# Patient Record
Sex: MALE | Race: WHITE | Employment: OTHER | URBAN - METROPOLITAN AREA
[De-identification: names, ages, dates, MRNs, and addresses within clinical notes are randomized per-mention and may not be internally consistent; named-entity substitution may affect disease eponyms.]

---

## 2017-03-06 ENCOUNTER — HOSPITAL ENCOUNTER (OUTPATIENT)
Dept: PREADMISSION TESTING | Age: 72
Discharge: HOME | End: 2017-03-06
Payer: MEDICARE

## 2017-03-06 VITALS
BODY MASS INDEX: 26.96 KG/M2 | WEIGHT: 182 LBS | TEMPERATURE: 97.7 F | SYSTOLIC BLOOD PRESSURE: 125 MMHG | HEIGHT: 69 IN | HEART RATE: 54 BPM | DIASTOLIC BLOOD PRESSURE: 76 MMHG | RESPIRATION RATE: 18 BRPM

## 2017-03-06 LAB
ABO + RH BLD: NORMAL
ANION GAP BLD CALC-SCNC: 6 MMOL/L (ref 5–15)
APPEARANCE UR: CLEAR
BACTERIA URNS QL MICRO: NEGATIVE /HPF
BILIRUB UR QL: NEGATIVE
BLOOD GROUP ANTIBODIES SERPL: NORMAL
BUN SERPL-MCNC: 23 MG/DL (ref 6–20)
BUN/CREAT SERPL: 18 (ref 12–20)
CALCIUM SERPL-MCNC: 9.3 MG/DL (ref 8.5–10.1)
CHLORIDE SERPL-SCNC: 101 MMOL/L (ref 97–108)
CO2 SERPL-SCNC: 31 MMOL/L (ref 21–32)
COLOR UR: NORMAL
CREAT SERPL-MCNC: 1.26 MG/DL (ref 0.7–1.3)
EPITH CASTS URNS QL MICRO: NORMAL /LPF
ERYTHROCYTE [DISTWIDTH] IN BLOOD BY AUTOMATED COUNT: 13.3 % (ref 11.5–14.5)
EST. AVERAGE GLUCOSE BLD GHB EST-MCNC: 128 MG/DL
GLUCOSE SERPL-MCNC: 93 MG/DL (ref 65–100)
GLUCOSE UR STRIP.AUTO-MCNC: NEGATIVE MG/DL
HBA1C MFR BLD: 6.1 % (ref 4.2–6.3)
HCT VFR BLD AUTO: 50 % (ref 36.6–50.3)
HGB BLD-MCNC: 16.5 G/DL (ref 12.1–17)
HGB UR QL STRIP: NEGATIVE
HYALINE CASTS URNS QL MICRO: NORMAL /LPF (ref 0–5)
INR PPP: 1 (ref 0.9–1.1)
KETONES UR QL STRIP.AUTO: NEGATIVE MG/DL
LEUKOCYTE ESTERASE UR QL STRIP.AUTO: NEGATIVE
MCH RBC QN AUTO: 31.5 PG (ref 26–34)
MCHC RBC AUTO-ENTMCNC: 33 G/DL (ref 30–36.5)
MCV RBC AUTO: 95.4 FL (ref 80–99)
NITRITE UR QL STRIP.AUTO: NEGATIVE
PH UR STRIP: 5 [PH] (ref 5–8)
PLATELET # BLD AUTO: 247 K/UL (ref 150–400)
POTASSIUM SERPL-SCNC: 4.5 MMOL/L (ref 3.5–5.1)
PROT UR STRIP-MCNC: NEGATIVE MG/DL
PROTHROMBIN TIME: 10.4 SEC (ref 9–11.1)
RBC # BLD AUTO: 5.24 M/UL (ref 4.1–5.7)
RBC #/AREA URNS HPF: NORMAL /HPF (ref 0–5)
SODIUM SERPL-SCNC: 138 MMOL/L (ref 136–145)
SP GR UR REFRACTOMETRY: 1.01 (ref 1–1.03)
SPECIMEN EXP DATE BLD: NORMAL
UA: UC IF INDICATED,UAUC: NORMAL
UROBILINOGEN UR QL STRIP.AUTO: 0.2 EU/DL (ref 0.2–1)
WBC # BLD AUTO: 5.8 K/UL (ref 4.1–11.1)
WBC URNS QL MICRO: NORMAL /HPF (ref 0–4)

## 2017-03-06 PROCEDURE — 86900 BLOOD TYPING SEROLOGIC ABO: CPT | Performed by: ORTHOPAEDIC SURGERY

## 2017-03-06 PROCEDURE — 80048 BASIC METABOLIC PNL TOTAL CA: CPT | Performed by: ORTHOPAEDIC SURGERY

## 2017-03-06 PROCEDURE — 85027 COMPLETE CBC AUTOMATED: CPT | Performed by: ORTHOPAEDIC SURGERY

## 2017-03-06 PROCEDURE — 83036 HEMOGLOBIN GLYCOSYLATED A1C: CPT | Performed by: ORTHOPAEDIC SURGERY

## 2017-03-06 PROCEDURE — 36415 COLL VENOUS BLD VENIPUNCTURE: CPT | Performed by: ORTHOPAEDIC SURGERY

## 2017-03-06 PROCEDURE — 85610 PROTHROMBIN TIME: CPT | Performed by: ORTHOPAEDIC SURGERY

## 2017-03-06 PROCEDURE — 81001 URINALYSIS AUTO W/SCOPE: CPT | Performed by: ORTHOPAEDIC SURGERY

## 2017-03-06 RX ORDER — IBUPROFEN 800 MG/1
800 TABLET ORAL
COMMUNITY
End: 2017-04-08

## 2017-03-06 RX ORDER — ATORVASTATIN CALCIUM 40 MG/1
20 TABLET, FILM COATED ORAL
COMMUNITY

## 2017-03-06 RX ORDER — LEVOTHYROXINE SODIUM 200 UG/1
100 TABLET ORAL
COMMUNITY
End: 2020-11-24

## 2017-03-06 RX ORDER — ENALAPRIL MALEATE 10 MG/1
20 TABLET ORAL
COMMUNITY

## 2017-03-06 RX ORDER — DESONIDE 0.5 MG/G
CREAM TOPICAL AS NEEDED
COMMUNITY
End: 2020-11-24

## 2017-03-06 RX ORDER — LEVOTHYROXINE SODIUM 200 UG/1
200 TABLET ORAL
COMMUNITY

## 2017-03-06 RX ORDER — SILDENAFIL CITRATE 20 MG/1
20 TABLET ORAL AS NEEDED
COMMUNITY
End: 2022-10-20

## 2017-03-06 RX ORDER — KETOCONAZOLE 20 MG/G
CREAM TOPICAL AS NEEDED
COMMUNITY
End: 2020-11-24

## 2017-03-06 RX ORDER — EPINEPHRINE 0.3 MG/.3ML
0.3 INJECTION SUBCUTANEOUS
COMMUNITY

## 2017-03-06 NOTE — PERIOP NOTES
Preoperative instructions reviewed with patient. Patient given six packs of CHG wipes. Instructions( reviewed in class) on use of CHG wipes. Patient given SSI infection FAQS sheet, as well as a  MRSA/MSSA treatment instruction sheet  With an explanation to patient that they will be notified if treatment instructions need to be initiated. Patient was given the opportunity to ask questions on the information provided. ORTHOPEDIC PRE-OP ASSESSMENT AND PLANNING FORM SENT FOR SCANNING.

## 2017-03-07 LAB
BACTERIA SPEC CULT: NORMAL
BACTERIA SPEC CULT: NORMAL
SERVICE CMNT-IMP: NORMAL

## 2017-04-05 ENCOUNTER — ANESTHESIA EVENT (OUTPATIENT)
Dept: SURGERY | Age: 72
DRG: 470 | End: 2017-04-05
Payer: MEDICARE

## 2017-04-05 RX ORDER — DEXAMETHASONE SODIUM PHOSPHATE 100 MG/10ML
10 INJECTION INTRAMUSCULAR; INTRAVENOUS ONCE
Status: CANCELLED | OUTPATIENT
Start: 2017-04-05 | End: 2017-04-05

## 2017-04-05 RX ORDER — CEFAZOLIN SODIUM IN 0.9 % NACL 2 G/50 ML
2 INTRAVENOUS SOLUTION, PIGGYBACK (ML) INTRAVENOUS ONCE
Status: CANCELLED | OUTPATIENT
Start: 2017-04-05 | End: 2017-04-05

## 2017-04-05 RX ORDER — PREGABALIN 75 MG/1
75 CAPSULE ORAL ONCE
Status: CANCELLED | OUTPATIENT
Start: 2017-04-05 | End: 2017-04-05

## 2017-04-05 RX ORDER — ACETAMINOPHEN 500 MG
1000 TABLET ORAL ONCE
Status: CANCELLED | OUTPATIENT
Start: 2017-04-05 | End: 2017-04-05

## 2017-04-05 NOTE — H&P
Mr. Rosanna Chin presents for right total knee replacement.  Has significant pain in the right knee at start up and with weightbearing activities.  Mechanical symptoms in the right knee without falls. [facility] now has wakening night pain. [facility] has had corticosteroid injections which provided very short term relief.  Over-the-counter anti-inflammatories at prescription strength doses have not helped. [facility] is very unhappy with progressive pain and dysfunction, poor quality of life. Past Medical History:   Diagnosis Date    Arthritis     CAD (coronary artery disease)     stent x 1 2001 LDA    Chronic lower back pain     Dr John Pike     Chronic pain     right knee    Hypercholesteremia     Hypertension     Hypothyroidism      Past Surgical History:   Procedure Laterality Date    HX CATARACT REMOVAL Right     2/16/17    HX CORONARY STENT PLACEMENT  2000    Dr Jagdish Flores  approx 2010    @ [place]    HX LUMBAR FUSION  2001     L3-L4 fusion    HX TONSILLECTOMY  age 13     No current facility-administered medications on file prior to encounter. Current Outpatient Prescriptions on File Prior to Encounter   Medication Sig Dispense Refill    aspirin 81 mg chewable tablet Take 81 mg by mouth daily.  furosemide (LASIX) 20 mg tablet Take 20 mg by mouth daily. Allergies   Allergen Reactions    Sulfa (Sulfonamide Antibiotics) Other (comments)     Lower extremity redness     Family History   Problem Relation Age of Onset    Hypertension Mother     Heart Disease Father     No Known Problems Sister     No Known Problems Brother     Anesth Problems Neg Hx      Social History     Social History    Marital status:      Spouse name: N/A    Number of children: N/A    Years of education: N/A     Occupational History    Not on file.      Social History Main Topics    Smoking status: Never Smoker    Smokeless tobacco: Never Used    Alcohol use 0.6 oz/week     1 Shots of liquor per week Comment: not weekly, drinks socially    Drug use: No    Sexual activity: Not on file     Other Topics Concern    Not on file     Social History Narrative     ROS:  General Not Present- Chills and Fatigue. Skin Not Present- Bruising, Pallor and Skin Color Changes. Respiratory Not Present- Cough and Difficulty Breathing. Cardiovascular Not Present- Chest Pain, Fainting / Blacking Out and Rapid Heart Rate. Musculoskeletal Present- Joint Pain. Not Present- Decreased Range of Motion and Joint Swelling. Neurological Not Present- Dysesthesia, Paresthesias and Weakness In Extremities. Hematology Not Present- Abnormal Bleeding, Blood Clots and Petechiae. Physical Exam   Appears well  Chest CTA  Heart RRR  abd soft NT  Ambulates with antalgia on the right side.  Moderate varus thrust in the right knee on ambulation.  Pain-free active and passive right hip range of motion.  Negative Stinchfield.  Right knee is in varus with near complete correction to neutral with valgus stress.  Healed arthroscopy portals.  Trace effusion but no warmth.  Tender over medial joint line.  Full active and passive extension with flexion to approximately 95°.  Patella tracks centrally.  Other than pseudo-laxity, no other instability.  No gross motor or sensory deficits distally in lower extremities.  No distal edema.  Symmetrical palpable pulses distally. Prior to X-rays of right knee demonstrate severe medial compartment arthritis with complete loss of medial joint space and tibial erosion. Imp/Plan:  Severe osteoarthritis right knee with progressive symptoms despite comprehensive conservative treatment measures.  Proceed with right total knee replacement.  Discussed risks and benefits in detail as well as anticipated hospital stay and course of rehabilitation.  All questions answered. Plan use IV tranexamic acid intraoperatively, aspirin for DVT prophylaxis.     Elke Flores MD

## 2017-04-06 ENCOUNTER — HOSPITAL ENCOUNTER (INPATIENT)
Age: 72
LOS: 2 days | Discharge: HOME HEALTH | DRG: 470 | End: 2017-04-08
Attending: ORTHOPAEDIC SURGERY | Admitting: ORTHOPAEDIC SURGERY
Payer: MEDICARE

## 2017-04-06 ENCOUNTER — ANESTHESIA (OUTPATIENT)
Dept: SURGERY | Age: 72
DRG: 470 | End: 2017-04-06
Payer: MEDICARE

## 2017-04-06 PROBLEM — M17.10 PRIMARY LOCALIZED OSTEOARTHROSIS OF THE KNEE: Status: ACTIVE | Noted: 2017-04-06

## 2017-04-06 LAB
ABO + RH BLD: NORMAL
BLOOD GROUP ANTIBODIES SERPL: NORMAL
GLUCOSE BLD STRIP.AUTO-MCNC: 94 MG/DL (ref 65–100)
SERVICE CMNT-IMP: NORMAL
SPECIMEN EXP DATE BLD: NORMAL

## 2017-04-06 PROCEDURE — 77030003601 HC NDL NRV BLK BBMI -A

## 2017-04-06 PROCEDURE — 77030018836 HC SOL IRR NACL ICUM -A: Performed by: ORTHOPAEDIC SURGERY

## 2017-04-06 PROCEDURE — C1713 ANCHOR/SCREW BN/BN,TIS/BN: HCPCS | Performed by: ORTHOPAEDIC SURGERY

## 2017-04-06 PROCEDURE — 77030031139 HC SUT VCRL2 J&J -A: Performed by: ORTHOPAEDIC SURGERY

## 2017-04-06 PROCEDURE — 74011250636 HC RX REV CODE- 250/636

## 2017-04-06 PROCEDURE — 77030000032 HC CUF TRNQT ZIMM -B: Performed by: ORTHOPAEDIC SURGERY

## 2017-04-06 PROCEDURE — 74011000258 HC RX REV CODE- 258: Performed by: ORTHOPAEDIC SURGERY

## 2017-04-06 PROCEDURE — 36415 COLL VENOUS BLD VENIPUNCTURE: CPT | Performed by: ORTHOPAEDIC SURGERY

## 2017-04-06 PROCEDURE — 77030011640 HC PAD GRND REM COVD -A: Performed by: ORTHOPAEDIC SURGERY

## 2017-04-06 PROCEDURE — 74011250636 HC RX REV CODE- 250/636: Performed by: ORTHOPAEDIC SURGERY

## 2017-04-06 PROCEDURE — 77030002933 HC SUT MCRYL J&J -A: Performed by: ORTHOPAEDIC SURGERY

## 2017-04-06 PROCEDURE — 76060000036 HC ANESTHESIA 2.5 TO 3 HR: Performed by: ORTHOPAEDIC SURGERY

## 2017-04-06 PROCEDURE — C9290 INJ, BUPIVACAINE LIPOSOME: HCPCS | Performed by: ORTHOPAEDIC SURGERY

## 2017-04-06 PROCEDURE — 97161 PT EVAL LOW COMPLEX 20 MIN: CPT

## 2017-04-06 PROCEDURE — 77030005515 HC CATH URETH FOL14 BARD -B: Performed by: ORTHOPAEDIC SURGERY

## 2017-04-06 PROCEDURE — 77030033067 HC SUT PDO STRATFX SPIR J&J -B: Performed by: ORTHOPAEDIC SURGERY

## 2017-04-06 PROCEDURE — C1776 JOINT DEVICE (IMPLANTABLE): HCPCS | Performed by: ORTHOPAEDIC SURGERY

## 2017-04-06 PROCEDURE — 74011250636 HC RX REV CODE- 250/636: Performed by: ANESTHESIOLOGY

## 2017-04-06 PROCEDURE — 77030007866 HC KT SPN ANES BBMI -B: Performed by: ANESTHESIOLOGY

## 2017-04-06 PROCEDURE — 74011000250 HC RX REV CODE- 250

## 2017-04-06 PROCEDURE — 76010000172 HC OR TIME 2.5 TO 3 HR INTENSV-TIER 1: Performed by: ORTHOPAEDIC SURGERY

## 2017-04-06 PROCEDURE — 65270000029 HC RM PRIVATE

## 2017-04-06 PROCEDURE — 77030020365 HC SOL INJ SOD CL 0.9% 50ML: Performed by: ORTHOPAEDIC SURGERY

## 2017-04-06 PROCEDURE — 77030014077 HC TOWER MX CEM J&J -C: Performed by: ORTHOPAEDIC SURGERY

## 2017-04-06 PROCEDURE — 77030013079 HC BLNKT BAIR HGGR 3M -A: Performed by: ANESTHESIOLOGY

## 2017-04-06 PROCEDURE — 77030016547 HC BLD SAW SAG1 STRY -B: Performed by: ORTHOPAEDIC SURGERY

## 2017-04-06 PROCEDURE — 77030018846 HC SOL IRR STRL H20 ICUM -A: Performed by: ORTHOPAEDIC SURGERY

## 2017-04-06 PROCEDURE — 77030020788: Performed by: ORTHOPAEDIC SURGERY

## 2017-04-06 PROCEDURE — 77030010507 HC ADH SKN DERMBND J&J -B: Performed by: ORTHOPAEDIC SURGERY

## 2017-04-06 PROCEDURE — 86900 BLOOD TYPING SEROLOGIC ABO: CPT | Performed by: ORTHOPAEDIC SURGERY

## 2017-04-06 PROCEDURE — 74011250637 HC RX REV CODE- 250/637: Performed by: ORTHOPAEDIC SURGERY

## 2017-04-06 PROCEDURE — 76210000006 HC OR PH I REC 0.5 TO 1 HR: Performed by: ORTHOPAEDIC SURGERY

## 2017-04-06 PROCEDURE — 82962 GLUCOSE BLOOD TEST: CPT

## 2017-04-06 PROCEDURE — 97116 GAIT TRAINING THERAPY: CPT

## 2017-04-06 PROCEDURE — 77030012935 HC DRSG AQUACEL BMS -B: Performed by: ORTHOPAEDIC SURGERY

## 2017-04-06 PROCEDURE — 74011250636 HC RX REV CODE- 250/636: Performed by: PHYSICIAN ASSISTANT

## 2017-04-06 PROCEDURE — 74011250637 HC RX REV CODE- 250/637: Performed by: PHYSICIAN ASSISTANT

## 2017-04-06 PROCEDURE — 74011000250 HC RX REV CODE- 250: Performed by: ORTHOPAEDIC SURGERY

## 2017-04-06 DEVICE — INSERT TIB RP FEM KNEE CEM: Type: IMPLANTABLE DEVICE | Site: KNEE | Status: FUNCTIONAL

## 2017-04-06 DEVICE — BASEPLATE TIB SZ 6 KNEE CEM FIX BEAR ATTUNE: Type: IMPLANTABLE DEVICE | Site: KNEE | Status: FUNCTIONAL

## 2017-04-06 DEVICE — CEMENT BNE GENTAMICIN 40 GM SMARTSET GMV: Type: IMPLANTABLE DEVICE | Site: KNEE | Status: FUNCTIONAL

## 2017-04-06 DEVICE — COMPONENT PAT DIA38MM POLYETH DOME CEM MEDIALIZED ATTUNE: Type: IMPLANTABLE DEVICE | Site: KNEE | Status: FUNCTIONAL

## 2017-04-06 DEVICE — INSERT TIB SZ 6 THK10MM KNEE POST STBL FIX BEAR ATTUNE: Type: IMPLANTABLE DEVICE | Site: KNEE | Status: FUNCTIONAL

## 2017-04-06 DEVICE — COMPONENT FEM SZ 6 R KNEE POST STBL CEM ATTUNE: Type: IMPLANTABLE DEVICE | Site: KNEE | Status: FUNCTIONAL

## 2017-04-06 RX ORDER — POLYETHYLENE GLYCOL 3350 17 G/17G
17 POWDER, FOR SOLUTION ORAL DAILY
Status: DISCONTINUED | OUTPATIENT
Start: 2017-04-06 | End: 2017-04-08 | Stop reason: HOSPADM

## 2017-04-06 RX ORDER — SODIUM CHLORIDE 9 MG/ML
25 INJECTION, SOLUTION INTRAVENOUS CONTINUOUS
Status: DISCONTINUED | OUTPATIENT
Start: 2017-04-06 | End: 2017-04-06 | Stop reason: HOSPADM

## 2017-04-06 RX ORDER — ONDANSETRON 2 MG/ML
4 INJECTION INTRAMUSCULAR; INTRAVENOUS AS NEEDED
Status: DISCONTINUED | OUTPATIENT
Start: 2017-04-06 | End: 2017-04-06 | Stop reason: HOSPADM

## 2017-04-06 RX ORDER — DIPHENHYDRAMINE HYDROCHLORIDE 50 MG/ML
12.5 INJECTION, SOLUTION INTRAMUSCULAR; INTRAVENOUS AS NEEDED
Status: DISCONTINUED | OUTPATIENT
Start: 2017-04-06 | End: 2017-04-06 | Stop reason: HOSPADM

## 2017-04-06 RX ORDER — MIDAZOLAM HYDROCHLORIDE 1 MG/ML
0.5 INJECTION, SOLUTION INTRAMUSCULAR; INTRAVENOUS
Status: DISCONTINUED | OUTPATIENT
Start: 2017-04-06 | End: 2017-04-06 | Stop reason: HOSPADM

## 2017-04-06 RX ORDER — OXYCODONE HYDROCHLORIDE 5 MG/1
10 TABLET ORAL
Status: DISCONTINUED | OUTPATIENT
Start: 2017-04-06 | End: 2017-04-08 | Stop reason: HOSPADM

## 2017-04-06 RX ORDER — SODIUM CHLORIDE, SODIUM LACTATE, POTASSIUM CHLORIDE, CALCIUM CHLORIDE 600; 310; 30; 20 MG/100ML; MG/100ML; MG/100ML; MG/100ML
1000 INJECTION, SOLUTION INTRAVENOUS CONTINUOUS
Status: DISCONTINUED | OUTPATIENT
Start: 2017-04-06 | End: 2017-04-06 | Stop reason: HOSPADM

## 2017-04-06 RX ORDER — SODIUM CHLORIDE 0.9 % (FLUSH) 0.9 %
5-10 SYRINGE (ML) INJECTION AS NEEDED
Status: DISCONTINUED | OUTPATIENT
Start: 2017-04-06 | End: 2017-04-08 | Stop reason: HOSPADM

## 2017-04-06 RX ORDER — SODIUM CHLORIDE, SODIUM LACTATE, POTASSIUM CHLORIDE, CALCIUM CHLORIDE 600; 310; 30; 20 MG/100ML; MG/100ML; MG/100ML; MG/100ML
INJECTION, SOLUTION INTRAVENOUS
Status: DISCONTINUED | OUTPATIENT
Start: 2017-04-06 | End: 2017-04-06 | Stop reason: HOSPADM

## 2017-04-06 RX ORDER — CEFAZOLIN SODIUM IN 0.9 % NACL 2 G/50 ML
2 INTRAVENOUS SOLUTION, PIGGYBACK (ML) INTRAVENOUS
Status: COMPLETED | OUTPATIENT
Start: 2017-04-06 | End: 2017-04-06

## 2017-04-06 RX ORDER — MIDAZOLAM HYDROCHLORIDE 1 MG/ML
INJECTION, SOLUTION INTRAMUSCULAR; INTRAVENOUS AS NEEDED
Status: DISCONTINUED | OUTPATIENT
Start: 2017-04-06 | End: 2017-04-06 | Stop reason: HOSPADM

## 2017-04-06 RX ORDER — OXYCODONE HYDROCHLORIDE 5 MG/1
5 TABLET ORAL
Status: DISCONTINUED | OUTPATIENT
Start: 2017-04-06 | End: 2017-04-08 | Stop reason: HOSPADM

## 2017-04-06 RX ORDER — ONDANSETRON 2 MG/ML
INJECTION INTRAMUSCULAR; INTRAVENOUS AS NEEDED
Status: DISCONTINUED | OUTPATIENT
Start: 2017-04-06 | End: 2017-04-06 | Stop reason: HOSPADM

## 2017-04-06 RX ORDER — FENTANYL CITRATE 50 UG/ML
25 INJECTION, SOLUTION INTRAMUSCULAR; INTRAVENOUS
Status: DISCONTINUED | OUTPATIENT
Start: 2017-04-06 | End: 2017-04-06 | Stop reason: HOSPADM

## 2017-04-06 RX ORDER — EPINEPHRINE 1 MG/ML
0.5 INJECTION INTRAMUSCULAR; INTRAVENOUS; SUBCUTANEOUS AS NEEDED
Status: DISCONTINUED | OUTPATIENT
Start: 2017-04-06 | End: 2017-04-08 | Stop reason: HOSPADM

## 2017-04-06 RX ORDER — DEXAMETHASONE SODIUM PHOSPHATE 4 MG/ML
INJECTION, SOLUTION INTRA-ARTICULAR; INTRALESIONAL; INTRAMUSCULAR; INTRAVENOUS; SOFT TISSUE AS NEEDED
Status: DISCONTINUED | OUTPATIENT
Start: 2017-04-06 | End: 2017-04-06 | Stop reason: HOSPADM

## 2017-04-06 RX ORDER — HYDROCORTISONE 1 %
CREAM (GRAM) TOPICAL
Status: DISCONTINUED | OUTPATIENT
Start: 2017-04-06 | End: 2017-04-08 | Stop reason: HOSPADM

## 2017-04-06 RX ORDER — HYDROXYZINE HYDROCHLORIDE 10 MG/1
10 TABLET, FILM COATED ORAL
Status: DISCONTINUED | OUTPATIENT
Start: 2017-04-06 | End: 2017-04-08 | Stop reason: HOSPADM

## 2017-04-06 RX ORDER — ACETAMINOPHEN 500 MG
1000 TABLET ORAL ONCE
Status: COMPLETED | OUTPATIENT
Start: 2017-04-06 | End: 2017-04-06

## 2017-04-06 RX ORDER — SODIUM CHLORIDE 9 MG/ML
125 INJECTION, SOLUTION INTRAVENOUS CONTINUOUS
Status: DISPENSED | OUTPATIENT
Start: 2017-04-06 | End: 2017-04-07

## 2017-04-06 RX ORDER — FENTANYL CITRATE 50 UG/ML
50 INJECTION, SOLUTION INTRAMUSCULAR; INTRAVENOUS AS NEEDED
Status: DISCONTINUED | OUTPATIENT
Start: 2017-04-06 | End: 2017-04-06 | Stop reason: HOSPADM

## 2017-04-06 RX ORDER — FUROSEMIDE 20 MG/1
20 TABLET ORAL DAILY
Status: DISCONTINUED | OUTPATIENT
Start: 2017-04-06 | End: 2017-04-08 | Stop reason: HOSPADM

## 2017-04-06 RX ORDER — LEVOTHYROXINE SODIUM 200 UG/1
200 TABLET ORAL
Status: DISCONTINUED | OUTPATIENT
Start: 2017-04-07 | End: 2017-04-08 | Stop reason: HOSPADM

## 2017-04-06 RX ORDER — GLYCOPYRROLATE 0.2 MG/ML
0.2 INJECTION INTRAMUSCULAR; INTRAVENOUS
Status: DISCONTINUED | OUTPATIENT
Start: 2017-04-06 | End: 2017-04-06 | Stop reason: HOSPADM

## 2017-04-06 RX ORDER — AMOXICILLIN 250 MG
1 CAPSULE ORAL 2 TIMES DAILY
Status: DISCONTINUED | OUTPATIENT
Start: 2017-04-06 | End: 2017-04-08 | Stop reason: HOSPADM

## 2017-04-06 RX ORDER — PROPOFOL 10 MG/ML
INJECTION, EMULSION INTRAVENOUS
Status: DISCONTINUED | OUTPATIENT
Start: 2017-04-06 | End: 2017-04-06 | Stop reason: HOSPADM

## 2017-04-06 RX ORDER — PREGABALIN 75 MG/1
75 CAPSULE ORAL ONCE
Status: COMPLETED | OUTPATIENT
Start: 2017-04-06 | End: 2017-04-06

## 2017-04-06 RX ORDER — ALBUTEROL SULFATE 0.83 MG/ML
2.5 SOLUTION RESPIRATORY (INHALATION) AS NEEDED
Status: DISCONTINUED | OUTPATIENT
Start: 2017-04-06 | End: 2017-04-06 | Stop reason: HOSPADM

## 2017-04-06 RX ORDER — ENALAPRIL MALEATE 5 MG/1
10 TABLET ORAL
Status: DISCONTINUED | OUTPATIENT
Start: 2017-04-06 | End: 2017-04-08 | Stop reason: HOSPADM

## 2017-04-06 RX ORDER — HYDROMORPHONE HYDROCHLORIDE 1 MG/ML
0.2 INJECTION, SOLUTION INTRAMUSCULAR; INTRAVENOUS; SUBCUTANEOUS
Status: DISCONTINUED | OUTPATIENT
Start: 2017-04-06 | End: 2017-04-06 | Stop reason: HOSPADM

## 2017-04-06 RX ORDER — KETOROLAC TROMETHAMINE 30 MG/ML
15 INJECTION, SOLUTION INTRAMUSCULAR; INTRAVENOUS EVERY 6 HOURS
Status: COMPLETED | OUTPATIENT
Start: 2017-04-06 | End: 2017-04-07

## 2017-04-06 RX ORDER — ASPIRIN 325 MG
325 TABLET, DELAYED RELEASE (ENTERIC COATED) ORAL 2 TIMES DAILY
Status: DISCONTINUED | OUTPATIENT
Start: 2017-04-06 | End: 2017-04-08 | Stop reason: HOSPADM

## 2017-04-06 RX ORDER — FACIAL-BODY WIPES
10 EACH TOPICAL DAILY PRN
Status: DISCONTINUED | OUTPATIENT
Start: 2017-04-08 | End: 2017-04-08 | Stop reason: HOSPADM

## 2017-04-06 RX ORDER — MORPHINE SULFATE 10 MG/ML
2 INJECTION, SOLUTION INTRAMUSCULAR; INTRAVENOUS
Status: DISCONTINUED | OUTPATIENT
Start: 2017-04-06 | End: 2017-04-06 | Stop reason: HOSPADM

## 2017-04-06 RX ORDER — ONDANSETRON 2 MG/ML
4 INJECTION INTRAMUSCULAR; INTRAVENOUS
Status: ACTIVE | OUTPATIENT
Start: 2017-04-06 | End: 2017-04-07

## 2017-04-06 RX ORDER — NALOXONE HYDROCHLORIDE 0.4 MG/ML
0.4 INJECTION, SOLUTION INTRAMUSCULAR; INTRAVENOUS; SUBCUTANEOUS AS NEEDED
Status: DISCONTINUED | OUTPATIENT
Start: 2017-04-06 | End: 2017-04-08 | Stop reason: HOSPADM

## 2017-04-06 RX ORDER — TRANEXAMIC ACID 100 MG/ML
INJECTION, SOLUTION INTRAVENOUS AS NEEDED
Status: DISCONTINUED | OUTPATIENT
Start: 2017-04-06 | End: 2017-04-06 | Stop reason: HOSPADM

## 2017-04-06 RX ORDER — LIDOCAINE HYDROCHLORIDE 10 MG/ML
0.1 INJECTION, SOLUTION EPIDURAL; INFILTRATION; INTRACAUDAL; PERINEURAL AS NEEDED
Status: DISCONTINUED | OUTPATIENT
Start: 2017-04-06 | End: 2017-04-06 | Stop reason: HOSPADM

## 2017-04-06 RX ORDER — ACETAMINOPHEN 500 MG
500 TABLET ORAL
Status: DISCONTINUED | OUTPATIENT
Start: 2017-04-06 | End: 2017-04-08 | Stop reason: HOSPADM

## 2017-04-06 RX ORDER — SAW PALMETTO 160 MG
CAPSULE ORAL
COMMUNITY
End: 2017-04-08

## 2017-04-06 RX ORDER — ATORVASTATIN CALCIUM 20 MG/1
20 TABLET, FILM COATED ORAL
Status: DISCONTINUED | OUTPATIENT
Start: 2017-04-06 | End: 2017-04-08 | Stop reason: HOSPADM

## 2017-04-06 RX ORDER — BUPIVACAINE HYDROCHLORIDE 7.5 MG/ML
INJECTION, SOLUTION EPIDURAL; RETROBULBAR AS NEEDED
Status: DISCONTINUED | OUTPATIENT
Start: 2017-04-06 | End: 2017-04-06 | Stop reason: HOSPADM

## 2017-04-06 RX ORDER — DEXAMETHASONE SODIUM PHOSPHATE 100 MG/10ML
10 INJECTION INTRAMUSCULAR; INTRAVENOUS ONCE
Status: DISCONTINUED | OUTPATIENT
Start: 2017-04-06 | End: 2017-04-06 | Stop reason: HOSPADM

## 2017-04-06 RX ORDER — HYDROCODONE BITARTRATE AND ACETAMINOPHEN 5; 325 MG/1; MG/1
1 TABLET ORAL AS NEEDED
Status: DISCONTINUED | OUTPATIENT
Start: 2017-04-06 | End: 2017-04-06 | Stop reason: HOSPADM

## 2017-04-06 RX ORDER — HYDROMORPHONE HYDROCHLORIDE 1 MG/ML
0.5 INJECTION, SOLUTION INTRAMUSCULAR; INTRAVENOUS; SUBCUTANEOUS
Status: ACTIVE | OUTPATIENT
Start: 2017-04-06 | End: 2017-04-07

## 2017-04-06 RX ORDER — ROPIVACAINE HYDROCHLORIDE 5 MG/ML
30 INJECTION, SOLUTION EPIDURAL; INFILTRATION; PERINEURAL AS NEEDED
Status: DISCONTINUED | OUTPATIENT
Start: 2017-04-06 | End: 2017-04-06 | Stop reason: HOSPADM

## 2017-04-06 RX ORDER — MIDAZOLAM HYDROCHLORIDE 1 MG/ML
1 INJECTION, SOLUTION INTRAMUSCULAR; INTRAVENOUS AS NEEDED
Status: DISCONTINUED | OUTPATIENT
Start: 2017-04-06 | End: 2017-04-06 | Stop reason: HOSPADM

## 2017-04-06 RX ORDER — DEXAMETHASONE SODIUM PHOSPHATE 4 MG/ML
10 INJECTION, SOLUTION INTRA-ARTICULAR; INTRALESIONAL; INTRAMUSCULAR; INTRAVENOUS; SOFT TISSUE ONCE
Status: COMPLETED | OUTPATIENT
Start: 2017-04-07 | End: 2017-04-07

## 2017-04-06 RX ORDER — CEFAZOLIN SODIUM IN 0.9 % NACL 2 G/50 ML
2 INTRAVENOUS SOLUTION, PIGGYBACK (ML) INTRAVENOUS EVERY 8 HOURS
Status: COMPLETED | OUTPATIENT
Start: 2017-04-06 | End: 2017-04-07

## 2017-04-06 RX ORDER — SODIUM CHLORIDE 0.9 % (FLUSH) 0.9 %
5-10 SYRINGE (ML) INJECTION EVERY 8 HOURS
Status: DISCONTINUED | OUTPATIENT
Start: 2017-04-07 | End: 2017-04-08 | Stop reason: HOSPADM

## 2017-04-06 RX ORDER — KETOCONAZOLE 20 MG/G
CREAM TOPICAL AS NEEDED
Status: DISCONTINUED | OUTPATIENT
Start: 2017-04-06 | End: 2017-04-08 | Stop reason: HOSPADM

## 2017-04-06 RX ADMIN — CEFAZOLIN 2 G: 1 INJECTION, POWDER, FOR SOLUTION INTRAMUSCULAR; INTRAVENOUS; PARENTERAL at 08:16

## 2017-04-06 RX ADMIN — ENALAPRIL MALEATE 10 MG: 5 TABLET ORAL at 23:09

## 2017-04-06 RX ADMIN — POLYETHYLENE GLYCOL 3350 17 G: 17 POWDER, FOR SOLUTION ORAL at 12:37

## 2017-04-06 RX ADMIN — PREGABALIN 75 MG: 75 CAPSULE ORAL at 06:54

## 2017-04-06 RX ADMIN — ACETAMINOPHEN 1000 MG: 500 TABLET, FILM COATED ORAL at 06:55

## 2017-04-06 RX ADMIN — MIDAZOLAM HYDROCHLORIDE 1 MG: 1 INJECTION, SOLUTION INTRAMUSCULAR; INTRAVENOUS at 08:01

## 2017-04-06 RX ADMIN — PROPOFOL 25 MCG/KG/MIN: 10 INJECTION, EMULSION INTRAVENOUS at 08:15

## 2017-04-06 RX ADMIN — ONDANSETRON 4 MG: 2 INJECTION INTRAMUSCULAR; INTRAVENOUS at 10:13

## 2017-04-06 RX ADMIN — ASPIRIN 325 MG: 325 TABLET, DELAYED RELEASE ORAL at 12:36

## 2017-04-06 RX ADMIN — KETOROLAC TROMETHAMINE 15 MG: 30 INJECTION, SOLUTION INTRAMUSCULAR at 18:00

## 2017-04-06 RX ADMIN — SODIUM CHLORIDE 125 ML/HR: 900 INJECTION, SOLUTION INTRAVENOUS at 20:23

## 2017-04-06 RX ADMIN — SODIUM CHLORIDE, SODIUM LACTATE, POTASSIUM CHLORIDE, CALCIUM CHLORIDE: 600; 310; 30; 20 INJECTION, SOLUTION INTRAVENOUS at 07:55

## 2017-04-06 RX ADMIN — KETOROLAC TROMETHAMINE 15 MG: 30 INJECTION, SOLUTION INTRAMUSCULAR at 12:37

## 2017-04-06 RX ADMIN — CEFAZOLIN 2 G: 1 INJECTION, POWDER, FOR SOLUTION INTRAMUSCULAR; INTRAVENOUS; PARENTERAL at 16:57

## 2017-04-06 RX ADMIN — ACETAMINOPHEN 500 MG: 500 TABLET, FILM COATED ORAL at 17:00

## 2017-04-06 RX ADMIN — DEXAMETHASONE SODIUM PHOSPHATE 10 MG: 4 INJECTION, SOLUTION INTRA-ARTICULAR; INTRALESIONAL; INTRAMUSCULAR; INTRAVENOUS; SOFT TISSUE at 08:32

## 2017-04-06 RX ADMIN — SODIUM CHLORIDE, SODIUM LACTATE, POTASSIUM CHLORIDE, AND CALCIUM CHLORIDE 1000 ML: 600; 310; 30; 20 INJECTION, SOLUTION INTRAVENOUS at 06:54

## 2017-04-06 RX ADMIN — MIDAZOLAM HYDROCHLORIDE 4 MG: 1 INJECTION, SOLUTION INTRAMUSCULAR; INTRAVENOUS at 07:44

## 2017-04-06 RX ADMIN — ACETAMINOPHEN 500 MG: 500 TABLET, FILM COATED ORAL at 23:09

## 2017-04-06 RX ADMIN — FENTANYL CITRATE 50 MCG: 50 INJECTION, SOLUTION INTRAMUSCULAR; INTRAVENOUS at 07:44

## 2017-04-06 RX ADMIN — SODIUM CHLORIDE, SODIUM LACTATE, POTASSIUM CHLORIDE, CALCIUM CHLORIDE: 600; 310; 30; 20 INJECTION, SOLUTION INTRAVENOUS at 08:50

## 2017-04-06 RX ADMIN — ATORVASTATIN CALCIUM 20 MG: 20 TABLET, FILM COATED ORAL at 23:10

## 2017-04-06 RX ADMIN — MIDAZOLAM HYDROCHLORIDE 1 MG: 1 INJECTION, SOLUTION INTRAMUSCULAR; INTRAVENOUS at 09:52

## 2017-04-06 RX ADMIN — SODIUM CHLORIDE 125 ML/HR: 900 INJECTION, SOLUTION INTRAVENOUS at 11:00

## 2017-04-06 RX ADMIN — PROPOFOL 25 MCG/KG/MIN: 10 INJECTION, EMULSION INTRAVENOUS at 08:16

## 2017-04-06 RX ADMIN — BUPIVACAINE HYDROCHLORIDE 12 MG: 7.5 INJECTION, SOLUTION EPIDURAL; RETROBULBAR at 08:11

## 2017-04-06 RX ADMIN — DOCUSATE SODIUM AND SENNOSIDES 1 TABLET: 8.6; 5 TABLET, FILM COATED ORAL at 17:00

## 2017-04-06 RX ADMIN — KETOROLAC TROMETHAMINE 15 MG: 30 INJECTION, SOLUTION INTRAMUSCULAR at 23:10

## 2017-04-06 RX ADMIN — ASPIRIN 325 MG: 325 TABLET, DELAYED RELEASE ORAL at 20:22

## 2017-04-06 RX ADMIN — DOCUSATE SODIUM AND SENNOSIDES 1 TABLET: 8.6; 5 TABLET, FILM COATED ORAL at 12:36

## 2017-04-06 NOTE — IP AVS SNAPSHOT
[address] [place] [address] 
[phone] Patient: Jigar Malcolm MRN: [id number] :1945 You are allergic to the following Allergen Reactions Wasp (Venom-Wasp) Anaphylaxis Swelling Sulfa (Sulfonamide Antibiotics) Other (comments) Lower extremity redness Recent Documentation Height Weight BMI Smoking Status 1.74 m 82 kg 27.09 kg/m2 Never Smoker Emergency Contacts Name Discharge Info Relation Home Work Mobile Charisse Hernandez [3] [phone] About your hospitalization You were admitted on:  2017 You last received care in the:  [facility] You were discharged on:  2017 Unit phone number:  [phone] Why you were hospitalized Your primary diagnosis was:  Not on File Your diagnoses also included:  Primary Localized Osteoarthrosis Of The Knee Providers Seen During Your Hospitalizations Provider Role Specialty Primary office phone Nabeel Delgado MD Attending Provider Orthopedic Surgery [phone] Your Primary Care Physician (PCP) Primary Care Physician Office Phone Office Fax [address] [phone] Follow-up Information Follow up With Details Comments Contact Info Orly Hancock MD   [address] [place] [address] 
[phone]  [facility], please call office if you have not heard from a staff member first full day after discharge. [address] [facility] [address] 
[phone] Current Discharge Medication List  
  
START taking these medications Dose & Instructions Dispensing Information Comments Morning Noon Evening Bedtime  
 acetaminophen 500 mg tablet Commonly known as:  TYLENOL Your last dose was: Your next dose is: Dose:  500 mg Take 1 Tab by mouth every four (4) hours (while awake). Indications: Pain Quantity:  100 Tab Refills:  0  
     
   
   
   
  
 aspirin delayed-release 325 mg tablet Replaces:  aspirin 81 mg chewable tablet Your last dose was: Your next dose is:    
   
   
 Dose:  325 mg Take 1 Tab by mouth two (2) times a day. Quantity:  60 Tab Refills:  0  
     
   
   
   
  
 oxyCODONE IR 5 mg immediate release tablet Commonly known as:  Liza Melissa Your last dose was: Your next dose is:    
   
   
 Dose:  5-10 mg Take 1-2 Tabs by mouth every four (4) hours as needed for Pain. Max Daily Amount: 60 mg.  
 Quantity:  90 Tab Refills:  0  
     
   
   
   
  
 senna-docusate 8.6-50 mg per tablet Commonly known as:  Mecca Romero Your last dose was: Your next dose is:    
   
   
 Dose:  1 Tab Take 1 Tab by mouth two (2) times a day. Indications: Constipation Quantity:  60 Tab Refills:  0 CONTINUE these medications which have NOT CHANGED Dose & Instructions Dispensing Information Comments Morning Noon Evening Bedtime  
 desonide 0.05 % cream  
Commonly known as:  Randy Alston Your last dose was: Your next dose is:    
   
   
 Apply  to affected area as needed for Skin Irritation. Apply to buttocks as needed Refills:  0  
     
   
   
   
  
 enalapril 10 mg tablet Commonly known as:  Floridalma Leaf Your last dose was: Your next dose is:    
   
   
 Dose:  10 mg Take 10 mg by mouth nightly. Refills:  0 EPINEPHrine 0.3 mg/0.3 mL injection Commonly known as:  Tobi Bachelor Your last dose was: Your next dose is:    
   
   
 Dose:  0.3 mg  
0.3 mg by IntraMUSCular route once as needed. Refills:  0  
     
   
   
   
  
 furosemide 20 mg tablet Commonly known as:  LASIX Your last dose was: Your next dose is: Dose:  20 mg Take 20 mg by mouth daily. Refills:  0  
     
   
   
   
  
 ketoconazole 2 % topical cream  
Commonly known as:  NIZORAL Your last dose was: Your next dose is:    
   
   
 Apply  to affected area as needed for Skin Irritation. Apply to affected skin prn Refills:  0 LIPITOR 40 mg tablet Generic drug:  atorvastatin Your last dose was: Your next dose is:    
   
   
 Dose:  20 mg Take 20 mg by mouth nightly. Refills:  0  
     
   
   
   
  
 sildenafil 20 mg tablet Commonly known as:  REVATIO Your last dose was: Your next dose is:    
   
   
 Dose:  20 mg Take 20 mg by mouth three (3) times daily. Refills:  0  
     
   
   
   
  
 * SYNTHROID 200 mcg tablet Generic drug:  levothyroxine Your last dose was: Your next dose is:    
   
   
 Dose:  200 mcg Take 200 mcg by mouth. Daily 6 days a week, Mon, Tues, Wed, Thurs, Fri ,Sun Refills:  0  
     
   
   
   
  
 * SYNTHROID 200 mcg tablet Generic drug:  levothyroxine Your last dose was: Your next dose is:    
   
   
 Dose:  100 mcg Take 100 mcg by mouth. on Saturday  (1/2 of 200mcg tab) Refills:  0  
     
   
   
   
  
 * Notice: This list has 2 medication(s) that are the same as other medications prescribed for you. Read the directions carefully, and ask your doctor or other care provider to review them with you. STOP taking these medications   
 aspirin 81 mg chewable tablet Replaced by:  aspirin delayed-release 325 mg tablet GARCINIA CAMBOGIA 200-500 mcg-mg Tab Generic drug:  chromium-brindal berry  
   
  
 ibuprofen 800 mg tablet Commonly known as:  MOTRIN  
   
  
 saw palmetto 160 mg Cap Where to Get Your Medications Information on where to get these meds will be given to you by the nurse or doctor. ! Ask your nurse or doctor about these medications acetaminophen 500 mg tablet  
 aspirin delayed-release 325 mg tablet  
 oxyCODONE IR 5 mg immediate release tablet  
 senna-docusate 8.6-50 mg per tablet Discharge Instructions Patient meets criteria for BUNDLED PAYMENT  
for Care Improvement Initiative Criteria Contact Information for Orthopedic Nurse Navigator:     
ARYA Arita, RN-BC 
N:[phone] P:[phone] K:[phone] After Hospital Care Plan:  Discharge Instructions Knee Replacement- Dr. Alvaro Valdez Patient Name: Rafita Simmons Date of procedure: 4/6/2017 Procedure: Procedure(s): RIGHT TOTAL KNEE ARTHROPLASTY (SPINAL WITH IV SEDATION) Surgeon: Surgeon(s) and Role: Sue Hoff MD - Primary PCP: Kassy Dumont MD 
Date of discharge: No discharge date for patient encounter. Follow up appointments ? Follow up with Dr. Alvaro Valdez in 4 weeks. Call [phone] to make an appointment. ? If home health has been arranged for you the agency will contact you to arrange dates/times for visits. Please call them if you do not hear from them within 24 hours after you are discharged When to call your Orthopaedic Surgeon: Call [phone]. If you call after 5pm or on a weekend, the on call physician will be contacted ? Unrelieved pain ? Signs of infection-if your incision is red; continues to have drainage; drainage has a foul odor or if you have a persistent fever over 101 degrees ? Signs of a blood clot in your leg-calf pain, tenderness, redness, swelling of lower leg When to call your Primary Care Physician: 
? Concerns about medical conditions such as diabetes, high blood pressure, asthma, congestive heart failure ? Call if blood sugars are elevated, persistent headache or dizziness, coughing or congestion, constipation or diarrhea, burning with urination, abnormal heart rate When to call [id number] go to the nearest emergency room ? Acute onset of chest pain, shortness of breath, difficulty breathing Activity ? Weight bearing as tolerated with walker or crutches. Refer to pages 23-31 of your handbook for instructions and pictures ? Complete your Home Exercise Program daily as instructed by your therapist.  Refer to pages 33-41 of your handbook for instructions and pictures ? Get up every one hour and walk (except at night when sleeping) ? Do not drive or operate heavy machinery Incision Care ? The Aquacel (brown, waterproof) surgical dressing is to remain on your knee for 7 days. On the 7th day have someone gently peel the dressing off by carefully lifting the edge and stretching it slightly to break the adhesive seal 
? If you have steri-strips (small, white pieces of tape) on your incision, they may come off when you remove the Aquacel surgical dressing. This is okay. You may now leave your incision open to air ? If your Aquacel dressing comes loose/off before the 7th day, you may replace it with a dry sterile gauze dressing; change it daily. Once you incision is not draining, you may leave it open to air ? You may take a shower with the Aquacel dressing in place. Once the Aquacel is removed, you may shower and get your incision wet but do not submerge your incision under water in a bath tub, hot tub or swimming pool for 6 weeks after surgery. Preventing blood clots ? Take Enteric coated Aspirin (delayed release) one tablet twice a day for one month following surgery Pain management ? Take pain medication as prescribed; decrease the amount you use as your pain lessens ? Avoid alcoholic beverages while taking pain medication ? Please be aware that many medications contain Tylenol. We do not want you to over medicate so please read the information below as a guide. Do not take more than 4 Grams of Tylenol in a 24 hour period. (There are 1000 milligrams in one Gram) o Tylenol 325 mg per tablet (do not take more than 12 tablets in 24 hours) o Tylenol 500 mg per tablet (do not take more than 8 tablets in 24 hours) o Tylenol 650 mg per tablet (do not take more than 6 tablets in 24 hours) ? You may place an ice bag on your knee for 15-20 minutes after exercising Diet ? Resume usual diet; drink plenty of fluids; eat foods high in fiber ? You may want to take a stool softener (such as Senokot-S or Colace) to prevent constipation while you are taking pain medication. If constipation occurs, take a laxative (such as Dulcolax tablets, Milk of Magnesia, or a suppository) Home Health Care Protocol (to be followed by [address]) Nursing-per physicians order ? Complete head to toe assessment, vital signs ? Medication reconciliation ? Review pain management ? Manage chronic medical conditions Physical Therapy-per physicians order Weight bearing status: 
Precautions at Admission: Fall, WBAT Right Side Weight Bearing: As tolerated Mobility Status: 
Supine to Sit: Modified independent Sit to Stand: Supervision Sit to Supine: Supervision Gait: 
Distance (ft): 100 Feet (ft) Ambulation - Level of Assistance: Supervision Assistive Device: Walker, rolling, Gait belt Gait Abnormalities: Antalgic, Decreased step clearance ADL status overall composite: 
  
  
  
  
 
Physical Therapy ? Assessment and evaluation-bed mobility; functional transfers (bed, chair, bathroom, stairs); ambulation with equipment, car transfers, shower transfers, safety and ability to get out of house in the event of an emergency ? Review weight bearing as tolerated, wean from walker or crutches as tolerated ? Discuss pain management ? Review how to do ADLs. Refer to page 42 of patient handbook Home Exercise Program-refer to pages 33-41 of patient handbook for exercises. Discharge Orders None [facility] Announcement We are excited to announce that we are making your provider's discharge notes available to you in [employer]. You will see these notes when they are completed and signed by the physician that discharged you from your recent hospital stay. If you have any questions or concerns about any information you see in [employer], please call the Health Information Department where you were seen or reach out to your Primary Care Provider for more information about your plan of care. Introducing [facility]! Thelma Pichardo introduces [employer] patient portal. Now you can access parts of your medical record, email your doctor's office, and request medication refills online. 1. In your internet browser, go to [email or web address] 2. Click on the First Time User? Click Here link in the Sign In box. You will see the New Member Sign Up page. 3. Enter your [employer] Access Code exactly as it appears below. You will not need to use this code after youve completed the sign-up process. If you do not sign up before the expiration date, you must request a new code. · [employer] Access Code: [email or web address] Expires: 6/1/2017 12:39 PM 
 
4. Enter the last four digits of your Social Security Number (xxxx) and Date of Birth (mm/dd/yyyy) as indicated and click Submit. You will be taken to the next sign-up page. 5. Create a [employer] ID. This will be your [employer] login ID and cannot be changed, so think of one that is secure and easy to remember. 6. Create a [employer] password. You can change your password at any time. 7. Enter your Password Reset Question and Answer. This can be used at a later time if you forget your password. 8. Enter your e-mail address. You will receive e-mail notification when new information is available in [address] 9. Click Sign Up. You can now view and download portions of your medical record.  
10. Click the Download Summary menu link to download a portable copy of your medical information. If you have questions, please visit the Frequently Asked Questions section of the [employer] website. Remember, MyChart is NOT to be used for urgent needs. For medical emergencies, dial 911. Now available from your iPhone and Android! General Information Please provide this summary of care documentation to your next provider. Patient Signature:  ____________________________________________________________ Date:  ____________________________________________________________  
  
Geraldine Parisian Provider Signature:  ____________________________________________________________ Date:  ____________________________________________________________

## 2017-04-06 NOTE — ANESTHESIA PREPROCEDURE EVALUATION
Anesthetic History   No history of anesthetic complications            Review of Systems / Medical History  Patient summary reviewed, nursing notes reviewed and pertinent labs reviewed    Pulmonary  Within defined limits                 Neuro/Psych   Within defined limits           Cardiovascular    Hypertension          CAD    Exercise tolerance: >4 METS     GI/Hepatic/Renal  Within defined limits              Endo/Other      Hypothyroidism: well controlled  Arthritis     Other Findings              Physical Exam    Airway  Mallampati: II  TM Distance: > 6 cm  Neck ROM: normal range of motion   Mouth opening: Normal     Cardiovascular  Regular rate and rhythm,  S1 and S2 normal,  no murmur, click, rub, or gallop             Dental  No notable dental hx       Pulmonary  Breath sounds clear to auscultation               Abdominal  GI exam deferred       Other Findings            Anesthetic Plan    ASA: 2  Anesthesia type: spinal      Post-op pain plan if not by surgeon: peripheral nerve block single    Induction: Intravenous  Anesthetic plan and risks discussed with: Patient

## 2017-04-06 NOTE — ANESTHESIA POSTPROCEDURE EVALUATION
Post-Anesthesia Evaluation and Assessment    Patient: Matthew Seals MRN: [id number]  SSN: [id number]    YOB: 1945  Age: 73 y.o. Sex: male       Cardiovascular Function/Vital Signs  Visit Vitals    /81 (BP 1 Location: Right arm, BP Patient Position: At rest)    Pulse 62    Temp 36.6 °C (97.9 °F)    Resp 16    Ht 5' 8.5\" (1.74 m)    Wt 81.6 kg (180 lb)    SpO2 97%    BMI 26.97 kg/m2       Patient is status post spinal anesthesia for Procedure(s):  RIGHT TOTAL KNEE ARTHROPLASTY (SPINAL WITH IV SEDATION). Nausea/Vomiting: None    Postoperative hydration reviewed and adequate. Pain:  Pain Scale 1: Numeric (0 - 10) (04/06/17 1241)  Pain Intensity 1: 0 (04/06/17 1241)   Managed    Neurological Status:   Neuro (WDL): Exceptions to WDL (04/06/17 1122)  Neuro  Neurologic State: Alert;Drowsy (04/06/17 1122)  Orientation Level: Oriented X4 (04/06/17 1122)  Cognition: Follows commands (04/06/17 1122)  Speech: Clear (04/06/17 1122)  LUE Motor Response: Purposeful (04/06/17 1122)  LLE Motor Response: Pharmacologically paralyzed;Numbness; No movement to any stimulus (04/06/17 1122)  RUE Motor Response: Purposeful (04/06/17 1122)  RLE Motor Response: No movement to any stimulus; Pharmacologically paralyzed;Numbness (04/06/17 1122)   At baseline    Mental Status and Level of Consciousness: Arousable    Pulmonary Status:   O2 Device: Nasal cannula (04/06/17 1122)   Adequate oxygenation and airway patent    Complications related to anesthesia: None    Post-anesthesia assessment completed.  No concerns    Signed By: Ansley Russell MD     April 6, 2017

## 2017-04-06 NOTE — PROGRESS NOTES
Problem: Mobility Impaired (Adult and Pediatric)  Goal: *Acute Goals and Plan of Care (Insert Text)  Physical Therapy Goals  Initiated 4/6/2017    1. Patient will move from supine to sit and sit to supine in bed with modified independence within 4 days. 2. Patient will perform sit to stand with modified independence within 4 days. 3. Patient will ambulate with modified independence for 250 feet with the least restrictive device within 4 days. 4. Patient will ascend/descend 4 stairs with use of handrail(s) with modified independence within 4 days. 5. Patient will perform home exercise program per protocol with independence within 4 days. 6. Patient will demonstrate AROM 0-90 degrees in operative joint within 4 days. PHYSICAL THERAPY EVALUATION  Patient: Walterine Spatz (89 y.o. male)  Date: 4/6/2017  Primary Diagnosis: PRIMARY LOCALIZED OSTEO ARTHRITIS OF BILATERAL KNEES  Primary localized osteoarthrosis of the knee, right  Procedure(s) (LRB):  RIGHT TOTAL KNEE ARTHROPLASTY (SPINAL WITH IV SEDATION) (Right) Day of Surgery   Precautions:   Fall, WBAT      ASSESSMENT :  Based on the objective data described below, the patient presents with decreased R knee ROM/strength and decreased functional independence compared to his baseline. He was able to ambulate 50 ft with RW, CGA for safety. Encouraged to work on [employer], quad sets and heel slides this evening, but to limit the amount to minimize pain. Ice applied at end of session. Anticipate he will progress well with therapy and should be able to return home with HHPT. Patient will benefit from skilled intervention to address the above impairments.   Patients rehabilitation potential is considered to be Good  Factors which may influence rehabilitation potential include:   [X]         None noted  [ ]         Mental ability/status  [ ]         Medical condition  [ ]         Home/family situation and support systems  [ ]         Safety awareness  [ ]         Pain tolerance/management  [ ]         Other:        PLAN :  Recommendations and Planned Interventions:  [X]           Bed Mobility Training             [ ]    Neuromuscular Re-Education  [X]           Transfer Training                   [ ]    Orthotic/Prosthetic Training  [X]           Gait Training                         [ ]    Modalities  [X]           Therapeutic Exercises           [ ]    Edema Management/Control  [X]           Therapeutic Activities            [X]    Patient and Family Training/Education  [ ]           Other (comment):     Frequency/Duration: Patient will be followed by physical therapy  twice daily to address goals. Discharge Recommendations: Home Health  Further Equipment Recommendations for Discharge: rolling walker       SUBJECTIVE:   Patient stated I am ready to do this.       OBJECTIVE DATA SUMMARY:   HISTORY:    Past Medical History:   Diagnosis Date    Arthritis      CAD (coronary artery disease)       stent x 1 2001 LDA    Chronic lower back pain       Dr Cheryl Sow     Chronic pain       right knee    Hypercholesteremia      Hypertension      Hypothyroidism       Past Surgical History:   Procedure Laterality Date    HX CATARACT REMOVAL Right       2/16/17    HX CORONARY STENT PLACEMENT   2000     Dr [address]   approx 2010     @ [place]    HX LUMBAR FUSION   2001      L3-L4 fusion    HX TONSILLECTOMY   age 13     Prior Level of Function/Home Situation: independent  Personal factors and/or comorbidities impacting plan of care:      Home Situation  Home Environment: Private residence  # Steps to Enter: 0  One/Two Story Residence: One story  Living Alone: No  Support Systems: Spouse/Significant Other/Partner  Patient Expects to be Discharged to[de-identified] Private residence  Current DME Used/Available at Home: None     EXAMINATION/PRESENTATION/DECISION MAKING:   Critical Behavior:  Neurologic State: Alert  Orientation Level: Oriented X4  Cognition: Appropriate decision making, Appropriate for age attention/concentration, Follows commands  Safety/Judgement: Awareness of environment  Hearing: Auditory  Auditory Impairment: None  Hearing Aids/Status: Does not own  Skin:  Appears intact     Range Of Motion:  AROM: Generally decreased, functional           Strength:    Strength: Generally decreased, functional           Tone & Sensation:   Tone: Normal        Sensation: Intact         Coordination:  Coordination: Within functional limits  Vision:      Functional Mobility:  Bed Mobility:     Supine to Sit: Supervision  Sit to Supine: Supervision     Transfers:  Sit to Stand: Stand-by asssistance  Stand to Sit: Stand-by asssistance           Balance:   Sitting: Intact  Standing: Intact; With support  Ambulation/Gait Training:  Distance (ft): 50 Feet (ft)  Assistive Device: Walker, rolling;Gait belt  Ambulation - Level of Assistance: Contact guard assistance     Gait Description (WDL): Exceptions to WDL  Gait Abnormalities: Antalgic;Decreased step clearance  Right Side Weight Bearing: As tolerated        Stance: Right decreased                       Therapeutic Exercises:   Encouraged AP's, quad sets and heel slides     Functional Measure:  Tinetti test:      Sitting Balance: 1  Arises: 1  Attempts to Rise: 2  Immediate Standing Balance: 1  Standing Balance: 1  Nudged: 2  Eyes Closed: 1  Turn 360 Degrees - Continuous/Discontinuous: 1  Turn 360 Degrees - Steady/Unsteady: 1  Sitting Down: 1  Balance Score: 12  Indication of Gait: 1  R Step Length/Height: 1  L Step Length/Height: 1  R Foot Clearance: 1  L Foot Clearance: 1  Step Symmetry: 1  Step Continuity: 1  Path: 1  Trunk: 0  Walking Time: 1  Gait Score: 9  Total Score: 21         Tinetti Test and G-code impairment scale:  Percentage of Impairment CH     0%    CI     1-19% CJ     20-39% CK     40-59% CL     60-79% CM     80-99% CN      100%   Tinetti  Score 0-28 28 23-27 17-22 12-16 6-11 1-5 0          Tinetti Tool Score Risk of Falls  <19 = High Fall Risk  19-24 = Moderate Fall Risk  25-28 = Low Fall Risk  Cristy CHAPPELL. Performance-Oriented Assessment of Mobility Problems in Elderly Patients. [facility] 66; [id number]. (Scoring Description: PT Bulletin Feb. 10, 1993)     Older adults: Stephanie Michel et al, 2009; n = 1000 [facility] elderly evaluated with ABC, MARTIN, ADL, and IADL)  · Mean MARTIN score for males aged 69-68 years = 26.21(3.40)  · Mean MARTIN score for females age 69-68 years = 25.16(4.30)  · Mean MARTIN score for males over 80 years = 23.29(6.02)  · Mean MARTIN score for females over 80 years = 17.20(8.32)         G codes: In compliance with CMSs Claims Based Outcome Reporting, the following G-code set was chosen for this patient based on their primary functional limitation being treated: The outcome measure chosen to determine the severity of the functional limitation was the Tinetti with a score of 21/28 which was correlated with the impairment scale.       · Mobility - Walking and Moving Around:               - CURRENT STATUS:    CJ - 20%-39% impaired, limited or restricted               - GOAL STATUS:           CI - 1%-19% impaired, limited or restricted               - D/C STATUS:                       ---------------To be determined---------------      Physical Therapy Evaluation Charge Determination   History Examination Presentation Decision-Making   MEDIUM  Complexity : 1-2 comorbidities / personal factors will impact the outcome/ POC  MEDIUM Complexity : 3 Standardized tests and measures addressing body structure, function, activity limitation and / or participation in recreation  LOW Complexity : Stable, uncomplicated  Other outcome measures Tinetti  MEDIUM      Based on the above components, the patient evaluation is determined to be of the following complexity level: LOW      Pain:  Pain Scale 1: Numeric (0 - 10)  Pain Intensity 1: 0              Activity Tolerance:   No apparent distress   Please refer to the flowsheet for vital signs taken during this treatment. After treatment:   [ ]         Patient left in no apparent distress sitting up in chair  [X]         Patient left in no apparent distress in bed  [X]         Call bell left within reach  [X]         Nursing notified  [ ]         Caregiver present  [ ]         Bed alarm activated      COMMUNICATION/EDUCATION:   The patients plan of care was discussed with: Registered Nurse.  [X]         Fall prevention education was provided and the patient/caregiver indicated understanding. [X]         Patient/family have participated as able in goal setting and plan of care. [X]         Patient/family agree to work toward stated goals and plan of care. [ ]         Patient understands intent and goals of therapy, but is neutral about his/her participation. [ ]         Patient is unable to participate in goal setting and plan of care.      Thank you for this referral.  Pennie Carlton, PT   Time Calculation: 21 mins

## 2017-04-06 NOTE — ANESTHESIA PROCEDURE NOTES
Spinal Block    Start time: 4/6/2017 8:05 AM  End time: 4/6/2017 8:10 AM  Performed by: Jovanna Chadwick  Authorized by: Jovanna Chadwick     Pre-procedure:   Indications: primary anesthetic  Preanesthetic Checklist: patient identified, risks and benefits discussed, anesthesia consent, site marked, patient being monitored and timeout performed      Spinal Block:   Patient Position:  Seated  Prep Region:  Lumbar  Prep: Betadine      Location:  L2-3  Technique:  Single shot  Local:  Lidocaine 1%  Local Dose (mL):  4    Needle:   Needle Type:  Pencan  Needle Gauge:  24 G  Attempts:  1      Events: CSF confirmed, no blood with aspiration and no paresthesia        Assessment:  Insertion:  Uncomplicated  Patient tolerance:  Patient tolerated the procedure well with no immediate complications

## 2017-04-06 NOTE — BRIEF OP NOTE
BRIEF OPERATIVE NOTE    Date of Procedure: 4/6/2017   Preoperative Diagnosis: PRIMARY LOCALIZED OSTEO ARTHRITIS OF BILATERAL KNEES  Postoperative Diagnosis: PRIMARY LOCALIZED OSTEO ARTHRITIS OF BILATERAL KNEES    Procedure(s):  RIGHT TOTAL KNEE ARTHROPLASTY (SPINAL WITH IV SEDATION)  Surgeon(s) and Role:     * Ruddy Siegel MD - Primary       Physician Assistant: Nisha Duke PA-C    Surgical Staff:  Circ-1: Lewis Wallace RN  Circ-Relief: Yuri Lane  Physician Assistant: Nisha Duke PA-C  Scrub Tech-1: Allan Ashton  Surg Asst-1: Den Carver  Event Time In   Incision Start 2668   Incision Close      Anesthesia: Spinal   Estimated Blood Loss: 100 mL  Specimens: * No specimens in log *   Findings: end-stage djd right knee   Complications: none  Implants:   Implant Name Type Inv.  Item Serial No.  Lot No. LRB No. Used Action   CEMENT BNE GENTAMC MV [id number] -- SMARTSET ENDURANCE - SNA  CEMENT BNE GENTAMC MV 40GM -- SMARTSET ENDURANCE NA [facility] [employer] [id number] Right 1 Implanted   CEMENT BNE GENTAMC MV 40GM -- SMARTSET ENDURANCE - SNA  CEMENT BNE GENTAMC MV 40GM -- SMARTSET ENDURANCE NA [facility] [employer] [id number] Right 1 Implanted   PAT GRETA DOME MEDIAL 38MM -- ATTUNE - SNA  PAT GRETA DOME MEDIAL 38MM -- ATTUNE NA [facility] [employer] [id number] Right 1 Implanted   BASE TIB FB SZ 6 GRETA -- ATTUNE - SNA  BASE TIB FB SZ 6 GRETA -- ATTUNE NA [facility] [employer] [id number] Right 1 Implanted   FEM PS SZ 6 RT GRETA -- ATTUNE - SNA  FEM PS SZ 6 RT GRETA -- ATTUNE NA [facility] [employer] [id number] Right 1 Implanted   INSERT TIB FB PS SZ 6 10MM -- ATTUNE - SNA   INSERT TIB FB PS SZ 6 10MM -- ATTUNE NA [facility] [employer] [id number] Right 1 Implanted

## 2017-04-06 NOTE — IP AVS SNAPSHOT
Current Discharge Medication List  
  
START taking these medications Dose & Instructions Dispensing Information Comments Morning Noon Evening Bedtime  
 acetaminophen 500 mg tablet Commonly known as:  TYLENOL Your last dose was: Your next dose is:    
   
   
 Dose:  500 mg Take 1 Tab by mouth every four (4) hours (while awake). Indications: Pain Quantity:  100 Tab Refills:  0  
     
   
   
   
  
 aspirin delayed-release 325 mg tablet Replaces:  aspirin 81 mg chewable tablet Your last dose was: Your next dose is:    
   
   
 Dose:  325 mg Take 1 Tab by mouth two (2) times a day. Quantity:  60 Tab Refills:  0  
     
   
   
   
  
 oxyCODONE IR 5 mg immediate release tablet Commonly known as:  Carlos Eldridgeto Your last dose was: Your next dose is:    
   
   
 Dose:  5-10 mg Take 1-2 Tabs by mouth every four (4) hours as needed for Pain. Max Daily Amount: 60 mg.  
 Quantity:  90 Tab Refills:  0  
     
   
   
   
  
 senna-docusate 8.6-50 mg per tablet Commonly known as:  Jaren Madi Your last dose was: Your next dose is:    
   
   
 Dose:  1 Tab Take 1 Tab by mouth two (2) times a day. Indications: Constipation Quantity:  60 Tab Refills:  0 CONTINUE these medications which have NOT CHANGED Dose & Instructions Dispensing Information Comments Morning Noon Evening Bedtime  
 desonide 0.05 % cream  
Commonly known as:  Bárbara Holland Your last dose was: Your next dose is:    
   
   
 Apply  to affected area as needed for Skin Irritation. Apply to buttocks as needed Refills:  0  
     
   
   
   
  
 enalapril 10 mg tablet Commonly known as:  Bar Bhatia Your last dose was: Your next dose is:    
   
   
 Dose:  10 mg Take 10 mg by mouth nightly. Refills:  0 EPINEPHrine 0.3 mg/0.3 mL injection Commonly known as:  Divina Godfrey Your last dose was: Your next dose is:    
   
   
 Dose:  0.3 mg  
0.3 mg by IntraMUSCular route once as needed. Refills:  0  
     
   
   
   
  
 furosemide 20 mg tablet Commonly known as:  LASIX Your last dose was: Your next dose is:    
   
   
 Dose:  20 mg Take 20 mg by mouth daily. Refills:  0  
     
   
   
   
  
 ketoconazole 2 % topical cream  
Commonly known as:  NIZORAL Your last dose was: Your next dose is:    
   
   
 Apply  to affected area as needed for Skin Irritation. Apply to affected skin prn Refills:  0 LIPITOR 40 mg tablet Generic drug:  atorvastatin Your last dose was: Your next dose is:    
   
   
 Dose:  20 mg Take 20 mg by mouth nightly. Refills:  0  
     
   
   
   
  
 sildenafil 20 mg tablet Commonly known as:  REVATIO Your last dose was: Your next dose is:    
   
   
 Dose:  20 mg Take 20 mg by mouth three (3) times daily. Refills:  0  
     
   
   
   
  
 * SYNTHROID 200 mcg tablet Generic drug:  levothyroxine Your last dose was: Your next dose is:    
   
   
 Dose:  200 mcg Take 200 mcg by mouth. Daily 6 days a week, Mon, Tues, Wed, Thurs, Fri ,Sun Refills:  0  
     
   
   
   
  
 * SYNTHROID 200 mcg tablet Generic drug:  levothyroxine Your last dose was: Your next dose is:    
   
   
 Dose:  100 mcg Take 100 mcg by mouth. on Saturday  (1/2 of 200mcg tab) Refills:  0  
     
   
   
   
  
 * Notice: This list has 2 medication(s) that are the same as other medications prescribed for you. Read the directions carefully, and ask your doctor or other care provider to review them with you. STOP taking these medications   
 aspirin 81 mg chewable tablet Replaced by:  aspirin delayed-release 325 mg tablet GARCINIA CAMBOGIA 200-500 mcg-mg Tab Generic drug:  chromium-brindal berry  
   
  
 ibuprofen 800 mg tablet Commonly known as:  MOTRIN  
   
  
 saw palmetto 160 mg Cap Where to Get Your Medications Information on where to get these meds will be given to you by the nurse or doctor. ! Ask your nurse or doctor about these medications  
  acetaminophen 500 mg tablet  
 aspirin delayed-release 325 mg tablet  
 oxyCODONE IR 5 mg immediate release tablet  
 senna-docusate 8.6-50 mg per tablet

## 2017-04-06 NOTE — ANESTHESIA PROCEDURE NOTES
Peripheral Block    Start time: 4/6/2017 7:34 AM  End time: 4/6/2017 7:41 AM  Performed by: Casandra Pugh  Authorized by: Casandra Pugh       Pre-procedure: Indications: at surgeon's request, post-op pain management and procedure for pain    Preanesthetic Checklist: patient identified, risks and benefits discussed, site marked, timeout performed, anesthesia consent given and patient being monitored      Block Type:   Block Type:   Adductor canal  Laterality:  Right  Monitoring:  Standard ASA monitoring, continuous pulse ox, frequent vital sign checks, heart rate, responsive to questions and oxygen  Injection Technique:  Single shot  Procedures: ultrasound guided    Patient Position: supine  Prep: chlorhexidine    Location:  Mid thigh  Needle Type:  Stimuplex  Needle Gauge:  22 G  Needle Localization:  Ultrasound guidance  Medication Injected:  0.5%  Volume (mL):  30    Assessment:  Number of attempts:  1  Injection Assessment:  Incremental injection every 5 mL, no paresthesia, local visualized surrounding nerve on ultrasound, negative aspiration for blood and no intravascular symptoms  Patient tolerance:  Patient tolerated the procedure well with no immediate complications  NO image stored

## 2017-04-06 NOTE — PROGRESS NOTES
Primary Nurse Karolina Gallo and Gracia Sorto, RN performed a dual skin assessment on this patient No impairment noted  Sulaiman score is 20

## 2017-04-06 NOTE — ROUTINE PROCESS
TO2 @ 3 liters  TechRANSFER - OUT REPORT:    Verbal report given to La Nena rea RN(name) on Roya Meza  being transferred to [facility](unit) for routine post - op       Report consisted of patients Situation, Background, Assessment and   Recommendations(SBAR). Information from the following report(s) SBAR, OR Summary, Procedure Summary, Intake/Output, MAR, Recent Results, Med Rec Status and Cardiac Rhythm NSR was reviewed with the receiving nurse. Opportunity for questions and clarification was provided. Patient [id number] with:   O2 @ 3 liters  Tech   SWA called and family notified of pt transfer to floor. Personal belongings at PACU bedside.

## 2017-04-07 LAB
ANION GAP BLD CALC-SCNC: 6 MMOL/L (ref 5–15)
BUN SERPL-MCNC: 14 MG/DL (ref 6–20)
BUN/CREAT SERPL: 13 (ref 12–20)
CALCIUM SERPL-MCNC: 8.4 MG/DL (ref 8.5–10.1)
CHLORIDE SERPL-SCNC: 111 MMOL/L (ref 97–108)
CO2 SERPL-SCNC: 25 MMOL/L (ref 21–32)
CREAT SERPL-MCNC: 1.09 MG/DL (ref 0.7–1.3)
GLUCOSE SERPL-MCNC: 107 MG/DL (ref 65–100)
HGB BLD-MCNC: 11.8 G/DL (ref 12.1–17)
POTASSIUM SERPL-SCNC: 4.8 MMOL/L (ref 3.5–5.1)
SODIUM SERPL-SCNC: 142 MMOL/L (ref 136–145)

## 2017-04-07 PROCEDURE — 97110 THERAPEUTIC EXERCISES: CPT

## 2017-04-07 PROCEDURE — 65270000029 HC RM PRIVATE

## 2017-04-07 PROCEDURE — 74011250637 HC RX REV CODE- 250/637: Performed by: PHYSICIAN ASSISTANT

## 2017-04-07 PROCEDURE — 36415 COLL VENOUS BLD VENIPUNCTURE: CPT | Performed by: ORTHOPAEDIC SURGERY

## 2017-04-07 PROCEDURE — 85018 HEMOGLOBIN: CPT | Performed by: ORTHOPAEDIC SURGERY

## 2017-04-07 PROCEDURE — 74011250636 HC RX REV CODE- 250/636: Performed by: PHYSICIAN ASSISTANT

## 2017-04-07 PROCEDURE — 97116 GAIT TRAINING THERAPY: CPT

## 2017-04-07 PROCEDURE — 0SRC0J9 REPLACEMENT OF RIGHT KNEE JOINT WITH SYNTHETIC SUBSTITUTE, CEMENTED, OPEN APPROACH: ICD-10-PCS | Performed by: ORTHOPAEDIC SURGERY

## 2017-04-07 PROCEDURE — 80048 BASIC METABOLIC PNL TOTAL CA: CPT | Performed by: ORTHOPAEDIC SURGERY

## 2017-04-07 RX ORDER — ASPIRIN 325 MG
325 TABLET, DELAYED RELEASE (ENTERIC COATED) ORAL 2 TIMES DAILY
Qty: 60 TAB | Refills: 0 | Status: SHIPPED | OUTPATIENT
Start: 2017-04-07 | End: 2017-04-07

## 2017-04-07 RX ORDER — OXYCODONE HYDROCHLORIDE 5 MG/1
5-10 TABLET ORAL
Qty: 90 TAB | Refills: 0 | Status: SHIPPED | OUTPATIENT
Start: 2017-04-07 | End: 2017-10-19

## 2017-04-07 RX ORDER — ASPIRIN 325 MG
325 TABLET, DELAYED RELEASE (ENTERIC COATED) ORAL 2 TIMES DAILY
Qty: 60 TAB | Refills: 0 | Status: SHIPPED | OUTPATIENT
Start: 2017-04-07 | End: 2017-10-19

## 2017-04-07 RX ORDER — ACETAMINOPHEN 500 MG
500 TABLET ORAL
Qty: 100 TAB | Refills: 0 | Status: SHIPPED
Start: 2017-04-07 | End: 2020-11-24

## 2017-04-07 RX ORDER — AMOXICILLIN 250 MG
1 CAPSULE ORAL 2 TIMES DAILY
Qty: 60 TAB | Refills: 0 | Status: SHIPPED
Start: 2017-04-07 | End: 2017-10-19

## 2017-04-07 RX ADMIN — KETOROLAC TROMETHAMINE 15 MG: 30 INJECTION, SOLUTION INTRAMUSCULAR at 06:59

## 2017-04-07 RX ADMIN — ASPIRIN 325 MG: 325 TABLET, DELAYED RELEASE ORAL at 08:20

## 2017-04-07 RX ADMIN — POLYETHYLENE GLYCOL 3350 17 G: 17 POWDER, FOR SOLUTION ORAL at 08:19

## 2017-04-07 RX ADMIN — ACETAMINOPHEN 500 MG: 500 TABLET, FILM COATED ORAL at 06:58

## 2017-04-07 RX ADMIN — OXYCODONE HYDROCHLORIDE 10 MG: 5 TABLET ORAL at 16:06

## 2017-04-07 RX ADMIN — CEFAZOLIN 2 G: 1 INJECTION, POWDER, FOR SOLUTION INTRAMUSCULAR; INTRAVENOUS; PARENTERAL at 00:00

## 2017-04-07 RX ADMIN — OXYCODONE HYDROCHLORIDE 5 MG: 5 TABLET ORAL at 08:19

## 2017-04-07 RX ADMIN — OXYCODONE HYDROCHLORIDE 10 MG: 5 TABLET ORAL at 22:14

## 2017-04-07 RX ADMIN — ASPIRIN 325 MG: 325 TABLET, DELAYED RELEASE ORAL at 21:02

## 2017-04-07 RX ADMIN — DOCUSATE SODIUM AND SENNOSIDES 1 TABLET: 8.6; 5 TABLET, FILM COATED ORAL at 17:11

## 2017-04-07 RX ADMIN — DEXAMETHASONE SODIUM PHOSPHATE 10 MG: 4 INJECTION, SOLUTION INTRAMUSCULAR; INTRAVENOUS at 07:00

## 2017-04-07 RX ADMIN — Medication 5 ML: at 06:00

## 2017-04-07 RX ADMIN — ACETAMINOPHEN 500 MG: 500 TABLET, FILM COATED ORAL at 18:35

## 2017-04-07 RX ADMIN — ACETAMINOPHEN 500 MG: 500 TABLET, FILM COATED ORAL at 14:30

## 2017-04-07 RX ADMIN — OXYCODONE HYDROCHLORIDE 10 MG: 5 TABLET ORAL at 12:54

## 2017-04-07 RX ADMIN — ENALAPRIL MALEATE 10 MG: 5 TABLET ORAL at 21:01

## 2017-04-07 RX ADMIN — OXYCODONE HYDROCHLORIDE 10 MG: 5 TABLET ORAL at 19:18

## 2017-04-07 RX ADMIN — DOCUSATE SODIUM AND SENNOSIDES 1 TABLET: 8.6; 5 TABLET, FILM COATED ORAL at 08:20

## 2017-04-07 RX ADMIN — LEVOTHYROXINE SODIUM 200 MCG: 200 TABLET ORAL at 07:36

## 2017-04-07 RX ADMIN — ATORVASTATIN CALCIUM 20 MG: 20 TABLET, FILM COATED ORAL at 21:01

## 2017-04-07 RX ADMIN — ACETAMINOPHEN 500 MG: 500 TABLET, FILM COATED ORAL at 21:01

## 2017-04-07 NOTE — PROGRESS NOTES
Problem: Mobility Impaired (Adult and Pediatric)  Goal: *Acute Goals and Plan of Care (Insert Text)  Physical Therapy Goals  Initiated 4/6/2017    1. Patient will move from supine to sit and sit to supine in bed with modified independence within 4 days. 2. Patient will perform sit to stand with modified independence within 4 days. 3. Patient will ambulate with modified independence for 250 feet with the least restrictive device within 4 days. 4. Patient will ascend/descend 4 stairs with use of handrail(s) with modified independence within 4 days. 5. Patient will perform home exercise program per protocol with independence within 4 days. 6. Patient will demonstrate AROM 0-90 degrees in operative joint within 4 days. PHYSICAL THERAPY TREATMENT  Patient: Jackeline Holguin (79 y.o. male)  Date: 4/7/2017  Diagnosis: PRIMARY LOCALIZED OSTEO ARTHRITIS OF BILATERAL KNEES  Primary localized osteoarthrosis of the knee, right <principal problem not specified>  Procedure(s) (LRB):  RIGHT TOTAL KNEE ARTHROPLASTY (SPINAL WITH IV SEDATION) (Right) 1 Day Post-Op  Precautions: Fall, WBAT      ASSESSMENT:  Pt received resting in bed and is progressing well with therapy. He was able to perform protocol knee exercises followed by ambulating 100 ft with RW, supervision for safety. Pt demonstrates step through gait pattern with minimal c/o pain. Ice applied at end of session sitting in the chair and instructed to sit no longer than 1 hour. PT to f/u this PM for BID session and to progress ambulation. He plans to discharge home tomorrow with HHPT. RW ordered this AM   Progression toward goals:  [X]      Improving appropriately and progressing toward goals  [ ]      Improving slowly and progressing toward goals  [ ]      Not making progress toward goals and plan of care will be adjusted       PLAN:  Patient continues to benefit from skilled intervention to address the above impairments.   Continue treatment per established plan of care.  Discharge Recommendations:  Home Health  Further Equipment Recommendations for Discharge:  rolling walker       SUBJECTIVE:   Patient stated It feels good to be up walking.       OBJECTIVE DATA SUMMARY:   Critical Behavior:  Neurologic State: Alert  Orientation Level: Oriented X4  Cognition: Appropriate decision making, Appropriate for age attention/concentration, Follows commands  Safety/Judgement: Awareness of environment  Range of Motion:   Flexion 74 degrees, Extension -6 degrees         Functional Mobility Training:  Bed Mobility:     Supine to Sit: Modified independent     Transfers:  Sit to Stand: Supervision  Stand to Sit: Supervision              Balance:  Sitting: Intact  Standing: Intact; With support  Ambulation/Gait Training:  Distance (ft): 100 Feet (ft)  Assistive Device: Walker, rolling;Gait belt  Ambulation - Level of Assistance: Supervision        Gait Abnormalities: Antalgic;Decreased step clearance  Right Side Weight Bearing: As tolerated        Stance: Right decreased           Therapeutic Exercises:     EXERCISE   Sets   Reps   Active Active Assist   Passive Self ROM   Comments   Ankle Pumps 1 10 [X]                                        [ ]                                        [ ]                                        [ ]                                            Quad Sets 1 10 [X]                                        [ ]                                        [ ]                                        [ ]                                            Hamstring Sets 1 10 [X]                                        [ ]                                        [ ]                                        [ ]                                            Short Arc Quads 1 10 [X]                                        [ ]                                        [ ]                                        [ ]                                            Knee Extension Stretch 1 1 min   [X] [ ]                                          [ ]                                          [ ]                                            Norrine Saas 1 10 [X]                                        [ ]                                        [ ]                                        [ ]                                            Xander Webstering     [ ]                                        [ ]                                        [ ]                                        [ ]                                            Brian Rodriguez     [ ]                                        [ ]                                        [ ]                                        [ ]                                            Helder Saunders     [ ]                                        [ ]                                        [ ]                                        [ ]                                               Pain:  Pain Scale 1: Numeric (0 - 10)  Pain Intensity 1: 5              Activity Tolerance:   No apparent distress   Please refer to the flowsheet for vital signs taken during this treatment.   After treatment:   [X] Patient left in no apparent distress sitting up in chair  [ ] Patient left in no apparent distress in bed  [X] Call bell left within reach  [X] Nursing notified  [X] Caregiver present  [ ] Bed alarm activated      COMMUNICATION/COLLABORATION:   The patients plan of care was discussed with: Registered Nurse     Mahi Carlton, PT   Time Calculation: 30 mins

## 2017-04-07 NOTE — PROGRESS NOTES
Problem: Discharge Planning  Goal: *Discharge to safe environment  Outcome: Progressing Towards Goal  Disposition plan is to discharge home in care of wife, home health, and self

## 2017-04-07 NOTE — OP NOTES
[address] [facility] [address]   OP NOTE       Name:  Consuelo Montes   MR#:  [id number]   :  1945   Account #:  [de-identified]    Surgery Date:  2017   Date of Adm:  2017       PREOPERATIVE DIAGNOSIS:   Osteoarthritis of the right knee. POSTOPERATIVE DIAGNOSIS:   Osteoarthritis of the right knee. PROCEDURES PERFORMED:   Right total knee arthroplasty. SURGEON:  Rod Wilson MD     FIRST ASSISTANT: Jesse Chao. Erin Gutierrez PA-C    COMPONENTS IMPLANTED: DePuy Attune size 6 posterior stabilized   femur, size 6 tibial tray with 10 mm posterior stabilized polyethylene   insert and 38 mm patella. ANESTHESIA: Spinal with sedation as well as adductor canal block. ESTIMATED BLOOD LOSS:   100 mL. SPECIMENS REMOVED: None. COMPLICATIONS: None. INDICATIONS: The patient is a 68-year-old gentleman with   progressive right knee pain due to severe osteoarthritis. Symptoms   have progressed despite comprehensive conservative treatment and   he presents for right total knee replacement. Risks, benefits,   alternatives of the procedure were reviewed with him in detail and he   desires to proceed. PROCEDURE IN DETAIL: The patient was taken to the operating   room after the anesthesia team had placed an adductor canal block in   the preoperative holding area. They also placed a spinal in the OR. Connors catheter was inserted and a padded pneumatic tourniquet was   placed around the right upper thigh. Right lower extremity was prepped   and draped in the usual sterile fashion. Tourniquet was inflated to 275   and I performed a medial parapatellar arthrotomy through a midline   anterior skin incision. A progressive medial release was performed to   facilitate exposure and soft tissue balance throughout the procedure.    There was complete absence of an ACL and PCL, which appeared to   be completely absent, with significant anterior translation of the femur   on the tibia, no rollback at all. There was fairly significant passive   correction of varus deformity and in flexion, there was also noted to be   laxity on the lateral side to varus stress. I took 10 mm off the distal   femur using a 5-degree distal femoral cutting block over a long   intramedullary markus with the presence of laxity on the lateral side in   flexion I elected to move to the tibial cut and use a gap balance   technique for preparation. Neutral proximal tibial resection was   performed using an extramedullary cutting guide. The menisci were   excised. Medial osteophyte was removed from the tibia and peripheral   osteophytes were removed from the femur. Posterior osteophytes were   removed from the femur as well and with the knee in extension   progressive medial release was performed until the extension gap was   symmetrical. With an 8 mm spacer block with the knee in flexion, there   was enough laxity that I could not externally rotate the femur enough to   create a symmetrical flexion gap. The femur was sized to a 6 and   externally rotated to close the lateral gap as much as I felt comfortable. Preparation was completed for the size 6 using appropriate jigs. By   upsizing the spacer block to a 10 in flexion I was happy with the medial   tension and this helped close the flexion gap laterally. I took 2 more   millimeters off the distal femur before placing trials, including a 10 mm   posterior stabilized insert. After a little bit more subperiosteal release   up the back of the femur the knee had full extension to gravity with   satisfactory coronal plane soft tissue tension and stability in extension   and mid flexion. Lateral flexion gap still opened up 2 to 3 mm to varus   stress, but I elected to accept this. It appeared that the popliteus   tendon was basically absent, appearing to have undergone a mucoid   type degeneration similar to the PCL.  The patella was prepared for a   38 mm component and tracked centrally using no-thumbs technique. Trials were removed and bony surfaces were copiously irrigated by   pulse lavage and dried before the real components were cemented   into place using antibiotic-impregnated cement. Excess cement was   removed, the knee was reduced in full extension with the real insert in   place during cement setup. Periarticular soft tissues were injected with   solution containing Exparel, 0.5% Marcaine with epinephrine, as well   as morphine and Toradol. Tourniquet was released and hemostasis   obtained with the Bovie. The wound was copiously irrigated. Two   grams of topical tranexamic acid were applied to the wound. The   arthrotomy was closed with a combination of heavy Vicryl sutures and   a running #2 StrataFix suture. Skin and subcutaneous layers were   closed in layered fashion with Vicryl and a running Monocryl   subcuticular stitch. The wound was dressed with Dermabond and an   Aquacel occlusive dressing, as well as a sterile compressive dressing. The patient was transported to the postanesthesia care unit in stable   condition. All counts were correct at the end of the procedure.         Delfina Chua MD      [facility]   D:  04/06/2017   20:52   T:  04/07/2017   00:17   Job #:  [id number]

## 2017-04-07 NOTE — PROGRESS NOTES
Problem: Mobility Impaired (Adult and Pediatric)  Goal: *Acute Goals and Plan of Care (Insert Text)  Physical Therapy Goals  Initiated 4/6/2017    1. Patient will move from supine to sit and sit to supine in bed with modified independence within 4 days. 2. Patient will perform sit to stand with modified independence within 4 days. 3. Patient will ambulate with modified independence for 250 feet with the least restrictive device within 4 days. 4. Patient will ascend/descend 4 stairs with use of handrail(s) with modified independence within 4 days. 5. Patient will perform home exercise program per protocol with independence within 4 days. 6. Patient will demonstrate AROM 0-90 degrees in operative joint within 4 days. PHYSICAL THERAPY TREATMENT  Patient: Luisito West (81 y.o. male)  Date: 4/7/2017  Diagnosis: PRIMARY LOCALIZED OSTEO ARTHRITIS OF BILATERAL KNEES  Primary localized osteoarthrosis of the knee, right <principal problem not specified>  Procedure(s) (LRB):  RIGHT TOTAL KNEE ARTHROPLASTY (SPINAL WITH IV SEDATION) (Right) 1 Day Post-Op  Precautions: Fall, WBAT      ASSESSMENT:  Pt progressing well with therapy as he was able to ambulate 150 ft with RW and perform 4 stairs with use of 1 rail without LOB or instability. He reports pain is under control and ice has helped with edema. Returned to chair at end of session and encouraged to continue ambulating throughout the evening with wife and nursing staff. Pt plans to discharge home tomorrow and will have HHPT. Progression toward goals:  [X]      Improving appropriately and progressing toward goals  [ ]      Improving slowly and progressing toward goals  [ ]      Not making progress toward goals and plan of care will be adjusted       PLAN:  Patient continues to benefit from skilled intervention to address the above impairments. Continue treatment per established plan of care.   Discharge Recommendations:  Home Health  Further Equipment Recommendations for Discharge:  RW delivered       SUBJECTIVE:   Is it going to hurt more tomorrow? OBJECTIVE DATA SUMMARY:   Functional Mobility Training:  Bed Mobility:     Supine to Sit: Modified independent              Transfers:  Sit to Stand: Modified independent  Stand to Sit: Modified independent                             Ambulation/Gait Training:  Distance (ft): 150 Feet (ft)  Assistive Device: Walker, rolling;Gait belt  Ambulation - Level of Assistance: Supervision        Gait Abnormalities: Antalgic;Decreased step clearance  Right Side Weight Bearing: As tolerated        Stance: Right decreased           Therapeutic Exercises:   Exercises performed per protocol. See morning treatment note for description. Pain:  Pain Scale 1: Numeric (0 - 10)  Pain Intensity 1: 8  Pain Location 1: Knee        Pain Intervention(s) 1: Medication (see MAR)  Activity Tolerance:   No apparent distress   Please refer to the flowsheet for vital signs taken during this treatment.   After treatment:   [X] Patient left in no apparent distress sitting up in chair  [ ] Patient left in no apparent distress in bed  [X] Call bell left within reach  [X] Nursing notified  [ ] Caregiver present  [ ] Bed alarm activated      COMMUNICATION/COLLABORATION:   The patients plan of care was discussed with: Registered Nurse     Vera Carlton, PT   Time Calculation: 17 mins

## 2017-04-07 NOTE — PROGRESS NOTES
Bedside and Verbal shift change report given to Heena (oncoming nurse) by Nancy Smith RN (offgoing nurse). Report given with Radha VÁZQUEZ and MAR.

## 2017-04-07 NOTE — PROGRESS NOTES
Pain controlled. No cp/sob.     Visit Vitals    /79    Pulse 65    Temp 97.6 °F (36.4 °C)    Resp 14    Ht 5' 8.5\" (1.74 m)    Wt 82 kg (180 lb 12.4 oz)    SpO2 96%    BMI 27.09 kg/m2     R knee dressing dry   Calves soft NT  Motor 5/5  Pulses symmetrical  Recent Results (from the past 12 hour(s))   METABOLIC PANEL, BASIC    Collection Time: 04/07/17  5:11 AM   Result Value Ref Range    Sodium 142 136 - 145 mmol/L    Potassium 4.8 3.5 - 5.1 mmol/L    Chloride 111 (H) 97 - 108 mmol/L    CO2 25 21 - 32 mmol/L    Anion gap 6 5 - 15 mmol/L    Glucose 107 (H) 65 - 100 mg/dL    BUN 14 6 - 20 MG/DL    Creatinine 1.09 0.70 - 1.30 MG/DL    BUN/Creatinine ratio 13 12 - 20      GFR est AA >60 >60 ml/min/1.73m2    GFR est non-AA >60 >60 ml/min/1.73m2    Calcium 8.4 (L) 8.5 - 10.1 MG/DL   HEMOGLOBIN    Collection Time: 04/07/17  5:11 AM   Result Value Ref Range    HGB 11.8 (L) 12.1 - 17.0 g/dL     POD 1 R TKA; acute postop blood loss anemia (expected)  -PT  -pain control  -ASA  -anticipate disch home tomorrow    Jim Tinsley MD

## 2017-04-07 NOTE — PROGRESS NOTES
CM reviewed chart. CM noted pt had right total knee arthroplasty with history of arthritis, CAD, chronic pain, hypercholesteremia, and HTN. CM met with pt to introduce self and role and offer support. Bedside assessment completed. CM noted pt is a total joint bundle patient. CURRENT LIVING SITUATION-  Pt states he lives with his wife in a private residence. Pt was driving prior to this hospital stay and has assistance with transportation as needed. Pt used a cane as needed prior to this surgery and is independent with ADL's and IADL's. Pt is currently retired. Pt's PCP is Dr Justin Greer phone # [phone]. Pt last saw his PCP 6 months ago. Pt gets his prescriptions filled at [facility]. CM verified with pt his insurance is Medicare. Pt has an AMD.    DISCHARGE PLANNING-  Pt denies need for assistance with medications, transportation, and follow up appointments. Disposition plan is to discharge home in care of wife, home health, and self. CM offered choice of [facility] provider and pt selected At [address] [place] [address]. Referral sent via [facility] to [facility] [address] [place] [address]. [facility] [address] [place] [address] accepted pt for [facility]. Information added to AVS.  Anat Lovell RN CRM      Care Management Interventions  PCP Verified by CM:  Yes (Dr Justin Greer [phone])  Palliative Care Consult (Criteria: CHF and RRAT>21): No  Mode of Transport at Discharge: Self (Private car)  Transition of Care Consult (CM Consult): [address]: No  Reason Outside [place]: Unable to staff case ([facility] [place]  [place] Signup: No  Physical Therapy Consult: Yes  Occupational Therapy Consult: No  Speech Therapy Consult: No  Current Support Network: Lives with Spouse, Own Home  Confirm Follow Up Transport: Family  Plan discussed with Pt/Family/Caregiver: Yes  Freedom of Choice Offered: Yes  Discharge Location  Discharge Placement: Home with home health

## 2017-04-08 VITALS
TEMPERATURE: 97.8 F | WEIGHT: 180.78 LBS | RESPIRATION RATE: 16 BRPM | DIASTOLIC BLOOD PRESSURE: 62 MMHG | HEART RATE: 79 BPM | BODY MASS INDEX: 26.78 KG/M2 | HEIGHT: 69 IN | SYSTOLIC BLOOD PRESSURE: 113 MMHG | OXYGEN SATURATION: 95 %

## 2017-04-08 LAB — HGB BLD-MCNC: 12 G/DL (ref 12.1–17)

## 2017-04-08 PROCEDURE — 97116 GAIT TRAINING THERAPY: CPT

## 2017-04-08 PROCEDURE — 36415 COLL VENOUS BLD VENIPUNCTURE: CPT | Performed by: ORTHOPAEDIC SURGERY

## 2017-04-08 PROCEDURE — 74011250637 HC RX REV CODE- 250/637: Performed by: PHYSICIAN ASSISTANT

## 2017-04-08 PROCEDURE — 85018 HEMOGLOBIN: CPT | Performed by: ORTHOPAEDIC SURGERY

## 2017-04-08 RX ADMIN — ACETAMINOPHEN 500 MG: 500 TABLET, FILM COATED ORAL at 09:20

## 2017-04-08 RX ADMIN — ASPIRIN 325 MG: 325 TABLET, DELAYED RELEASE ORAL at 09:20

## 2017-04-08 RX ADMIN — OXYCODONE HYDROCHLORIDE 10 MG: 5 TABLET ORAL at 03:47

## 2017-04-08 RX ADMIN — ACETAMINOPHEN 500 MG: 500 TABLET, FILM COATED ORAL at 12:56

## 2017-04-08 RX ADMIN — OXYCODONE HYDROCHLORIDE 10 MG: 5 TABLET ORAL at 07:02

## 2017-04-08 RX ADMIN — OXYCODONE HYDROCHLORIDE 5 MG: 5 TABLET ORAL at 12:56

## 2017-04-08 RX ADMIN — POLYETHYLENE GLYCOL 3350 17 G: 17 POWDER, FOR SOLUTION ORAL at 09:20

## 2017-04-08 RX ADMIN — ACETAMINOPHEN 500 MG: 500 TABLET, FILM COATED ORAL at 07:02

## 2017-04-08 RX ADMIN — DOCUSATE SODIUM AND SENNOSIDES 1 TABLET: 8.6; 5 TABLET, FILM COATED ORAL at 09:20

## 2017-04-08 RX ADMIN — Medication 10 ML: at 07:03

## 2017-04-08 RX ADMIN — OXYCODONE HYDROCHLORIDE 10 MG: 5 TABLET ORAL at 10:58

## 2017-04-08 NOTE — PROGRESS NOTES
Bedside shift change report given to Miller (oncoming nurse) by Hilary Lopez (offgoing nurse). Report included the following information SBAR, Kardex, Intake/Output and MAR.

## 2017-04-08 NOTE — DISCHARGE SUMMARY
Total Joint Discharge Summary      Patient ID:  Mt Alicia  [id number]  56 y.o.  1945    Admit date: 4/6/2017    Discharge date and time: No discharge date for patient encounter. Admitting Physician: Kendy Gamez MD     Discharge Physician: Vel Innocent    Admission Diagnoses: PRIMARY LOCALIZED OSTEO ARTHRITIS OF BILATERAL KNEES  Primary localized osteoarthrosis of the knee, right    Discharge Diagnoses: Active Problems:    Primary localized osteoarthrosis of the knee (4/6/2017)        Surgeon: Glenn Tabor                            Perioperative Antibiotics: Ancef      Postoperative Pain Management:  Roxycodone    DVT Prophylaxis:  ASA                                  ADDY Hose                                  Plexi-Pulse    Postoperative transfusions:     none Banked PRBCs     Post Op complications: none    Hemoglobin at discharge: 12.0    Wound appears to be healing without any evidence of infection. Physical Therapy started on the day following surgery and progressed to independent ambulation with the aid of a walker. At the time of discharge, able to go up and down stairs and had understanding of precautions needed following surgery.       Condition: Good    Discharged to: Home    Discharge instructions:  - Anticoagulate with: ASA  -Resume pre hospital diet             -Resume home medications per medical continuation form     -Ambulate with walker, appropriate total joint protocol  -Follow up in office as scheduled       Signed:  Buck Martinez MD  4/8/2017  8:52 AM

## 2017-04-08 NOTE — PROGRESS NOTES
[address]  SBAR Bundled Payment Handoff     FROM:                                TO: [facility] [address]                                                      ([address] [place] [address] or Facility name)  [address]  [address] [id number]  Dept: [address]: [phone]                                      Room#:  577/01                                                      Discharging Nurse:  Jw Kruger, RN  Unit Ph#:[phone]         SITUATION      ASAScore: ASA 2 - Patient with mild systemic disease with no functional limitations    Admitted:  4/6/2017  Hospital Day: 3      Attending Provider:  Celia Terrazas MD     Consultations:  None    PCP:  Sophia Zambrano MD   [phone]     Admitting Dx:  PRIMARY LOCALIZED OSTEO ARTHRITIS OF BILATERAL KNEES  Primary localized osteoarthrosis of the knee, right       Active Problems:    Primary localized osteoarthrosis of the knee (4/6/2017)      2 Days Post-Op of   Procedure(s):  RIGHT TOTAL KNEE ARTHROPLASTY (SPINAL WITH IV SEDATION)   BY: Celia Terrazas MD             ON: 4/6/2017                  Code Status: Full Code             Advance Directive? Yes Not W Pt (Send w/patient)     Isolation:  There are currently no Active Isolations       MDRO: No current active infections    BACKGROUND     Allergies:   Allergies   Allergen Reactions    Wasp [Venom-Wasp] Anaphylaxis and Swelling    Sulfa (Sulfonamide Antibiotics) Other (comments)     Lower extremity redness       Past Medical History:   Diagnosis Date    Arthritis     CAD (coronary artery disease)     stent x 1 2001 LDA    Chronic lower back pain     Dr Kendall Alexander     Chronic pain     right knee    Hypercholesteremia     Hypertension     Hypothyroidism        Past Surgical History:   Procedure Laterality Date    HX CATARACT REMOVAL Right     2/16/17    HX CORONARY STENT PLACEMENT  2000    Dr Moncho Mccollum  approx 2010    @ [place]    HX LUMBAR FUSION  2001     L3-L4 fusion    HX TONSILLECTOMY  age 13       Prior to Admission Medications   Prescriptions Last Dose Informant Patient Reported? Taking? EPINEPHrine (EPIPEN) 0.3 mg/0.3 mL injection 2015  Yes No   Si.3 mg by IntraMUSCular route once as needed. aspirin 81 mg chewable tablet 3/30/2017 at Unknown time  Yes Yes   Sig: Take 81 mg by mouth daily. atorvastatin (LIPITOR) 40 mg tablet 2017 at Unknown time  Yes Yes   Sig: Take 20 mg by mouth nightly. chromium-brindal berry (GARCINIA CAMBOGIA) 200-500 mcg-mg tab 3/30/2017 at Unknown time  Yes Yes   Sig: Take  by mouth. desonide (TRIDESILON) 0.05 % cream 3/30/2017 at Unknown time  Yes Yes   Sig: Apply  to affected area as needed for Skin Irritation. Apply to buttocks as needed   enalapril (VASOTEC) 10 mg tablet 2017 at Unknown time  Yes Yes   Sig: Take 10 mg by mouth nightly. furosemide (LASIX) 20 mg tablet 2017  Yes No   Sig: Take 20 mg by mouth daily. ibuprofen (MOTRIN) 800 mg tablet 3/30/2017 at Unknown time  Yes Yes   Sig: Take 800 mg by mouth every eight (8) hours as needed for Pain.   ketoconazole (NIZORAL) 2 % topical cream 3/30/2017 at Unknown time  Yes Yes   Sig: Apply  to affected area as needed for Skin Irritation. Apply to affected skin prn   levothyroxine (SYNTHROID) 200 mcg tablet 2017 at 0430  Yes Yes   Sig: Take 200 mcg by mouth. Daily 6 days a week, Mon, Tues, Wed, Thurs, Fri ,Sun   levothyroxine (SYNTHROID) 200 mcg tablet   Yes No   Sig: Take 100 mcg by mouth. on Saturday  (1/2 of 200mcg tab)   saw palmetto 160 mg cap 3/30/2017 at Unknown time  Yes Yes   Sig: Take  by mouth.   sildenafil (REVATIO) 20 mg tablet Unknown at Unknown time  Yes No   Sig: Take 20 mg by mouth three (3) times daily. Facility-Administered Medications: None       Vaccinations: There is no immunization history on file for this patient. ASSESSMENT   Age: 73 y.o.              Gender: male        Height: Height: 5' 8.5\" (174 cm)                    Weight:Weight: 82 kg (180 lb 12.4 oz)     Patient Vitals for the past 8 hrs:   Temp Pulse Resp BP SpO2   04/08/17 0857 (P) 97.8 °F (36.6 °C) (P) 79 (P) 16 (P) 113/62 (P) 95 %   04/08/17 0340 97.8 °F (36.6 °C) 72 14 128/74 98 %            Active Orders   Diet    DIET REGULAR       Orientation: Orientation Level: Oriented X4    Active Lines/Drains:  (Peg Tube / Connors / CL or S/L?):no    Urinary Status: Voiding      Last BM: Last Bowel Movement Date: 04/05/17     Skin Integrity: Incision (comment) (R Knee)   Wound Knee Right-DRESSING STATUS: Clean, dry, and intact    Wound Knee Right-DRESSING TYPE: Elastic bandage    Mobility: Slightly limited   Weight Bearing Status: WBAT (Weight Bearing as Tolerated)      Gait Training  Assistive Device: Walker, rolling, Gait belt  Ambulation - Level of Assistance: Supervision  Distance (ft): 150 Feet (ft)     On Anticoagulation? YES  Aspirin                                      Last dose:  4/8/2017at 0930    Pain Medications given:  oxicodone                                 Last dose: 4/8/2017 at  1100    Lab Results   Component Value Date/Time    Glucose 107 04/07/2017 05:11 AM    Hemoglobin A1c 6.1 03/06/2017 12:38 PM    INR 1.0 03/06/2017 12:38 PM    HGB 12.0 04/08/2017 03:54 AM    HGB 11.8 04/07/2017 05:11 AM    HGB 16.5 03/06/2017 12:38 PM       Readmission Risks:  Score:         RECOMMENDATION     See After Visit Summary (AVS) for:  · Discharge instructions  · After [address]   · Medication Reconciliation          [facility] Orthopaedic Nurse Navigator  ARYA Brady, RN-BC       Office  [phone]  Cell      [phone]  Fax      [phone]  [email or web address]             . Jj

## 2017-04-08 NOTE — PROGRESS NOTES
Bedside and Verbal shift change report given to Elliot Mazariegos RN (oncoming nurse) by Lesa Conley RN (offgoing nurse). Report included the following information SBAR.

## 2017-04-08 NOTE — PROGRESS NOTES
I have reviewed discharge instructions with the patient. The patient verbalized understanding.  Patient leaving tto go home with wife by car; patient signed electronically; patient leaving with copy of discharge instructions, bundle form, 2 prescriptions (declined bedside pharmacy), extra ice packs, personal belongings; new walker; volunteers called to assist patient to discharge area

## 2017-04-08 NOTE — DISCHARGE INSTRUCTIONS
Patient meets criteria for   BUNDLED PAYMENT   for Care Improvement Initiative Criteria    Contact Information for Orthopedic Nurse Navigator:      ARYA Brantley, RN-BC  [phone]  K:[phone]    After Hospital Care Plan:  Discharge Instructions Knee Replacement- Dr. Kadeem Thao    Patient Name: Rachell Mora  Date of procedure: 4/6/2017   Procedure: Procedure(s):  RIGHT TOTAL KNEE ARTHROPLASTY (SPINAL WITH IV SEDATION)  Surgeon: Nikita Perry) and Role:     * Deangelo Black MD - Primary  PCP: Aysha Nettles MD  Date of discharge: No discharge date for patient encounter. Follow up appointments   Follow up with Dr. Kadeem Thao in 4 weeks. Call [phone] to make an appointment.  If home health has been arranged for you the agency will contact you to arrange dates/times for visits. Please call them if you do not hear from them within 24 hours after you are discharged    When to call your Orthopaedic Surgeon: Call [phone]. If you call after 5pm or on a weekend, the on call physician will be contacted   Unrelieved pain   Signs of infection-if your incision is red; continues to have drainage; drainage has a foul odor or if you have a persistent fever over 101 degrees   Signs of a blood clot in your leg-calf pain, tenderness, redness, swelling of lower leg    When to call your Primary Care Physician:   Concerns about medical conditions such as diabetes, high blood pressure, asthma, congestive heart failure   Call if blood sugars are elevated, persistent headache or dizziness, coughing or congestion, constipation or diarrhea, burning with urination, abnormal heart rate    When to call [phone] go to the nearest emergency room   Acute onset of chest pain, shortness of breath, difficulty breathing      Activity   Weight bearing as tolerated with walker or crutches.  Refer to pages 23-31 of your handbook for instructions and pictures   Complete your Home Exercise Program daily as instructed by your therapist.  Refer to pages 33-41 of your handbook for instructions and pictures   Get up every one hour and walk (except at night when sleeping)   Do not drive or operate heavy machinery      Incision Care   The Aquacel (brown, waterproof) surgical dressing is to remain on your knee for 7 days. On the 7th day have someone gently peel the dressing off by carefully lifting the edge and stretching it slightly to break the adhesive seal   If you have steri-strips (small, white pieces of tape) on your incision, they may come off when you remove the Aquacel surgical dressing. This is okay. You may now leave your incision open to air   If your Aquacel dressing comes loose/off before the 7th day, you may replace it with a dry sterile gauze dressing; change it daily. Once you incision is not draining, you may leave it open to air   You may take a shower with the Aquacel dressing in place. Once the Aquacel is removed, you may shower and get your incision wet but do not submerge your incision under water in a bath tub, hot tub or swimming pool for 6 weeks after surgery. Preventing blood clots    Take Enteric coated Aspirin (delayed release) one tablet twice a day for one month following surgery    Pain management   Take pain medication as prescribed; decrease the amount you use as your pain lessens   Avoid alcoholic beverages while taking pain medication   Please be aware that many medications contain Tylenol. We do not want you to over medicate so please read the information below as a guide. Do not take more than 4 Grams of Tylenol in a 24 hour period.   (There are 1000 milligrams in one Gram)   o Tylenol 325 mg per tablet (do not take more than 12 tablets in 24 hours)  o Tylenol 500 mg per tablet (do not take more than 8 tablets in 24 hours)  o Tylenol 650 mg per tablet (do not take more than 6 tablets in 24 hours)   You may place an ice bag on your knee for 15-20 minutes after exercising    Diet   Resume usual diet; drink plenty of fluids; eat foods high in fiber   You may want to take a stool softener (such as Senokot-S or Colace) to prevent constipation while you are taking pain medication. If constipation occurs, take a laxative (such as Dulcolax tablets, Milk of Magnesia, or a suppository)      [facility] Protocol (to be followed by [address])  Nursing-per physicians order   Complete head to toe assessment, vital signs   Medication reconciliation   Review pain management   Manage chronic medical conditions    Physical Therapy-per physicians order  Weight bearing status:  Precautions at Admission: Fall, WBAT     Right Side Weight Bearing: As tolerated    Mobility Status:  Supine to Sit: Modified independent  Sit to Stand: Supervision  Sit to Supine: Supervision     Gait:  Distance (ft): 100 Feet (ft)  Ambulation - Level of Assistance: Supervision  Assistive Device: Walker, rolling, Gait belt  Gait Abnormalities: Antalgic, Decreased step clearance    ADL status overall composite:                Physical Therapy   Assessment and evaluation-bed mobility; functional transfers (bed, chair, bathroom, stairs); ambulation with equipment, car transfers, shower transfers, safety and ability to get out of house in the event of an emergency   Review weight bearing as tolerated, wean from walker or crutches as tolerated   Discuss pain management   Review how to do ADLs. Refer to page 42 of patient handbook    Home Exercise Program-refer to pages 33-41 of patient handbook for exercises.

## 2017-04-08 NOTE — PROGRESS NOTES
Problem: Mobility Impaired (Adult and Pediatric)  Goal: *Acute Goals and Plan of Care (Insert Text)  Physical Therapy Goals  Initiated 4/6/2017    1. Patient will move from supine to sit and sit to supine in bed with modified independence within 4 days. 2. Patient will perform sit to stand with modified independence within 4 days. 3. Patient will ambulate with modified independence for 250 feet with the least restrictive device within 4 days. 4. Patient will ascend/descend 4 stairs with use of handrail(s) with modified independence within 4 days. 5. Patient will perform home exercise program per protocol with independence within 4 days. 6. Patient will demonstrate AROM 0-90 degrees in operative joint within 4 days. PHYSICAL THERAPY TREATMENT/DISCHARGE  Patient: Jackeline Holguin (6 y.o. male)  Date: 4/8/2017  Diagnosis: PRIMARY LOCALIZED OSTEO ARTHRITIS OF BILATERAL KNEES  Primary localized osteoarthrosis of the knee, right <principal problem not specified>  Procedure(s) (LRB):  RIGHT TOTAL KNEE ARTHROPLASTY (SPINAL WITH IV SEDATION) (Right) 2 Days Post-Op  Precautions: Fall, WBAT      ASSESSMENT:  Pt has no further skilled acute PT needs and would highly benefit from transition to HHPT in order to maximize R LE ROM/strength/balance, progress gait with a less restrictive assistive device, and to ensure safety with functional mobility and household tasks. Overall, pt is at an independent to modified independent for all functional tasks/ OOB mobility. Pt progressed his distance ambulated to 250 ft with walker support. With increased distance ambulated, pt able to progress to a step-through pattern. No LOB or path deviations noted. Pt able to achieve 70-75 degrees of knee flexion (anticiapte improvement following reduction of pain, swelling, and continuation of ROM exercises).  Reviewed role of HHPT, LE exercises and frequency of performance, signs/symptoms of DVTs, icing schedule, and LE positioning in bed and in chair; Pt verbalized and demonstrated understanding. Progression toward goals:  [X]      Improving appropriately and progressing toward goals  [ ]      Improving slowly and progressing toward goals  [ ]      Not making progress toward goals and plan of care will be adjusted       PLAN:  Patient has no further skilled acute care PT needs at this time. Goals primarily met. Discharge Recommendations:  Home Health  Further Equipment Recommendations for Discharge:  RW present in pt's hospital room        SUBJECTIVE:   Patient stated I am ex [employer].       OBJECTIVE DATA SUMMARY:   Critical Behavior:  Neurologic State: Alert, Appropriate for age  Orientation Level: Oriented X4  Cognition: Appropriate decision making, Appropriate for age attention/concentration, Appropriate safety awareness, Follows commands  Safety/Judgement: Awareness of environment  Range of Motion:  RLE AROM  R Knee Flexion: 70  R Knee Extension: 0  RLE PROM  R Knee Flexion: 75  R Knee Extension: 0     Functional Mobility Training:  Bed Mobility:  Supine to Sit: Independent  Sit to Supine: Independent  Transfers:  Sit to Stand: Modified independent  Stand to Sit: Modified independent  Balance:  Sitting: Intact  Standing: Intact; With support  Ambulation/Gait Training:  Distance (ft): 250 Feet (ft)  Assistive Device: Gait belt;Walker, rolling  Ambulation - Level of Assistance: Modified independent  Right Side Weight Bearing: As tolerated  Therapeutic Exercises:   Exercises performed per protocol. Pain:  Pain Scale 1: Numeric (0 - 10)  Pain Intensity 1: 5  Pain Location 1: Knee  Pain Orientation 1: Right  Pain Description 1: Aching  Pain Intervention(s) 1: Medication (see MAR); Repositioned      Activity Tolerance:   Please refer to the flowsheet for vital signs taken during this treatment.   After treatment:   [ ] Patient left in no apparent distress sitting up in chair  [X] Patient left in no apparent distress in bed  [X] Call bell left within reach  [X] Nursing notified  [ ] Caregiver present  [ ] Bed alarm activated      COMMUNICATION/COLLABORATION:   The patients plan of care was discussed with: Registered Nurse     Mahi López PT, DPT   Time Calculation: 22 mins

## 2017-04-10 ENCOUNTER — NURSE NAVIGATOR (OUTPATIENT)
Dept: OTHER | Age: 72
End: 2017-04-10

## 2017-04-10 NOTE — PROGRESS NOTES
This note will not be viewable in [address] Post Discharge Follow-up contact after Joint Replacement    Patient discharged on 4/8/17  By  Trace John   following  right knee Arthroplasty. Spoke with patient today, who reports they are \" doing really well. \"  Denies Fever, Shortness of Breath or Chest Pain. Home Health has visited. Patient also reports:. Incision  clean, dry, intact  Calf is non-tender,   operative extremity has moderate swelling. Pain is well managed. Discussed use of ice & elevation. is progressing with therapy and isexercising on own. Taking Aspirin for anticoagulation, oxycodone for pain. Patient   is not experiencing symptoms of constipation & urinating without difficulty. Discussed side effects of anticoagulants & pain medications (bleeding/bruising, constipation, lightheaded/dizziness)  Follow up appointment is not scheduled; but patient states plan to schedule. Discussed calling surgeon Dr Chauncey Claude  for drainage, bleeding, swelling in operative extremity, fever or pain. Discussed calling PCP Dr Corie Cornejo with other medical issues.

## 2017-10-19 ENCOUNTER — ANESTHESIA EVENT (OUTPATIENT)
Dept: SURGERY | Age: 72
End: 2017-10-19
Payer: MEDICARE

## 2017-10-19 ENCOUNTER — HOSPITAL ENCOUNTER (OUTPATIENT)
Dept: SURGERY | Age: 72
Setting detail: OUTPATIENT SURGERY
Discharge: HOME | End: 2017-10-19
Payer: MEDICARE

## 2017-10-19 VITALS
HEART RATE: 69 BPM | OXYGEN SATURATION: 97 % | DIASTOLIC BLOOD PRESSURE: 78 MMHG | SYSTOLIC BLOOD PRESSURE: 135 MMHG | RESPIRATION RATE: 16 BRPM | TEMPERATURE: 97.8 F

## 2017-10-19 LAB
ANION GAP SERPL CALC-SCNC: 6 MMOL/L (ref 5–15)
BUN SERPL-MCNC: 16 MG/DL (ref 6–20)
BUN/CREAT SERPL: 14 (ref 12–20)
CALCIUM SERPL-MCNC: 9.1 MG/DL (ref 8.5–10.1)
CHLORIDE SERPL-SCNC: 106 MMOL/L (ref 97–108)
CO2 SERPL-SCNC: 29 MMOL/L (ref 21–32)
CREAT SERPL-MCNC: 1.13 MG/DL (ref 0.7–1.3)
GLUCOSE SERPL-MCNC: 85 MG/DL (ref 65–100)
POTASSIUM SERPL-SCNC: 4 MMOL/L (ref 3.5–5.1)
SODIUM SERPL-SCNC: 141 MMOL/L (ref 136–145)

## 2017-10-19 PROCEDURE — 36415 COLL VENOUS BLD VENIPUNCTURE: CPT | Performed by: ANESTHESIOLOGY

## 2017-10-19 PROCEDURE — 80048 BASIC METABOLIC PNL TOTAL CA: CPT | Performed by: ANESTHESIOLOGY

## 2017-10-19 RX ORDER — ASPIRIN 81 MG/1
81 TABLET ORAL DAILY
COMMUNITY
End: 2022-10-29

## 2017-10-19 RX ORDER — INDOMETHACIN 50 MG/1
50 CAPSULE ORAL
COMMUNITY
Start: 2017-07-24 | End: 2020-12-05

## 2017-10-19 RX ORDER — IBUPROFEN 800 MG/1
800 TABLET ORAL
COMMUNITY
End: 2020-12-05

## 2017-10-19 NOTE — PERIOP NOTES
[facility]  Ambulatory Surgery Unit  Pre-operative Instructions for Endo Procedures    Procedure Date  10/20            Tentative Arrival Time 0615      1. On the day of your procedure, please report to the Ambulatory Surgery Unit Registration Desk and sign in at your designated time. The Ambulatory Surgery Unit is located in [facility] on the [facility] side of the [facility] across from the [address] [place] [address]. Please have all of your health insurance cards and a photo ID. 2. You must have someone with you to drive you home, as you should not drive a car for 24 hours following anesthesia. Please make arrangements for a responsible adult friend or family member to stay with you for at least the first 24 hours after your procedure. 3. Do not have anything to eat or drink (including water, gum, mints, coffee, juice) after midnight 10/19. This may not apply to medications prescribed by your physician. (Please note below the special instructions with medications to take the morning of your procedure.)    4. If applicable, follow the clear liquid diet and bowel prep instructions provided by your physician's office. If you do not have this information, or have any questions, please contact your physician's office. 5. We recommend you do not drink any alcoholic beverages for 24 hours before and after your procedure. 6. Contact your surgeons office for instructions on the following medications: non-steroidal anti-inflammatory drugs (i.e. Advil, Aleve), vitamins, and supplements. (Some surgeons will want you to stop these medications prior to surgery and others may allow you to take them)   **If you are currently taking Plavix, Coumadin, Aspirin and/or other blood-thinning agents, contact your surgeon for instructions. ** Your surgeon will partner with the physician prescribing these medications to determine if it is safe to stop or if you need to continue taking.  Please do not stop taking these medications without instructions from your surgeon. 7. In an effort to help prevent surgical site infection, we ask that you shower with an anti-bacterial soap (i.e. Dial or Safeguard) on the morning of your procedure. Do not apply any lotions, powders, or deodorants after showering. 8. Wear comfortable clothes. Wear glasses instead of contacts. Do not bring any jewelry or money (other than copays or fees as instructed). Do not wear make-up, particularly mascara, the morning of your procedure. Wear your hair loose or down, no ponytails, buns, kishore pins or clips. All body piercings must be removed. 9. You should understand that if you do not follow these instructions your procedure may be cancelled. If your physical condition changes (i.e. fever, cold or flu) please contact your surgeon as soon as possible. 10. It is important that you be on time. If a situation occurs where you may be late, or if you have any questions or problems, please call [phone]. 11. Your procedure time may be subject to change. You will receive a phone call the day prior to confirm your arrival time. Special Instructions: Take all medications and inhalers, as prescribed, on the morning of surgery with a sip of water EXCEPT: none      I understand a pre-operative phone call will be made to verify my procedure time. In the event that I am not available, I give permission for a message to be left on my answering service and/or with another person?       yes       ___________________      ___________________      ___________________  (Signature of Patient)          (Witness)                   (Date and Time)

## 2017-10-20 ENCOUNTER — ANESTHESIA (OUTPATIENT)
Dept: SURGERY | Age: 72
End: 2017-10-20
Payer: MEDICARE

## 2017-10-20 ENCOUNTER — HOSPITAL ENCOUNTER (OUTPATIENT)
Age: 72
Setting detail: OUTPATIENT SURGERY
Discharge: HOME | End: 2017-10-20
Attending: SPECIALIST | Admitting: SPECIALIST
Payer: MEDICARE

## 2017-10-20 VITALS
HEART RATE: 72 BPM | TEMPERATURE: 97.9 F | BODY MASS INDEX: 27.74 KG/M2 | OXYGEN SATURATION: 96 % | RESPIRATION RATE: 19 BRPM | SYSTOLIC BLOOD PRESSURE: 132 MMHG | WEIGHT: 183 LBS | DIASTOLIC BLOOD PRESSURE: 87 MMHG | HEIGHT: 68 IN

## 2017-10-20 PROBLEM — Z12.11 SCREEN FOR COLON CANCER: Status: ACTIVE | Noted: 2017-10-20

## 2017-10-20 PROCEDURE — 76210000040 HC AMBSU PH I REC FIRST 0.5 HR: Performed by: SPECIALIST

## 2017-10-20 PROCEDURE — 76030000002 HC AMB SURG OR TIME FIRST 0.: Performed by: SPECIALIST

## 2017-10-20 PROCEDURE — 74011250636 HC RX REV CODE- 250/636: Performed by: ANESTHESIOLOGY

## 2017-10-20 PROCEDURE — 76060000073 HC AMB SURG ANES FIRST 0.5 HR: Performed by: SPECIALIST

## 2017-10-20 PROCEDURE — 77030020255 HC SOL INJ LR 1000ML BG: Performed by: SPECIALIST

## 2017-10-20 PROCEDURE — 74011250636 HC RX REV CODE- 250/636

## 2017-10-20 PROCEDURE — 76210000046 HC AMBSU PH II REC FIRST 0.5 HR: Performed by: SPECIALIST

## 2017-10-20 RX ORDER — SODIUM CHLORIDE 0.9 % (FLUSH) 0.9 %
5-10 SYRINGE (ML) INJECTION EVERY 8 HOURS
Status: DISCONTINUED | OUTPATIENT
Start: 2017-10-20 | End: 2017-10-20 | Stop reason: HOSPADM

## 2017-10-20 RX ORDER — SODIUM CHLORIDE 9 MG/ML
75 INJECTION, SOLUTION INTRAVENOUS CONTINUOUS
Status: DISCONTINUED | OUTPATIENT
Start: 2017-10-20 | End: 2017-10-20 | Stop reason: HOSPADM

## 2017-10-20 RX ORDER — ONDANSETRON 2 MG/ML
4 INJECTION INTRAMUSCULAR; INTRAVENOUS AS NEEDED
Status: DISCONTINUED | OUTPATIENT
Start: 2017-10-20 | End: 2017-10-20 | Stop reason: HOSPADM

## 2017-10-20 RX ORDER — SODIUM CHLORIDE 0.9 % (FLUSH) 0.9 %
5-10 SYRINGE (ML) INJECTION AS NEEDED
Status: DISCONTINUED | OUTPATIENT
Start: 2017-10-20 | End: 2017-10-20 | Stop reason: HOSPADM

## 2017-10-20 RX ORDER — SODIUM CHLORIDE, SODIUM LACTATE, POTASSIUM CHLORIDE, CALCIUM CHLORIDE 600; 310; 30; 20 MG/100ML; MG/100ML; MG/100ML; MG/100ML
25 INJECTION, SOLUTION INTRAVENOUS CONTINUOUS
Status: DISCONTINUED | OUTPATIENT
Start: 2017-10-20 | End: 2017-10-20 | Stop reason: HOSPADM

## 2017-10-20 RX ORDER — ATROPINE SULFATE 0.1 MG/ML
0.5 INJECTION INTRAVENOUS
Status: DISCONTINUED | OUTPATIENT
Start: 2017-10-20 | End: 2017-10-20 | Stop reason: HOSPADM

## 2017-10-20 RX ORDER — FLUMAZENIL 0.1 MG/ML
0.2 INJECTION INTRAVENOUS
Status: DISCONTINUED | OUTPATIENT
Start: 2017-10-20 | End: 2017-10-20 | Stop reason: HOSPADM

## 2017-10-20 RX ORDER — EPINEPHRINE 0.1 MG/ML
1 INJECTION INTRACARDIAC; INTRAVENOUS
Status: DISCONTINUED | OUTPATIENT
Start: 2017-10-20 | End: 2017-10-20 | Stop reason: HOSPADM

## 2017-10-20 RX ORDER — DEXTROMETHORPHAN/PSEUDOEPHED 2.5-7.5/.8
1.2 DROPS ORAL
Status: DISCONTINUED | OUTPATIENT
Start: 2017-10-20 | End: 2017-10-20 | Stop reason: HOSPADM

## 2017-10-20 RX ORDER — LIDOCAINE HYDROCHLORIDE 10 MG/ML
0.1 INJECTION, SOLUTION EPIDURAL; INFILTRATION; INTRACAUDAL; PERINEURAL AS NEEDED
Status: DISCONTINUED | OUTPATIENT
Start: 2017-10-20 | End: 2017-10-20 | Stop reason: HOSPADM

## 2017-10-20 RX ORDER — DIPHENHYDRAMINE HYDROCHLORIDE 50 MG/ML
12.5 INJECTION, SOLUTION INTRAMUSCULAR; INTRAVENOUS AS NEEDED
Status: DISCONTINUED | OUTPATIENT
Start: 2017-10-20 | End: 2017-10-20 | Stop reason: HOSPADM

## 2017-10-20 RX ORDER — PROPOFOL 10 MG/ML
INJECTION, EMULSION INTRAVENOUS AS NEEDED
Status: DISCONTINUED | OUTPATIENT
Start: 2017-10-20 | End: 2017-10-20 | Stop reason: HOSPADM

## 2017-10-20 RX ORDER — FENTANYL CITRATE 50 UG/ML
25 INJECTION, SOLUTION INTRAMUSCULAR; INTRAVENOUS
Status: DISCONTINUED | OUTPATIENT
Start: 2017-10-20 | End: 2017-10-20 | Stop reason: HOSPADM

## 2017-10-20 RX ADMIN — PROPOFOL 50 MG: 10 INJECTION, EMULSION INTRAVENOUS at 07:49

## 2017-10-20 RX ADMIN — PROPOFOL 50 MG: 10 INJECTION, EMULSION INTRAVENOUS at 07:47

## 2017-10-20 RX ADMIN — SODIUM CHLORIDE, SODIUM LACTATE, POTASSIUM CHLORIDE, AND CALCIUM CHLORIDE 25 ML/HR: 600; 310; 30; 20 INJECTION, SOLUTION INTRAVENOUS at 07:01

## 2017-10-20 RX ADMIN — PROPOFOL 100 MG: 10 INJECTION, EMULSION INTRAVENOUS at 07:42

## 2017-10-20 NOTE — PROCEDURES
Colonoscopy    Indications: screen colon cancer    Pre-operative Diagnosis: screen colon cancer    Medications:  See anesthesia form    Post-operative Diagnosis:  INTERNAL HEMORRHOIDS, DIVERTICULOSIS      Procedure Details   Prior to the procedure its objectives, risks, consequences and alternatives were discussed with the patient who then elected to proceed. All questions were answered. Digital Rectal Exam:  was normal     The Olympus videocolonoscope was inserted in the rectum and advanced to the cecum. The cecum was identified by typical landmarks. The colonoscope was slowly and carefully withdrawn as the mucosa was inspected. Left sided diverticula were seen. No other abnormalities were noted. Retroflexion in the rectum was remarkable for grade I internal hemorrhoids. .    Photos to document the ileocecal valve, appendiceal orifice and retroflexion exam were obtained. The preparation was adequate. Estimated Blood Loss:  none    Specimens:  none    Findings:  Negative exam except diverticula and internal hemorrhoids    Complications:  none    Repeat colonoscopy is recommended in:  Ten years--PCP to decide based on patient's health at the time.                  Brianda Damon MD  8:04 AM  10/20/2017

## 2017-10-20 NOTE — ANESTHESIA PREPROCEDURE EVALUATION
Anesthetic History   No history of anesthetic complications            Review of Systems / Medical History  Patient summary reviewed, nursing notes reviewed and pertinent labs reviewed    Pulmonary  Within defined limits                 Neuro/Psych   Within defined limits           Cardiovascular    Hypertension          CAD (s/p stent)    Exercise tolerance: >4 METS     GI/Hepatic/Renal  Within defined limits              Endo/Other      Hypothyroidism: well controlled  Arthritis     Other Findings   Comments: Chronic back pain           Physical Exam    Airway  Mallampati: II  TM Distance: 4 - 6 cm  Neck ROM: normal range of motion   Mouth opening: Normal     Cardiovascular    Rhythm: regular  Rate: normal      Pertinent negatives: No murmur   Dental    Dentition: Implants and Caps/crowns     Pulmonary  Breath sounds clear to auscultation               Abdominal  GI exam deferred       Other Findings            Anesthetic Plan    ASA: 2  Anesthesia type: general and total IV anesthesia          Induction: Intravenous  Anesthetic plan and risks discussed with: Patient

## 2017-10-20 NOTE — ANESTHESIA POSTPROCEDURE EVALUATION
Post-Anesthesia Evaluation and Assessment    Patient: Drew Hobson MRN: [id number]  SSN: [id number]    YOB: 1945  Age: 81 y.o. Sex: male       Cardiovascular Function/Vital Signs  Visit Vitals    /87 (BP 1 Location: Left arm, BP Patient Position: At rest)    Pulse 72    Temp 36.6 °C (97.9 °F)    Resp 19    Ht 5' 8\" (1.727 m)    Wt 83 kg (183 lb)    SpO2 96%    BMI 27.83 kg/m2       Patient is status post general, total IV anesthesia anesthesia for Procedure(s):  COLONOSCOPY. Nausea/Vomiting: None    Postoperative hydration reviewed and adequate. Pain:  Pain Scale 1: Numeric (0 - 10) (10/20/17 0811)  Pain Intensity 1: 0 (10/20/17 0811)   Managed    Neurological Status:   Neuro (WDL): Within Defined Limits (10/20/17 0193)  Neuro  Neurologic State: Drowsy; Eyes open spontaneously (10/20/17 0806)   At baseline    Mental Status and Level of Consciousness: Arousable    Pulmonary Status:   O2 Device: Room air (10/20/17 0811)   Adequate oxygenation and airway patent    Complications related to anesthesia: None    Post-anesthesia assessment completed.  No concerns    Signed By: Екатерина Mak MD     October 20, 2017

## 2017-10-20 NOTE — PERIOP NOTES
Reviewed discharge instructions w/pt. And wife. They verbalized understanding. Vss. Denies pain. Abdomen soft, nontender. Pt discharged via wheelchair, accompanied by RN. Pt discharged awake and alert, respirations equal and unlabored, skin warm, dry, and intact. Pt and family members' questions and concerns addressed prior to discharge.

## 2017-10-20 NOTE — PERIOP NOTES
Kenia Trejo  1945  [id number]    Situation:  Verbal report given from: Braxton Rangel CRNA, Jagjit Smith RN  Procedure: Procedure(s):  COLONOSCOPY    Background:    Preoperative diagnosis: SCREENING    Postoperative diagnosis: INTERNAL HEMORRHOIDS, DIVERTICULOSIS    :  Dr. Sindy Rucker    Assistant(s): Circ-1: Marcus Hampton RN  Circ-2: Joaquim Post RN  Scrub Tech-1: Curt Gabriel    Specimens: * No specimens in log *    Assessment:  Intra-procedure medications   Propofol 200 mg      Anesthesia gave intra-procedure sedation and medications, see anesthesia flow sheet     Intravenous fluids: LR@ KVO     Vital signs stable     Abdominal assessment: round and soft       Recommendation:    Permission to share finding with family or friend yes    All side rails up, bed in low position, wheels locked. Nurse at bedside.

## 2017-10-20 NOTE — IP AVS SNAPSHOT
[address] [facility] 
[phone] Patient: Bertha Tatum MRN: [id number] :1945 You are allergic to the following Allergen Reactions Wasp (Venom-Wasp) Anaphylaxis Swelling Sulfa (Sulfonamide Antibiotics) Other (comments) Lower extremity redness Recent Documentation Height Weight BMI Smoking Status 1.727 m 83 kg 27.83 kg/m2 Never Smoker Emergency Contacts Name Discharge Info Relation Home Work Mobile Felicitas Lind CAREGIVER [3] Spouse [3] [phone] About your hospitalization You were admitted on:  2017 You last received care in the:  [facility] PACU You were discharged on:  2017 Unit phone number:  [phone] Why you were hospitalized Your primary diagnosis was:  Not on File Your diagnoses also included:  Screen For Colon Cancer Providers Seen During Your Hospitalizations Provider Role Specialty Primary office phone Shannan Velazco MD Attending Provider Gastroenterology [phone] Your Primary Care Physician (PCP) Primary Care Physician Office Phone Office Fax [address] [phone] Follow-up Information Follow up With Details Comments Contact Info Ginna Quiroz MD   [address] [place] [address] [address] [facility] 
[phone] Current Discharge Medication List  
  
CONTINUE these medications which have NOT CHANGED Dose & Instructions Dispensing Information Comments Morning Noon Evening Bedtime  
 acetaminophen 500 mg tablet Commonly known as:  TYLENOL Your last dose was: Your next dose is:    
   
   
 Dose:  500 mg Take 1 Tab by mouth every four (4) hours (while awake). Indications: Pain Quantity:  100 Tab Refills:  0 aspirin delayed-release 81 mg tablet Your last dose was: Your next dose is:    
   
   
 Dose:  81 mg Take 81 mg by mouth daily. Refills:  0  
     
   
   
   
  
 desonide 0.05 % cream  
Commonly known as:  Rohit Brittany Your last dose was: Your next dose is:    
   
   
 Apply  to affected area as needed for Skin Irritation. Apply to buttocks as needed Refills:  0  
     
   
   
   
  
 enalapril 10 mg tablet Commonly known as:  Sanjuana Credit Your last dose was: Your next dose is:    
   
   
 Dose:  10 mg Take 10 mg by mouth nightly. Refills:  0 EPINEPHrine 0.3 mg/0.3 mL injection Commonly known as:  Dannis Plush Your last dose was: Your next dose is:    
   
   
 Dose:  0.3 mg  
0.3 mg by IntraMUSCular route once as needed. Refills:  0  
     
   
   
   
  
 furosemide 20 mg tablet Commonly known as:  LASIX Your last dose was: Your next dose is:    
   
   
 Dose:  20 mg Take 20 mg by mouth daily. Refills:  0  
     
   
   
   
  
 ibuprofen 800 mg tablet Commonly known as:  MOTRIN Your last dose was: Your next dose is:    
   
   
 Dose:  800 mg Take 800 mg by mouth every eight (8) hours as needed for Pain. Refills:  0  
     
   
   
   
  
 indomethacin 50 mg capsule Commonly known as:  INDOCIN Your last dose was: Your next dose is:    
   
   
 Dose:  50 mg Take 50 mg by mouth three (3) times daily as needed. Refills:  0  
     
   
   
   
  
 ketoconazole 2 % topical cream  
Commonly known as:  NIZORAL Your last dose was: Your next dose is:    
   
   
 Apply  to affected area as needed for Skin Irritation. Apply to affected skin prn Refills:  0 LIPITOR 40 mg tablet Generic drug:  atorvastatin Your last dose was: Your next dose is:    
   
   
 Dose:  20 mg Take 20 mg by mouth nightly. Refills:  0  
     
   
   
   
  
 sildenafil 20 mg tablet Commonly known as:  REVATIO Your last dose was: Your next dose is:    
   
   
 Dose:  20 mg Take 20 mg by mouth as needed. Refills:  0  
     
   
   
   
  
 * SYNTHROID 200 mcg tablet Generic drug:  levothyroxine Your last dose was: Your next dose is:    
   
   
 Dose:  200 mcg Take 200 mcg by mouth. Daily 6 days a week, Mon, Tues, Wed, Thurs, Fri ,Sun Refills:  0  
     
   
   
   
  
 * SYNTHROID 200 mcg tablet Generic drug:  levothyroxine Your last dose was: Your next dose is:    
   
   
 Dose:  100 mcg Take 100 mcg by mouth. on Saturday  (1/2 of 200mcg tab) Refills:  0  
     
   
   
   
  
 * Notice: This list has 2 medication(s) that are the same as other medications prescribed for you. Read the directions carefully, and ask your doctor or other care provider to review them with you. Discharge Instructions Gracie Zuleika 
[id number] 
1945 Procedure  Discharge Instructions:   
 
Discomfort: 
Redness at IV site- apply warm compress to area; if redness or soreness persist- contact your physician There may be a slight amount of blood passed from the rectum Gaseous discomfort- walking, belching will help relieve any discomfort You may not operate a vehicle for 24 hours You may not engage in an occupation involving machinery or appliances for rest of today You may not drink alcoholic beverages for at least 24 hours Avoid making any critical decisions for at least 24 hour DIET: 
 You may resume your normal diet today. You should not overeat or \"feast\" today as your abdomen may become distended or uncomfortable. MEDICATIONS: 
 I reconciled this list from the list you gave us when you came today for the procedure. Please clarify with me, your primary care physician and the nurse who is discharging you if we have any discrepancies. Aspirin and or non-steroidal medication (Ibuprofen, Motrin, naproxen, etc.) is ok in limited quantities. ACTIVITY: 
You may resume your normal daily activities it is recommended that you spend the remainder of the day resting -  avoid any strenuous activity. CALL M.D. ANY SIGN OF: Increasing pain, nausea, vomiting Abdominal distension (swelling) New increased bleeding (oral or rectal) Fever (chills) Pain in chest area Bloody discharge from nose or mouth Shortness of breath Follow-up Instructions: No biopsies Telephone #  [phone] Follow up visit as needed. You do not need another colonoscopy unless your pcp decides you should have one at age [de-identified] or if there are symptoms. Jyoti Mazariegos MD 
8:06 AM 
10/20/2017 DO NOT TAKE SLEEPING MEDICATIONS OR ANTIANXIETY MEDICATIONS WHILE TAKING NARCOTIC PAIN MEDICATIONS,  ESPECIALLY THE NIGHT OF ANESTHESIA. CPAP PATIENTS BE SURE TO WEAR MACHINE WHENEVER NAPPING OR SLEEPING. DISCHARGE SUMMARY from Nurse The following personal items collected during your admission are returned to you:  
Dental Appliance: Dental Appliances: None Vision: Visual Aid: Glasses, At home Hearing Aid:   
Jewelry:   
Clothing:   
Other Valuables:   
Valuables sent to safe:   
 
 
PATIENT INSTRUCTIONS: 
 
 
B - Balance E - Eyes F-  Face looks uneven A-  Arms unable to move or move even S-  Speech slurred or non-existent T-  Time-call 911 as soon as signs and symptoms begin-DO NOT go Back to bed or wait to see if you get better-TIME IS BRAIN. If you have not received your influenza and/or pneumococcal vaccine, please follow up with your primary care physician. The discharge information has been reviewed with the patient and spouse. The patient and spouse verbalized understanding. Discharge Orders None Introducing [facility]! [place] [employer] introduces [employer] patient portal. Now you can access parts of your medical record, email your doctor's office, and request medication refills online. 1. In your internet browser, go to [email or web address] 2. Click on the First Time User? Click Here link in the Sign In box. You will see the New Member Sign Up page. 3. Enter your [employer] Access Code exactly as it appears below. You will not need to use this code after youve completed the sign-up process.  If you do not sign up before the expiration date, you must request a new code. 
 
· [employer] Access Code: [email or web address] Expires: 1/17/2018  7:31 AM 
 
4. Enter the last four digits of your Social Security Number (xxxx) and Date of Birth (mm/dd/yyyy) as indicated and click Submit. You will be taken to the next sign-up page. 5. Create a [employer] ID. This will be your [employer] login ID and cannot be changed, so think of one that is secure and easy to remember. 6. Create a [employer] password. You can change your password at any time. 7. Enter your Password Reset Question and Answer. This can be used at a later time if you forget your password. 8. Enter your e-mail address. You will receive e-mail notification when new information is available in [address] 9. Click Sign Up. You can now view and download portions of your medical record. 10. Click the Download Summary menu link to download a portable copy of your medical information. If you have questions, please visit the Frequently Asked Questions section of the [employer] website. Remember, [employer] is NOT to be used for urgent needs. For medical emergencies, dial 911. Now available from your iPhone and Android! General Information Please provide this summary of care documentation to your next provider. Patient Signature:  ____________________________________________________________ Date:  ____________________________________________________________  
  
[facility] Provider Signature:  ____________________________________________________________ Date:  ____________________________________________________________

## 2017-10-20 NOTE — PERIOP NOTES
Endoscope was pre-cleaned at bedside immediately by [employer].    Patient: Isabel Ruiz MRN: [id number]  SSN: [id number]   YOB: 1945  Age: 81 y.o. Sex: male     Patient is status post Procedure(s):  COLONOSCOPY. Surgeon(s) and Role:     * Melissa Jackson MD - Primary    Local/Dose/Irrigation:  Héctor Altamirano TO FIELD &USED IN WATER IRRIGATION AS NEEDED BY DR. Jl Kelsey. Peripheral IV 10/20/17 Right Wrist (Active)   Site Assessment Clean, dry, & intact 10/20/2017  6:52 AM   Phlebitis Assessment 0 10/20/2017  6:52 AM   Infiltration Assessment 0 10/20/2017  6:52 AM   Dressing Status Clean, dry, & intact 10/20/2017  6:52 AM   Dressing Type Tape;Transparent 10/20/2017  6:52 AM   Hub Color/Line Status Blue; Infusing 10/20/2017  6:52 AM

## 2017-10-20 NOTE — DISCHARGE INSTRUCTIONS
Regina Tampa  [id number]  1945              Procedure  Discharge Instructions:      Discomfort:  Redness at IV site- apply warm compress to area; if redness or soreness persist- contact your physician  There may be a slight amount of blood passed from the rectum  Gaseous discomfort- walking, belching will help relieve any discomfort  You may not operate a vehicle for 24 hours  You may not engage in an occupation involving machinery or appliances for rest of today  You may not drink alcoholic beverages for at least 24 hours  Avoid making any critical decisions for at least 24 hour  DIET:   You may resume your normal diet today. You should not overeat or \"feast\" today as your abdomen may become distended or uncomfortable. MEDICATIONS:   I reconciled this list from the list you gave us when you came today for the procedure. Please clarify with me, your primary care physician and the nurse who is discharging you if we have any discrepancies. Aspirin and or non-steroidal medication (Ibuprofen, Motrin, naproxen, etc.) is ok in limited quantities. ACTIVITY:  You may resume your normal daily activities it is recommended that you spend the remainder of the day resting -  avoid any strenuous activity. CALL M.D. ANY SIGN OF:  Increasing pain, nausea, vomiting  Abdominal distension (swelling)  New increased bleeding (oral or rectal)  Fever (chills)  Pain in chest area  Bloody discharge from nose or mouth  Shortness of breath          Follow-up Instructions:   No biopsies Telephone #  [phone]  Follow up visit as needed. You do not need another colonoscopy unless your pcp decides you should have one at age [de-identified] or if there are symptoms. Amor Huber MD  8:06 AM  10/20/2017               DO NOT TAKE SLEEPING MEDICATIONS OR ANTIANXIETY MEDICATIONS WHILE TAKING NARCOTIC PAIN MEDICATIONS,  ESPECIALLY THE NIGHT OF ANESTHESIA.     CPAP PATIENTS BE SURE TO WEAR MACHINE WHENEVER NAPPING OR SLEEPING. DISCHARGE SUMMARY from Nurse    The following personal items collected during your admission are returned to you:   Dental Appliance: Dental Appliances: None  Vision: Visual Aid: Glasses, At home  Hearing Aid:    Jewelry:    Clothing:    Other Valuables:    Valuables sent to safe:        PATIENT INSTRUCTIONS:    After General Anesthesia or Intravenous Sedation, for 24 hours or while taking prescription Narcotics:        Someone should be with you for the next 24 hours. For your own safety, a responsible adult must drive you home. · Limit your activities  · Recommended activity: Rest today, up with assistance today. Do not climb stairs or shower unattended for the next 24 hours. · Please start with a soft bland diet and advance as tolerated (no nausea) to regular diet. · If you have a sore throat you should try the following: fluids, warm salt water gargles, or throat lozenges. If it does not improve after several days please follow up with your primary physician. · Do not drive and operate hazardous machinery  · Do not make important personal or business decisions  · Do  not drink alcoholic beverages  · If you have not urinated within 8 hours after discharge, please contact your surgeon on call. Report the following to your surgeon:  · Excessive pain, swelling, redness or odor of or around the surgical area  · Temperature over 100.5  · Nausea and vomiting lasting longer than 4 hours or if unable to take medications  · Any signs of decreased circulation or nerve impairment to extremity: change in color, persistent  numbness, tingling, coldness or increase pain      · You will receive a Post Operative Call from one of the Recovery Room Nurses on the day after your surgery to check on you. It is very important for us to know how you are recovering after your surgery. If you have an issue or need to speak with someone, please call your surgeon, do not wait for the post operative call.     · You may receive an e-mail or letter in the mail from [facility] regarding your experience with us in the Ambulatory Surgery Unit. Your feedback is valuable to us and we appreciate your participation in the survey. · If the above instructions are not adequate, please contact Sundar Aguilar RN, [facility] anesthesia Nurse Manager or our Anesthesiologist, at [phone]. If you are having problems after your surgery, call the physician at his office number. · We wish you a speedy recovery ? What to do at Home:      *  Please give a list of your current medications to your Primary Care Provider. *  Please update this list whenever your medications are discontinued, doses are      changed, or new medications (including over-the-counter products) are added. *  Please carry medication information at all times in case of emergency situations. These are general instructions for a healthy lifestyle:    No smoking/ No tobacco products/ Avoid exposure to second hand smoke    Surgeon General's Warning:  Quitting smoking now greatly reduces serious risk to your health. Obesity, smoking, and sedentary lifestyle greatly increases your risk for illness    A healthy diet, regular physical exercise & weight monitoring are important for maintaining a healthy lifestyle    You may be retaining fluid if you have a history of heart failure or if you experience any of the following symptoms:  Weight gain of 3 pounds or more overnight or 5 pounds in a week, increased swelling in our hands or feet or shortness of breath while lying flat in bed. Please call your doctor as soon as you notice any of these symptoms; do not wait until your next office visit.     Recognize signs and symptoms of STROKE:    B - Balance  E - Eyes    F-  Face looks uneven    A-  Arms unable to move or move even    S-  Speech slurred or non-existent    T-  Time-call 911 as soon as signs and symptoms begin-DO NOT go       Back to bed or wait to see if you get better-TIME IS BRAIN. If you have not received your influenza and/or pneumococcal vaccine, please follow up with your primary care physician. The discharge information has been reviewed with the patient and spouse. The patient and spouse verbalized understanding.

## 2017-10-20 NOTE — H&P
Pre-endoscopy H and P    The patient was seen and examined in the [facility] pre op. The airway was assessed and docuemented. The problem list, past medical history, and medications were reviewed. Patient Active Problem List   Diagnosis Code    Primary localized osteoarthrosis of the knee M17.10    Screen for colon cancer Z12.11     Social History     Social History    Marital status:      Spouse name: N/A    Number of children: N/A    Years of education: N/A     Occupational History    Not on file. Social History Main Topics    Smoking status: Never Smoker    Smokeless tobacco: Never Used    Alcohol use 1.2 oz/week     2 Shots of liquor per week    Drug use: No    Sexual activity: Not on file     Other Topics Concern    Not on file     Social History Narrative     Past Medical History:   Diagnosis Date    Arthritis     lower back    CAD (coronary artery disease)     stent x 1  LDA    Chronic lower back pain     Dr Trey Ling     Chronic pain     right knee - resolved with knee replacement 2017    Hypercholesteremia     Hypertension     Hypothyroidism      The patient has a family history of na    Prior to Admission Medications   Prescriptions Last Dose Informant Patient Reported? Taking? EPINEPHrine (EPIPEN) 0.3 mg/0.3 mL injection Unknown at Unknown time  Yes No   Si.3 mg by IntraMUSCular route once as needed. acetaminophen (TYLENOL) 500 mg tablet Unknown at Unknown time  No No   Sig: Take 1 Tab by mouth every four (4) hours (while awake). Indications: Pain   aspirin delayed-release 81 mg tablet 10/19/2017 at Unknown time  Yes Yes   Sig: Take 81 mg by mouth daily. atorvastatin (LIPITOR) 40 mg tablet 10/19/2017 at Unknown time  Yes Yes   Sig: Take 20 mg by mouth nightly. desonide (TRIDESILON) 0.05 % cream Not Taking at Unknown time  Yes No   Sig: Apply  to affected area as needed for Skin Irritation.  Apply to buttocks as needed   enalapril (VASOTEC) 10 mg tablet 10/19/2017 at Unknown time  Yes Yes   Sig: Take 10 mg by mouth nightly. furosemide (LASIX) 20 mg tablet 10/19/2017 at Unknown time  Yes Yes   Sig: Take 20 mg by mouth daily. ibuprofen (MOTRIN) 800 mg tablet 10/13/2017 at Unknown time  Yes Yes   Sig: Take 800 mg by mouth every eight (8) hours as needed for Pain. indomethacin (INDOCIN) 50 mg capsule 10/19/2017 at Unknown time  Yes Yes   Sig: Take 50 mg by mouth three (3) times daily as needed. ketoconazole (NIZORAL) 2 % topical cream Not Taking at Unknown time  Yes No   Sig: Apply  to affected area as needed for Skin Irritation. Apply to affected skin prn   levothyroxine (SYNTHROID) 200 mcg tablet 10/19/2017 at Unknown time  Yes Yes   Sig: Take 200 mcg by mouth. Daily 6 days a week, Mon, Tues, Wed, Thurs, Fri ,Sun   levothyroxine (SYNTHROID) 200 mcg tablet 10/19/2017 at Unknown time  Yes Yes   Sig: Take 100 mcg by mouth. on Saturday  (1/2 of 200mcg tab)   sildenafil (REVATIO) 20 mg tablet Unknown at Unknown time  Yes No   Sig: Take 20 mg by mouth as needed. Facility-Administered Medications: None           The review of systems is:  negative for shortness of breath or chest pain      The heart, lungs, and mental status were satisfactory for the administration of anesthesia sedation and for the procedure. I discussed with the patient the objectives, risks, consequences and alternatives to the procedure.       Alfred Boogie MD  10/20/2017  7:24 AM

## 2018-01-31 NOTE — PERIOP NOTES
[facility]  Ambulatory Surgery Unit  Pre-operative Instructions    Procedure Date  2/6            Tentative Arrival Time 6646      1. On the day of your procedure, please report to the Ambulatory Surgery Unit Registration Desk and sign in at your designated time. The Ambulatory Surgery Unit is located in [facility] on the [facility] side of the hospitals across from the [facility]. Please have all of your health insurance cards and a photo ID. 2. You must have someone with you to drive you home as directed by your surgeon. 3. You may have a light breakfast and take normal morning medications. 4. We recommend you do not drink any alcoholic beverages for 24 hours before and after your procedure. 5. Contact your surgeons office for instructions on the following medications: non-steroidal anti-inflammatory drugs (i.e. Advil, Aleve), vitamins, and supplements. (Some surgeons will want you to stop these medications prior to surgery and others may allow you to take them)   **If you are currently taking Plavix, Coumadin, Aspirin and/or other blood-thinning agents, contact your surgeon for instructions. ** Your surgeon will partner with the physician prescribing these medications to determine if it is safe to stop or if you need to continue taking. Please do not stop taking these medications without instructions from your surgeon. 6. In an effort to help prevent surgical site infection, we ask that you shower with an anti-bacterial soap (i.e. Dial or Safeguard) on the morning of your procedure. Do not apply any lotions, powders, or deodorants after showering. 7. Wear comfortable clothes. Wear glasses instead of contacts. Do not bring any jewelry or money (other than copays or fees as instructed). Do not wear make-up, particularly mascara, the morning of your procedure. Wear your hair loose or down, no ponytails, buns, kishore pins or clips.  All body piercings must be removed. 8. You should understand that if you do not follow these instructions your procedure may be cancelled. If your physical condition changes (i.e. fever, cold or flu) please contact your surgeon as soon as possible. 9. It is important that you be on time. If a situation occurs where you may be late, or if you have any questions or problems, please call [phone].    10. Your procedure time may be subject to change. You will receive a phone call the day prior to confirm your arrival time. I understand a pre-operative phone call will be made to verify my procedure time. In the event that I am not available, I give permission for a message to be left on my answering service and/or with another person?       yes    Reviewed by phone with pt, verbalized understanding     ___________________      ___________________      ___________________  (Signature of Patient)          (Witness)                   (Date and Time)

## 2018-02-06 ENCOUNTER — APPOINTMENT (OUTPATIENT)
Dept: GENERAL RADIOLOGY | Age: 73
End: 2018-02-06
Attending: PHYSICAL MEDICINE & REHABILITATION
Payer: MEDICARE

## 2018-02-06 ENCOUNTER — HOSPITAL ENCOUNTER (OUTPATIENT)
Age: 73
Setting detail: OUTPATIENT SURGERY
Discharge: HOME | End: 2018-02-06
Attending: PHYSICAL MEDICINE & REHABILITATION | Admitting: PHYSICAL MEDICINE & REHABILITATION
Payer: MEDICARE

## 2018-02-06 VITALS
HEIGHT: 69 IN | RESPIRATION RATE: 16 BRPM | TEMPERATURE: 98 F | BODY MASS INDEX: 28.23 KG/M2 | DIASTOLIC BLOOD PRESSURE: 82 MMHG | SYSTOLIC BLOOD PRESSURE: 146 MMHG | HEART RATE: 75 BPM | OXYGEN SATURATION: 96 % | WEIGHT: 190.6 LBS

## 2018-02-06 PROCEDURE — 77030003665 HC NDL SPN BBMI -A: Performed by: PHYSICAL MEDICINE & REHABILITATION

## 2018-02-06 PROCEDURE — 74011000250 HC RX REV CODE- 250: Performed by: PHYSICAL MEDICINE & REHABILITATION

## 2018-02-06 PROCEDURE — 76030000000 HC AMB SURG OR TIME 0.5 TO 1: Performed by: PHYSICAL MEDICINE & REHABILITATION

## 2018-02-06 PROCEDURE — 76000 FLUOROSCOPY <1 HR PHYS/QHP: CPT

## 2018-02-06 PROCEDURE — 72020 X-RAY EXAM OF SPINE 1 VIEW: CPT

## 2018-02-06 PROCEDURE — 76210000046 HC AMBSU PH II REC FIRST 0.5 HR: Performed by: PHYSICAL MEDICINE & REHABILITATION

## 2018-02-06 RX ORDER — LIDOCAINE HYDROCHLORIDE 20 MG/ML
5 INJECTION, SOLUTION INFILTRATION; PERINEURAL ONCE
Status: COMPLETED | OUTPATIENT
Start: 2018-02-06 | End: 2018-02-06

## 2018-02-06 RX ORDER — METHYLPREDNISOLONE ACETATE 40 MG/ML
40 INJECTION, SUSPENSION INTRA-ARTICULAR; INTRALESIONAL; INTRAMUSCULAR; SOFT TISSUE ONCE
Status: DISCONTINUED | OUTPATIENT
Start: 2018-02-06 | End: 2018-02-06 | Stop reason: HOSPADM

## 2018-02-06 RX ORDER — BUPIVACAINE HYDROCHLORIDE 5 MG/ML
5 INJECTION, SOLUTION EPIDURAL; INTRACAUDAL ONCE
Status: COMPLETED | OUTPATIENT
Start: 2018-02-06 | End: 2018-02-06

## 2018-02-06 NOTE — DISCHARGE INSTRUCTIONS
Radiofrequency Ablation  Discharge Instructions    You had a radiofrequency ablation today. You will probably have some numbness in your lower back area for the next 6-8 hours. You should begin feeling better after a few days, but it may take up to 2 weeks to notice the difference. The benefit you get from your injection will last a variable amount of time, depending on the severity of your spine problem. · Pain:  Most people do not have any increase in pain after this injection. However, you might experience some soreness a few days at the site of the injection. If this happens, putting an ice pack over the sore area will help. · Bandage: You have a small bandage covering the site of the injection. You may remove it when you get home. · Restrictions:  Some one should drive you home after the injection. After that, you have no restrictions. You may resume your normal level of activity. You may take a shower or bath, and you may eat normally. You should continue your current exercise and/or therapy routine. Medications:  Continue your current medications as prescribed. If you pain decreases, you may reduce the amount of your pain medications. If you stopped taking anticoagulants or blood-thinners before the injection, start them tomorrow. Call Dr. Mckenzie Paget at [phone] if you experience:    · Fever (101 degrees Fahrenheit or greater)  · Nausea or vomiting  · Headache unrelieved by your normal pain medication  · Redness or swelling at the injection site that lasts more than 1 day  · New numbness, tingling, weakness, or pain that you didn't have before the injection      If still having pain in 1-2 weeks, call office at [phone] for a follow up appointment.         DISCHARGE SUMMARY from Nurse    The following personal items collected during your admission are returned to you:   Dental Appliance: Dental Appliances: None  Vision:    Hearing Aid:    Jewelry: Jewelry: Watch (pt wearing)  Clothing: Clothing:  (pt wearing)  Other Valuables:    Valuables sent to safe: If you were given prescriptions, please review the written information on prescribed medications. · You will receive a Post Operative Call from one of the Recovery Room Nurses on the day after your surgery to check on you. It is very important for us to know how you are recovering after your surgery. · You may receive an e-mail or letter in the mail from [employer] regarding your experience with us in the Ambulatory Surgery Unit. Your feedback is valuable to us and we appreciate your participation in the survey. If you have not had your influenza or pneumococcal vaccines, please follow up with your primary care physician. The discharge information has been reviewed with the patient. The patient verbalized understanding.

## 2018-02-06 NOTE — H&P
Procedural Case Note    2/6/2018    (1:23 PM)    Dheeraj Mercado    1945   (81 y.o.)    [id number]    CC:  pain    ROS:   Complete ROS obtained, no CP, no SOB, no N or V    PMH:     Past Medical History:   Diagnosis Date    Arthritis     lower back    CAD (coronary artery disease)     stent x 1 2001 LDA    Chronic lower back pain     Dr Zackary Keene     Chronic pain     right knee - resolved with knee replacement 4/2017    Hypercholesteremia     Hypertension     Hypothyroidism        ALLERGIES:     Allergies   Allergen Reactions    Wasp [Venom-Wasp] Anaphylaxis and Swelling    Sulfa (Sulfonamide Antibiotics) Other (comments)     Lower extremity redness       MEDS:     No current facility-administered medications for this encounter. Visit Vitals    BP (!) 156/92 (BP 1 Location: Right arm, BP Patient Position: At rest)    Pulse 74    Temp 98.2 °F (36.8 °C)    Resp 20    Ht 5' 9\" (1.753 m)    Wt 86.5 kg (190 lb 9.6 oz)    SpO2 96%    BMI 28.15 kg/m2     PE:  Gen: NAD  Head: normocephalic  Heart: RRR  Lungs: CTA sarath  Abd: NT, ND, soft  Neuro: awake and alert  Skin: intact    IMPRESSION:   LS DJD    Note:  The clinical status was discussed in detail with the patient. The procedure was again discussed and described in detail. All understand and accept the planned procedure and risks; reject other forms of treatment. All questions are answered.     Kenia Sinclair MD

## 2018-02-06 NOTE — IP AVS SNAPSHOT
[address] [facility] 
[phone] Patient: Denise Jones MRN: [id number] :1945 About your hospitalization You were admitted on:  2018 You last received care in the:  [facility] You were discharged on:  2018 Why you were hospitalized Your primary diagnosis was:  Not on File Follow-up Information Follow up With Details Comments Contact Info Ricardo Levine MD   [address] [facility] 
[phone] Your Scheduled Appointments 2018 RADIO FREQUENCY LUMBAR/CERVICAL with Toni Macias MD  
[facility] AMB SURGERY UNIT (RI OR PRE ASSESSMENT) [address] [facility]  
[phone] Discharge Orders None A check douglas indicates which time of day the medication should be taken. My Medications ASK your doctor about these medications Instructions Each Dose to Equal  
 Morning Noon Evening Bedtime  
 acetaminophen 500 mg tablet Commonly known as:  TYLENOL Your last dose was: Your next dose is: Take 1 Tab by mouth every four (4) hours (while awake). Indications: Pain 500 mg  
    
   
   
   
  
 aspirin delayed-release 81 mg tablet Your last dose was: Your next dose is: Take 81 mg by mouth daily. 81 mg  
    
   
   
   
  
 desonide 0.05 % cream  
Commonly known as:  Darrian Rider Your last dose was: Your next dose is:    
   
   
 Apply  to affected area as needed for Skin Irritation. Apply to buttocks as needed  
     
   
   
   
  
 enalapril 10 mg tablet Commonly known as:  Adrián Gloria Your last dose was: Your next dose is: Take 10 mg by mouth nightly. 10 mg  
    
   
   
   
  
 EPINEPHrine 0.3 mg/0.3 mL injection Commonly known as:  Yolis Covington  
   
 Your last dose was: Your next dose is: 0.3 mg by IntraMUSCular route once as needed. 0.3 mg  
    
   
   
   
  
 furosemide 20 mg tablet Commonly known as:  LASIX Your last dose was: Your next dose is: Take 20 mg by mouth daily. 20 mg  
    
   
   
   
  
 ibuprofen 800 mg tablet Commonly known as:  MOTRIN Your last dose was: Your next dose is: Take 800 mg by mouth every eight (8) hours as needed for Pain. 800 mg  
    
   
   
   
  
 indomethacin 50 mg capsule Commonly known as:  INDOCIN Your last dose was: Your next dose is: Take 50 mg by mouth three (3) times daily as needed. 50 mg  
    
   
   
   
  
 ketoconazole 2 % topical cream  
Commonly known as:  NIZORAL Your last dose was: Your next dose is:    
   
   
 Apply  to affected area as needed for Skin Irritation. Apply to affected skin prn  
     
   
   
   
  
 LIPITOR 40 mg tablet Generic drug:  atorvastatin Your last dose was: Your next dose is: Take 20 mg by mouth nightly. 20 mg  
    
   
   
   
  
 sildenafil (antihypertensive) 20 mg tablet Commonly known as:  REVATIO Your last dose was: Your next dose is: Take 20 mg by mouth as needed. 20 mg  
    
   
   
   
  
 * SYNTHROID 200 mcg tablet Generic drug:  levothyroxine Your last dose was: Your next dose is: Take 200 mcg by mouth. Daily 6 days a week, Mon, Tues, Wed, Thurs, Fri ,Sun 200 mcg  
    
   
   
   
  
 * SYNTHROID 200 mcg tablet Generic drug:  levothyroxine Your last dose was: Your next dose is: Take 100 mcg by mouth. on Saturday  (1/2 of 200mcg tab) 100 mcg * Notice: This list has 2 medication(s) that are the same as other medications prescribed for you.  Read the directions carefully, and ask your doctor or other care provider to review them with you. Discharge Instructions Radiofrequency Ablation Discharge Instructions You had a radiofrequency ablation today. You will probably have some numbness in your lower back area for the next 6-8 hours. You should begin feeling better after a few days, but it may take up to 2 weeks to notice the difference. The benefit you get from your injection will last a variable amount of time, depending on the severity of your spine problem. · Pain:  Most people do not have any increase in pain after this injection. However, you might experience some soreness a few days at the site of the injection. If this happens, putting an ice pack over the sore area will help. · Bandage: You have a small bandage covering the site of the injection. You may remove it when you get home. · Restrictions:  Some one should drive you home after the injection. After that, you have no restrictions. You may resume your normal level of activity. You may take a shower or bath, and you may eat normally. You should continue your current exercise and/or therapy routine. Medications:  Continue your current medications as prescribed. If you pain decreases, you may reduce the amount of your pain medications. If you stopped taking anticoagulants or blood-thinners before the injection, start them tomorrow. Call Dr. Zackary Keene at [phone] if you experience: · Fever (101 degrees Fahrenheit or greater) · Nausea or vomiting · Headache unrelieved by your normal pain medication · Redness or swelling at the injection site that lasts more than 1 day · New numbness, tingling, weakness, or pain that you didn't have before the injection If still having pain in 1-2 weeks, call office at [phone] for a follow up appointment. DISCHARGE SUMMARY from Nurse The following personal items collected during your admission are returned to you: Dental Appliance: Dental Appliances: None Vision:   
Hearing Aid:   
Jewelry: Jewelry: Watch (pt wearing) Clothing: Clothing:  (pt wearing) Other Valuables:   
Valuables sent to safe: If you were given prescriptions, please review the written information on prescribed medications. · You will receive a Post Operative Call from one of the Recovery Room Nurses on the day after your surgery to check on you. It is very important for us to know how you are recovering after your surgery. · You may receive an e-mail or letter in the mail from [employer] regarding your experience with us in the Ambulatory Surgery Unit. Your feedback is valuable to us and we appreciate your participation in the survey. If you have not had your influenza or pneumococcal vaccines, please follow up with your primary care physician. The discharge information has been reviewed with the patient. The patient verbalized understanding. Introducing [facility] SERVICES! [facility] introduces [employer] patient portal. Now you can access parts of your medical record, email your doctor's office, and request medication refills online. 1. In your internet browser, go to [email or web address] 2. Click on the First Time User? Click Here link in the Sign In box. You will see the New Member Sign Up page. 3. Enter your [employer] Access Code exactly as it appears below. You will not need to use this code after youve completed the sign-up process. If you do not sign up before the expiration date, you must request a new code. · [employer] Access Code: [email or web address] Expires: 5/1/2018 11:20 AM 
 
4. Enter the last four digits of your Social Security Number (xxxx) and Date of Birth (mm/dd/yyyy) as indicated and click Submit. You will be taken to the next sign-up page. 5. Create a [employer] ID. This will be your [employer] login ID and cannot be changed, so think of one that is secure and easy to remember. 6. Create a [employer] password. You can change your password at any time. 7. Enter your Password Reset Question and Answer. This can be used at a later time if you forget your password. 8. Enter your e-mail address. You will receive e-mail notification when new information is available in [address] 9. Click Sign Up. You can now view and download portions of your medical record. 10. Click the Download Summary menu link to download a portable copy of your medical information. If you have questions, please visit the Frequently Asked Questions section of the [employer] website. Remember, [employer] is NOT to be used for urgent needs. For medical emergencies, dial 911. Now available from your iPhone and Android! Providers Seen During Your Hospitalization Provider Specialty Primary office phone Ariel Dunne MD Physical Medicine and Rehab [phone] Your Primary Care Physician (PCP) Primary Care Physician Office Phone Office Fax [address] [phone] You are allergic to the following Allergen Reactions Wasp (Venom-Wasp) Anaphylaxis Swelling Sulfa (Sulfonamide Antibiotics) Other (comments) Lower extremity redness Recent Documentation Height Weight BMI Smoking Status 1.753 m 86.5 kg 28.15 kg/m2 Never Smoker Emergency Contacts Name Discharge Info Relation Home Work Mobile Chin Montoya CAREGIVER [3] Spouse [3] [phone] [facility] DISCHARGE CAREGIVER [3] Daughter [21] [phone] Patient Belongings The following personal items are in your possession at time of discharge: 
  Dental Appliances: None         Home Medications: None   Jewelry: Watch (pt wearing)  Clothing:  (pt wearing) Please provide this summary of care documentation to your next provider. Signatures-by signing, you are acknowledging that this After Visit Summary has been reviewed with you and you have received a copy. Patient Signature:  ____________________________________________________________ Date:  ____________________________________________________________  
  
[place] Current Provider Signature:  ____________________________________________________________ Date:  ____________________________________________________________

## 2018-02-06 NOTE — PERIOP NOTES
[facility]  1945  [id number]    Situation:  Verbal report given from: Alonso Fabio  Procedure: Procedure(s):  RIGHT L3 L4 L5 RADIO FREQUENCY ABLATION    Background:    Preoperative diagnosis: DEGENERATIVE DISC DISEASE    Postoperative diagnosis: [address] DISEASE    :  Dr. Russell Epps    Assistant(s): Circ-1: Ruchi Kahn RN  Scrub Tech-1: Sergey Rivera    Specimens: * No specimens in log *    Assessment:  Intra-procedure medications             Vital signs stable       Recommendation:    Permission to share finding with family or friend yes    Pt has strong push and pull in lower extremities. Pt discharged via wheelchair.

## 2018-02-06 NOTE — OP NOTES
PROCEDURES PERFORMED:   1. Radiofrequency neuroablation of the medial branches R L3, L4 and L5    PREPROCEDURE DIAGNOSIS: lumbar spondylosis with chronic back pain. POST PROCEDURE DIAGNOSIS: lumbar spondylosis with chronic back pain. SURGEON: Deysi Bullard M.D. HISTORY OF PRESENT ILLNESS AND INDICATIONS FOR THE PROCEDURE: This patient was previously given diagnostic successful blocks of the facet joints innervated by the aforementioned medial branches and is here today for ablation and neurolysis of those branches. She has previously failed conservative management which has proceeded to injection therapy. FINDINGS AND PROCEDURES:  Adequate informed consent was obtained and the patient was taken to the procedure room and placed in the prone position on the procedure table. A time out was held for patient verification. The patient had monitoring throughout the procedure at cycled one minute blood pressure, pulse, and pulse oximetry. The patient will be further monitored for 30 minutes postprocedure in the recovery area. The skin was prepped and draped in the normal sterile fashion. Using fluoroscopic guidance in anteroposterior, oblique, and lateral views, the appropriate superior articular processes and pedicles were identified. After identification 1 percent lidocaine skin anesthesia was administered at each site. Using a radiofrequency ablation needle, a 22 gauge 10 millimeter active tip was placed at the medial branch nerves, right L3, L4 and L5. The initial impedance in Ohms was within acceptable limits. Each level was subsequently tested on sensory at 50 megahertz with no referred pattern of pain noted at any level. Each level was also tested at 2 Hertz with no distal motor movement or referred pain pattern. The lesion site was injected with 0.5 milliliters of 0.5 percent bupivacaine anesthesia of the nerve branch. Next a lesion was applied for 90 seconds at 80 degrees.      The patient tolerated the procedure well with no apparent complications. The patient was taken to the recovery area for further monitoring. Post procedure neurologic examination was consistent with preprocedure examination. The patient was discharged home ambulatory with family and was given post procedure instructions. FOLLOW-UP: The patient will have a follow-up appointment scheduled by the clinic in the coming weeks.

## 2018-03-12 NOTE — PERIOP NOTES
[facility]  Ambulatory Surgery Unit  Pre-operative Instructions    Procedure Date  3/13/18            Tentative Arrival Time 6919      1. On the day of your procedure, please report to the Ambulatory Surgery Unit Registration Desk and sign in at your designated time. The Ambulatory Surgery Unit is located in [facility] on the [facility] side of the [facility] [place] across from the [address] [place] [address]. Please have all of your health insurance cards and a photo ID. 2. You must have someone with you to drive you home as directed by your surgeon. 3. You may have a light breakfast and take normal morning medications. 4. We recommend you do not drink any alcoholic beverages for 24 hours before and after your procedure. 5. Contact your surgeons office for instructions on the following medications: non-steroidal anti-inflammatory drugs (i.e. Advil, Aleve), vitamins, and supplements. (Some surgeons will want you to stop these medications prior to surgery and others may allow you to take them)   **If you are currently taking Plavix, Coumadin, Aspirin and/or other blood-thinning agents, contact your surgeon for instructions. ** Your surgeon will partner with the physician prescribing these medications to determine if it is safe to stop or if you need to continue taking. Please do not stop taking these medications without instructions from your surgeon. 6. In an effort to help prevent surgical site infection, we ask that you shower with an anti-bacterial soap (i.e. Dial or Safeguard) on the morning of your procedure. Do not apply any lotions, powders, or deodorants after showering. 7. Wear comfortable clothes. Wear glasses instead of contacts. Do not bring any jewelry or money (other than copays or fees as instructed). Do not wear make-up, particularly mascara, the morning of your procedure. Wear your hair loose or down, no ponytails, buns, kishore pins or clips.  All body piercings must be removed. 8. You should understand that if you do not follow these instructions your procedure may be cancelled. If your physical condition changes (i.e. fever, cold or flu) please contact your surgeon as soon as possible. 9. It is important that you be on time. If a situation occurs where you may be late, or if you have any questions or problems, please call [phone].    10. Your procedure time may be subject to change. You will receive a phone call the day prior to confirm your arrival time. I understand a pre-operative phone call will be made to verify my procedure time. In the event that I am not available, I give permission for a message to be left on my answering service and/or with another person?       Yes     (instructions given verbally during phone assessment- pt voiced understanding)     ___________________      ___________________      ___________________  (Signature of Patient)          (Witness)                   (Date and Time)

## 2018-03-13 ENCOUNTER — APPOINTMENT (OUTPATIENT)
Dept: GENERAL RADIOLOGY | Age: 73
End: 2018-03-13
Attending: PHYSICAL MEDICINE & REHABILITATION
Payer: MEDICARE

## 2018-03-13 ENCOUNTER — HOSPITAL ENCOUNTER (OUTPATIENT)
Age: 73
Setting detail: OUTPATIENT SURGERY
Discharge: HOME | End: 2018-03-13
Attending: PHYSICAL MEDICINE & REHABILITATION | Admitting: PHYSICAL MEDICINE & REHABILITATION
Payer: MEDICARE

## 2018-03-13 VITALS
HEIGHT: 69 IN | HEART RATE: 65 BPM | BODY MASS INDEX: 28.58 KG/M2 | SYSTOLIC BLOOD PRESSURE: 139 MMHG | DIASTOLIC BLOOD PRESSURE: 77 MMHG | RESPIRATION RATE: 16 BRPM | OXYGEN SATURATION: 96 % | TEMPERATURE: 97.8 F | WEIGHT: 193 LBS

## 2018-03-13 PROCEDURE — 72020 X-RAY EXAM OF SPINE 1 VIEW: CPT

## 2018-03-13 PROCEDURE — 74011000250 HC RX REV CODE- 250: Performed by: PHYSICAL MEDICINE & REHABILITATION

## 2018-03-13 PROCEDURE — 76000 FLUOROSCOPY <1 HR PHYS/QHP: CPT

## 2018-03-13 PROCEDURE — 76210000046 HC AMBSU PH II REC FIRST 0.5 HR: Performed by: PHYSICAL MEDICINE & REHABILITATION

## 2018-03-13 PROCEDURE — 76030000000 HC AMB SURG OR TIME 0.5 TO 1: Performed by: PHYSICAL MEDICINE & REHABILITATION

## 2018-03-13 RX ORDER — LIDOCAINE HYDROCHLORIDE 20 MG/ML
5 INJECTION, SOLUTION EPIDURAL; INFILTRATION; INTRACAUDAL; PERINEURAL ONCE
Status: COMPLETED | OUTPATIENT
Start: 2018-03-13 | End: 2018-03-13

## 2018-03-13 RX ORDER — METHYLPREDNISOLONE ACETATE 40 MG/ML
80 INJECTION, SUSPENSION INTRA-ARTICULAR; INTRALESIONAL; INTRAMUSCULAR; SOFT TISSUE ONCE
Status: DISCONTINUED | OUTPATIENT
Start: 2018-03-13 | End: 2018-03-13 | Stop reason: HOSPADM

## 2018-03-13 RX ORDER — BUPIVACAINE HYDROCHLORIDE 5 MG/ML
10 INJECTION, SOLUTION EPIDURAL; INTRACAUDAL ONCE
Status: COMPLETED | OUTPATIENT
Start: 2018-03-13 | End: 2018-03-13

## 2018-03-13 NOTE — OP NOTES
PROCEDURES PERFORMED:   1. Radiofrequency neuroablation of the medial branches Left  L3, L4 and L5    PREPROCEDURE DIAGNOSIS: lumbar spondylosis with chronic back pain. POST PROCEDURE DIAGNOSIS: lumbar spondylosis with chronic back pain. SURGEON: Gloria Lamar M.D. HISTORY OF PRESENT ILLNESS AND INDICATIONS FOR THE PROCEDURE: This patient was previously given diagnostic successful blocks of the facet joints innervated by the aforementioned medial branches and is here today for ablation and neurolysis of those branches. She has previously failed conservative management which has proceeded to injection therapy. FINDINGS AND PROCEDURES:  Adequate informed consent was obtained and the patient was taken to the procedure room and placed in the prone position on the procedure table. A time out was held for patient verification. The patient had monitoring throughout the procedure at cycled one minute blood pressure, pulse, and pulse oximetry. The patient will be further monitored for 30 minutes postprocedure in the recovery area. The skin was prepped and draped in the normal sterile fashion. Using fluoroscopic guidance in anteroposterior, oblique, and lateral views, the appropriate superior articular processes and pedicles were identified. After identification 1 percent lidocaine skin anesthesia was administered at each site. Using a radiofrequency ablation needle, a 22 gauge 10 millimeter active tip was placed at the medial branch nerves, left L3, L4 and L5. The initial impedance in Ohms was within acceptable limits. Each level was subsequently tested on sensory at 50 megahertz with no referred pattern of pain noted at any level. Each level was also tested at 2 Hertz with no distal motor movement or referred pain pattern. The lesion site was injected with 0.5 milliliters of 0.5 percent bupivacaine anesthesia of the nerve branch. Next a lesion was applied for 90 seconds at 80 degrees.      The patient tolerated the procedure well with no apparent complications. The patient was taken to the recovery area for further monitoring. Post procedure neurologic examination was consistent with preprocedure examination. The patient was discharged home ambulatory with family and was given post procedure instructions. FOLLOW-UP: The patient will have a follow-up appointment scheduled by the clinic in the coming weeks.

## 2018-03-13 NOTE — IP AVS SNAPSHOT
[address] [facility]. [phone] Patient: Karen Diaz MRN: [id number] :1945 About your hospitalization You were admitted on:  2018 You last received care in the:  [facility] SURGERY UNIT You were discharged on:  2018 Why you were hospitalized Your primary diagnosis was:  Not on File Follow-up Information Follow up With Details Comments Contact Info Shreya Bergeron MD   [address] [facility]. 
[phone] Discharge Orders None A check douglas indicates which time of day the medication should be taken. My Medications ASK your doctor about these medications Instructions Each Dose to Equal  
 Morning Noon Evening Bedtime  
 acetaminophen 500 mg tablet Commonly known as:  TYLENOL Your last dose was: Your next dose is: Take 1 Tab by mouth every four (4) hours (while awake). Indications: Pain 500 mg  
    
   
   
   
  
 aspirin delayed-release 81 mg tablet Your last dose was: Your next dose is: Take 81 mg by mouth daily. 81 mg  
    
   
   
   
  
 desonide 0.05 % cream  
Commonly known as:  Becky Franz Your last dose was: Your next dose is:    
   
   
 Apply  to affected area as needed for Skin Irritation. Apply to buttocks as needed  
     
   
   
   
  
 enalapril 10 mg tablet Commonly known as:  Loral Tyler Your last dose was: Your next dose is: Take 10 mg by mouth nightly. 10 mg  
    
   
   
   
  
 EPINEPHrine 0.3 mg/0.3 mL injection Commonly known as:  Severa Juan Miguel Your last dose was: Your next dose is: 0.3 mg by IntraMUSCular route once as needed. 0.3 mg  
    
   
   
   
  
 furosemide 20 mg tablet Commonly known as:  LASIX Your last dose was: Your next dose is: Take 20 mg by mouth daily. 20 mg  
    
   
   
   
  
 ibuprofen 800 mg tablet Commonly known as:  MOTRIN Your last dose was: Your next dose is: Take 800 mg by mouth every eight (8) hours as needed for Pain. 800 mg  
    
   
   
   
  
 indomethacin 50 mg capsule Commonly known as:  INDOCIN Your last dose was: Your next dose is: Take 50 mg by mouth three (3) times daily as needed. 50 mg  
    
   
   
   
  
 ketoconazole 2 % topical cream  
Commonly known as:  NIZORAL Your last dose was: Your next dose is:    
   
   
 Apply  to affected area as needed for Skin Irritation. Apply to affected skin prn  
     
   
   
   
  
 LIPITOR 40 mg tablet Generic drug:  atorvastatin Your last dose was: Your next dose is: Take 20 mg by mouth nightly. 20 mg  
    
   
   
   
  
 sildenafil (antihypertensive) 20 mg tablet Commonly known as:  REVATIO Your last dose was: Your next dose is: Take 20 mg by mouth as needed. 20 mg  
    
   
   
   
  
 * SYNTHROID 200 mcg tablet Generic drug:  levothyroxine Your last dose was: Your next dose is: Take 200 mcg by mouth. Daily 6 days a week, Mon, Tues, Wed, Thurs, Fri ,Sun 200 mcg  
    
   
   
   
  
 * SYNTHROID 200 mcg tablet Generic drug:  levothyroxine Your last dose was: Your next dose is: Take 100 mcg by mouth. on Saturday  (1/2 of 200mcg tab) 100 mcg * Notice: This list has 2 medication(s) that are the same as other medications prescribed for you. Read the directions carefully, and ask your doctor or other care provider to review them with you. Discharge Instructions Radiofrequency Ablation Discharge Instructions You had a radiofrequency ablation today. You will probably have some numbness in your lower back area for the next 6-8 hours. You should begin feeling better after a few days, but it may take up to 2 weeks to notice the difference. The benefit you get from your injection will last a variable amount of time, depending on the severity of your spine problem. · Pain:  Most people do not have any increase in pain after this injection. However, you might experience some soreness a few days at the site of the injection. If this happens, putting an ice pack over the sore area will help. · Bandage: You have a small bandage covering the site of the injection. You may remove it when you get home. · Restrictions:  Some one should drive you home after the injection. After that, you have no restrictions. You may resume your normal level of activity. You may take a shower or bath, and you may eat normally. You should continue your current exercise and/or therapy routine. Medications:  Continue your current medications as prescribed. If you pain decreases, you may reduce the amount of your pain medications. If you stopped taking anticoagulants or blood-thinners before the injection, start them tomorrow. Call Dr. Abelardo Ramos at [phone] if you experience: · Fever (101 degrees Fahrenheit or greater) · Nausea or vomiting · Headache unrelieved by your normal pain medication · Redness or swelling at the injection site that lasts more than 1 day · New numbness, tingling, weakness, or pain that you didn't have before the injection If still having pain in 1-2 weeks, call office at [phone] for a follow up appointment. DISCHARGE SUMMARY from Nurse The following personal items collected during your admission are returned to you:  
Dental Appliance: Dental Appliances: None Vision:   
Hearing Aid:   
Jewelry: Jewelry: Watch, With patient Clothing: Clothing: Belt, Jacket/Coat (placed under stretcher) Other Valuables: Other Valuables: None Valuables sent to safe: If you were given prescriptions, please review the written information on prescribed medications. · You will receive a Post Operative Call from one of the Recovery Room Nurses on the day after your surgery to check on you. It is very important for us to know how you are recovering after your surgery. · You may receive an e-mail or letter in the mail from [place] regarding your experience with us in the Ambulatory Surgery Unit. Your feedback is valuable to us and we appreciate your participation in the survey. If you have not had your influenza or pneumococcal vaccines, please follow up with your primary care physician. The discharge information has been reviewed with the patient. The patient verbalized understanding. Introducing [facility]! Tom Multani introduces [facility] patient portal. Now you can access parts of your medical record, email your doctor's office, and request medication refills online. 1. In your internet browser, go to [email or web address] 2. Click on the First Time User? Click Here link in the Sign In box. You will see the New Member Sign Up page. 3. Enter your [facility] Access Code exactly as it appears below. You will not need to use this code after youve completed the sign-up process. If you do not sign up before the expiration date, you must request a new code. · [facility] Access Code: [email or web address] Expires: 5/1/2018 12:20 PM 
 
4. Enter the last four digits of your Social Security Number (xxxx) and Date of Birth (mm/dd/yyyy) as indicated and click Submit. You will be taken to the next sign-up page. 5. Create a [facility] ID. This will be your [facility] login ID and cannot be changed, so think of one that is secure and easy to remember. 6. Create a [facility] password. You can change your password at any time. 7. Enter your Password Reset Question and Answer. This can be used at a later time if you forget your password. 8. Enter your e-mail address. You will receive e-mail notification when new information is available in [address] 9. Click Sign Up. You can now view and download portions of your medical record. 10. Click the Download Summary menu link to download a portable copy of your medical information. If you have questions, please visit the Frequently Asked Questions section of the [facility] website. Remember, [facility] is NOT to be used for urgent needs. For medical emergencies, dial 911. Now available from your iPhone and Android! Providers Seen During Your Hospitalization Provider Specialty Primary office phone Seth Ramirez MD Physical Medicine and Rehab [phone] Your Primary Care Physician (PCP) Primary Care Physician Office Phone Office Fax [address] [phone] You are allergic to the following Allergen Reactions Wasp (Venom-Wasp) Anaphylaxis Swelling Sulfa (Sulfonamide Antibiotics) Other (comments) Lower extremity redness Recent Documentation Height Weight BMI Smoking Status 1.753 m 87.5 kg 28.5 kg/m2 Never Smoker Emergency Contacts Name Discharge Info Relation Home Work Mobile Gabriela Senate CAREGIVER [3] Spouse [3] [phone] [facility] DISCHARGE CAREGIVER [3] Daughter [21] [phone] Patient Belongings The following personal items are in your possession at time of discharge: 
  Dental Appliances: None         Home Medications: None   Jewelry: Watch, With patient  Clothing: Belt, Jacket/Coat (placed under stretcher)    Other Valuables: None Please provide this summary of care documentation to your next provider.  
  
  
 
  
Signatures-by signing, you are acknowledging that this After Visit Summary has been reviewed with you and you have received a copy. Patient Signature:  ____________________________________________________________ Date:  ____________________________________________________________  
  
Branden Mealing Provider Signature:  ____________________________________________________________ Date:  ____________________________________________________________

## 2018-03-13 NOTE — DISCHARGE INSTRUCTIONS
Radiofrequency Ablation  Discharge Instructions    You had a radiofrequency ablation today. You will probably have some numbness in your lower back area for the next 6-8 hours. You should begin feeling better after a few days, but it may take up to 2 weeks to notice the difference. The benefit you get from your injection will last a variable amount of time, depending on the severity of your spine problem. · Pain:  Most people do not have any increase in pain after this injection. However, you might experience some soreness a few days at the site of the injection. If this happens, putting an ice pack over the sore area will help. · Bandage: You have a small bandage covering the site of the injection. You may remove it when you get home. · Restrictions:  Some one should drive you home after the injection. After that, you have no restrictions. You may resume your normal level of activity. You may take a shower or bath, and you may eat normally. You should continue your current exercise and/or therapy routine. Medications:  Continue your current medications as prescribed. If you pain decreases, you may reduce the amount of your pain medications. If you stopped taking anticoagulants or blood-thinners before the injection, start them tomorrow. Call Dr. Mckenzie Paget at [phone] if you experience:    · Fever (101 degrees Fahrenheit or greater)  · Nausea or vomiting  · Headache unrelieved by your normal pain medication  · Redness or swelling at the injection site that lasts more than 1 day  · New numbness, tingling, weakness, or pain that you didn't have before the injection      If still having pain in 1-2 weeks, call office at [phone] for a follow up appointment.         DISCHARGE SUMMARY from Nurse    The following personal items collected during your admission are returned to you:   Dental Appliance: Dental Appliances: None  Vision:    Hearing Aid:    Jewelry: Jewelry: Watch, With patient  Clothing: Clothing: Belt, Jacket/Coat (placed under stretcher)  Other Valuables: Other Valuables: None  Valuables sent to safe: If you were given prescriptions, please review the written information on prescribed medications. · You will receive a Post Operative Call from one of the Recovery Room Nurses on the day after your surgery to check on you. It is very important for us to know how you are recovering after your surgery. · You may receive an e-mail or letter in the mail from [employer] regarding your experience with us in the Ambulatory Surgery Unit. Your feedback is valuable to us and we appreciate your participation in the survey. If you have not had your influenza or pneumococcal vaccines, please follow up with your primary care physician. The discharge information has been reviewed with the patient. The patient verbalized understanding.

## 2018-03-13 NOTE — PERIOP NOTES
Octavia Godfrey  1945  [id number]    Situation:  Verbal report given from: Suzie Mesa RN  Procedure: Procedure(s):  LEFT L3, L4, L5 RADIO FREQUENCY ABLATION     Background:    Preoperative diagnosis: DJD LUMBAR SACRUM     Postoperative diagnosis: DJD LUMBAR SACRUM     :  Dr. Lisa Martinez:  Intra-procedure medications, procedure, and allergies reviewed        Recommendation:    Discharge patient home after discharge instructions reviewed with patient. Rest until local has worn off.

## 2018-03-13 NOTE — PERIOP NOTES
Reviewed discharge instructions w/pt. He verbalized understanding. Vss. Denies pain. Dorsi and plantar flexion strong bilaterally. Incision to lower back intact, open to air. Pt discharged via wheelchair, accompanied by RN. Pt discharged awake and alert, respirations equal and unlabored, skin warm, dry, and intact. Pt questions and concerns addressed prior to discharge.

## 2018-03-13 NOTE — H&P
Procedural Case Note    3/13/2018    (1:08 PM)    Denise Jones    1945   (81 y.o.)    [id number]    CC:  pain    ROS:   Complete ROS obtained, no CP, no SOB, no N or V    PMH:     Past Medical History:   Diagnosis Date    Arthritis     lower back    CAD (coronary artery disease)     stent x 1 2001 LDA    Chronic lower back pain     Dr Shraddha Lechuga     Chronic pain     right knee - resolved with knee replacement 4/2017    Hypercholesteremia     Hypertension     Hypothyroidism        ALLERGIES:     Allergies   Allergen Reactions    Wasp [Venom-Wasp] Anaphylaxis and Swelling    Sulfa (Sulfonamide Antibiotics) Other (comments)     Lower extremity redness       MEDS:     No current facility-administered medications for this encounter. Visit Vitals    /82 (BP 1 Location: Right arm, BP Patient Position: At rest)    Pulse 72    Temp 97.4 °F (36.3 °C)    Resp 17    Ht 5' 9\" (1.753 m)    Wt 87.5 kg (193 lb)    SpO2 96%    BMI 28.5 kg/m2     PE:  Gen: NAD  Head: normocephalic  Heart: RRR  Lungs: CTA sarath  Abd: NT, ND, soft  Neuro: awake and alert  Skin: intact    IMPRESSION:   LS DJD    Note:  The clinical status was discussed in detail with the patient. The procedure was again discussed and described in detail. All understand and accept the planned procedure and risks; reject other forms of treatment. All questions are answered.     Toni Macias MD

## 2019-09-24 PROBLEM — Z12.11 SCREEN FOR COLON CANCER: Status: RESOLVED | Noted: 2017-10-20 | Resolved: 2019-09-24

## 2020-03-06 ENCOUNTER — OFFICE VISIT (OUTPATIENT)
Dept: DERMATOLOGY | Facility: AMBULATORY SURGERY CENTER | Age: 75
End: 2020-03-06

## 2020-03-06 ENCOUNTER — HOSPITAL ENCOUNTER (OUTPATIENT)
Dept: LAB | Age: 75
Discharge: HOME | End: 2020-03-06

## 2020-03-06 VITALS
HEIGHT: 69 IN | HEART RATE: 62 BPM | TEMPERATURE: 98.2 F | SYSTOLIC BLOOD PRESSURE: 124 MMHG | BODY MASS INDEX: 26.66 KG/M2 | OXYGEN SATURATION: 95 % | RESPIRATION RATE: 16 BRPM | DIASTOLIC BLOOD PRESSURE: 68 MMHG | WEIGHT: 180 LBS

## 2020-03-06 DIAGNOSIS — D03.59 MELANOMA IN SITU OF TRUNK (HCC): Primary | ICD-10-CM

## 2020-03-06 NOTE — PATIENT INSTRUCTIONS

## 2020-03-06 NOTE — LETTER
3/6/2020 4:45 PM 
 
Patient:  Kenia Trejo YOB: 1945 Date of Visit: 3/6/2020 Dear Ricardo Kimball MD 
[address] [facility] [address] [place] [address] VIA Facsimile: [phone] Thank you for referring Kenia Trejo to me for evaluation/treatment. Below are the relevant portions of my assessment and plan of care. Mr. 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 I excised the lesion and repaired the defect primarily. He tolerated the procedure well. Please see the attached procedure note(s) for additional details. Mr. Matthew Palomo will return to me for suture removal and/or wound checks at an appropriate interval and I will follow-up with him regarding any issues arising from or relating to this surgery. I will otherwise defer any additional dermatologic care back to you. If you have questions, please do not hesitate to call me. I look forward to following Mr. Matthew Palomo along with you. Sincerely, Bettie Goncalves MD 
[phone] (cell)

## 2020-03-06 NOTE — PROGRESS NOTES
Almas Chew is a 69 y.o. 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 The lesion was recently biopsied by Jakob Chilel MD.  The patient is not any problems with the biopsy site since that time. The lesion had previously been asymptomatic and has never otherwise been treated. 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 These are his first skin cancers. He is otherwise in his normal state of good health and has no additional skin complaints today. Physical Exam  HENT:      Head: Normocephalic. Pulmonary:      Effort: Pulmonary effort is normal.   Skin:     Comments: On the left abdomen there is a pink scar with no apparent pigment on Woods lamp exam.   Neurological:      Mental Status: He is alert and oriented to person, place, and time. 1. Melanoma in situ of trunk ([address]  Excision was advised for removal and therapy of this 1.0 cm bx-proven MIS on the left abdomen. The excision procedure was reviewed and verbal and written consents were obtained. The site was identified and confirmed with the patient. The risks of pain, bleeding, infection, recurrence of the lesion, and scar were discussed. I performed the procedure. The site was cleansed and anesthetized with 1% lidocaine with epinephrine 1:100,000. The lesion was excised with a 5 mm margin in an elliptical manner to a depth of subcutaneous tissue. A intermediate primary closure was performed after the excision using 4-0 Maxon buried vertical mattress sutures and Tegaderm. The closure length was 6.8 cm. The wound was bandaged and care reviewed. The specimen was sent to pathology.   I will contact the patient with the results and any further treatment that may be necessary.              - EXC SKIN MALIG 1.1-2CM TRUNK,ARM,LEG  - LAYR CLOS WND TRUNK,ARM,LEG 2.6-7.5  - SURGICAL PATHOLOGY; Future      Follow-up and Dispositions    · Return in about 3 weeks (around 3/27/2020) for Mohs.          Jose Jenkins MD

## 2020-03-11 NOTE — PROGRESS NOTES
Please let the patient know that the results from his excision have returned and show that the lesion was completely removed. No further treatment is needed at this time. Thank you.

## 2020-03-11 NOTE — PROGRESS NOTES
Left vm on patient's personal number with path results and office number should he have any questions.

## 2020-03-31 ENCOUNTER — OFFICE VISIT (OUTPATIENT)
Dept: DERMATOLOGY | Facility: AMBULATORY SURGERY CENTER | Age: 75
End: 2020-03-31

## 2020-03-31 VITALS
HEIGHT: 69 IN | DIASTOLIC BLOOD PRESSURE: 70 MMHG | RESPIRATION RATE: 12 BRPM | SYSTOLIC BLOOD PRESSURE: 120 MMHG | HEART RATE: 60 BPM | TEMPERATURE: 97.3 F | OXYGEN SATURATION: 96 % | BODY MASS INDEX: 26.66 KG/M2 | WEIGHT: 180 LBS

## 2020-03-31 DIAGNOSIS — D04.71 SQUAMOUS CELL CARCINOMA IN SITU (SCCIS) OF SKIN OF RIGHT LOWER LEG: Primary | ICD-10-CM

## 2020-03-31 NOTE — LETTER
3/31/2020 4:04 PM 
 
Patient:  Allison Tavarez YOB: 1945 Date of Visit: 3/31/2020 Dear Nirmala Feliciano MD 
[address] [facility] [address] [facility] [address] VIA Facsimile: [phone] Thank you for referring Allison Tavarez to me for evaluation/treatment. Below are the relevant portions of my assessment and plan of care. Mr. Ramakrishna Gomez presented today for Mohs surgery to treat a biopsy-proven squamous cell carcinoma in situ at least of the right chin. 1 stage(s) of Mohs surgery were required to achieve tumor free margins. The defect was allowed to heal by second intent. He tolerated the procedure well. Please see the attached procedure note(s) for additional details. Mr. Ramakrishna Gomez will return to me for suture removal and/or wound checks at an appropriate interval and I will follow-up with him regarding any issues arising from or relating to this surgery. I will otherwise defer any additional dermatologic care back to you. If you have questions, please do not hesitate to call me. I look forward to following Mr. Ramakrishna Gomez along with you. Sincerely, Jeanne Haji MD 
[phone] (cell)

## 2020-03-31 NOTE — PATIENT INSTRUCTIONS
Chief Complaint   Patient presents with    Surgery     Mohs - Right shin, SCCis     WOUND CARE INSTRUCTIONS     1. Keep the dressing clean and dry and do not remove for 48 hours. 2. Then change the dressing once a day as follows:  a. Wash hands before and after each dressing change. b. Remove dressing and wash site gently with mild soap and water, rinse, and pat dry.  c. Apply liberal amounts of an ointment (Petroleum jelly or Aquaphor). d. Apply a non-stick (Telfa) dressing or Band-Aid to cover the wound. 3. Watch for:  BLEEDING: A small amount of drainage may occur. If bleeding occurs, elevate and rest the surgery site. Apply gauze and steady pressure for 20 minutes. If bleeding continues repeat pressure once more. If bleeding still does not stop, call this office. If after hours, call Dr. Hayley Johnson at [phone]. INFECTION: Signs of infection include increased redness, pain, warmth, drainage of pus, and fever. If this occurs, please call this office. 4. Special Instructions (follow any that are checked):  · [x] Rest the surgery site and keep it elevated as much as possible for two to three days. Limit activity. [x] Other instructions: Vinegar soaks as directed. Avoid strenuous exercises that result in swelling or bleeding. Follow up as directed. Take Tylenol for pain as needed. If you have any questions or concerns, please call our office Monday through Friday at [phone]. If after hours, please call Dr. Hayley Johnson at [phone].

## 2020-03-31 NOTE — PROGRESS NOTES
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 year old male referred by  Amirah Oneill MD for Mohs surgery to treat the following lesion:  Lesion Info  Location: Right shin  Size: 2.3 x 1.4 cm  Type: Squamous in situ  Duration: less than 6 months  Path Lab: [facility]  Path #: [id number]  Prior Treatment: none     Symptoms of the lesion include recurrence after cryotherapy. ROS:  Shahram Louis is feeling well and in their usual state of health today. He is not in pain. He does not have any other skin concerns. Exam:  Shahram Louis is an awake, alert, oriented, well-appearing male in no distress. The right lower extremity was examined. Findings are: There is a pink scar with surrounding scaly plaque on the right anterior leg. There are scattered scaly plaques on the bilateral anterior legs. 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 The diagnosis was reviewed. The Mohs surgery procedure was reviewed. Indications, risks, and options were discussed with Mr. Sarwat Fitzgerald preoperatively. Risks including, but not limited to: pain, bleeding, infection, tumor recurrence, scarring and damage to motor and/or sensory nerves, were discussed. Mr. Sarwat Fitzgerald chose Mohs surgery. Mr. Sarwat Fitzgerald was an acceptable surgery candidate. I performed Mohs surgery using standard technique after verbal and written consent were obtained. The lesion was identified and confirmed with the patient and photograph, if available. The surgical site was marked with gentian violet, prepped, draped and anesthetized in standard fashion. The tumor was debulked by curettage and orientation hashes were placed. The tumor and any associated scar was excised using beveled incision. Hemostasis was achieved, the site was bandaged, and the tissue was transported to the Mohs lab.   While maintaining anatomic orientation the tissue was divided, if needed, and marked with colored inks that were noted on the corresponding Mohs map. The tissue was prepared by Mohs en-face technique for fresh frozen section analysis. The resulting slides were examined for residual tumor, scar and other concerns, all of which were marked on the corresponding Mohs map, if present. The Mohs map was used to guide subsequent stages of surgery, if necessary, and the above process was repeated until a tumor-free plane was achieved. Once the tumor was cleared the map was marked as such and signed. Dr. Lisset Alarcon acted as surgeon and pathologist for the entire case, performing all stages of the surgical excision as well as examination and interpretation of the histologic slides. See table below for details regarding the surgical case. 1 stage(s) were required to reach a tumor-free plane, resulting in a 2.9 x 2.0 cm defect extending to the subcutaneous tissue. Of note, there is a significant amount of actinic keratosis remaining at the surgical margins. I discussed with the patient the possibility of treating the actinic keratosis with field therapy, however I will defer decisions on this back to Dr. Gerardo العلي. There were not complications. Praful Ontiveros will follow up as needed in the postoperative period. Regular skin examinations will be with  Cari Jimenez MD.    Due to the superficial nature of the defect, the decision was made to allow the wound to heal by second intent. Reconstructive alternatives were discussed including skin graft, and the patient understands that he may elect to undergo repair at a later date if desired. Implications of second intention healing were discussed including bleeding, scar, wound contraction, potential for distortion of free margins. Hemostasis was achieved using electrosurgery and pressure. Vaseline and a pressure dressing were applied and the patient was given written and verbal wound care instructions including my cell phone number. He will return in 2 weeks for wound check. Right shin  Mohs Lesion Operative Report  Date: 03/31/20  Room: Exam 2  Indications: Poor definition, Site, Size  Pre-op Meds: n/a  Pre-op BP: 144/72  Pre-op pulse: 60  1st Assistant: Fredis Oswald RN and Emma Li LPN  Stage #: 1  Stage 1 Sections: 1  Stage 1 # Pos: 0  Perineural Involvement: No  Lymphadenopathy: No  Defect Size: 2.9 x 2.0 cm  Depth: subcutaneous tissue  Wound Mgt: 2nd intention healing  Donor Site: n/a  Closure Length: n/a  Estimated Blood Loss: 2 mL  Hemostasis: Electrosurgery, Pressure  Anesthesia: 1% Lidocaine w/1:100,000 epi  1% Lidocaine: 3 cc  Complications: n/a  Dressing: vaseline, telfa, gauze, medipore tape, coban  Post-op BP: 120/70  Post-op Pulse: 60  Pos-op Meds: n/a  W/C Instructions: Verbal, Written  Follow-up: 3 months for wound check     [facility]   OFFICE PROCEDURE PROGRESS NOTE   Chart reviewed for the following:   Nan Osei MD have reviewed the History, Physical and updated the Allergic reactions for Marianne Schilder performed immediately prior to start of procedure:   Nan Osei MD, have performed the following reviews on Nelta Sensor   prior to the start of the procedure:     * Patient was identified by name and date of birth   * Agreement on procedure being performed was verified   * Risks and Benefits explained to the patient   * Procedure site verified and marked as necessary   * Patient was positioned for comfort   * Consent was signed and verified     Time: 1 PM  Date of procedure: 3/31/2020  Procedure performed by:  Danni Starr MD  Provider assisted by: Fredis Oswald RN and Emma Li LPN  Patient assisted by: self   How tolerated by patient: tolerated the procedure well with no complications   Comments: none

## 2020-09-24 ENCOUNTER — HOSPITAL ENCOUNTER (OUTPATIENT)
Dept: MRI IMAGING | Age: 75
Discharge: HOME | End: 2020-09-24
Attending: NEUROLOGICAL SURGERY
Payer: MEDICARE

## 2020-09-24 VITALS — WEIGHT: 180 LBS | BODY MASS INDEX: 26.58 KG/M2

## 2020-09-24 DIAGNOSIS — M43.10 SPONDYLOLISTHESIS: ICD-10-CM

## 2020-09-24 PROCEDURE — 74011250636 HC RX REV CODE- 250/636: Performed by: NEUROLOGICAL SURGERY

## 2020-09-24 PROCEDURE — A9575 INJ GADOTERATE MEGLUMI 0.1ML: HCPCS | Performed by: NEUROLOGICAL SURGERY

## 2020-09-24 PROCEDURE — 72158 MRI LUMBAR SPINE W/O & W/DYE: CPT

## 2020-09-24 RX ORDER — GADOTERATE MEGLUMINE 376.9 MG/ML
15 INJECTION INTRAVENOUS
Status: COMPLETED | OUTPATIENT
Start: 2020-09-24 | End: 2020-09-24

## 2020-09-24 RX ADMIN — GADOTERATE MEGLUMINE 15 ML: 376.9 INJECTION INTRAVENOUS at 14:02

## 2020-11-17 ENCOUNTER — TRANSCRIBE ORDER (OUTPATIENT)
Dept: REGISTRATION | Age: 75
End: 2020-11-17

## 2020-11-17 DIAGNOSIS — Z01.812 PRE-PROCEDURE LAB EXAM: Primary | ICD-10-CM

## 2020-11-24 ENCOUNTER — HOSPITAL ENCOUNTER (OUTPATIENT)
Dept: PREADMISSION TESTING | Age: 75
Discharge: HOME | End: 2020-11-24
Payer: MEDICARE

## 2020-11-24 VITALS
HEART RATE: 56 BPM | TEMPERATURE: 97.6 F | WEIGHT: 187.39 LBS | SYSTOLIC BLOOD PRESSURE: 141 MMHG | HEIGHT: 68 IN | BODY MASS INDEX: 28.4 KG/M2 | DIASTOLIC BLOOD PRESSURE: 74 MMHG

## 2020-11-24 LAB
ANION GAP SERPL CALC-SCNC: 3 MMOL/L (ref 5–15)
ATRIAL RATE: 52 BPM
BASOPHILS # BLD: 0 K/UL (ref 0–0.1)
BASOPHILS NFR BLD: 1 % (ref 0–1)
BUN SERPL-MCNC: 16 MG/DL (ref 6–20)
BUN/CREAT SERPL: 13 (ref 12–20)
CALCIUM SERPL-MCNC: 9.4 MG/DL (ref 8.5–10.1)
CALCULATED P AXIS, ECG09: 53 DEGREES
CALCULATED R AXIS, ECG10: 8 DEGREES
CALCULATED T AXIS, ECG11: -3 DEGREES
CHLORIDE SERPL-SCNC: 107 MMOL/L (ref 97–108)
CO2 SERPL-SCNC: 31 MMOL/L (ref 21–32)
CREAT SERPL-MCNC: 1.2 MG/DL (ref 0.7–1.3)
DIAGNOSIS, 93000: NORMAL
DIFFERENTIAL METHOD BLD: NORMAL
EOSINOPHIL # BLD: 0.2 K/UL (ref 0–0.4)
EOSINOPHIL NFR BLD: 4 % (ref 0–7)
ERYTHROCYTE [DISTWIDTH] IN BLOOD BY AUTOMATED COUNT: 13.2 % (ref 11.5–14.5)
GLUCOSE SERPL-MCNC: 86 MG/DL (ref 65–100)
HCT VFR BLD AUTO: 48.9 % (ref 36.6–50.3)
HGB BLD-MCNC: 15.9 G/DL (ref 12.1–17)
IMM GRANULOCYTES # BLD AUTO: 0 K/UL (ref 0–0.04)
IMM GRANULOCYTES NFR BLD AUTO: 0 % (ref 0–0.5)
LYMPHOCYTES # BLD: 1.3 K/UL (ref 0.8–3.5)
LYMPHOCYTES NFR BLD: 25 % (ref 12–49)
MCH RBC QN AUTO: 31.3 PG (ref 26–34)
MCHC RBC AUTO-ENTMCNC: 32.5 G/DL (ref 30–36.5)
MCV RBC AUTO: 96.3 FL (ref 80–99)
MONOCYTES # BLD: 0.5 K/UL (ref 0–1)
MONOCYTES NFR BLD: 9 % (ref 5–13)
NEUTS SEG # BLD: 3.2 K/UL (ref 1.8–8)
NEUTS SEG NFR BLD: 61 % (ref 32–75)
NRBC # BLD: 0 K/UL (ref 0–0.01)
NRBC BLD-RTO: 0 PER 100 WBC
P-R INTERVAL, ECG05: 142 MS
PLATELET # BLD AUTO: 283 K/UL (ref 150–400)
PMV BLD AUTO: 9.6 FL (ref 8.9–12.9)
POTASSIUM SERPL-SCNC: 4.6 MMOL/L (ref 3.5–5.1)
Q-T INTERVAL, ECG07: 446 MS
QRS DURATION, ECG06: 82 MS
QTC CALCULATION (BEZET), ECG08: 414 MS
RBC # BLD AUTO: 5.08 M/UL (ref 4.1–5.7)
SODIUM SERPL-SCNC: 141 MMOL/L (ref 136–145)
VENTRICULAR RATE, ECG03: 52 BPM
WBC # BLD AUTO: 5.2 K/UL (ref 4.1–11.1)

## 2020-11-24 PROCEDURE — 36415 COLL VENOUS BLD VENIPUNCTURE: CPT

## 2020-11-24 PROCEDURE — 85025 COMPLETE CBC W/AUTO DIFF WBC: CPT

## 2020-11-24 PROCEDURE — 80048 BASIC METABOLIC PNL TOTAL CA: CPT

## 2020-11-24 PROCEDURE — 93005 ELECTROCARDIOGRAM TRACING: CPT

## 2020-11-24 RX ORDER — TRAMADOL HYDROCHLORIDE 50 MG/1
50 TABLET ORAL
COMMUNITY
End: 2020-12-05

## 2020-11-24 RX ORDER — AMLODIPINE BESYLATE 5 MG/1
2.5 TABLET ORAL
COMMUNITY

## 2020-11-24 RX ORDER — GABAPENTIN 300 MG/1
300 CAPSULE ORAL
COMMUNITY

## 2020-11-24 RX ORDER — ACETAMINOPHEN, DIPHENHYDRAMINE HCL, PHENYLEPHRINE HCL 325; 25; 5 MG/1; MG/1; MG/1
1 TABLET ORAL DAILY
COMMUNITY

## 2020-11-24 NOTE — PERIOP NOTES
PATIENT GIVEN SURGICAL SITE INFECTION FAQ HANDOUT AND HAND WASHING TIP SHEET. PREOP INSTRUCTIONS REVIEWED AND PATIENT VERBALIZES UNDERSTANDING OF INSTRUCTIONS. PATIENT HAS BEEN GIVEN THE OPPORTUNITY TO ASK QUESTIONS.   COVID AND HIBICLENS INSTRUCTIONS WERE GIVEN TO THE PT.  ALSO INFO ABOUT MRSA GIVEN TO THE PT IN CASE HE IS POSITIVE

## 2020-11-24 NOTE — PERIOP NOTES
PAT Nurse Practitioner   Pre-Operative Chart Review/Assessment:-ORTHOPEDIC/NEUROSURGICAL SPINE                Patient Name:  Samuel Patton                                                           Age:   70 y.o.    :  1945     Today's Date:  2020     Date of PAT:   2020      Date of Surgery:    2020      Procedure(s):  L2-S1 Laminectomy and Fusion     Surgeon:   Dr. Lucía Thornton:       1)  PCP: Dr. Bandar Garcia        2)  Cardiac Clearance: Not requested. 3)  Diabetic Treatment Consult: A1c not ordered. No hx of DM. 4)  Sleep Apnea evaluation:  Not indicated.  VERONIKA Score 3.       5)  Treatment for MRSA/Staph Aureus: Neg       6)  Additional Concerns: HTN, CAD s/p stent in               Vital Signs:         Vitals:    20 1220   BP: (!) 141/74   Pulse: (!) 56   Temp: 97.6 °F (36.4 °C)   Weight: 85 kg (187 lb 6.3 oz)   Height: 5' 8\" (1.727 m)            ____________________________________________  PAST MEDICAL HISTORY  Past Medical History:   Diagnosis Date    Arthritis     lower back    CAD (coronary artery disease)     stent x 1  LDA    Chronic lower back pain     Dr Thea Morrison     Chronic pain     right knee - resolved with knee replacement 2017    Hypercholesteremia     Hypertension     Hypothyroidism     Skin cancer       RLEG   L FLANK AREA    Sun-damaged skin       ____________________________________________  PAST SURGICAL HISTORY  Past Surgical History:   Procedure Laterality Date    CARDIAC SURG PROCEDURE UNLIST      1 STENT    COLONOSCOPY N/A 10/20/2017    COLON[facility]oa [facility] AM[facility]    HX CATARACT REMOVAL Right 2017    HX CORONARY STENT PLACEMENT      Dr Nehemias Montero      [address]  approx 2010    @ [place] UMBILCAL AND R GROIN    HX LUMBAR FUSION  2006     L3-L4 fusion    HX TONSILLECTOMY  age 13    NC COLON CA SCRN NOT HI RSK IND  10/20/2017         TOTAL KNEE ARTHROPLASTY Right 04/2017      ____________________________________________  HOME MEDICATIONS    Current Outpatient Medications   Medication Sig    amLODIPine (NORVASC) 5 mg tablet Take 5 mg by mouth nightly.  cyanocobalamin, vitamin B-12, (Vitamin B-12) 5,000 mcg subl 1 Tab by SubLINGual route daily.  gabapentin (NEURONTIN) 300 mg capsule Take 300 mg by mouth as needed for Pain.  traMADoL (ULTRAM) 50 mg tablet Take 50 mg by mouth every six (6) hours as needed for Pain.  ibuprofen (MOTRIN) 800 mg tablet Take 800 mg by mouth every eight (8) hours as needed for Pain.  aspirin delayed-release 81 mg tablet Take 81 mg by mouth daily.  indomethacin (INDOCIN) 50 mg capsule Take 50 mg by mouth three (3) times daily as needed.  atorvastatin (LIPITOR) 40 mg tablet Take 20 mg by mouth nightly.  enalapril (VASOTEC) 10 mg tablet Take 10 mg by mouth nightly.  levothyroxine (SYNTHROID) 200 mcg tablet Take 200 mcg by mouth. Daily 6 days a week, Mon, Tues, Wed, Thurs, Fri ,Sun    EPINEPHrine (EPIPEN) 0.3 mg/0.3 mL injection 0.3 mg by IntraMUSCular route once as needed.  sildenafil (REVATIO) 20 mg tablet Take 20 mg by mouth as needed.  furosemide (LASIX) 20 mg tablet Take 20 mg by mouth as needed. TAKES 4 TIMES A WEEK NORMALLY     No current facility-administered medications for this encounter.       ____________________________________________  ALLERGIES  Allergies   Allergen Reactions    Wasp [Venom-Wasp] Anaphylaxis and Swelling    Sulfa (Sulfonamide Antibiotics) Other (comments)     Lower extremity redness      ____________________________________________  SOCIAL HISTORY  Social History     Tobacco Use    Smoking status: Never Smoker    Smokeless tobacco: Never Used   Substance Use Topics    Alcohol use:  Yes     Alcohol/week: 3.0 standard drinks     Types: 3 Shots of liquor per week ____________________________________________        Labs:     Hospital Outpatient Visit on 11/24/2020   Component Date Value Ref Range Status    WBC 11/24/2020 5.2  4.1 - 11.1 K/uL Final    RBC 11/24/2020 5.08  4.10 - 5.70 M/uL Final    HGB 11/24/2020 15.9  12.1 - 17.0 g/dL Final    HCT 11/24/2020 48.9  36.6 - 50.3 % Final    MCV 11/24/2020 96.3  80.0 - 99.0 FL Final    MCH 11/24/2020 31.3  26.0 - 34.0 PG Final    MCHC 11/24/2020 32.5  30.0 - 36.5 g/dL Final    RDW 11/24/2020 13.2  11.5 - 14.5 % Final    PLATELET 42/90/8600 106  150 - 400 K/uL Final    MPV 11/24/2020 9.6  8.9 - 12.9 FL Final    NRBC 11/24/2020 0.0  0  WBC Final    ABSOLUTE NRBC 11/24/2020 0.00  0.00 - 0.01 K/uL Final    NEUTROPHILS 11/24/2020 61  32 - 75 % Final    LYMPHOCYTES 11/24/2020 25  12 - 49 % Final    MONOCYTES 11/24/2020 9  5 - 13 % Final    EOSINOPHILS 11/24/2020 4  0 - 7 % Final    BASOPHILS 11/24/2020 1  0 - 1 % Final    IMMATURE GRANULOCYTES 11/24/2020 0  0.0 - 0.5 % Final    ABS. NEUTROPHILS 11/24/2020 3.2  1.8 - 8.0 K/UL Final    ABS. LYMPHOCYTES 11/24/2020 1.3  0.8 - 3.5 K/UL Final    ABS. MONOCYTES 11/24/2020 0.5  0.0 - 1.0 K/UL Final    ABS. EOSINOPHILS 11/24/2020 0.2  0.0 - 0.4 K/UL Final    ABS. BASOPHILS 11/24/2020 0.0  0.0 - 0.1 K/UL Final    ABS. IMM.  GRANS. 11/24/2020 0.0  0.00 - 0.04 K/UL Final    DF 11/24/2020 AUTOMATED    Final    Sodium 11/24/2020 141  136 - 145 mmol/L Final    Potassium 11/24/2020 4.6  3.5 - 5.1 mmol/L Final    Chloride 11/24/2020 107  97 - 108 mmol/L Final    CO2 11/24/2020 31  21 - 32 mmol/L Final    Anion gap 11/24/2020 3* 5 - 15 mmol/L Final    Glucose 11/24/2020 86  65 - 100 mg/dL Final    BUN 11/24/2020 16  6 - 20 MG/DL Final    Creatinine 11/24/2020 1.20  0.70 - 1.30 MG/DL Final    BUN/Creatinine ratio 11/24/2020 13  12 - 20   Final    GFR est AA 11/24/2020 >60  >60 ml/min/1.73m2 Final    GFR est non-AA 11/24/2020 59* >60 ml/min/1.73m2 Final Estimated GFR is calculated using the IDMS-traceable Modification of Diet in Renal Disease (MDRD) Study equation, reported for both  Americans (GFRAA) and non- Americans (GFRNA), and normalized to 1.73m2 body surface area. The physician must decide which value applies to the patient.  Calcium 11/24/2020 9.4  8.5 - 10.1 MG/DL Final    Ventricular Rate 11/24/2020 52  BPM Final    Atrial Rate 11/24/2020 52  BPM Final    P-R Interval 11/24/2020 142  ms Final    QRS Duration 11/24/2020 82  ms Final    Q-T Interval 11/24/2020 446  ms Final    QTC Calculation (Bezet) 11/24/2020 414  ms Final    Calculated P Axis 11/24/2020 53  degrees Final    Calculated R Axis 11/24/2020 8  degrees Final    Calculated T Axis 11/24/2020 -3  degrees Final    Diagnosis 11/24/2020    Final                    Value:Sinus bradycardia with premature atrial complexes  Otherwise normal ECG  When compared with ECG of 20-APR-2016 13:25,  premature atrial complexes are now present  Confirmed by Osvaldo Lawrence M.D., Johnny Hess ([id number]) on 11/24/2020 5:57:42 PM      Special Requests: 11/24/2020 NO SPECIAL REQUESTS    Final    Culture result: 11/24/2020 MRSA NOT PRESENT    Final       Skin:     Denies open wounds, cuts, sores, rashes or other areas of concern in PAT assessment.         Tiffani Wade, NP

## 2020-11-25 LAB
BACTERIA SPEC CULT: NORMAL
BACTERIA SPEC CULT: NORMAL
SERVICE CMNT-IMP: NORMAL

## 2020-11-28 ENCOUNTER — HOSPITAL ENCOUNTER (OUTPATIENT)
Dept: PREADMISSION TESTING | Age: 75
Discharge: HOME | End: 2020-11-28
Payer: MEDICARE

## 2020-11-28 DIAGNOSIS — Z01.812 PRE-PROCEDURE LAB EXAM: ICD-10-CM

## 2020-11-28 PROCEDURE — 87635 SARS-COV-2 COVID-19 AMP PRB: CPT

## 2020-11-29 LAB — SARS-COV-2, COV2NT: NOT DETECTED

## 2020-12-02 ENCOUNTER — HOSPITAL ENCOUNTER (INPATIENT)
Age: 75
LOS: 3 days | Discharge: HOME | DRG: 460 | End: 2020-12-05
Attending: NEUROLOGICAL SURGERY | Admitting: NEUROLOGICAL SURGERY
Payer: MEDICARE

## 2020-12-02 ENCOUNTER — APPOINTMENT (OUTPATIENT)
Dept: GENERAL RADIOLOGY | Age: 75
DRG: 460 | End: 2020-12-02
Attending: NEUROLOGICAL SURGERY
Payer: MEDICARE

## 2020-12-02 ENCOUNTER — ANESTHESIA (OUTPATIENT)
Dept: SURGERY | Age: 75
DRG: 460 | End: 2020-12-02
Payer: MEDICARE

## 2020-12-02 ENCOUNTER — ANESTHESIA EVENT (OUTPATIENT)
Dept: SURGERY | Age: 75
DRG: 460 | End: 2020-12-02
Payer: MEDICARE

## 2020-12-02 DIAGNOSIS — Z98.1 S/P SPINAL FUSION: Primary | ICD-10-CM

## 2020-12-02 PROBLEM — M48.062 LUMBAR STENOSIS WITH NEUROGENIC CLAUDICATION: Status: ACTIVE | Noted: 2020-12-02

## 2020-12-02 LAB
GLUCOSE BLD STRIP.AUTO-MCNC: 83 MG/DL (ref 65–100)
HISTORY CHECKED?,CKHIST: NORMAL
SERVICE CMNT-IMP: NORMAL

## 2020-12-02 PROCEDURE — 0SG1071 FUSION OF 2 OR MORE LUMBAR VERTEBRAL JOINTS WITH AUTOLOGOUS TISSUE SUBSTITUTE, POSTERIOR APPROACH, POSTERIOR COLUMN, OPEN APPROACH: ICD-10-PCS | Performed by: NEUROLOGICAL SURGERY

## 2020-12-02 PROCEDURE — 85018 HEMOGLOBIN: CPT

## 2020-12-02 PROCEDURE — 74011250636 HC RX REV CODE- 250/636: Performed by: NURSE ANESTHETIST, CERTIFIED REGISTERED

## 2020-12-02 PROCEDURE — 77030040361 HC SLV COMPR DVT MDII -B: Performed by: NEUROLOGICAL SURGERY

## 2020-12-02 PROCEDURE — 0QP004Z REMOVAL OF INTERNAL FIXATION DEVICE FROM LUMBAR VERTEBRA, OPEN APPROACH: ICD-10-PCS | Performed by: NEUROLOGICAL SURGERY

## 2020-12-02 PROCEDURE — 74011000250 HC RX REV CODE- 250: Performed by: NURSE ANESTHETIST, CERTIFIED REGISTERED

## 2020-12-02 PROCEDURE — 77030008462 HC STPLR SKN PROX J&J -A: Performed by: NEUROLOGICAL SURGERY

## 2020-12-02 PROCEDURE — 77030013079 HC BLNKT BAIR HGGR 3M -A: Performed by: ANESTHESIOLOGY

## 2020-12-02 PROCEDURE — 77030003029 HC SUT VCRL J&J -B: Performed by: NEUROLOGICAL SURGERY

## 2020-12-02 PROCEDURE — 74011000250 HC RX REV CODE- 250: Performed by: NEUROLOGICAL SURGERY

## 2020-12-02 PROCEDURE — 77030019908 HC STETH ESOPH SIMS -A: Performed by: ANESTHESIOLOGY

## 2020-12-02 PROCEDURE — 77030010813: Performed by: NEUROLOGICAL SURGERY

## 2020-12-02 PROCEDURE — 76010000177 HC OR TIME 5 TO 5.5 HR INTENSV-TIER 1: Performed by: NEUROLOGICAL SURGERY

## 2020-12-02 PROCEDURE — 77030014647 HC SEAL FBRN TISSL BAXT -D: Performed by: NEUROLOGICAL SURGERY

## 2020-12-02 PROCEDURE — 01NR0ZZ RELEASE SACRAL NERVE, OPEN APPROACH: ICD-10-PCS | Performed by: NEUROLOGICAL SURGERY

## 2020-12-02 PROCEDURE — 82962 GLUCOSE BLOOD TEST: CPT

## 2020-12-02 PROCEDURE — 72100 X-RAY EXAM L-S SPINE 2/3 VWS: CPT

## 2020-12-02 PROCEDURE — 36415 COLL VENOUS BLD VENIPUNCTURE: CPT

## 2020-12-02 PROCEDURE — 77030040922 HC BLNKT HYPOTHRM STRY -A

## 2020-12-02 PROCEDURE — 8E0WXBZ COMPUTER ASSISTED PROCEDURE OF TRUNK REGION: ICD-10-PCS | Performed by: NEUROLOGICAL SURGERY

## 2020-12-02 PROCEDURE — 77030008684 HC TU ET CUF COVD -B: Performed by: ANESTHESIOLOGY

## 2020-12-02 PROCEDURE — 77030026438 HC STYL ET INTUB CARD -A: Performed by: ANESTHESIOLOGY

## 2020-12-02 PROCEDURE — 01NB0ZZ RELEASE LUMBAR NERVE, OPEN APPROACH: ICD-10-PCS | Performed by: NEUROLOGICAL SURGERY

## 2020-12-02 PROCEDURE — 76060000042 HC ANESTHESIA 5.5 TO 6 HR: Performed by: NEUROLOGICAL SURGERY

## 2020-12-02 PROCEDURE — 77030014650 HC SEAL MTRX FLOSEL BAXT -C: Performed by: NEUROLOGICAL SURGERY

## 2020-12-02 PROCEDURE — 0SG3071 FUSION OF LUMBOSACRAL JOINT WITH AUTOLOGOUS TISSUE SUBSTITUTE, POSTERIOR APPROACH, POSTERIOR COLUMN, OPEN APPROACH: ICD-10-PCS | Performed by: NEUROLOGICAL SURGERY

## 2020-12-02 PROCEDURE — 77030038552 HC DRN WND MDII -A: Performed by: NEUROLOGICAL SURGERY

## 2020-12-02 PROCEDURE — C1713 ANCHOR/SCREW BN/BN,TIS/BN: HCPCS | Performed by: NEUROLOGICAL SURGERY

## 2020-12-02 PROCEDURE — P9045 ALBUMIN (HUMAN), 5%, 250 ML: HCPCS | Performed by: NURSE ANESTHETIST, CERTIFIED REGISTERED

## 2020-12-02 PROCEDURE — 74011250636 HC RX REV CODE- 250/636: Performed by: NEUROLOGICAL SURGERY

## 2020-12-02 PROCEDURE — 86923 COMPATIBILITY TEST ELECTRIC: CPT

## 2020-12-02 PROCEDURE — 76210000016 HC OR PH I REC 1 TO 1.5 HR: Performed by: NEUROLOGICAL SURGERY

## 2020-12-02 PROCEDURE — 74011250636 HC RX REV CODE- 250/636: Performed by: ANESTHESIOLOGY

## 2020-12-02 PROCEDURE — 74011000250 HC RX REV CODE- 250: Performed by: ANESTHESIOLOGY

## 2020-12-02 PROCEDURE — 77030012890

## 2020-12-02 PROCEDURE — 77030032958 HC GRFT BN SUB ELITE TRNTY MUSC -K1: Performed by: NEUROLOGICAL SURGERY

## 2020-12-02 PROCEDURE — 86900 BLOOD TYPING SEROLOGIC ABO: CPT

## 2020-12-02 PROCEDURE — 74011250637 HC RX REV CODE- 250/637: Performed by: ANESTHESIOLOGY

## 2020-12-02 PROCEDURE — 77030029099 HC BN WAX SSPC -A: Performed by: NEUROLOGICAL SURGERY

## 2020-12-02 PROCEDURE — 2709999900 HC NON-CHARGEABLE SUPPLY: Performed by: NEUROLOGICAL SURGERY

## 2020-12-02 PROCEDURE — 77030005513 HC CATH URETH FOL11 MDII -B: Performed by: NEUROLOGICAL SURGERY

## 2020-12-02 PROCEDURE — 74011250637 HC RX REV CODE- 250/637: Performed by: NEUROLOGICAL SURGERY

## 2020-12-02 PROCEDURE — 77030002933 HC SUT MCRYL J&J -A: Performed by: NEUROLOGICAL SURGERY

## 2020-12-02 PROCEDURE — 77030004391 HC BUR FLUT MEDT -C: Performed by: NEUROLOGICAL SURGERY

## 2020-12-02 PROCEDURE — 65270000029 HC RM PRIVATE

## 2020-12-02 PROCEDURE — 77030038600 HC TU BPLR IRR DISP STRY -B: Performed by: NEUROLOGICAL SURGERY

## 2020-12-02 PROCEDURE — 77030022302 HC GRFT BN SPN SC OSTEO -H1: Performed by: NEUROLOGICAL SURGERY

## 2020-12-02 DEVICE — SCREW 55840006545 5.5/6 MAS 6.5X45 CC .
Type: IMPLANTABLE DEVICE | Site: BACK | Status: FUNCTIONAL
Brand: CD HORIZON® SPINAL SYSTEM

## 2020-12-02 DEVICE — SCREW 55840006550 5.5/6 MAS 6.5X50 CC
Type: IMPLANTABLE DEVICE | Site: BACK | Status: FUNCTIONAL
Brand: CD HORIZON® SPINAL SYSTEM

## 2020-12-02 DEVICE — SCREW 55840007545 5.5/6 MAS 7.5X45 CC
Type: IMPLANTABLE DEVICE | Site: BACK | Status: FUNCTIONAL
Brand: CD HORIZON® SPINAL SYSTEM

## 2020-12-02 DEVICE — ROD 1553201100 5.5 TI CP4 NS CURV 100MM
Type: IMPLANTABLE DEVICE | Site: BACK | Status: FUNCTIONAL
Brand: CD HORIZON® SPINAL SYSTEM

## 2020-12-02 DEVICE — CROSSLINK 8115545 45TO58MM X10 MULTI
Type: IMPLANTABLE DEVICE | Site: SPINE LUMBAR | Status: FUNCTIONAL
Brand: CD HORIZON® SPINAL SYSTEM

## 2020-12-02 DEVICE — GRAFT BNE SUB L CANC FRZN MORSELIZED W/ VIABLE CELL TRINITY: Type: IMPLANTABLE DEVICE | Site: SPINE LUMBAR | Status: FUNCTIONAL

## 2020-12-02 DEVICE — SET SCREW 5540030 5.5 TI NS BRK OFF
Type: IMPLANTABLE DEVICE | Site: BACK | Status: FUNCTIONAL
Brand: CD HORIZON® SPINAL SYSTEM

## 2020-12-02 DEVICE — DBM 7509211 MAGNIFUSE 1 X 10CM
Type: IMPLANTABLE DEVICE | Site: SPINE LUMBAR | Status: FUNCTIONAL
Brand: MAGNIFUSE® BONE GRAFT

## 2020-12-02 RX ORDER — DOCUSATE SODIUM 100 MG/1
100 CAPSULE, LIQUID FILLED ORAL 2 TIMES DAILY
Status: DISCONTINUED | OUTPATIENT
Start: 2020-12-02 | End: 2020-12-05 | Stop reason: HOSPADM

## 2020-12-02 RX ORDER — DIPHENHYDRAMINE HYDROCHLORIDE 50 MG/ML
12.5 INJECTION, SOLUTION INTRAMUSCULAR; INTRAVENOUS AS NEEDED
Status: DISCONTINUED | OUTPATIENT
Start: 2020-12-02 | End: 2020-12-02 | Stop reason: HOSPADM

## 2020-12-02 RX ORDER — SODIUM CHLORIDE 0.9 % (FLUSH) 0.9 %
5-40 SYRINGE (ML) INJECTION EVERY 8 HOURS
Status: DISCONTINUED | OUTPATIENT
Start: 2020-12-02 | End: 2020-12-02 | Stop reason: HOSPADM

## 2020-12-02 RX ORDER — PHENYLEPHRINE HCL IN 0.9% NACL 0.4MG/10ML
SYRINGE (ML) INTRAVENOUS AS NEEDED
Status: DISCONTINUED | OUTPATIENT
Start: 2020-12-02 | End: 2020-12-02

## 2020-12-02 RX ORDER — CYCLOBENZAPRINE HCL 10 MG
10 TABLET ORAL
Status: DISCONTINUED | OUTPATIENT
Start: 2020-12-02 | End: 2020-12-05 | Stop reason: HOSPADM

## 2020-12-02 RX ORDER — MORPHINE SULFATE 10 MG/ML
2 INJECTION, SOLUTION INTRAMUSCULAR; INTRAVENOUS
Status: DISCONTINUED | OUTPATIENT
Start: 2020-12-02 | End: 2020-12-02 | Stop reason: HOSPADM

## 2020-12-02 RX ORDER — ROCURONIUM BROMIDE 10 MG/ML
INJECTION, SOLUTION INTRAVENOUS AS NEEDED
Status: DISCONTINUED | OUTPATIENT
Start: 2020-12-02 | End: 2020-12-02 | Stop reason: HOSPADM

## 2020-12-02 RX ORDER — TRAMADOL HYDROCHLORIDE 50 MG/1
50 TABLET ORAL
Status: DISCONTINUED | OUTPATIENT
Start: 2020-12-02 | End: 2020-12-05 | Stop reason: HOSPADM

## 2020-12-02 RX ORDER — ALBUMIN HUMAN 50 G/1000ML
SOLUTION INTRAVENOUS AS NEEDED
Status: DISCONTINUED | OUTPATIENT
Start: 2020-12-02 | End: 2020-12-02 | Stop reason: HOSPADM

## 2020-12-02 RX ORDER — LANOLIN ALCOHOL/MO/W.PET/CERES
1000 CREAM (GRAM) TOPICAL DAILY
Status: DISCONTINUED | OUTPATIENT
Start: 2020-12-03 | End: 2020-12-05 | Stop reason: HOSPADM

## 2020-12-02 RX ORDER — SODIUM CHLORIDE, SODIUM LACTATE, POTASSIUM CHLORIDE, CALCIUM CHLORIDE 600; 310; 30; 20 MG/100ML; MG/100ML; MG/100ML; MG/100ML
25 INJECTION, SOLUTION INTRAVENOUS CONTINUOUS
Status: DISCONTINUED | OUTPATIENT
Start: 2020-12-02 | End: 2020-12-02 | Stop reason: HOSPADM

## 2020-12-02 RX ORDER — MIDAZOLAM HYDROCHLORIDE 1 MG/ML
1 INJECTION, SOLUTION INTRAMUSCULAR; INTRAVENOUS AS NEEDED
Status: DISCONTINUED | OUTPATIENT
Start: 2020-12-02 | End: 2020-12-02 | Stop reason: HOSPADM

## 2020-12-02 RX ORDER — LIDOCAINE HYDROCHLORIDE 10 MG/ML
0.1 INJECTION, SOLUTION EPIDURAL; INFILTRATION; INTRACAUDAL; PERINEURAL AS NEEDED
Status: DISCONTINUED | OUTPATIENT
Start: 2020-12-02 | End: 2020-12-02 | Stop reason: HOSPADM

## 2020-12-02 RX ORDER — TAMSULOSIN HYDROCHLORIDE 0.4 MG/1
0.4 CAPSULE ORAL DAILY
Status: DISCONTINUED | OUTPATIENT
Start: 2020-12-03 | End: 2020-12-05 | Stop reason: HOSPADM

## 2020-12-02 RX ORDER — DIPHENHYDRAMINE HCL 25 MG
25 CAPSULE ORAL
Status: DISCONTINUED | OUTPATIENT
Start: 2020-12-02 | End: 2020-12-05 | Stop reason: HOSPADM

## 2020-12-02 RX ORDER — LEVOTHYROXINE SODIUM 100 UG/1
200 TABLET ORAL
Status: DISCONTINUED | OUTPATIENT
Start: 2020-12-03 | End: 2020-12-05 | Stop reason: HOSPADM

## 2020-12-02 RX ORDER — OXYCODONE HYDROCHLORIDE 5 MG/1
5 TABLET ORAL
Status: DISCONTINUED | OUTPATIENT
Start: 2020-12-02 | End: 2020-12-05 | Stop reason: HOSPADM

## 2020-12-02 RX ORDER — HYDROMORPHONE HYDROCHLORIDE 1 MG/ML
1 INJECTION, SOLUTION INTRAMUSCULAR; INTRAVENOUS; SUBCUTANEOUS
Status: ACTIVE | OUTPATIENT
Start: 2020-12-02 | End: 2020-12-03

## 2020-12-02 RX ORDER — ACETAMINOPHEN 325 MG/1
650 TABLET ORAL ONCE
Status: COMPLETED | OUTPATIENT
Start: 2020-12-02 | End: 2020-12-02

## 2020-12-02 RX ORDER — HYDROMORPHONE HYDROCHLORIDE 2 MG/ML
INJECTION, SOLUTION INTRAMUSCULAR; INTRAVENOUS; SUBCUTANEOUS AS NEEDED
Status: DISCONTINUED | OUTPATIENT
Start: 2020-12-02 | End: 2020-12-02 | Stop reason: HOSPADM

## 2020-12-02 RX ORDER — MIDAZOLAM HYDROCHLORIDE 1 MG/ML
0.5 INJECTION, SOLUTION INTRAMUSCULAR; INTRAVENOUS
Status: DISCONTINUED | OUTPATIENT
Start: 2020-12-02 | End: 2020-12-02 | Stop reason: HOSPADM

## 2020-12-02 RX ORDER — AMLODIPINE BESYLATE 5 MG/1
5 TABLET ORAL
Status: DISCONTINUED | OUTPATIENT
Start: 2020-12-02 | End: 2020-12-05 | Stop reason: HOSPADM

## 2020-12-02 RX ORDER — FENTANYL CITRATE 50 UG/ML
50 INJECTION, SOLUTION INTRAMUSCULAR; INTRAVENOUS AS NEEDED
Status: DISCONTINUED | OUTPATIENT
Start: 2020-12-02 | End: 2020-12-02 | Stop reason: HOSPADM

## 2020-12-02 RX ORDER — SUFENTANIL CITRATE 50 UG/ML
INJECTION EPIDURAL; INTRAVENOUS
Status: DISCONTINUED | OUTPATIENT
Start: 2020-12-02 | End: 2020-12-02 | Stop reason: HOSPADM

## 2020-12-02 RX ORDER — SODIUM CHLORIDE 9 MG/ML
100 INJECTION, SOLUTION INTRAVENOUS CONTINUOUS
Status: DISPENSED | OUTPATIENT
Start: 2020-12-02 | End: 2020-12-03

## 2020-12-02 RX ORDER — SODIUM CHLORIDE, SODIUM LACTATE, POTASSIUM CHLORIDE, CALCIUM CHLORIDE 600; 310; 30; 20 MG/100ML; MG/100ML; MG/100ML; MG/100ML
125 INJECTION, SOLUTION INTRAVENOUS CONTINUOUS
Status: DISCONTINUED | OUTPATIENT
Start: 2020-12-02 | End: 2020-12-02 | Stop reason: HOSPADM

## 2020-12-02 RX ORDER — GLYCOPYRROLATE 0.2 MG/ML
INJECTION INTRAMUSCULAR; INTRAVENOUS AS NEEDED
Status: DISCONTINUED | OUTPATIENT
Start: 2020-12-02 | End: 2020-12-02 | Stop reason: HOSPADM

## 2020-12-02 RX ORDER — ACETAMINOPHEN 325 MG/1
650 TABLET ORAL EVERY 6 HOURS
Status: DISCONTINUED | OUTPATIENT
Start: 2020-12-03 | End: 2020-12-05 | Stop reason: HOSPADM

## 2020-12-02 RX ORDER — SODIUM CHLORIDE 0.9 % (FLUSH) 0.9 %
5-40 SYRINGE (ML) INJECTION AS NEEDED
Status: DISCONTINUED | OUTPATIENT
Start: 2020-12-02 | End: 2020-12-02 | Stop reason: HOSPADM

## 2020-12-02 RX ORDER — NALOXONE HYDROCHLORIDE 0.4 MG/ML
0.4 INJECTION, SOLUTION INTRAMUSCULAR; INTRAVENOUS; SUBCUTANEOUS AS NEEDED
Status: DISCONTINUED | OUTPATIENT
Start: 2020-12-02 | End: 2020-12-05 | Stop reason: HOSPADM

## 2020-12-02 RX ORDER — FENTANYL CITRATE 50 UG/ML
25 INJECTION, SOLUTION INTRAMUSCULAR; INTRAVENOUS
Status: DISCONTINUED | OUTPATIENT
Start: 2020-12-02 | End: 2020-12-02 | Stop reason: HOSPADM

## 2020-12-02 RX ORDER — HYDROMORPHONE HYDROCHLORIDE 1 MG/ML
0.2 INJECTION, SOLUTION INTRAMUSCULAR; INTRAVENOUS; SUBCUTANEOUS
Status: DISCONTINUED | OUTPATIENT
Start: 2020-12-02 | End: 2020-12-02 | Stop reason: HOSPADM

## 2020-12-02 RX ORDER — ONDANSETRON 2 MG/ML
4 INJECTION INTRAMUSCULAR; INTRAVENOUS AS NEEDED
Status: DISCONTINUED | OUTPATIENT
Start: 2020-12-02 | End: 2020-12-02 | Stop reason: HOSPADM

## 2020-12-02 RX ORDER — SODIUM CHLORIDE, SODIUM LACTATE, POTASSIUM CHLORIDE, CALCIUM CHLORIDE 600; 310; 30; 20 MG/100ML; MG/100ML; MG/100ML; MG/100ML
INJECTION, SOLUTION INTRAVENOUS
Status: DISCONTINUED | OUTPATIENT
Start: 2020-12-02 | End: 2020-12-02 | Stop reason: HOSPADM

## 2020-12-02 RX ORDER — LIDOCAINE HYDROCHLORIDE 20 MG/ML
INJECTION, SOLUTION EPIDURAL; INFILTRATION; INTRACAUDAL; PERINEURAL AS NEEDED
Status: DISCONTINUED | OUTPATIENT
Start: 2020-12-02 | End: 2020-12-02 | Stop reason: HOSPADM

## 2020-12-02 RX ORDER — ONDANSETRON 2 MG/ML
4 INJECTION INTRAMUSCULAR; INTRAVENOUS
Status: DISPENSED | OUTPATIENT
Start: 2020-12-02 | End: 2020-12-03

## 2020-12-02 RX ORDER — OXYCODONE HYDROCHLORIDE 5 MG/1
5 TABLET ORAL AS NEEDED
Status: DISCONTINUED | OUTPATIENT
Start: 2020-12-02 | End: 2020-12-02 | Stop reason: HOSPADM

## 2020-12-02 RX ORDER — SODIUM CHLORIDE 0.9 % (FLUSH) 0.9 %
5-40 SYRINGE (ML) INJECTION EVERY 8 HOURS
Status: DISCONTINUED | OUTPATIENT
Start: 2020-12-02 | End: 2020-12-05 | Stop reason: HOSPADM

## 2020-12-02 RX ORDER — DEXAMETHASONE SODIUM PHOSPHATE 4 MG/ML
INJECTION, SOLUTION INTRA-ARTICULAR; INTRALESIONAL; INTRAMUSCULAR; INTRAVENOUS; SOFT TISSUE AS NEEDED
Status: DISCONTINUED | OUTPATIENT
Start: 2020-12-02 | End: 2020-12-02 | Stop reason: HOSPADM

## 2020-12-02 RX ORDER — MORPHINE SULFATE IN 0.9 % NACL 150MG/30ML
PATIENT CONTROLLED ANALGESIA SYRINGE INTRAVENOUS
Status: DISCONTINUED | OUTPATIENT
Start: 2020-12-02 | End: 2020-12-03

## 2020-12-02 RX ORDER — SODIUM CHLORIDE 0.9 % (FLUSH) 0.9 %
5-40 SYRINGE (ML) INJECTION AS NEEDED
Status: DISCONTINUED | OUTPATIENT
Start: 2020-12-02 | End: 2020-12-05 | Stop reason: HOSPADM

## 2020-12-02 RX ORDER — SUFENTANIL CITRATE 50 UG/ML
INJECTION EPIDURAL; INTRAVENOUS AS NEEDED
Status: DISCONTINUED | OUTPATIENT
Start: 2020-12-02 | End: 2020-12-02 | Stop reason: HOSPADM

## 2020-12-02 RX ORDER — SODIUM CHLORIDE 9 MG/ML
250 INJECTION, SOLUTION INTRAVENOUS AS NEEDED
Status: DISCONTINUED | OUTPATIENT
Start: 2020-12-02 | End: 2020-12-02

## 2020-12-02 RX ORDER — ATORVASTATIN CALCIUM 20 MG/1
20 TABLET, FILM COATED ORAL
Status: DISCONTINUED | OUTPATIENT
Start: 2020-12-02 | End: 2020-12-05 | Stop reason: HOSPADM

## 2020-12-02 RX ORDER — SODIUM CHLORIDE 9 MG/ML
25 INJECTION, SOLUTION INTRAVENOUS CONTINUOUS
Status: DISCONTINUED | OUTPATIENT
Start: 2020-12-02 | End: 2020-12-02 | Stop reason: HOSPADM

## 2020-12-02 RX ORDER — PROPOFOL 10 MG/ML
INJECTION, EMULSION INTRAVENOUS AS NEEDED
Status: DISCONTINUED | OUTPATIENT
Start: 2020-12-02 | End: 2020-12-02 | Stop reason: HOSPADM

## 2020-12-02 RX ORDER — SUCCINYLCHOLINE CHLORIDE 20 MG/ML
INJECTION INTRAMUSCULAR; INTRAVENOUS AS NEEDED
Status: DISCONTINUED | OUTPATIENT
Start: 2020-12-02 | End: 2020-12-02 | Stop reason: HOSPADM

## 2020-12-02 RX ORDER — ONDANSETRON 2 MG/ML
INJECTION INTRAMUSCULAR; INTRAVENOUS AS NEEDED
Status: DISCONTINUED | OUTPATIENT
Start: 2020-12-02 | End: 2020-12-02 | Stop reason: HOSPADM

## 2020-12-02 RX ORDER — OXYCODONE HYDROCHLORIDE 5 MG/1
10 TABLET ORAL
Status: DISCONTINUED | OUTPATIENT
Start: 2020-12-02 | End: 2020-12-05 | Stop reason: HOSPADM

## 2020-12-02 RX ORDER — DIAZEPAM 5 MG/1
5 TABLET ORAL
Status: DISCONTINUED | OUTPATIENT
Start: 2020-12-02 | End: 2020-12-05 | Stop reason: HOSPADM

## 2020-12-02 RX ADMIN — DOCUSATE SODIUM 100 MG: 100 CAPSULE, LIQUID FILLED ORAL at 22:16

## 2020-12-02 RX ADMIN — ACETAMINOPHEN 650 MG: 325 TABLET ORAL at 11:16

## 2020-12-02 RX ADMIN — ROCURONIUM BROMIDE 40 MG: 10 SOLUTION INTRAVENOUS at 13:56

## 2020-12-02 RX ADMIN — SUGAMMADEX 400 MG: 100 INJECTION, SOLUTION INTRAVENOUS at 18:33

## 2020-12-02 RX ADMIN — LIDOCAINE HYDROCHLORIDE 40 MG: 20 INJECTION, SOLUTION EPIDURAL; INFILTRATION; INTRACAUDAL; PERINEURAL at 13:37

## 2020-12-02 RX ADMIN — GLYCOPYRROLATE 0.2 MG: 0.2 INJECTION, SOLUTION INTRAMUSCULAR; INTRAVENOUS at 15:16

## 2020-12-02 RX ADMIN — DEXAMETHASONE SODIUM PHOSPHATE 8 MG: 4 INJECTION, SOLUTION INTRAMUSCULAR; INTRAVENOUS at 14:06

## 2020-12-02 RX ADMIN — ROCURONIUM BROMIDE 10 MG: 10 SOLUTION INTRAVENOUS at 16:32

## 2020-12-02 RX ADMIN — PROPOFOL 30 MG: 10 INJECTION, EMULSION INTRAVENOUS at 13:37

## 2020-12-02 RX ADMIN — HYDROMORPHONE HYDROCHLORIDE 0.5 MG: 2 INJECTION, SOLUTION INTRAMUSCULAR; INTRAVENOUS; SUBCUTANEOUS at 19:03

## 2020-12-02 RX ADMIN — ROCURONIUM BROMIDE 10 MG: 10 SOLUTION INTRAVENOUS at 17:34

## 2020-12-02 RX ADMIN — ALBUMIN (HUMAN) 250 ML: 12.5 INJECTION, SOLUTION INTRAVENOUS at 14:53

## 2020-12-02 RX ADMIN — SUFENTANIL CITRATE 0.2 MCG/KG/HR: 50 INJECTION EPIDURAL; INTRAVENOUS at 14:03

## 2020-12-02 RX ADMIN — SODIUM CHLORIDE, POTASSIUM CHLORIDE, SODIUM LACTATE AND CALCIUM CHLORIDE: 600; 310; 30; 20 INJECTION, SOLUTION INTRAVENOUS at 15:18

## 2020-12-02 RX ADMIN — Medication: at 19:19

## 2020-12-02 RX ADMIN — ONDANSETRON HYDROCHLORIDE 4 MG: 2 INJECTION, SOLUTION INTRAMUSCULAR; INTRAVENOUS at 18:38

## 2020-12-02 RX ADMIN — SODIUM CHLORIDE, POTASSIUM CHLORIDE, SODIUM LACTATE AND CALCIUM CHLORIDE: 600; 310; 30; 20 INJECTION, SOLUTION INTRAVENOUS at 13:09

## 2020-12-02 RX ADMIN — SODIUM CHLORIDE 125 ML/HR: 900 INJECTION, SOLUTION INTRAVENOUS at 20:32

## 2020-12-02 RX ADMIN — ROCURONIUM BROMIDE 10 MG: 10 SOLUTION INTRAVENOUS at 16:52

## 2020-12-02 RX ADMIN — SUCCINYLCHOLINE CHLORIDE 120 MG: 20 INJECTION, SOLUTION INTRAMUSCULAR; INTRAVENOUS at 13:50

## 2020-12-02 RX ADMIN — ROCURONIUM BROMIDE 10 MG: 10 SOLUTION INTRAVENOUS at 13:50

## 2020-12-02 RX ADMIN — WATER 2 G: 1 INJECTION INTRAMUSCULAR; INTRAVENOUS; SUBCUTANEOUS at 14:06

## 2020-12-02 RX ADMIN — ALBUMIN (HUMAN) 250 ML: 12.5 INJECTION, SOLUTION INTRAVENOUS at 15:04

## 2020-12-02 RX ADMIN — PROPOFOL 170 MG: 10 INJECTION, EMULSION INTRAVENOUS at 13:50

## 2020-12-02 RX ADMIN — SUFENTANIL CITRATE 10 MCG: 50 INJECTION EPIDURAL; INTRAVENOUS at 13:50

## 2020-12-02 RX ADMIN — LIDOCAINE HYDROCHLORIDE 60 MG: 20 INJECTION, SOLUTION EPIDURAL; INFILTRATION; INTRACAUDAL; PERINEURAL at 13:50

## 2020-12-02 RX ADMIN — SODIUM CHLORIDE, SODIUM LACTATE, POTASSIUM CHLORIDE, AND CALCIUM CHLORIDE 25 ML/HR: 600; 310; 30; 20 INJECTION, SOLUTION INTRAVENOUS at 11:11

## 2020-12-02 RX ADMIN — SODIUM CHLORIDE, POTASSIUM CHLORIDE, SODIUM LACTATE AND CALCIUM CHLORIDE: 600; 310; 30; 20 INJECTION, SOLUTION INTRAVENOUS at 16:43

## 2020-12-02 RX ADMIN — LIDOCAINE HYDROCHLORIDE 0.1 ML: 10 INJECTION, SOLUTION EPIDURAL; INFILTRATION; INTRACAUDAL; PERINEURAL at 11:11

## 2020-12-02 RX ADMIN — HYDROMORPHONE HYDROCHLORIDE 0.5 MG: 2 INJECTION, SOLUTION INTRAMUSCULAR; INTRAVENOUS; SUBCUTANEOUS at 18:52

## 2020-12-02 RX ADMIN — ACETAMINOPHEN 650 MG: 325 TABLET ORAL at 23:58

## 2020-12-02 RX ADMIN — ROCURONIUM BROMIDE 25 MG: 10 SOLUTION INTRAVENOUS at 14:36

## 2020-12-02 RX ADMIN — PHENYLEPHRINE HYDROCHLORIDE 80 MCG/MIN: 10 INJECTION INTRAVENOUS at 15:16

## 2020-12-02 RX ADMIN — ROCURONIUM BROMIDE 15 MG: 10 SOLUTION INTRAVENOUS at 15:33

## 2020-12-02 RX ADMIN — ATORVASTATIN CALCIUM 20 MG: 20 TABLET, FILM COATED ORAL at 22:15

## 2020-12-02 RX ADMIN — WATER 2 G: 1 INJECTION INTRAMUSCULAR; INTRAVENOUS; SUBCUTANEOUS at 18:14

## 2020-12-02 RX ADMIN — SUFENTANIL CITRATE 10 MCG: 50 INJECTION EPIDURAL; INTRAVENOUS at 13:54

## 2020-12-02 NOTE — BRIEF OP NOTE
Brief Postoperative Note    Patient: Ludmila Arguello  YOB: 1945  MRN: [id number]    Date of Procedure: 12/2/2020     Pre-Op Diagnosis: LUMBAR STENOSIS, INSTABILITY    Post-Op Diagnosis: Same as preoperative diagnosis. Procedure(s):  REDO L2-5 LAMINECTOMY WITH INSTRUMENTATED FUSION L2-S1 WITH  REMOVAL OF  L3-4 HARDWARE, L5-S1 POSTEROR LUMBAR INTERBODY FUSION, PEDICAL SCREWS AND ALLOGRAFT (O-ARM)    Surgeon(s):  Nikolas Palma MD    Surgical Assistant: Surg Asst-1: Nilson Mendoza    Anesthesia: General     Estimated Blood Loss (mL): 968    Complications: None    Specimens: * No specimens in log *     Implants:   Implant Name Type Inv. Item Serial No.  Lot No. LRB No. Used Action   GRAFT BNE SUB [id number] SPNL DEFORMITY MAGNIFUSE SC - [id number]  GRAFT BNE SUB [id number] SPNL DEFORMITY MAGNIFUSE SC [id number] [employer] SPINALGRAFT TECH_WD NA N/A 1 Implanted   GRAFT BNE SUB L CANC FRZN MORSELIZED W/ VIABLE CELL MARCY - [id number]  GRAFT BNE SUB L CANC FRZN MORSELIZED W/ VIABLE CELL MARCY [id number] MUSCULOSKELETAL TRANSPLANT [facility]_ NA N/A 1 Implanted   GRAFT BNE SUB L CANC FRZN MORSELIZED W/ VIABLE CELL MARCY - [id number]  GRAFT BNE SUB L CANC FRZN MORSELIZED W/ VIABLE CELL MARCY [id number] [employer]_ NA N/A 1 Implanted   SET SCR SPNL L6MM DIA5. 5MM TI BRK OFF PORTER W/ DETACH 1301 [employer] - SN/A  SET SCR SPNL L6MM DIA5. 5MM TI BRK OFF PORTER W/ DETACH [employer]01 [employer] N/A [employer] SOFAMOR DANEK_WD N/A N/A 10 Implanted   SCREW SPNL L45MM DIA6. 5MM POST THORACOLUMBOSACRAL CO CHROM - SNA  SCREW SPNL L45MM DIA6. 5MM POST THORACOLUMBOSACRAL CO CHROM NA [employer] SOFAMOR DANEK_WD NA N/A 6 Implanted   SCREW SPNL L45MM DIA7. 5MM POST THORACOLUMBOSACRAL CO CHROM - SNA  SCREW SPNL L45MM DIA7. 5MM POST THORACOLUMBOSACRAL CO CHROM NA [employer] SOFAMOR DANEK_WD NA N/A 2 Implanted   SCREW SPNL L50MM DIA6. 5MM POST THORACOLUMBOSACRAL CO CHROM - SNA SCREW SPNL L50MM DIA6. 5MM POST THORACOLUMBOSACRAL CO CHROM NA [employer] SOFAMOR DANEK_WD NA N/A 2 Implanted   JOHANNA SPNL L100MM DIA5. 5MM TI ANT POST THORACOLUMBOSACRAL CRV - SNA  JOHANNA SPNL L100MM DIA5. 5MM TI ANT POST THORACOLUMBOSACRAL CRV NA [employer] SOFAMOR DANEK_WD NA N/A 2 Implanted   PLATE SPNL D24-78UD POST THORLUM TI LO PROF FOR 5.5MM JOHANNA - [id number]  PLATE SPNL P88-10VX POST THORLUM TI LO PROF FOR 5.5MM JOHANNA [id number] [employer] SOFAMOR DANEK_WD NA N/A 1 Implanted       Drains: * No LDAs found *    Findings: stenosis    Electronically Signed by Emanuel Chicas MD on 12/2/2020 at 6:22 PM

## 2020-12-02 NOTE — ANESTHESIA PREPROCEDURE EVALUATION
Anesthetic History   No history of anesthetic complications            Review of Systems / Medical History  Patient summary reviewed, nursing notes reviewed and pertinent labs reviewed    Pulmonary  Within defined limits                 Neuro/Psych   Within defined limits           Cardiovascular    Hypertension          CAD (s/p stent) and cardiac stents    Exercise tolerance: >4 METS     GI/Hepatic/Renal  Within defined limits              Endo/Other      Hypothyroidism: well controlled  Arthritis     Other Findings   Comments: Chronic back pain           Physical Exam    Airway  Mallampati: II  TM Distance: 4 - 6 cm  Neck ROM: normal range of motion   Mouth opening: Normal     Cardiovascular    Rhythm: regular  Rate: normal      Pertinent negatives: No murmur   Dental    Dentition: Implants and Caps/crowns     Pulmonary  Breath sounds clear to auscultation               Abdominal  GI exam deferred       Other Findings            Anesthetic Plan    ASA: 2  Anesthesia type: general          Induction: Intravenous  Anesthetic plan and risks discussed with: Patient

## 2020-12-02 NOTE — ROUTINE PROCESS
Patient: Jarrell Schaeffer MRN: [id number]  SSN: [id number]   YOB: 1945  Age: 70 y.o. Sex: male     Patient is status post Procedure(s):  REDO L2-5 LAMINECTOMY WITH INSTRUMENTATED FUSION L2-S1 WITH  REMOVAL OF  L3-4 HARDWARE, L5-S1 POSTEROR LUMBAR INTERBODY FUSION, PEDICAL SCREWS AND ALLOGRAFT (O-ARM). Surgeon(s) and Role:     * Saint Morale, MD - Primary    Local/Dose/Irrigation:  NIL                  Peripheral IV 12/02/20 Left Hand (Active)   Site Assessment Clean, dry, & intact 12/02/20 1101   Dressing Status Clean, dry, & intact 12/02/20 1101   Dressing Type Transparent 12/02/20 1101   Hub Color/Line Status Infusing 12/02/20 1101       Peripheral IV 12/02/20 Right Hand (Active)          Hemovac Right; Lower Back (Active)      Airway - Endotracheal Tube 12/02/20 Oral (Active)                   Dressing/Packing:  Wound Back-Dressing/Treatment: Other (Comment)(Staples, island dressing, ABD, tape) (12/02/20 5223)    Splint/Cast:  ]    Other:

## 2020-12-03 LAB
ABO + RH BLD: NORMAL
ANION GAP SERPL CALC-SCNC: 8 MMOL/L (ref 5–15)
BLD PROD TYP BPU: NORMAL
BLOOD GROUP ANTIBODIES SERPL: NORMAL
BPU ID: NORMAL
BUN SERPL-MCNC: 18 MG/DL (ref 6–20)
BUN/CREAT SERPL: 12 (ref 12–20)
CALCIUM SERPL-MCNC: 7.9 MG/DL (ref 8.5–10.1)
CHLORIDE SERPL-SCNC: 110 MMOL/L (ref 97–108)
CO2 SERPL-SCNC: 26 MMOL/L (ref 21–32)
CREAT SERPL-MCNC: 1.49 MG/DL (ref 0.7–1.3)
CROSSMATCH RESULT,%XM: NORMAL
GLUCOSE SERPL-MCNC: 150 MG/DL (ref 65–100)
HGB BLD-MCNC: 11.6 G/DL (ref 12.1–17)
POTASSIUM SERPL-SCNC: 5.3 MMOL/L (ref 3.5–5.1)
SODIUM SERPL-SCNC: 144 MMOL/L (ref 136–145)
SPECIMEN EXP DATE BLD: NORMAL
STATUS OF UNIT,%ST: NORMAL
UNIT DIVISION, %UDIV: 0

## 2020-12-03 PROCEDURE — 97535 SELF CARE MNGMENT TRAINING: CPT

## 2020-12-03 PROCEDURE — 36415 COLL VENOUS BLD VENIPUNCTURE: CPT

## 2020-12-03 PROCEDURE — 97530 THERAPEUTIC ACTIVITIES: CPT

## 2020-12-03 PROCEDURE — 80048 BASIC METABOLIC PNL TOTAL CA: CPT

## 2020-12-03 PROCEDURE — 97116 GAIT TRAINING THERAPY: CPT

## 2020-12-03 PROCEDURE — 97165 OT EVAL LOW COMPLEX 30 MIN: CPT

## 2020-12-03 PROCEDURE — 74011250636 HC RX REV CODE- 250/636: Performed by: NEUROLOGICAL SURGERY

## 2020-12-03 PROCEDURE — 65270000029 HC RM PRIVATE

## 2020-12-03 PROCEDURE — 74011250637 HC RX REV CODE- 250/637: Performed by: NEUROLOGICAL SURGERY

## 2020-12-03 PROCEDURE — 85018 HEMOGLOBIN: CPT

## 2020-12-03 PROCEDURE — 74011000250 HC RX REV CODE- 250: Performed by: NEUROLOGICAL SURGERY

## 2020-12-03 PROCEDURE — 97161 PT EVAL LOW COMPLEX 20 MIN: CPT

## 2020-12-03 RX ORDER — POLYETHYLENE GLYCOL 3350 17 G/17G
17 POWDER, FOR SOLUTION ORAL DAILY
Status: DISCONTINUED | OUTPATIENT
Start: 2020-12-03 | End: 2020-12-05 | Stop reason: HOSPADM

## 2020-12-03 RX ORDER — PANTOPRAZOLE SODIUM 20 MG/1
20 TABLET, DELAYED RELEASE ORAL
Status: DISCONTINUED | OUTPATIENT
Start: 2020-12-04 | End: 2020-12-05 | Stop reason: HOSPADM

## 2020-12-03 RX ADMIN — CYANOCOBALAMIN TAB 500 MCG 1000 MCG: 500 TAB at 11:48

## 2020-12-03 RX ADMIN — ONDANSETRON 4 MG: 2 INJECTION INTRAMUSCULAR; INTRAVENOUS at 06:45

## 2020-12-03 RX ADMIN — Medication 10 ML: at 06:46

## 2020-12-03 RX ADMIN — Medication 5 ML: at 13:23

## 2020-12-03 RX ADMIN — ACETAMINOPHEN 650 MG: 325 TABLET ORAL at 11:48

## 2020-12-03 RX ADMIN — ACETAMINOPHEN 650 MG: 325 TABLET ORAL at 06:45

## 2020-12-03 RX ADMIN — OXYCODONE HYDROCHLORIDE 10 MG: 5 TABLET ORAL at 09:41

## 2020-12-03 RX ADMIN — TAMSULOSIN HYDROCHLORIDE 0.4 MG: 0.4 CAPSULE ORAL at 09:08

## 2020-12-03 RX ADMIN — AMLODIPINE BESYLATE 5 MG: 5 TABLET ORAL at 22:13

## 2020-12-03 RX ADMIN — ATORVASTATIN CALCIUM 20 MG: 20 TABLET, FILM COATED ORAL at 22:13

## 2020-12-03 RX ADMIN — DOCUSATE SODIUM 100 MG: 100 CAPSULE, LIQUID FILLED ORAL at 09:08

## 2020-12-03 RX ADMIN — LEVOTHYROXINE SODIUM 200 MCG: 0.1 TABLET ORAL at 07:32

## 2020-12-03 RX ADMIN — POLYETHYLENE GLYCOL 3350 17 G: 17 POWDER, FOR SOLUTION ORAL at 09:08

## 2020-12-03 RX ADMIN — Medication 10 ML: at 22:13

## 2020-12-03 RX ADMIN — CEFAZOLIN SODIUM 2 G: 1 INJECTION, POWDER, FOR SOLUTION INTRAMUSCULAR; INTRAVENOUS at 09:08

## 2020-12-03 RX ADMIN — ACETAMINOPHEN 650 MG: 325 TABLET ORAL at 18:21

## 2020-12-03 RX ADMIN — CEFAZOLIN SODIUM 2 G: 1 INJECTION, POWDER, FOR SOLUTION INTRAMUSCULAR; INTRAVENOUS at 01:56

## 2020-12-03 RX ADMIN — OXYCODONE HYDROCHLORIDE 5 MG: 5 TABLET ORAL at 15:20

## 2020-12-03 RX ADMIN — ACETAMINOPHEN 650 MG: 325 TABLET ORAL at 23:41

## 2020-12-03 RX ADMIN — SODIUM CHLORIDE 100 ML/HR: 900 INJECTION, SOLUTION INTRAVENOUS at 11:55

## 2020-12-03 RX ADMIN — DOCUSATE SODIUM 100 MG: 100 CAPSULE, LIQUID FILLED ORAL at 18:21

## 2020-12-03 NOTE — PROGRESS NOTES
Spine Surgery Progress Note  Odette Cruz PA-C    Admit Date: 2020   LOS: 1 day      Daily Progress Note: 12/3/2020    POD:1 Day Post-Op    S/P: Procedure(s):  REDO L2-5 LAMINECTOMY WITH INSTRUMENTATED FUSION L2-S1 WITH  REMOVAL OF  L3-4 HARDWARE, L5-S1 POSTEROR LUMBAR INTERBODY FUSION, PEDICAL SCREWS AND ALLOGRAFT (O-ARM)    Subjective:     Mr. Constantine Becerril is a 70year old gentleman with a PMHx of HTN, CAD, and hypothyroidism. Patient had a L3-4 laminectomy and fusion by Dr. Missael Perez roughly 12-15 year ago. He did well for a time. He presented to Dr. Molina Lara office recently with worsening claudication despite multiple AIRAM injections. He noted difficulty walking with numbness in both legs. Right leg pain was worse than the left. Xrays revealed significant spondylolisthesis of L4-5 with gas effect and significant collapse at L5-S1. Degenerative changes with high grade stenosis with severe foraminal and lateral recess compression was noted at L2-3. L4-5 showed stenosis and spondylolisthesis. L3-4 was solidly arthrodesed. Patient elected to precede with redo L2-5 laminectomy and L2-S1 fusion with hardware removal at L3-4. At present, patient is doing well. Pain is controlled with current medications. Patient denies chest pain, nausea, vomiting, difficulty swallowing, headache, and dyspnea. Pt resting comfortably in bed. Objective:     Vital signs  VSS Afebrile. Temp (24hrs), Av.7 °F (37.1 °C), Min:97.6 °F (36.4 °C), Max:99.6 °F (37.6 °C)   No intake/output data recorded.    1901 -  0700  In: 2525 [I.V.:2400]  Out:  [Urine:875; Drains:260]    Visit Vitals  BP 98/66 (BP 1 Location: Right arm, BP Patient Position: At rest)   Pulse 78   Temp 97.6 °F (36.4 °C)   Resp 16   Ht 5' 8\" (1.727 m)   Wt 85 kg (187 lb 6.3 oz)   SpO2 96%   BMI 28.49 kg/m²    O2 Flow Rate (L/min): 2 l/min O2 Device: Room air, Nasal cannula       Voiding status: pending  Output (mL)  Last Bowel Movement Date: 20 (12/02/20 1047)     Pain control  Pain Assessment  Pain Scale 1: Numeric (0 - 10)  Pain Intensity 1: 0  Pain Location 1: Back  Pain Orientation 1: Lower, Right  Pain Description 1: Intermittent, Sore    Physical Exam:  Gen: No acute distress. Neuro: A&Ox3. Follows commands. Speech clear. Affect normal.  SOTO spontaneously. Gait deferred. Calves soft and supple; No pain with passive stretch  Skin: Incision C/D/I  Drain intact    24 hour results:    Recent Results (from the past 24 hour(s))   GLUCOSE, POC    Collection Time: 12/02/20 11:13 AM   Result Value Ref Range    Glucose (POC) 83 65 - 100 mg/dL    Performed by Dony Romo    RBC, ALLOCATE    Collection Time: 12/02/20  5:20 PM   Result Value Ref Range    HISTORY CHECKED?  Historical check performed    TYPE & SCREEN    Collection Time: 12/02/20  5:20 PM   Result Value Ref Range    Crossmatch Expiration 12/05/2020,2359     ABO/Rh(D) O POSITIVE     Antibody screen NEG     Unit number [id number]     Blood component type RC LR     Unit division 00     Status of unit REL FROM [facility]     Crossmatch result Compatible    HEMOGLOBIN    Collection Time: 12/03/20  1:51 AM   Result Value Ref Range    HGB 11.6 (L) 12.1 - 70.3 g/dL   METABOLIC PANEL, BASIC    Collection Time: 12/03/20  1:51 AM   Result Value Ref Range    Sodium 144 136 - 145 mmol/L    Potassium 5.3 (H) 3.5 - 5.1 mmol/L    Chloride 110 (H) 97 - 108 mmol/L    CO2 26 21 - 32 mmol/L    Anion gap 8 5 - 15 mmol/L    Glucose 150 (H) 65 - 100 mg/dL    BUN 18 6 - 20 MG/DL    Creatinine 1.49 (H) 0.70 - 1.30 MG/DL    BUN/Creatinine ratio 12 12 - 20      GFR est AA 56 (L) >60 ml/min/1.73m2    GFR est non-AA 46 (L) >60 ml/min/1.73m2    Calcium 7.9 (L) 8.5 - 10.1 MG/DL          Assessment:     Active Problems:    Lumbar stenosis with neurogenic claudication (12/2/2020)      Plan:     1) s/p redo L2-5 laminectomy with L2-S1 fusion  -PT/OT    -Pain control - D/C PCA; scheduled tylenol, prn oxycodone   -Drain - discontinue when <30mL in 8 hours   -Diet - tolerating   -Voiding and BM pending; daily flomax, colace, miralax  2) HTN   -Daily amlodipine  3) Hypothyroidsim   -Daily Synthroid  4) GERD   -Daily Protonix for GERD & GI prophylaxis    Activity: up with assist  DVT ppx: SCDs  Dispo: pending    Plan d/w Dr. China Lazaro, PA

## 2020-12-03 NOTE — PROGRESS NOTES
Problem: Self Care Deficits Care Plan (Adult)  Goal: *Acute Goals and Plan of Care (Insert Text)  Description: FUNCTIONAL STATUS PRIOR TO ADMISSION: Patient was independent and active without use of DME. 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 Prior to operation, patient most limited by pain - only able to tolerate brief periods of activity. States he would lean on the grocery cart when shopping and would need to sit after approx 10 minutes  2/2 pain. HOME SUPPORT: The patient lived with wife but did not require assist.    Occupational Therapy Goals  Initiated 12/3/2020  1. Patient will perform standing grooming with supervision/set-up using RW prn within 7 days. 2.  Patient will perform toilet transfer with supervision/set-up using RW prn within 7 days. 3.  Patient will complete all aspects of toileting at supervision/set-up within 7 days. 4.  Patient will don/doff back brace at modified [place] within 7 days. 5.  Patient will verbalize/demonstrate 3/3 back precautions during ADL tasks without cues within 7 days. Outcome: Progressing Towards Goal     OCCUPATIONAL THERAPY EVALUATION  Patient: David Cabello (23 y.o. male)  Date: 12/3/2020  Primary Diagnosis: Lumbar stenosis with neurogenic claudication [M48.062]  Procedure(s) (LRB):  REDO L2-5 LAMINECTOMY WITH INSTRUMENTATED FUSION L2-S1 WITH  REMOVAL OF  L3-4 HARDWARE, L5-S1 POSTEROR LUMBAR INTERBODY FUSION, PEDICAL SCREWS AND ALLOGRAFT (O-ARM) (N/A) 1 Day Post-Op   Precautions: Spinal       ASSESSMENT  Based on the objective data described below, the patient presents with post-operative pain, impaired functional mobility and balance, decreased reach to lower body, decreased activity tolerance, and new onset spinal precautions. Patient received on 2L O2 via NC, lying in bed with wife present - agreeable to session.  In supine, patient able to demonstrate supine hip ER stretch in prep for greater independence in lower body dressing, maintaining position for approx 20 seconds each LE. With cues for log roll technique and min-mod assist, patient able to transfer to seated EOB where he donned brace with minimal assistance. Patient able to stand with min assist, using RW for increased stability - after standing approx 1 minute, patient with 1 posterior LOB requiring physical assistance to correct. Noted significant drop in patient BP, prompting return to seated for rest break. Patient standing to take several steps towards [facility] before reporting need to urinate - attempted to complete task in standing, however note patient slightly clammy and PIV becoming dislodged when orienting urinal (pressure applied and RN notified, arriving in room to address) - assisted patient in doffing brace at EOB before returning to supine position. Patient symptoms and BP stabilizing in supine - assisted with positioning for comfort, explaining best pillow placement for pain relief. Throughout session provided post-op spinal handout as well as extensive education regarding spinal precautions, transfer techniques, and compensatory ADL strategies - patient would benefit from further review and practice as tolerated next session. Patient left in supine with PT arriving to take over patient care. BP Supine: 102/78  Seated EOB: 108/63  Standing EOB: 80/56, symptomatic    Current Level of Function Impacting Discharge (ADLs/self-care): up to total assist for lower body tasks; orthostatic hypotension; set-up to minimal assistance for upper body ADLs    Functional Outcome Measure: The patient scored 50/100 on the Barthel Index. Other factors to consider for discharge: wife available to assist at discharge     Patient will benefit from skilled therapy intervention to address the above noted impairments.        PLAN :  Recommendations and Planned Interventions: self care training, functional mobility training, therapeutic exercise, balance training, therapeutic activities, endurance activities, patient education, home safety training and family training/education    Frequency/Duration: Patient will be followed by occupational therapy 5 times a week to address goals. Recommendation for discharge: (in order for the patient to meet his/her long term goals)  To be determined: HH vs none    This discharge recommendation:  Has been made in collaboration with the attending provider and/or case management    IF patient discharges home will need the following DME: TBD       SUBJECTIVE:   Patient stated I want to do whatever you tell me is right.     OBJECTIVE DATA SUMMARY:   HISTORY:   Past Medical History:   Diagnosis Date    Arthritis     lower back    CAD (coronary artery disease)     stent x 1 2001 LDA    Chronic lower back pain     Dr Josselyn Reilly Chronic pain     right knee - resolved with knee replacement 4/2017    Hypercholesteremia     Hypertension     Hypothyroidism     Skin cancer 2020      RLEG   L FLANK AREA    Sun-damaged skin      Past Surgical History:   Procedure Laterality Date    CARDIAC SURG PROCEDURE UNLIST      1 STENT    COLONOSCOPY N/A 10/20/2017    [facility]    HX CATARACT REMOVAL Right 02/2017    HX CORONARY STENT PLACEMENT  2000    Dr Bernadine Caceres [address] [facility] [address]  2001    HX HERNIA REPAIR  approx 2010    @ [place] [facility]    HX LUMBAR FUSION  2006     L3-L4 fusion    HX TONSILLECTOMY  age 13    PA COLON CA SCRN NOT  [address]  10/20/2017         TOTAL KNEE ARTHROPLASTY Right 04/2017       Expanded or extensive additional review of patient history:   Home Situation  Home Environment: Private residence  One/Two Story Residence: Two story, live on 1st floor  Living Alone: No  Support Systems: Spouse/Significant Other/Partner  Patient Expects to be Discharged to[de-identified] Private residence  Current DME Used/Available at Home: Cane, straight, Crutches, Raised toilet seat, Safety frame toliet, Walker, rolling  Tub or Shower Type: Shower(w/ built-in bencher)    Hand dominance: Right    EXAMINATION OF PERFORMANCE DEFICITS:  Cognitive/Behavioral Status:  Neurologic State: Alert; Appropriate for age  Orientation Level: Oriented X4  Cognition: Follows commands; Appropriate decision making; Appropriate for age attention/concentration; Appropriate safety awareness  Perception: Appears intact  Perseveration: No perseveration noted  Safety/Judgement: Awareness of environment; Fall prevention;Home safety; Insight into deficits    Hearing: Auditory  Auditory Impairment: None    Vision/Perceptual:    Acuity: Within Defined Limits       Range of Motion:  AROM: Generally decreased, functional    Strength:  Strength: Generally decreased, functional    Coordination:  Coordination: Within functional limits  Fine Motor Skills-Upper: Left Intact; Right Intact    Gross Motor Skills-Upper: Left Intact; Right Intact    Tone & Sensation:  No reported deficits - patient reporting BLE sensation intact, equivalent B sides. No BUE numbness/tingling. Balance:  Sitting: Intact  Standing: Impaired  Standing - Static: Good;Fair  Standing - Dynamic : Fair    Functional Mobility and Transfers for ADLs:  Bed Mobility:  Supine to Sit: Minimum assistance; Moderate assistance; Additional time  Sit to Supine: Minimum assistance(at LEs, cues for technique)    Transfers:  Sit to Stand: Minimum assistance  Stand to Sit: Minimum assistance   Toilet Transfer: Moderate assistance (infer to [facility] 2/2 balance, activity tolerance, and mobility)    ADL Assessment:  Feeding: Setup    Oral Facial Hygiene/Grooming: Setup    Bathing: Moderate assistance    Upper Body Dressing: Minimum assistance    Lower Body Dressing: Maximum assistance    Toileting:  Moderate assistance    ADL Intervention and task modifications:  Patient instructed and demonstrated 3/3 back precautions with occasional verbal and visual cues. Feeding  Feeding Assistance: Set-up  Drink to Mouth: Supervision    Upper Body Dressing Assistance  Dressing Assistance: Minimum assistance  Orthotics(Brace): Minimum assistance  Cues: Verbal cues provided;Visual cues provided    Lower Body Dressing Assistance  Dressing Assistance: Total assistance(dependent)  Socks: Total assistance (dependent)  Position Performed: Supine  Cues: Verbal cues provided;Visual cues provided;Physical assistance    Toileting  Toileting Assistance: Moderate assistance  Bladder Hygiene: Contact guard assistance(in standing using urinal)  Clothing Management: Moderate assistance  Cues: Verbal cues provided;Visual cues provided(physical assist with gown management)  Adaptive Equipment: Walker    Cognitive Retraining  Safety/Judgement: Awareness of environment; Fall prevention;Home safety; Insight into deficits    Patient instructed and indicated understanding the benefits of maintaining activity tolerance, functional mobility, and independence with self care tasks during acute stay  to ensure safe return home and to baseline. Encouraged patient to increase frequency and duration OOB, not sitting longer than 30 mins without marching/walking with staff, be out of bed for all meals, perform daily ADLs (as approved by RN/MD regarding bathing etc), and performing functional mobility to/from bathroom. Patient instruction and indicated understanding on body mechanics, ergonomics and gravitational force on the spine during different body positions to plan activities in prep for return home to complete basic ADLs, instrumental ADLs and back to work safely. Patient instructed and demonstrated while supine hip ER stretch and hold 10 seconds to increase ROM in prep for lower body ADLs daily with supervision. Dressing brace: Patient instructed and demonstrated to don/doff velcro on brace using dominant side, keeping non-dominant side intact.  Patient instructed and demonstrated in meantime of being able to stand with back against wall to don/doff brace, to don/doff seated using lap and bed/chair surface to support brace while manipulating. Dressing lower body: Patient instructed to don brace first and on the benefits to remain seated to don all clothing to increase independence with precautions and pain management. Patient instruction and indicated understanding to not strain i.e. holding breath to bear down during a bowel movement, lifting/activity, and sexual activity. Functional Measure:  Barthel Index:    Bathin  Bladder: 10  Bowels: 10  Groomin  Dressin  Feeding: 10  Mobility: 0  Stairs: 0  Toilet Use: 5  Transfer (Bed to Chair and Back): 5  Total: 50/100        The Barthel ADL Index: Guidelines  1. The index should be used as a record of what a patient does, not as a record of what a patient could do. 2. The main aim is to establish degree of independence from any help, physical or verbal, however minor and for whatever reason. 3. The need for supervision renders the patient not independent. 4. A patient's performance should be established using the best available evidence. Asking the patient, friends/relatives and nurses are the usual sources, but direct observation and common sense are also important. However direct testing is not needed. 5. Usually the patient's performance over the preceding 24-48 hours is important, but occasionally longer periods will be relevant. 6. Middle categories imply that the patient supplies over 50 per cent of the effort. 7. Use of aids to be independent is allowed. Lizz Leroy., Barthel, DPaolaW. ([id number]). Functional evaluation: the Barthel Index. [address] [facility] (14)2. CARLOS MANUEL [place], Felicia Lamas., Calvin Grigsby., Jay, [address] (). Measuring the change indisability after inpatient rehabilitation; comparison of the responsiveness of the Barthel Index and Functional [place] Measure.  Journal of Neurology, Neurosurgery, and Psychiatry, 664), [phone]. ARUN Wang, MICHI Berry, & Julien Stinson M.A. (2004.) Assessment of post-stroke quality of life in cost-effectiveness studies: The usefulness of the Barthel Index and the EuroQoL-5D. Quality of Life Research, 15, 662-94     Occupational Therapy Evaluation Charge Determination   History Examination Decision-Making   LOW Complexity : Brief history review  LOW Complexity : 1-3 performance deficits relating to physical, cognitive , or psychosocial skils that result in activity limitations and / or participation restrictions  LOW Complexity : No comorbidities that affect functional and no verbal or physical assistance needed to complete eval tasks       Based on the above components, the patient evaluation is determined to be of the following complexity level: LOW   Pain Rating:  Patient with moderate post-op pain reported. Activity Tolerance:   Fair, requires frequent rest breaks and signs and symptoms of orthostatic hypotension    After treatment patient left in no apparent distress:    Supine in bed, Call bell within reach, Caregiver / family present, Side rails x 3 and PT present    COMMUNICATION/EDUCATION:   The patients plan of care was discussed with: Physical therapist and Registered nurse. Home safety education was provided and the patient/caregiver indicated understanding., Patient/family have participated as able in goal setting and plan of care. and Patient/family agree to work toward stated goals and plan of care.     Thank you for this referral.  Jose De Jesus Oswald OT  Time Calculation: 59 mins

## 2020-12-03 NOTE — OP NOTES
[address]   OPERATIVE REPORT    Name:  Armen Quiñonez  MR#:  [id number]  :  1945  ACCOUNT #:  [de-identified]  DATE OF SERVICE:  2020    PREOPERATIVE DIAGNOSES:  Severe lumbar stenosis and instability, L2-3 and L4-5; spondylosis, L5-S1; previous fusion, L3-4. POSTOPERATIVE DIAGNOSES:  Severe lumbar stenosis and instability, L2-3 and L4-5; spondylosis, L5-S1; previous fusion, L3-4. PROCEDURE PERFORMED:  Redo L2 through L5 laminectomy, posterolateral fusion L2 through S1, instrumented fusion L2 through S1 using Medtronic Solera pedicle screws and Genie and MagniFuse allograft, removal of hardware at L3-4. SURGEON:  Laurie Arora MD    ASSISTANT:  CHRISTY Cervantes    ANESTHESIA:  General endotracheal anesthesia. COMPLICATIONS:  None. SPECIMENS REMOVED:  None. IMPLANTS:  As noted above. ESTIMATED BLOOD LOSS:  400 mL. OPERATIVE INDICATIONS:  This is a 77-year-old gentleman, who has had 10-year history of L3-4 laminectomy and fusion with progressive severe neurogenic claudication, worse on the right with severe right leg pain and weakness. Workup revealed high-grade stenosis and collapse at L2-3 with retrolisthesis and a grade 1 anterolisthesis at L4-5, spondylosis at L5-S1. After failing nonoperative therapy and discussing the treatment options, informed consent was obtained. OPERATION IN DETAIL:  The patient was taken to the operating room and placed under general endotracheal anesthesia. All necessary lines and monitors were placed. He was given appropriate dose of IV antibiotics. SCDs and Connors were placed. He was placed prone on the Harjit Jock table. All pressure points were padded. The lumbosacral region was prepped and draped in standard sterile fashion. A midline incision was made from proximally L1 spinous process down to the sacrum with a skin knife, carried down with Bovie electrocautery in avascular midline plane.   Subperiosteal dissection was performed on the lamina of L2, L5, S1.  L3-4 had previous facetectomies and posterolateral fusion. This was dissected out. Instrumentation was dissected out. The dissection was carried over the facet joints to expose the transverse processes of L2, L3, L4, L5 and the sacral ala bilaterally. Self-retaining retraction was placed. The previous Medtronic sextant instrumentation was then explanted without difficulty. Scar tissue was removed. A decompression was then performed starting at L4-5 where there was a high-grade slip and severe stenosis. Laminectomy and medial facetectomy were performed creating a wide decompression at this level. Both the L5 nerve roots were widely decompressed as well as the L4 foramen. L5-S1 was also decompressed as well. I then performed a decompression at L2-3 removing all of L2 and back towards the scar tissue of L3 removing some scar tissue and the remaining lamina of L3. Medial facetectomy was performed at this level as well, widely decompressing the L3 nerve roots. At L4-5, I removed some previous L4 scar tissue as well where there had been some previous laminotomies. Foramen were palpated to be free. We used the O-arm for navigation. This was run with fiducial arrays placed in the sacrum. Sterile field was reestablished. Under O-arm navigation, I then cannulated the pedicles of L2, L5 and S1. The posterolateral gutters were then decorticated and packed with 2 large Genie, 10-cm MagniFuse as well as local autograft. I then under navigation placed 6.5 x 50 mm screws bilaterally at L2, 6.5 x 45 mm Medtronic screws bilaterally at L3, L4 and L5 and 7.5 x 45 mm screws bilaterally in the sacrum. There was good purchase of all the screws. AP and lateral fluoroscopy confirmed position. The wound was copiously irrigated out. I then took 100-mm rods and shaped them appropriately.   Before locking them in place, I did distract at L4-5 and L2-3 to further open up these foramen especially on the right-hand side where there was compression. They were locked down. A cross-link was placed. The wound was copiously irrigated. Hemostasis was achieved. The Hemovac drain was placed, brought out through a separate stab incision. The retractors were removed, and the wound was then closed using 0 Vicryl to close the deep fascial and scar layer, 2-0 Vicryl in the deep dermal layer, and staples on the skin. Wounds were cleaned, dried and dressed with sterile dressing. The patient was flipped over, extubated and taken to Recovery in stable condition.       Eyvonne Kawasaki, MD [id number]  D:  12/03/2020 7:43  T:  12/03/2020 9:40  JOB #:  [id number]  CC:  Kayleigh Magaña MD

## 2020-12-03 NOTE — PROGRESS NOTES
Problem: Falls - Risk of  Goal: *Absence of Falls  Description: Document Fransico Clayton Fall Risk and appropriate interventions in the flowsheet.   Outcome: Progressing Towards Goal  Note: Fall Risk Interventions:     Medication Interventions: Evaluate medications/consider consulting pharmacy       Problem: Patient Education: Go to Patient Education Activity  Goal: Patient/Family Education  Outcome: Progressing Towards Goal

## 2020-12-03 NOTE — ANESTHESIA POSTPROCEDURE EVALUATION
Procedure(s):  REDO L2-5 LAMINECTOMY WITH INSTRUMENTATED FUSION L2-S1 WITH  REMOVAL OF  L3-4 HARDWARE, L5-S1 POSTEROR LUMBAR INTERBODY FUSION, PEDICAL SCREWS AND ALLOGRAFT (O-ARM). general    Anesthesia Post Evaluation        Patient location during evaluation: PACU  Patient participation: complete - patient participated  Level of consciousness: awake  Pain management: adequate  Airway patency: patent  Anesthetic complications: no  Cardiovascular status: hemodynamically stable  Respiratory status: acceptable  Hydration status: acceptable  Comments: I have seen and evaluated the patient. The patient is ready for PACU discharge. Nandini Lenz, DO                         INITIAL Post-op Vital signs:   Vitals Value Taken Time   BP 92/64 12/2/2020  7:30 PM   Temp 37.6 °C (99.6 °F) 12/2/2020  7:08 PM   Pulse 85 12/2/2020  7:34 PM   Resp 9 12/2/2020  7:33 PM   SpO2 92 % 12/2/2020  7:34 PM   Vitals shown include unvalidated device data.

## 2020-12-03 NOTE — PROGRESS NOTES
Problem: Mobility Impaired (Adult and Pediatric)  Goal: *Acute Goals and Plan of Care (Insert Text)  Description: FUNCTIONAL STATUS PRIOR TO ADMISSION: Patient was independent and active without use of DME.    HOME SUPPORT PRIOR TO ADMISSION: The patient lived with wife but did not require assist.    Physical Therapy Goals  Initiated 12/3/2020    1. Patient will move from supine to sit and sit to supine , scoot up and down, and roll side to side in bed with modified independence within 4 days. 2. Patient will perform sit to stand with modified independence within 4 days. 3. Patient will ambulate with modified independence for 300 feet with the least restrictive device within 4 days. 4. Patient will ascend/descend 4 stairs with 1 handrail(s) with supervision/set-up within 4 days. 5. Patient will verbalize and demonstrate understanding of spinal precautions (No bending, lifting greater than 5 lbs, or twisting; log-roll technique; frequent repositioning as instructed) within 4 days. Outcome: Progressing Towards Goal       PHYSICAL THERAPY EVALUATION  Patient: Guille Flores (85 y.o. male)  Date: 12/3/2020  Primary Diagnosis: Lumbar stenosis with neurogenic claudication [M48.062]  Procedure(s) (LRB):  REDO L2-5 LAMINECTOMY WITH INSTRUMENTATED FUSION L2-S1 WITH  REMOVAL OF  L3-4 HARDWARE, L5-S1 POSTEROR LUMBAR INTERBODY FUSION, PEDICAL SCREWS AND ALLOGRAFT (O-ARM) (N/A) 1 Day Post-Op   Precautions: spine         ASSESSMENT  Based on the objective data described below, the patient presents with impaired trunk ROM, spinal precautions, mild to none LE pain, balance, and weakness and gait dysfunction/intolerance causing limited independence with mobility s/p recent Redo L2-S1 lami fusion POD #1. Pt received in bed, min A for rolling and had just laid down from sitting position where he became orthostatic.  Pt participates in lengthy discussion of back precautions, sitting limit of 30-45 minutes, positioning in chair, and progressing to return to activity. Pt is not cleared for discharge from Physical Therapy standpoint at this time, recommend HHPT. Will benefit from therapy in acute setting, anticipate will improve tolerance quickly with improved pain control. Current Level of Function Impacting Discharge (mobility/balance): unable to participate in gait due to unstable vitals. Functional Outcome Measure: The patient scored Total: 50/100 on the Barthel Index which is indicative of moderate impaired ability to care for basic self needs/dependency on others. Other factors to consider for discharge: patient will have support at home, is fiercely independent     Patient will benefit from skilled therapy intervention to address the above noted impairments. PLAN :  Recommendations and Planned Interventions: bed mobility training, transfer training, gait training, therapeutic exercises, neuromuscular re-education, patient and family training/education and therapeutic activities      Frequency/Duration: Patient will be followed by physical therapy:  twice daily to address goals. Recommendation for discharge: (in order for the patient to meet his/her long term goals)  Physical therapy at least 2 days/week in the home     This discharge recommendation:  Has been made in collaboration with the attending provider and/or case management    IF patient discharges home will need the following DME: to be determined (TBD)         SUBJECTIVE:   Patient stated I feel so awful.     OBJECTIVE DATA SUMMARY:   HISTORY:    Past Medical History:   Diagnosis Date    Arthritis     lower back    CAD (coronary artery disease)     stent x 1 2001 LDA    Chronic lower back pain     Dr Adarsh Bravo     Chronic pain     right knee - resolved with knee replacement 4/2017    Hypercholesteremia     Hypertension     Hypothyroidism     Skin cancer 2020      RLEG   L FLANK AREA    Sun-damaged skin      Past Surgical History: Procedure Laterality Date    CARDIAC SURG PROCEDURE UNLIST      1 STENT    COLONOSCOPY N/A 10/20/2017    COLONOSCOPY performed [facility]irali Mend[facility] at [facility]    HX CATARACT REMOVAL Right 02/2017    HX CORONARY STENT PLACEMENT  2000    Dr Ariel Cruz  2001    HX HERNIA REPAIR  approx 2010    @ [facility]    HX LUMBAR FUSION  2006     L3-L4 fusion    HX TONSILLECTOMY  age 13    OR COLON CA SCRN NOT HI RSK IND  10/20/2017         TOTAL KNEE ARTHROPLASTY Right 04/2017       Personal factors and/or comorbidities impacting plan of care:     Home Situation  Home Environment: Private residence  One/Two Story Residence: Two story, live on 1st floor  Living Alone: No  Support Systems: Spouse/Significant Other/Partner  Patient Expects to be Discharged to[de-identified] Private residence  Current DME Used/Available at Home: Cane, straight, Crutches, Raised toilet seat, Safety frame toliet, Walker, rolling  Tub or Shower Type: Shower(w/ built-in bencher)    EXAMINATION/PRESENTATION/DECISION MAKING:   Critical Behavior:  Neurologic State: Alert, Appropriate for age  Orientation Level: Oriented X4  Cognition: Follows commands, Appropriate decision making, Appropriate for age attention/concentration, Appropriate safety awareness  Safety/Judgement: Awareness of environment, Fall prevention, Home safety, Insight into deficits  Hearing: Auditory  Auditory Impairment: None  Skin:  Intact x incision  Edema:   Range Of Motion:  AROM: Generally decreased, functional                       Strength:    Strength: Generally decreased, functional                    Tone & Sensation:                                  Coordination:  Coordination: Within functional limits  Vision:   Acuity: Within Defined Limits  Functional Mobility:  Bed Mobility:     Supine to Sit: Minimum assistance; Moderate assistance; Additional time  Sit to Supine: Minimum assistance(at LEs, cues for technique)     Transfers:  Sit to Stand: Minimum assistance  Stand to Sit: Minimum assistance                       Balance:   Sitting: Intact  Standing: Impaired  Standing - Static: Good;Fair  Standing - Dynamic : Fair  Ambulation/Gait Training:           Functional Measure:  Barthel Index:    Bathin  Bladder: 10  Bowels: 10  Groomin  Dressin  Feeding: 10  Mobility: 0  Stairs: 0  Toilet Use: 5  Transfer (Bed to Chair and Back): 5  Total: 50/100       The Barthel ADL Index: Guidelines  1. The index should be used as a record of what a patient does, not as a record of what a patient could do. 2. The main aim is to establish degree of independence from any help, physical or verbal, however minor and for whatever reason. 3. The need for supervision renders the patient not independent. 4. A patient's performance should be established using the best available evidence. Asking the patient, friends/relatives and nurses are the usual sources, but direct observation and common sense are also important. However direct testing is not needed. 5. Usually the patient's performance over the preceding 24-48 hours is important, but occasionally longer periods will be relevant. 6. Middle categories imply that the patient supplies over 50 per cent of the effort. 7. Use of aids to be independent is allowed. Bethany Mra., Barthel, D.W. (9714). Functional evaluation: the Barthel Index. [facility] (14)2. CARLOS MANUEL Ware, Marifer Hernandez., Hitesh Martinez., [place], [address] (). Measuring the change indisability after inpatient rehabilitation; comparison of the responsiveness of the Barthel Index and Functional [place] Measure. Journal of Neurology, Neurosurgery, and Psychiatry, 66(4), 988-907. Timoteo Barrios, N.J.A, Grey Narvaez,  SHERI.J.M, & PURVI ReinaA. (2004.) Assessment of post-stroke quality of life in cost-effectiveness studies: The usefulness of the Barthel Index and the EuroQoL-5D.  Quality of Life Research, 15, 930-85        Physical Therapy Evaluation Charge Determination   History Examination Presentation Decision-Making   HIGH Complexity :3+ comorbidities / personal factors will impact the outcome/ POC  HIGH Complexity : 4+ Standardized tests and measures addressing body structure, function, activity limitation and / or participation in recreation  LOW Complexity : Stable, uncomplicated  Other outcome measures barthel  MEDIUM      Based on the above components, the patient evaluation is determined to be of the following complexity level: LOW     Pain Rating:  controlled    Activity Tolerance:   Fair    After treatment patient left in no apparent distress:   Supine in bed, Call bell within reach and Caregiver / family present    COMMUNICATION/EDUCATION:   The patients plan of care was discussed with: Registered nurse and Case management. Fall prevention education was provided and the patient/caregiver indicated understanding. and Patient/family have participated as able in goal setting and plan of care.     Thank you for this referral.  Doris Haley, PT   Time Calculation: 20 mins

## 2020-12-03 NOTE — ROUTINE PROCESS
Primary Nurse Keith Palma and Kandy Ibrahim, RN performed a dual skin assessment on this patient No impairment noted  Sulaiman score is 23

## 2020-12-03 NOTE — ROUTINE PROCESS
Primary Nurse Radha Dumont and Btete Vegas RN performed a dual skin assessment on this patient No impairment noted  Sulaiman score is 21

## 2020-12-03 NOTE — PROGRESS NOTES
1625- CM reviewed chart & attempted to meet with pt to discuss transitional planning and complete assessment, however  was meeting with pt. Pt asked that CM visit at another time. CM to follow for transitions of care.   Dennie Border RN BSN [facility]  CRM

## 2020-12-03 NOTE — PROGRESS NOTES
Problem: Mobility Impaired (Adult and Pediatric)  Goal: *Acute Goals and Plan of Care (Insert Text)  Description: FUNCTIONAL STATUS PRIOR TO ADMISSION: Patient was independent and active without use of DME.    HOME SUPPORT PRIOR TO ADMISSION: The patient lived with wife but did not require assist.    Physical Therapy Goals  Initiated 12/3/2020    1. Patient will move from supine to sit and sit to supine , scoot up and down, and roll side to side in bed with modified independence within 4 days. 2. Patient will perform sit to stand with modified independence within 4 days. 3. Patient will ambulate with modified independence for 300 feet with the least restrictive device within 4 days. 4. Patient will ascend/descend 4 stairs with 1 handrail(s) with supervision/set-up within 4 days. 5. Patient will verbalize and demonstrate understanding of spinal precautions (No bending, lifting greater than 5 lbs, or twisting; log-roll technique; frequent repositioning as instructed) within 4 days. 12/3/2020 1618 by Trini Vazquez, PT  Outcome: Progressing Towards Goal  12/3/2020 1142 by Trini Vazquez, PT  Outcome: Progressing Towards Goal       PHYSICAL THERAPY TREATMENT  Patient: Bere Cerda (12 y.o. male)  Date: 12/3/2020  Diagnosis: Lumbar stenosis with neurogenic claudication [M48.062]   <principal problem not specified>  Procedure(s) (LRB):  REDO L2-5 LAMINECTOMY WITH INSTRUMENTATED FUSION L2-S1 WITH  REMOVAL OF  L3-4 HARDWARE, L5-S1 POSTEROR LUMBAR INTERBODY FUSION, PEDICAL SCREWS AND ALLOGRAFT (O-ARM) (N/A) 1 Day Post-Op  Precautions:    Chart, physical therapy assessment, plan of care and goals were reviewed. ASSESSMENT  Patient continues with skilled PT services and is progressing towards goals. Pt demos greatly increased time and frequent ankle pumps to maintain BP >100/60s, occasionally orthostatic but resolves with AP and time.  Pt participates in log roll, supine to sit, lengthy rest breaks, sit to stand, and gait training for 40ft. Pt demos increased anxiety regarding performance, recovery, proper movement. Lengthy discussion had regarding proper sitting hygiene, returning home, and progression. Will contiue to follow, left pt in chair with proper BP and call bell. Current Level of Function Impacting Discharge (mobility/balance): min A overall    Other factors to consider for discharge:          PLAN :  Patient continues to benefit from skilled intervention to address the above impairments. Continue treatment per established plan of care. to address goals. Recommendation for discharge: (in order for the patient to meet his/her long term goals)  Physical therapy at least 2 days/week in the home     This discharge recommendation:  Has been made in collaboration with the attending provider and/or case management    IF patient discharges home will need the following DME: brace/splint and rolling walker       SUBJECTIVE:   Patient stated I dont hurt much now, but whats going to happen overnight.     OBJECTIVE DATA SUMMARY:   Critical Behavior:  Neurologic State: Alert, Appropriate for age  Orientation Level: Oriented X4  Cognition: Follows commands, Appropriate decision making, Appropriate for age attention/concentration, Appropriate safety awareness  Safety/Judgement: Awareness of environment, Fall prevention, Home safety, Insight into deficits  Functional Mobility Training:  Bed Mobility:     Supine to Sit: Minimum assistance; Moderate assistance  Sit to Supine: Minimum assistance; Moderate assistance           Transfers:  Sit to Stand: Minimum assistance  Stand to Sit: Minimum assistance                             Balance:  Sitting: Intact  Standing: Impaired; With support  Standing - Static: Fair;Constant support  Standing - Dynamic : Fair;Constant support  Ambulation/Gait Training:  Distance (ft): 40 Feet (ft)  Assistive Device: Brace/Splint;Gait belt;Walker, rolling  Ambulation - Level of Assistance: Minimal assistance;Contact guard assistance        Gait Abnormalities: Antalgic; Shuffling gait        Base of Support: Widened     Speed/Sepideh: Shuffled; Slow  Step Length: Right shortened;Left shortened               Pain Rating:  controlled    Activity Tolerance:   Fair and requires rest breaks    After treatment patient left in no apparent distress:   Sitting in chair and Call bell within reach    COMMUNICATION/COLLABORATION:   The patients plan of care was discussed with: Registered nurse and Case management.      Garret Haley, PT   Time Calculation: 65 mins

## 2020-12-03 NOTE — PROGRESS NOTES
Occupational Therapy    Orders received, chart reviewed and patient evaluated by occupational therapy. Pending progression with skilled acute occupational therapy, recommend: To be determined: HH vs none     Recommend with nursing patient to complete as able in order to maintain strength, endurance and independence: OOB to chair 3x/day as tolerated with RW and mod A and functional mobility to the [facility] as tolerated with RW and mod A. Thank you for your assistance. Full evaluation to follow.      Cass Springer OTR/L

## 2020-12-03 NOTE — ROUTINE PROCESS
TRANSFER - IN REPORT:    Verbal report received from ***(name) on Vianey Scriver  being received from ***(unit) for {TRANSFER CARE:[id number]}      Report consisted of patients Situation, Background, Assessment and   Recommendations(SBAR). Information from the following report(s) {SBAR REPORTS ABEM:26868} was reviewed with the receiving nurse. Opportunity for questions and clarification was provided. Assessment completed upon patients arrival to unit and care assumed.

## 2020-12-04 LAB
HGB BLD-MCNC: 14.4 G/DL (ref 12.1–17)
HGB BLD-MCNC: 9 G/DL (ref 12.1–17)

## 2020-12-04 PROCEDURE — 85018 HEMOGLOBIN: CPT

## 2020-12-04 PROCEDURE — 74011250637 HC RX REV CODE- 250/637: Performed by: PHYSICIAN ASSISTANT

## 2020-12-04 PROCEDURE — 65270000029 HC RM PRIVATE

## 2020-12-04 PROCEDURE — 74011250637 HC RX REV CODE- 250/637: Performed by: NEUROLOGICAL SURGERY

## 2020-12-04 PROCEDURE — 97116 GAIT TRAINING THERAPY: CPT

## 2020-12-04 PROCEDURE — 36415 COLL VENOUS BLD VENIPUNCTURE: CPT

## 2020-12-04 PROCEDURE — 97535 SELF CARE MNGMENT TRAINING: CPT

## 2020-12-04 RX ORDER — OXYCODONE HYDROCHLORIDE 5 MG/1
5 TABLET ORAL
Qty: 60 TAB | Refills: 0 | Status: SHIPPED | OUTPATIENT
Start: 2020-12-04 | End: 2020-12-18

## 2020-12-04 RX ADMIN — CYANOCOBALAMIN TAB 500 MCG 1000 MCG: 500 TAB at 08:38

## 2020-12-04 RX ADMIN — LEVOTHYROXINE SODIUM 200 MCG: 0.1 TABLET ORAL at 06:35

## 2020-12-04 RX ADMIN — OXYCODONE HYDROCHLORIDE 10 MG: 5 TABLET ORAL at 08:38

## 2020-12-04 RX ADMIN — PANTOPRAZOLE SODIUM 20 MG: 20 TABLET, DELAYED RELEASE ORAL at 06:34

## 2020-12-04 RX ADMIN — OXYCODONE HYDROCHLORIDE 10 MG: 5 TABLET ORAL at 16:08

## 2020-12-04 RX ADMIN — DOCUSATE SODIUM 100 MG: 100 CAPSULE, LIQUID FILLED ORAL at 17:50

## 2020-12-04 RX ADMIN — POLYETHYLENE GLYCOL 3350 17 G: 17 POWDER, FOR SOLUTION ORAL at 08:38

## 2020-12-04 RX ADMIN — Medication 10 ML: at 16:08

## 2020-12-04 RX ADMIN — AMLODIPINE BESYLATE 5 MG: 5 TABLET ORAL at 21:45

## 2020-12-04 RX ADMIN — ACETAMINOPHEN 650 MG: 325 TABLET ORAL at 17:50

## 2020-12-04 RX ADMIN — Medication 10 ML: at 06:35

## 2020-12-04 RX ADMIN — ACETAMINOPHEN 650 MG: 325 TABLET ORAL at 23:59

## 2020-12-04 RX ADMIN — Medication 10 ML: at 21:45

## 2020-12-04 RX ADMIN — DOCUSATE SODIUM 100 MG: 100 CAPSULE, LIQUID FILLED ORAL at 08:38

## 2020-12-04 RX ADMIN — TAMSULOSIN HYDROCHLORIDE 0.4 MG: 0.4 CAPSULE ORAL at 08:38

## 2020-12-04 RX ADMIN — OXYCODONE HYDROCHLORIDE 10 MG: 5 TABLET ORAL at 01:31

## 2020-12-04 RX ADMIN — ACETAMINOPHEN 650 MG: 325 TABLET ORAL at 06:34

## 2020-12-04 RX ADMIN — ATORVASTATIN CALCIUM 20 MG: 20 TABLET, FILM COATED ORAL at 21:45

## 2020-12-04 NOTE — PROGRESS NOTES
YASMANI-Home with wife  RUR-7%    Reason for Admission:   Lumbar stenosis, instability                   RUR Score:  7% Low                   Plan for utilizing home health:    Patient has declined       PCP: First and Last name:  Dr. Kathy Nation   Name of Practice: NA   Are you a current patient: Yes/No: Yes   Approximate date of last visit: NA   Can you participate in a virtual visit with your PCP: CEFERINO                    Current Advanced Directive/Advance Care Plan: Has ACP docs on file. Transition of Care Plan:    CM met with patient to inform of CM role and to assess needs. Patient lives at home with wife and is independent. He has DME at home to include walker, cane, raised toilet seat. Patient's wife to transport home. There are no identified CM needs at this time.      Daniela Mcrae MS

## 2020-12-04 NOTE — PROGRESS NOTES
Spine Surgery Progress Note  Juan Carlos Mcdermott PA-C    Admit Date: 2020   LOS: 2 days      Daily Progress Note: 2020    POD:2 Day Post-Op    S/P: Procedure(s):  REDO L2-5 LAMINECTOMY WITH INSTRUMENTATED FUSION L2-S1 WITH  REMOVAL OF  L3-4 HARDWARE, L5-S1 POSTEROR LUMBAR INTERBODY FUSION, PEDICAL SCREWS AND ALLOGRAFT (O-ARM)    Subjective:     Mr. Laura Villalta is a 70year old gentleman with a PMHx of HTN, CAD, and hypothyroidism. Patient had a L3-4 laminectomy and fusion by Dr. Max Alvarado roughly 12-15 year ago. He did well for a time. He presented to Dr. Kristian Geiger office recently with worsening claudication despite multiple AIRAM injections. He noted difficulty walking with numbness in both legs. Right leg pain was worse than the left. Xrays revealed significant spondylolisthesis of L4-5 with gas effect and significant collapse at L5-S1. Degenerative changes with high grade stenosis with severe foraminal and lateral recess compression was noted at L2-3. L4-5 showed stenosis and spondylolisthesis. L3-4 was solidly arthrodesed. Patient elected to precede with redo L2-5 laminectomy and L2-S1 fusion with hardware removal at L3-4. At present, patient is doing well. Pain is controlled with current medications. Patient denies chest pain, nausea, vomiting, difficulty swallowing, headache, and dyspnea. Pt resting comfortably in bed. Objective:     Vital signs  VSS Afebrile. Temp (24hrs), Av.1 °F (36.7 °C), Min:97.6 °F (36.4 °C), Max:98.8 °F (37.1 °C)   No intake/output data recorded.    1901 -  0700  In: -   Out: 250 [Urine:375; Drains:540]    Visit Vitals  /71   Pulse 82   Temp 98.4 °F (36.9 °C)   Resp 16   Ht 5' 8\" (1.727 m)   Wt 85 kg (187 lb 6.3 oz)   SpO2 94%   BMI 28.49 kg/m²    O2 Flow Rate (L/min): 2 l/min O2 Device: Room air       Voiding status: +  Output (mL)  Urine Voided: 250 ml (20 1141)  Last Bowel Movement Date: 20 (20)     Pain control  Pain Assessment  Pain Scale 1: Numeric (0 - 10)  Pain Intensity 1: 4  Pain Onset 1: postop  Pain Location 1: Back  Pain Orientation 1: Lower  Pain Description 1: Aching  Pain Intervention(s) 1: Medication (see MAR)    Physical Exam:  Gen: No acute distress. Neuro: A&Ox3. Follows commands. Speech clear. Affect normal.  SOTO spontaneously. 5/5 strength BUE and RLE. LLE strength 5/5 at hip and knee, 4/5 with plantar and dorsiflexion. Gait deferred. Calves soft and supple; No pain with passive stretch  Skin: Incision C/D/I  Drain intact    24 hour results:    Recent Results (from the past 24 hour(s))   HEMOGLOBIN    Collection Time: 12/04/20  1:33 AM   Result Value Ref Range    HGB 9.0 (L) 12.1 - 17.0 g/dL        Assessment:     Active Problems:    Lumbar stenosis with neurogenic claudication (12/2/2020)      Plan:     1) s/p redo L2-5 laminectomy with L2-S1 fusion  -PT/OT    -Pain control - scheduled tylenol, prn oxycodone   -Drain - discontinue when <30mL in 8 hours   -Diet - tolerating   -Voiding without issue    -Hgb drop from 11.6 (12/3) to 9 (12/3); pt is asymptomatic; monitor & redraw tomorrow AM  2) HTN   -Daily amlodipine  3) Hypothyroidsim   -Daily Synthroid  4) GERD   -Daily Protonix for GERD & GI prophylaxis    Activity: up with assist  DVT ppx: SCDs  Dispo: home over the weekend    Plan d/w Dr. Nixon Lara, PT and nurse    Pamelia Farm, PA           Doing well, coming along.  Motor 5/5   Drain still putting out, will keep until tomorrow   If stable will d/c tomorrow  Brittney Hernandez MD

## 2020-12-04 NOTE — PROGRESS NOTES
Problem: Mobility Impaired (Adult and Pediatric)  Goal: *Acute Goals and Plan of Care (Insert Text)  Description: FUNCTIONAL STATUS PRIOR TO ADMISSION: Patient was independent and active without use of DME.    HOME SUPPORT PRIOR TO ADMISSION: The patient lived with wife but did not require assist.    Physical Therapy Goals  Initiated 12/3/2020    1. Patient will move from supine to sit and sit to supine , scoot up and down, and roll side to side in bed with modified independence within 4 days. 2. Patient will perform sit to stand with modified independence within 4 days. 3. Patient will ambulate with modified independence for 300 feet with the least restrictive device within 4 days. 4. Patient will ascend/descend 4 stairs with 1 handrail(s) with supervision/set-up within 4 days. 5. Patient will verbalize and demonstrate understanding of spinal precautions (No bending, lifting greater than 5 lbs, or twisting; log-roll technique; frequent repositioning as instructed) within 4 days. Outcome: Progressing Towards Goal       PHYSICAL THERAPY TREATMENT  Patient: Pasha Bryan (5 y.o. male)  Date: 12/4/2020  Diagnosis: Lumbar stenosis with neurogenic claudication [M48.062]   <principal problem not specified>  Procedure(s) (LRB):  REDO L2-5 LAMINECTOMY WITH INSTRUMENTATED FUSION L2-S1 WITH  REMOVAL OF  L3-4 HARDWARE, L5-S1 POSTEROR LUMBAR INTERBODY FUSION, PEDICAL SCREWS AND ALLOGRAFT (O-ARM) (N/A) 2 Days Post-Op  Precautions:   No bending, no lifting greater than 5 lbs, no twisting, log-roll technique, repositioning every 20-30 min except when sleeping, brace when OOB (if ordered)  Chart, physical therapy assessment, plan of care and goals were reviewed. ASSESSMENT  Patient continues with skilled PT services and is progressing towards goals.  Pt demos good mobility for AM and PM sessions with gait training, able to perform smooth reciprocal gait with increased cues and postural encouragement for 300ft with RW in AM/PM. Pt continues to hold his breath and valsalva during majority of mobility supine/sit/stand and initial gait training. Performed multiple education between pt and wife. Will continue to follow and likely progress to home tomorrow. Current Level of Function Impacting Discharge (mobility/balance): Other factors to consider for discharge: cues for proper mobility         PLAN :  Patient continues to benefit from skilled intervention to address the above impairments. Continue treatment per established plan of care. to address goals. Recommendation for discharge: (in order for the patient to meet his/her long term goals)  Physical therapy at least 2 days/week in the home     This discharge recommendation:  Has been made in collaboration with the attending provider and/or case management    IF patient discharges home will need the following DME: to be determined (TBD)       SUBJECTIVE:   Patient stated I feel much more pain today, you were right.     OBJECTIVE DATA SUMMARY:   Critical Behavior:  Neurologic State: Alert  Orientation Level: Oriented X4  Cognition: Appropriate decision making, Appropriate for age attention/concentration, Appropriate safety awareness, Follows commands  Safety/Judgement: Awareness of environment, Home safety, Insight into deficits    Spinal diagnosis intervention:  The patient stated 3/3 back precautions when prompted. Reviewed all 3 back precautions, log roll technique, and sitting for 30 minutes at a time.     Functional Mobility Training:    Bed Mobility:  Log    Supine to Sit: Minimum assistance;Contact guard assistance              Transfers:  Sit to Stand: Stand-by assistance  Stand to Sit: Stand-by assistance        Bed to Chair: Stand-by assistance                    Balance:  Sitting: Intact  Standing: Intact  Standing - Static: Good;Constant support  Standing - Dynamic : Good;Constant support  Ambulation/Gait Training:  Distance (ft): 300 Feet (ft)  Assistive Device: Brace/Splint;Gait belt;Walker, rolling  Ambulation - Level of Assistance: Stand-by assistance;Contact guard assistance        Gait Abnormalities: Antalgic; Shuffling gait        Base of Support: Widened     Speed/Sepideh: Shuffled; Slow  Step Length: Right shortened;Left shortened         Pain Rating:  controlled    Activity Tolerance:   Good, Fair, and requires rest breaks    After treatment patient left in no apparent distress:   Sitting in chair, Call bell within reach, Caregiver / family present, and Side rails x 3    COMMUNICATION/COLLABORATION:   The patients plan of care was discussed with: Registered nurse and Case management.      Silvano Haley, PT   Time Calculation: 30 mins

## 2020-12-04 NOTE — DISCHARGE INSTRUCTIONS
After Hospital Care Plan:  Discharge Instructions Lumbar Fusion Surgery   Dr. Hayley Salas     Patient Name: Vianey Nuñez    Date of procedure: 12/2/2020  Date of discharge: 12/4/2020    Procedure: Procedure(s):  REDO L2-5 LAMINECTOMY WITH INSTRUMENTATED FUSION L2-S1 WITH  REMOVAL OF  L3-4 HARDWARE, L5-S1 POSTEROR LUMBAR INTERBODY FUSION, PEDICAL SCREWS AND ALLOGRAFT (O-ARM)  PCP: Elías Mcnulty MD    Follow up appointments  -follow up with Dr. Hayley Salas in 2 weeks. Call [phone] to make an appointment as soon as you get home from the hospital.    When to call your Spine Surgeon:  -Signs of infection-if your incision is red; continues to have drainage; drainage has a foul odor or if you have a persistent fever over 101 degrees for 24 hours  -Nausea or vomiting, severe headache  -Loss of bowel or bladder function, inability to urinate  -Changes in sensation in your arms or legs (numbness, tingling, loss of color)  -Increased weakness-greater than before your surgery  -Severe pain or pain not relieved by medications  -Signs of a blood clot in your leg-calf pain, tenderness, redness, swelling of lower leg    When to call your Primary Care Physician:  -Concerns about medical conditions such as diabetes, high blood pressure, asthma, congestive heart failure  -Call if blood sugars are elevated, persistent headache or dizziness, coughing or congestion, constipation or diarrhea, burning with urination, abnormal heart rate    When to call 911 and go to the nearest emergency room:  -Acute onset of chest pain, shortness of breath, difficulty breathing    Activity  -You are going home a well person, be as active as possible. Your only exercise should be walking. Start with short frequent walks and increase your walking distance each day.  -Limit the amount of time you sit to 20-30 minute intervals.   Sitting for prolonged periods of time will be uncomfortable for you following surgery.  -Do NOT lift anything over 5 pounds  -Do NOT do any straining, twisting or bending  -When you are in bed, you may lay on your back or on either side. Do NOT lie on your stomach    Brace  -If you have a back brace, you should wear your brace at all times when you are out of bed. Do not wear the brace while in bed or showering.  -Remember to always wear a cotton t-shirt underneath your brace.  -Do not bend or twist when your brace is off. Diet  -Resume usual diet; drink plenty of fluids; eat foods high in fiber  -It is important to have regular bowel movements. Pain medications may cause constipation. You may want to take a stool softener (such as Senokot-S or Colace) to prevent constipation.   -If constipation occurs, take a laxative (such as Dulcolax tablets, Milk of Magnesia, or a suppository). Laxatives should only be used if the above preventable measures have failed and you still have not had a bowel movement after three days    Driving  -You may not drive or return to work until instructed by your physician. However, you may ride in the car for short periods of time. Incision Care  -You may take brief showers but do not run the water run directly onto the wound. After showering or bathing, remove the wet dressing and gently blot the wound dry with a soft towel.  -Do not rub or apply any lotions or ointments to your incision site.   -Do not soak or scrub your wound  -Keep a dry dressing (guaze and paper tape) on your incision and have it changed daily for 14 days after surgery; more often if your incision is draining. Have your caregiver wash their hands thoroughly before changing your dressing.  -You will have absorbable sutures and steristrips (white tape) on your incision. Leave the steristrips on until they fall off. Showering  -You may shower in approximately 2 days after your surgery.    -Leave the dressing on during your shower. Do NOT allow the water to run directly onto your dressing.    Once you get out of the shower, pat the area dry with a towel and put on a dry dressing.  -Reminder- your brace can be removed while showering. Remember to not bend or twist while your brace is off.    -Do not take a tub bath. Preventing blood clots  -You have been given T.E.D. stockings to wear. Continue to wear these for 7 days after your discharge. Put them on in the morning and take them off at night.    -They are used to increase your circulation and prevent blood clots from forming in your legs  -T. E.D. stockings can be machine washed, temperature not to exceed 160° F (71°C) and machine dried for 15 to 20 minutes, temperature not to exceed 250° F (121°C). Pain management  -Take pain medication as prescribed; decrease the amount you use as your pain lessens  -DO not wait until you are in extreme pain to take your medication.  -Avoid alcoholic beverages while taking pain medication    Pain Medication Safety  DO:  -Read the Medication Guide   -Take your medicine exactly as prescribed   -Store your medicine away from children and in a safe place   -Call your healthcare provider for medical advice about side effects.  -Please be aware that many medications contain Tylenol. We do not want you to over medicate so please read the information below as a guide. Do not take more than 4 Grams of Tylenol in a 24 hour period.   (There are 1000 milligrams in one Gram)                                                                                                                                                                                                                                                                                                                                                                  Percocet contains 325 mg of Tylenol per tablet (do not take more than 12 tablets in 24 hours)  Lortab contains 500 mg of Tylenol per tablet (do not take more than 8 tablets in 24 hours)  Norco contains 325 mg of Tylenol per tablet (do not take more than 12 tablets in 24 hours). DO NOT:  -Do not give your medicine to others   -Do not take medicine unless it was prescribed for you   -Do not stop taking your medicine without talking to your healthcare provider   -Do not break, chew, crush, dissolve, or inject your medicine. If you cannot swallow your medicine whole, talk to your healthcare provider.  -Do not drink alcohol while taking this medicine  -Do not take anti-inflammatory medications or aspirin unless instructed by your   physician.

## 2020-12-04 NOTE — PROGRESS NOTES
Problem: Self Care Deficits Care Plan (Adult)  Goal: *Acute Goals and Plan of Care (Insert Text)  Description: FUNCTIONAL STATUS PRIOR TO ADMISSION: Patient was independent and active without use of DME. 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 Prior to operation, patient most limited by pain - only able to tolerate brief periods of activity. States he would lean on the grocery cart when shopping and would need to sit after approx 10 minutes  2/2 pain. HOME SUPPORT: The patient lived with wife but did not require assist.    Occupational Therapy Goals  Initiated 12/3/2020  1. Patient will perform standing grooming with supervision/set-up using RW prn within 7 days. 2.  Patient will perform toilet transfer with supervision/set-up using RW prn within 7 days. 3.  Patient will complete all aspects of toileting at supervision/set-up within 7 days. 4.  Patient will don/doff back brace at modified [place] within 7 days. 5.  Patient will verbalize/demonstrate 3/3 back precautions during ADL tasks without cues within 7 days. Outcome: Progressing Towards Goal    OCCUPATIONAL THERAPY TREATMENT  Patient: Aubrie Chaves (33 y.o. male)  Date: 12/4/2020  Diagnosis: Lumbar stenosis with neurogenic claudication [M48.062]   <principal problem not specified>  Procedure(s) (LRB):  REDO L2-5 LAMINECTOMY WITH INSTRUMENTATED FUSION L2-S1 WITH  REMOVAL OF  L3-4 HARDWARE, L5-S1 POSTEROR LUMBAR INTERBODY FUSION, PEDICAL SCREWS AND ALLOGRAFT (O-ARM) (N/A) 2 Days Post-Op  Precautions:    Chart, occupational therapy assessment, plan of care, and goals were reviewed. ASSESSMENT  Patient continues with skilled OT services and is progressing towards goals.   Pt progressing with functional mobility/balance and activity tolerance evidenced completing RW level toileting (pt stood to void) and RW standing hand hygiene with SBA and Min verbal cues for anterior RW positioning 2/2 attempting to position off to side when reaching outside base of support, with patient also benefiting from cues for exhaling upon effort as he was noted to hold breathe during sit to stand and bed mobility. Reporting therapist believes pt is safe for discharge home, from a self-care standpoint, with reports of good PRN ADL/IADL assist available from wife, with no anticipated future OT needs once discharged. Acute OT to continue to follow during acute hospitalization. Current Level of Function Impacting Discharge (ADLs): Min A    Other factors to consider for discharge: None         PLAN :  Patient continues to benefit from skilled intervention to address the above impairments. Continue treatment per established plan of care. to address goals. Recommend with staff: OOB meals, Active ADL engagement, Assist x1 to/from bathroom    Recommend next OT session: POC progression    Recommendation for discharge: (in order for the patient to meet his/her long term goals)  No skilled occupational therapy/ follow up rehabilitation needs identified at this time. This discharge recommendation:  Has been made in collaboration with the attending provider and/or case management    IF patient discharges home will need the following DME: walker: rolling       SUBJECTIVE:   Patient stated I helped my wife with her back surgery and she plans to help me.     OBJECTIVE DATA SUMMARY:   Cognitive/Behavioral Status:  Neurologic State: Alert  Orientation Level: Oriented X4  Cognition: Appropriate decision making; Appropriate for age attention/concentration; Appropriate safety awareness; Follows commands  Perception: Appears intact  Perseveration: No perseveration noted  Safety/Judgement: Awareness of environment;Home safety; Insight into deficits    Functional Mobility and Transfers for ADLs:  Bed Mobility:  Supine to Sit: Minimum assistance(Min A>CGA)    Transfers:  Sit to Stand: Stand-by assistance  Functional Transfers  Bathroom Mobility: Stand-by assistance  Bed to Chair: Stand-by assistance    Balance:  Sitting: Intact  Standing: Impaired; With support  Standing - Static: Good;Constant support(RW)  Standing - Dynamic : Good;Fair;Constant support(RW)    ADL Intervention:       Grooming  Grooming Assistance: Stand-by assistance  Position Performed: Standing(RW at sink)  Washing Hands: Stand-by assistance  Cues: Verbal cues provided(for anterior placement of RW during sinkside standing)  Adaptive Equipment: (RW)    Upper Body Dressing Assistance  Orthotics(Brace): Set-up(Min verbal cues for technique)    Lower Body Dressing Assistance  Adaptive Equipment Used: (Educated on AE to assist with distal LE; declined engagement)    Toileting  Toileting Assistance: Stand-by assistance  Bladder Hygiene: Modified independent  Bowel Hygiene: Modified indpendent  Clothing Management: Stand-by assistance  Cues: Verbal cues provided(RW placement)  Adaptive Equipment: Walker    Cognitive Retraining  Safety/Judgement: Awareness of environment;Home safety; Insight into deficits    Therapeutic Exercises:   Educated on engagement in BLE ER stretches to increase independence with crossed leg method for distal LE ADL management    Pain:  No c/o pain    Activity Tolerance:   Good, Fair, and requires rest breaks    After treatment patient left in no apparent distress:   Sitting in chair and Call bell within reach    COMMUNICATION/COLLABORATION:   The patients plan of care was discussed with: Physical therapist, Registered nurse, and Case management.      Ayush Negron OT  Time Calculation: 28 mins

## 2020-12-05 VITALS
OXYGEN SATURATION: 96 % | SYSTOLIC BLOOD PRESSURE: 123 MMHG | WEIGHT: 187.39 LBS | DIASTOLIC BLOOD PRESSURE: 67 MMHG | TEMPERATURE: 98.7 F | HEART RATE: 82 BPM | HEIGHT: 68 IN | BODY MASS INDEX: 28.4 KG/M2 | RESPIRATION RATE: 16 BRPM

## 2020-12-05 LAB — HGB BLD-MCNC: 8.5 G/DL (ref 12.1–17)

## 2020-12-05 PROCEDURE — 85018 HEMOGLOBIN: CPT

## 2020-12-05 PROCEDURE — 74011250637 HC RX REV CODE- 250/637: Performed by: PHYSICIAN ASSISTANT

## 2020-12-05 PROCEDURE — 74011250637 HC RX REV CODE- 250/637: Performed by: NEUROLOGICAL SURGERY

## 2020-12-05 PROCEDURE — 94760 N-INVAS EAR/PLS OXIMETRY 1: CPT

## 2020-12-05 PROCEDURE — 97535 SELF CARE MNGMENT TRAINING: CPT

## 2020-12-05 PROCEDURE — 36415 COLL VENOUS BLD VENIPUNCTURE: CPT

## 2020-12-05 PROCEDURE — 97116 GAIT TRAINING THERAPY: CPT

## 2020-12-05 RX ADMIN — OXYCODONE HYDROCHLORIDE 10 MG: 5 TABLET ORAL at 03:48

## 2020-12-05 RX ADMIN — DOCUSATE SODIUM 100 MG: 100 CAPSULE, LIQUID FILLED ORAL at 08:24

## 2020-12-05 RX ADMIN — ACETAMINOPHEN 650 MG: 325 TABLET ORAL at 06:33

## 2020-12-05 RX ADMIN — OXYCODONE HYDROCHLORIDE 10 MG: 5 TABLET ORAL at 15:15

## 2020-12-05 RX ADMIN — Medication 10 ML: at 06:34

## 2020-12-05 RX ADMIN — PANTOPRAZOLE SODIUM 20 MG: 20 TABLET, DELAYED RELEASE ORAL at 06:34

## 2020-12-05 RX ADMIN — TAMSULOSIN HYDROCHLORIDE 0.4 MG: 0.4 CAPSULE ORAL at 08:24

## 2020-12-05 RX ADMIN — POLYETHYLENE GLYCOL 3350 17 G: 17 POWDER, FOR SOLUTION ORAL at 08:25

## 2020-12-05 RX ADMIN — CYANOCOBALAMIN TAB 500 MCG 1000 MCG: 500 TAB at 08:24

## 2020-12-05 NOTE — PROGRESS NOTES
Problem: Self Care Deficits Care Plan (Adult)  Goal: *Acute Goals and Plan of Care (Insert Text)  Description: FUNCTIONAL STATUS PRIOR TO ADMISSION: Patient was independent and active without use of DME. 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 Prior to operation, patient most limited by pain - only able to tolerate brief periods of activity. States he would lean on the grocery cart when shopping and would need to sit after approx 10 minutes  2/2 pain. HOME SUPPORT: The patient lived with wife but did not require assist.    Occupational Therapy Goals  Initiated 12/3/2020  1. Patient will perform standing grooming with supervision/set-up using RW prn within 7 days. 2.  Patient will perform toilet transfer with supervision/set-up using RW prn within 7 days. 3.  Patient will complete all aspects of toileting at supervision/set-up within 7 days. 4.  Patient will don/doff back brace at modified [place] within 7 days. 5.  Patient will verbalize/demonstrate 3/3 back precautions during ADL tasks without cues within 7 days. Outcome: Progressing Towards Goal    OCCUPATIONAL THERAPY TREATMENT  Patient: Natty Mcclendon (47 y.o. male)  Date: 12/5/2020  Diagnosis: Lumbar stenosis with neurogenic claudication [M48.062] <principal problem not specified>  Procedure(s) (LRB):  REDO L2-5 LAMINECTOMY WITH INSTRUMENTATED FUSION L2-S1 WITH  REMOVAL OF  L3-4 HARDWARE, L5-S1 POSTEROR LUMBAR INTERBODY FUSION, PEDICAL SCREWS AND ALLOGRAFT (O-ARM) (N/A) 3 Days Post-Op  Precautions:    Chart, occupational therapy assessment, plan of care, and goals were reviewed. ASSESSMENT  Patient continues with skilled OT services and is progressing towards goals, however, remains limited by movement precautions s/p laminectomy and fusion POD 3. Pt received EOB and willing to participate in therapy.  Pt donned brace and socks via tailor sit independently with verbal cues to practice deep breathing. Completed sit>stand with RW (supervision). Completed functional mobility to bathroom (CGA) and participated in standing ADLs at sink (supervision). Pt required verbal cues for RW management. Verbalized and demonstrated 3/3 back precautions. Pt with supportive wife at home. No anticipated OT needs at discharge. Current Level of Function Impacting Discharge (ADLs): Mod I for functional mobility/ambulation, Independent-Min A for LB dressing/bathing, Independent for grooming/hygiene    Other factors to consider for discharge: Lives with wife          PLAN :  Patient continues to benefit from skilled intervention to address the above impairments. Continue treatment per established plan of care. to address goals. Recommend with staff: Roseann Flannery for meals and to commode     Recommend next OT session: UB/LB dressing     Recommendation for discharge: (in order for the patient to meet his/her long term goals)  No skilled occupational therapy/ follow up rehabilitation needs identified at this time. This discharge recommendation:  Has been made in collaboration with the attending provider and/or case management    IF patient discharges home will need the following DME: none (suggested reacher)        SUBJECTIVE:   Patient stated I have questions about brushing my teeth at the sink.  (discussed using spit bowl to avoid bending over sink)    OBJECTIVE DATA SUMMARY:   Cognitive/Behavioral Status:  Neurologic State: Alert  Orientation Level: Oriented X4  Cognition: Appropriate decision making; Appropriate for age attention/concentration; Appropriate safety awareness; Follows commands             Functional Mobility and Transfers for ADLs:  Bed Mobility:       Transfers:  Sit to Stand: Supervision  Functional Transfers  Bathroom Mobility: Contact guard assistance       Balance:  Sitting: Intact  Standing: Intact  Standing - Static: Good;Constant support  Standing - Dynamic : Good;Constant support    ADL Intervention:       Grooming  Grooming Assistance: Supervision  Position Performed: Standing  Brushing Teeth: Supervision  Cues: Verbal cues provided(RW placement at sink )                   Lower Body Dressing Assistance  Dressing Assistance: Supervision  Socks: Supervision  Leg Crossed Method Used: Yes  Position Performed: Seated edge of bed  Cues: Verbal cues provided(deep breathing )              The patient recalled and demonstrated 3/3 back precautions. Reviewed all 3 with patient. Dressing brace: Patient instructed and demonstrated to don/doff velcro on brace using dominant side, keeping non-dominant side intact. Patient instructed and demonstrated in meantime of being able to stand with back against wall to don/doff brace, to don/doff seated using lap and bed/chair surface to support brace while manipulating. Dressing lower body: Patient instructed to don brace first and on the benefits to remain seated to don all clothing to increase independence with precautions and pain management. Standing: Patient instructed and indicated understanding to walk up to sink/counter top/surfaces, step into walker, square off while using objects, slide objects along surfaces, to increase adherence to back precautions and fall prevention. Patient instructed to increase amount of time standing in order to increase independence and tolerance with ADLs. During prolonged standing, can open cabinet door or place foot on stool to decrease spinal pressure/increase pain. Patient instructed and indicated understanding the benefits of maintaining activity tolerance, functional mobility, and independence with self care tasks during acute stay  to ensure safe return home and to baseline.  Encouraged patient to increase frequency and duration OOB, not sitting longer than 30 mins without marching/walking with staff, be out of bed for all meals, perform daily ADLs (as approved by RN/MD regarding bathing etc), and performing functional mobility to/from bathroom. Patient instruction and indicated understanding on body mechanics, ergonomics and gravitational force on the spine during different body positions to plan activities in prep for return home to complete instrumental ADLs and back to work safely. Pain:  Mild pain reported during LB dressing     Activity Tolerance:   Good    After treatment patient left in no apparent distress:   Sitting in chair and Call bell within reach    COMMUNICATION/COLLABORATION:   The patients plan of care was discussed with: Physical therapist and Registered nurse. Regina Malin  Time Calculation: 24 mins     Regarding student involvement in patient care:  A student participated in this treatment session. Per CMS Medicare statements and AOTA guidelines I certify that the following was true:  1. I was present and directly observed the entire session. 2. I made all skilled judgments and clinical decisions regarding care. 3. I am the practitioner responsible for assessment, treatment, and documentation.

## 2020-12-05 NOTE — DISCHARGE SUMMARY
Ambulating with walker  Feels well   Incision healing well  Will remove drain and discharge later today  F/u with dr Doron Devine in office

## 2020-12-05 NOTE — PROGRESS NOTES
Problem: Mobility Impaired (Adult and Pediatric)  Goal: *Acute Goals and Plan of Care (Insert Text)  Description: FUNCTIONAL STATUS PRIOR TO ADMISSION: Patient was independent and active without use of DME.    HOME SUPPORT PRIOR TO ADMISSION: The patient lived with wife but did not require assist.    Physical Therapy Goals  Initiated 12/3/2020    1. Patient will move from supine to sit and sit to supine , scoot up and down, and roll side to side in bed with modified independence within 4 days. 2. Patient will perform sit to stand with modified independence within 4 days. 3. Patient will ambulate with modified independence for 300 feet with the least restrictive device within 4 days. 4. Patient will ascend/descend 4 stairs with 1 handrail(s) with supervision/set-up within 4 days. 5. Patient will verbalize and demonstrate understanding of spinal precautions (No bending, lifting greater than 5 lbs, or twisting; log-roll technique; frequent repositioning as instructed) within 4 days. Outcome: Progressing Towards Goal    PHYSICAL THERAPY TREATMENT  Patient: Ludmila Arguello (84 y.o. male)  Date: 12/5/2020  Diagnosis: Lumbar stenosis with neurogenic claudication [M48.062]   <principal problem not specified>  Procedure(s) (LRB):  REDO L2-5 LAMINECTOMY WITH INSTRUMENTATED FUSION L2-S1 WITH  REMOVAL OF  L3-4 HARDWARE, L5-S1 POSTEROR LUMBAR INTERBODY FUSION, PEDICAL SCREWS AND ALLOGRAFT (O-ARM) (N/A) 3 Days Post-Op  Precautions:    Chart, physical therapy assessment, plan of care and goals were reviewed. ASSESSMENT  Patient continues with skilled PT services and is progressing towards goals. Pt tolerates gait training today, roll from supine without assistance, performed gait training well with continued cues for posture and technique. Attempted gait training without AD, reverted quickly to RW due to pain. Pt tolerates sitting in chair, cues for breathing while performing stand to sit.  Pt states that he has changed his mind and that he is amenable to home health. Recommend short HH plan of care for technique and safety. Current Level of Function Impacting Discharge (mobility/balance): heavy cues required    Other factors to consider for discharge: cues          PLAN :  Patient continues to benefit from skilled intervention to address the above impairments. Continue treatment per established plan of care. to address goals. Recommendation for discharge: (in order for the patient to meet his/her long term goals)  Physical therapy at least 2 days/week in the home     This discharge recommendation:  Has been made in collaboration with the attending provider and/or case management    IF patient discharges home will need the following DME: rolling walker       SUBJECTIVE:   Patient stated I feel better than yesterday.     OBJECTIVE DATA SUMMARY:   Critical Behavior:  Neurologic State: Alert  Orientation Level: Oriented X4  Cognition: Appropriate decision making, Appropriate for age attention/concentration, Appropriate safety awareness, Follows commands  Safety/Judgement: Awareness of environment, Home safety, Insight into deficits  Functional Mobility Training:  Bed Mobility:     Supine to Sit: Setup;Contact guard assistance              Transfers:  Sit to Stand: Supervision  Stand to Sit: Supervision                             Balance:  Sitting: Intact  Standing: Intact  Standing - Static: Good;Constant support  Standing - Dynamic : Good;Constant support  Ambulation/Gait Training:  Distance (ft): 300 Feet (ft)  Assistive Device: Brace/Splint;Gait belt;Walker, rolling  Ambulation - Level of Assistance: Stand-by assistance;Contact guard assistance        Gait Abnormalities: Antalgic; Shuffling gait        Base of Support: Widened     Speed/Sepideh: Shuffled; Slow  Step Length: Right shortened;Left shortened          Pain Rating:  none    Activity Tolerance:   Fair    After treatment patient left in no apparent distress:   Sitting in chair and Call bell within reach    COMMUNICATION/COLLABORATION:   The patients plan of care was discussed with: Physician and Rehabilitation technician.      Bill Haley, PT   Time Calculation: 25 mins

## 2020-12-07 ENCOUNTER — PATIENT OUTREACH (OUTPATIENT)
Dept: CASE MANAGEMENT | Age: 75
End: 2020-12-07
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
## 2020-12-09 ENCOUNTER — APPOINTMENT (OUTPATIENT)
Dept: VASCULAR SURGERY | Age: 75
End: 2020-12-09
Attending: EMERGENCY MEDICINE
Payer: MEDICARE

## 2020-12-09 ENCOUNTER — HOSPITAL ENCOUNTER (EMERGENCY)
Age: 75
Discharge: HOME | End: 2020-12-09
Attending: EMERGENCY MEDICINE
Payer: MEDICARE

## 2020-12-09 VITALS
HEART RATE: 80 BPM | DIASTOLIC BLOOD PRESSURE: 72 MMHG | TEMPERATURE: 98.5 F | WEIGHT: 188.49 LBS | RESPIRATION RATE: 16 BRPM | OXYGEN SATURATION: 97 % | SYSTOLIC BLOOD PRESSURE: 125 MMHG | BODY MASS INDEX: 28.57 KG/M2 | HEIGHT: 68 IN

## 2020-12-09 DIAGNOSIS — I87.2 VENOUS INSUFFICIENCY OF BOTH LOWER EXTREMITIES: ICD-10-CM

## 2020-12-09 DIAGNOSIS — R60.0 BILATERAL LEG EDEMA: Primary | ICD-10-CM

## 2020-12-09 PROCEDURE — 99283 EMERGENCY DEPT VISIT LOW MDM: CPT

## 2020-12-09 PROCEDURE — 93970 EXTREMITY STUDY: CPT

## 2020-12-09 NOTE — ED PROVIDER NOTES
The history is provided by the patient. Leg Swelling    This is a new problem. Episode onset: 1 week ago since back surgery. The problem occurs constantly. The problem has been gradually worsening. Pain location: bilateral lower legs below knees. The pain is mild. Associated symptoms include stiffness. Pertinent negatives include full range of motion. The symptoms are aggravated by movement. Treatments tried: couldn't get compression stockings on. The treatment provided no relief.         Past Medical History:   Diagnosis Date    Arthritis     lower back    CAD (coronary artery disease)     stent x 1 2001 LDA    Chronic lower back pain     Dr Antwan Shannon     Chronic pain     right knee - resolved with knee replacement 4/2017    Hypercholesteremia     Hypertension     Hypothyroidism     Ill-defined condition     Bleeding prolonged    Skin cancer 2020      RLEG   L FLANK AREA    Sun-damaged skin        Past Surgical History:   Procedure Laterality Date    CARDIAC SURG PROCEDURE UNLIST      1 STENT    COLONOSCOPY N/A 10/20/2017    COLONOSCOPY performed by Nba Hahn MD at [facility] AMBULATORY OR    HX CATARACT REMOVAL Right 02/2017    HX CORONARY STENT PLACEMENT  2000    Dr Ben Alarcon [address] [facility] [address]  2001    [address]  approx 2010    @ [place] UMBILCAL AND R GROIN    HX LUMBAR FUSION  2006     L3-L4 fusion    HX TONSILLECTOMY  age 13    OK COLON CA SCRN NOT  [address]  10/20/2017         TOTAL KNEE ARTHROPLASTY Right 04/2017         Family History:   Problem Relation Age of Onset    Hypertension Mother     Heart Disease Father     No Known Problems Sister     No Known Problems Brother     Anesth Problems Neg Hx        Social History     Socioeconomic History    Marital status:      Spouse name: Not on file    Number of children: Not on file    Years of education: Not on file    Highest education level: Not on file   Occupational History    Not on file   Social Needs    Financial resource strain: Not on file    Food insecurity     Worry: Not on file     Inability: Not on file    Transportation needs     Medical: Not on file     Non-medical: Not on file   Tobacco Use    Smoking status: Never Smoker    Smokeless tobacco: Never Used   Substance and Sexual Activity    Alcohol use: Yes     Alcohol/week: 3.0 standard drinks     Types: 3 Shots of liquor per week    Drug use: No    Sexual activity: Not on file   Lifestyle    Physical activity     Days per week: Not on file     Minutes per session: Not on file    Stress: Not on file   Relationships    Social connections     Talks on phone: Not on file     Gets together: Not on file     Attends Mandaeism service: Not on file     Active member of club or organization: Not on file     Attends meetings of clubs or organizations: Not on file     Relationship status: Not on file    Intimate partner violence     Fear of current or ex partner: Not on file     Emotionally abused: Not on file     Physically abused: Not on file     Forced sexual activity: Not on file   Other Topics Concern    Not on file   Social History Narrative    Not on file         ALLERGIES: Wasp [venom-wasp] and Sulfa (sulfonamide antibiotics)    Review of Systems   Musculoskeletal: Positive for stiffness. All other systems reviewed and are negative. Vitals:    12/09/20 1516   BP: 125/71   Pulse: 88   Resp: 18   Temp: 98 °F (36.7 °C)   SpO2: 98%   Weight: 85.5 kg (188 lb 7.9 oz)   Height: 5' 8\" (1.727 m)            Physical Exam  Vitals signs and nursing note reviewed. Constitutional:       General: He is not in acute distress. Appearance: He is well-developed. HENT:      Head: Normocephalic and atraumatic. Eyes:      Conjunctiva/sclera: Conjunctivae normal.   Neck:      Musculoskeletal: Neck supple. Trachea: No tracheal deviation. Cardiovascular:      Rate and Rhythm: Normal rate and regular rhythm.       Comments: Venous stasis dermatitis of both shins  Pulmonary:      Effort: Pulmonary effort is normal. No respiratory distress. Abdominal:      General: There is no distension. Musculoskeletal: Normal range of motion. General: No deformity. Right lower le+ Pitting Edema present. Left lower le+ Pitting Edema present. Skin:     General: Skin is warm and dry. Neurological:      Mental Status: He is alert. Cranial Nerves: No cranial nerve deficit. Psychiatric:         Behavior: Behavior normal.          MDM     70 y.o. male presents with bilateral leg swelling since having a back operation with Dr. Luz Ramon. He initially had compression stockings on but was unable to put them on after he has had increased swelling. He has been taking Lasix without any relief of symptoms. I suspect he has leaky capillary beds that have led to venous insufficiency above lower extremity since surgery but difficult to rule out thrombus due to increased risk of clotting postoperatively. Bilateral venous duplex is negative for thrombus. We discussed aggressive elevation, compression with Ace wraps until he is able to place on compression hose, and monitoring of skin for wound formation but there is no opening currently. Return precautions were discussed for worsening or new concerning symptoms.      Procedures

## 2020-12-09 NOTE — ED TRIAGE NOTES
Triage:  Pt to the ED due to continued swelling to both lower legs, Pt recent back surgery and discharged. Pt states since being home has noted worsening swelling to both legs up to mid thigh. Pt states he has been doubling up his lasix recently per direction of prescription. Pt denies any CP or SOB.

## 2022-03-19 PROBLEM — M48.062 LUMBAR STENOSIS WITH NEUROGENIC CLAUDICATION: Status: ACTIVE | Noted: 2020-12-02

## 2022-03-19 PROBLEM — M17.10 PRIMARY LOCALIZED OSTEOARTHROSIS OF THE KNEE: Status: ACTIVE | Noted: 2017-04-06

## 2022-06-20 ENCOUNTER — OFFICE VISIT (OUTPATIENT)
Dept: ORTHOPEDIC SURGERY | Age: 77
End: 2022-06-20
Payer: MEDICARE

## 2022-06-20 DIAGNOSIS — M54.16 LUMBAR BACK PAIN WITH RADICULOPATHY AFFECTING RIGHT LOWER EXTREMITY: Primary | ICD-10-CM

## 2022-06-20 DIAGNOSIS — R26.2 DIFFICULTY WALKING: ICD-10-CM

## 2022-06-20 PROCEDURE — 97162 PT EVAL MOD COMPLEX 30 MIN: CPT | Performed by: PHYSICAL THERAPIST

## 2022-06-20 PROCEDURE — 97110 THERAPEUTIC EXERCISES: CPT | Performed by: PHYSICAL THERAPIST

## 2022-06-20 PROCEDURE — 97140 MANUAL THERAPY 1/> REGIONS: CPT | Performed by: PHYSICAL THERAPIST

## 2022-06-23 ENCOUNTER — OFFICE VISIT (OUTPATIENT)
Dept: ORTHOPEDIC SURGERY | Age: 77
End: 2022-06-23
Payer: MEDICARE

## 2022-06-23 DIAGNOSIS — M54.16 LUMBAR BACK PAIN WITH RADICULOPATHY AFFECTING RIGHT LOWER EXTREMITY: Primary | ICD-10-CM

## 2022-06-23 PROCEDURE — 97140 MANUAL THERAPY 1/> REGIONS: CPT | Performed by: PHYSICAL THERAPIST

## 2022-06-23 PROCEDURE — 97110 THERAPEUTIC EXERCISES: CPT | Performed by: PHYSICAL THERAPIST

## 2022-06-23 NOTE — PROGRESS NOTES
Patient Name: Danielle Epperson  Date:2022  : 1945  [x]  Patient  Verified  Payor: Leeann Feng / Plan: [place] / Product Type: Medicare /    Total Treatment Time (min): 50  1:1 Treatment Time ( [address] only): 30  Referring provider: Les Jarrell MD  1. Lumbar back pain with radiculopathy affecting right lower extremity      SUBJECTIVE  Patient reports his right-sided lower back pain has somewhat decreased in intensity but may slightly be more diffuse throughout his lower spine. OBJECTIVE  Modality:   []  E-Stim: type _ x _ min     []att   []unatt   []w/US   []w/ice   []w/heat  []  Ultrasound: []cont   []pulse    _ W/cm2 x _  min   []1MHz   []3MHz  []  Ice pack _  Post       [] Hot pack _  Pre       []  Other:    Man: 15 min  Manual assisted right lower extremity long axis traction while in supine. Sacral mob/float techniques performed while patient in left side-lying. Myofascial techniques to the lumbar paraspinals, QL, and piriformis performed while in side-lying as well. NMR:  min  Neuromuscular reeducation/proprioceptive training listed in exercise below. Ex: 15 min  Therapeutic exercise/strength/endurance completed here in clinic today per the exercise log. Manual assisted passive flexibility exercises for the hamstrings and iliopsoas while in supine. PT Exercise Log         EXERCISE 2022   Nu-step 10'   Slant board y   Hip flexor passive exercise y                                                                            Ex: 15 min  Man: 15 min  NMR:  min    ASSESSMENT  [x]  See Plan of Care  [x]  Patient will continue to benefit from skilled therapy to address remaining functional deficits:   Still with clear trigger point type restrictions but subjectively feels better with treatment here in the clinic today. Still ambulates with a flexed trunk posture.     PLAN  Continue with current plan of care and progress as appropriate towards functional goals.  [x]  Upgrade activities as tolerated     [x]  Continue plan of care  []  Discharge due to:_  [] Other:_       Brent Conner, PT, DPT  6/23/2022    5:57 PM

## 2022-06-29 NOTE — PROGRESS NOTES
Patient Name: Albert Cooper  Date:2022  : 1945  [x]  Patient  Verified  Payor: Neda Garcia / Plan: [place] MEDICARE PART A & B / Product Type: Medicare /    Total Treatment Time (min): 50  1:1 Treatment Time ([facility] only): 40   Referring provider: Collette To MD     1. Lumbar back pain with radiculopathy affecting right lower extremity  2. Difficulty walking    SUBJECTIVE    Patient reports he got an injection 2 days ago in his R lumbar spine and is tender at the injection site, but states pain seems to be improved somewhat. OBJECTIVE  Modality:   []  E-Stim: type _ x _ min     []att   []unatt   []w/US   []w/ice   []w/heat  []  Ultrasound: []cont   []pulse    _ W/cm2 x _  min   []1MHz   []3MHz  []  Ice pack _  Post       [] Hot pack _  Pre       []  Other:    Man: 15 min  Sacral mob/float techniques performed while patient in left side-lying. Myofascial techniques to the lumbar paraspinals, QL, and piriformis performed while in side-lying as well. NMR:  min  Neuromuscular reeducation/proprioceptive training listed in exercise below. Ex: 25 min  Therapeutic exercise/strength/endurance completed here in clinic today per the exercise log. Manual assisted passive flexibility exercises for the iliopsoas while in supine. PT Exercise Log         EXERCISE 2022   Nu-step 10'   Slant board y   Hip flexor passive exercise y   DKTC w/ball y   Sliders stand L y                                                                Ex: 25 min  Man: 15 min  NMR:  min    ASSESSMENT  [x]  See Plan of Care  [x]  Patient will continue to benefit from skilled therapy to address remaining functional deficits:     Continues to be quite tender in R QL/lumbar ps and very restricted with psoas stretching. PLAN  Continue with current plan of care and progress as appropriate towards functional goals.   [x]  Upgrade activities as tolerated     [x]  Continue plan of care  []  Discharge due to:_  [] Other:_       Meg Mitchell, PTA  6/30/2022    5:57 PM

## 2022-06-30 ENCOUNTER — OFFICE VISIT (OUTPATIENT)
Dept: ORTHOPEDIC SURGERY | Age: 77
End: 2022-06-30
Payer: MEDICARE

## 2022-06-30 DIAGNOSIS — R26.2 DIFFICULTY WALKING: ICD-10-CM

## 2022-06-30 DIAGNOSIS — M54.16 LUMBAR BACK PAIN WITH RADICULOPATHY AFFECTING RIGHT LOWER EXTREMITY: Primary | ICD-10-CM

## 2022-06-30 PROCEDURE — 97110 THERAPEUTIC EXERCISES: CPT

## 2022-06-30 PROCEDURE — 97140 MANUAL THERAPY 1/> REGIONS: CPT

## 2022-06-30 NOTE — PROGRESS NOTES
I have reviewed the notes, assessments, and/or procedures performed by Cristiana Govea PTA, I concur with her/his documentation of Danielle Epperson.

## 2022-07-05 ENCOUNTER — OFFICE VISIT (OUTPATIENT)
Dept: ORTHOPEDIC SURGERY | Age: 77
End: 2022-07-05
Payer: MEDICARE

## 2022-07-05 DIAGNOSIS — M54.16 LUMBAR BACK PAIN WITH RADICULOPATHY AFFECTING RIGHT LOWER EXTREMITY: Primary | ICD-10-CM

## 2022-07-05 PROCEDURE — 97110 THERAPEUTIC EXERCISES: CPT | Performed by: PHYSICAL THERAPIST

## 2022-07-05 PROCEDURE — 97140 MANUAL THERAPY 1/> REGIONS: CPT | Performed by: PHYSICAL THERAPIST

## 2022-07-05 NOTE — PROGRESS NOTES
Patient Name: Lilli Macedo  Date:2022  : 1945  [x]  Patient  Verified  Payor: Erinn Freedman / Plan: [place] MEDICARE PART A & B / Product Type: Medicare /    Total Treatment Time (min): 50  1:1 Treatment Time ( [address] only): 30   Referring provider: Karma Blood MD     1. Lumbar back pain with radiculopathy affecting right lower extremity    SUBJECTIVE    Patient reports lumbar injection has still provided benefits although he notices some right sided LBP/post hip pain is gradually returning. OBJECTIVE  Modality:   []  E-Stim: type _ x _ min     []att   []unatt   []w/US   []w/ice   []w/heat  []  Ultrasound: []cont   []pulse    _ W/cm2 x _  min   []1MHz   []3MHz  []  Ice pack _  Post       [] Hot pack _  Pre       []  Other:    Man: 15 min  Sacral mob/float techniques performed while patient in left side-lying. Myofascial techniques to the lumbar paraspinals, QL, and piriformis performed while in side-lying as well. NMR:  min  Neuromuscular reeducation/proprioceptive training listed in exercise below. Ex: 25 min  Therapeutic exercise/strength/endurance completed here in clinic today per the exercise log. Manual assisted passive flexibility exercises for the iliopsoas while in supine. PT Exercise Log         EXERCISE 2022   Nu-step 10'   Slant board y   Hip flexor passive exercise y   [address] w/ball y   Sliders stand L y   Hip ER with ppt green   A-P potstirrer org                                                        Ex: 15 min  Man: 15 min  NMR:  min    ASSESSMENT  [x]  See Plan of Care  [x]  Patient will continue to benefit from skilled therapy to address remaining functional deficits:   Sore but feels good with manual work to fascial restrictions. PLAN  Continue with current plan of care and progress as appropriate towards functional goals.   [x]  Upgrade activities as tolerated     [x]  Continue plan of care  []  Discharge due to:_  [] Other:_       Levada Key, PT, DPT  7/5/2022    5:57 PM

## 2022-07-12 ENCOUNTER — OFFICE VISIT (OUTPATIENT)
Dept: ORTHOPEDIC SURGERY | Age: 77
End: 2022-07-12
Payer: MEDICARE

## 2022-07-12 DIAGNOSIS — M54.16 LUMBAR BACK PAIN WITH RADICULOPATHY AFFECTING RIGHT LOWER EXTREMITY: Primary | ICD-10-CM

## 2022-07-12 PROCEDURE — 97140 MANUAL THERAPY 1/> REGIONS: CPT | Performed by: PHYSICAL THERAPIST

## 2022-07-12 PROCEDURE — 97110 THERAPEUTIC EXERCISES: CPT | Performed by: PHYSICAL THERAPIST

## 2022-07-12 NOTE — PROGRESS NOTES
Patient Name: Edmond Frank  Date:2022  : 1945  [x]  Patient  Verified  Payor: Fortunato Lynne / Plan: [place] MEDICARE PART A & B / Product Type: Medicare /    Total Treatment Time (min): 60  1:1 Treatment Time ( [address] only): 30   Referring provider: Vanessa Friend MD     1. Lumbar back pain with radiculopathy affecting right lower extremity    SUBJECTIVE    Patient states previous 9/10 pain has decreased to 3-5/10 pain. Feels he is much improved since attending PT, although certainly not painfree. OBJECTIVE  Modality:   []  E-Stim: type _ x _ min     []att   []unatt   []w/US   []w/ice   []w/heat  []  Ultrasound: []cont   []pulse    _ W/cm2 x _  min   []1MHz   []3MHz  []  Ice pack _  Post       [] Hot pack _  Pre       []  Other:    Man: 15 min  Sacral mob/float techniques performed while patient in left side-lying. Myofascial techniques to the lumbar paraspinals, QL, and piriformis performed while in side-lying as well. NMR:  min  Neuromuscular reeducation/proprioceptive training listed in exercise below. Ex: 15 min  Therapeutic exercise/strength/endurance completed here in clinic today per the exercise log. Manual assisted passive flexibility exercises for the hamstring/iliopsoas while in supine. PT Exercise Log         EXERCISE 2022   Nu-step 10'   Slant board y   Hip flexor passive exercise y   [address] w/ball y   Sliders stand L y   Hip ER with ppt green   A-P potstirrer org   Tandem stance y                                                    Ex: 15 min  Man: 15 min  NMR:  min    ASSESSMENT  [x]  See Plan of Care  [x]  Patient will continue to benefit from skilled therapy to address remaining functional deficits:   Still not pain-free but patient seems to be progressing well utilizing current plan of care based on his subjective reports. PLAN  Continue with current plan of care and progress as appropriate towards functional goals.   [x]  Upgrade activities as tolerated     [x]  Continue plan of care  []  Discharge due to:_  [] Other:_       Trudi Corea, PT, DPT  7/12/2022    5:57 PM

## 2022-07-19 ENCOUNTER — OFFICE VISIT (OUTPATIENT)
Dept: ORTHOPEDIC SURGERY | Age: 77
End: 2022-07-19
Payer: MEDICARE

## 2022-07-19 DIAGNOSIS — M54.16 LUMBAR BACK PAIN WITH RADICULOPATHY AFFECTING RIGHT LOWER EXTREMITY: Primary | ICD-10-CM

## 2022-07-19 DIAGNOSIS — R26.2 DIFFICULTY WALKING: ICD-10-CM

## 2022-07-19 PROCEDURE — 97110 THERAPEUTIC EXERCISES: CPT

## 2022-07-19 PROCEDURE — 97140 MANUAL THERAPY 1/> REGIONS: CPT

## 2022-07-19 NOTE — PROGRESS NOTES
Patient Name: Ml Huerta  Date:2022  : 1945  [x]  Patient  Verified  Payor: Darcy Diaz / Plan: [place] / Product Type: Medicare /    Total Treatment Time (min): 60  1:1 Treatment Time ( [address] only): 40   Referring provider: Emily Ricci MD     1. Lumbar back pain with radiculopathy affecting right lower extremity  2. Difficulty walking    SUBJECTIVE    Patient states R lower back pain is present but not as intense. OBJECTIVE  Modality:   []  E-Stim: type _ x _ min     []att   []unatt   []w/US   []w/ice   []w/heat  []  Ultrasound: []cont   []pulse    _ W/cm2 x _  min   []1MHz   []3MHz  []  Ice pack _  Post       [] Hot pack _  Pre       []  Other:    Man: 15 min  Sacral mob/float techniques performed while patient in left side-lying. Myofascial techniques to the lumbar paraspinals, QL, and piriformis performed while in side-lying as well. NMR:  min  Neuromuscular reeducation/proprioceptive training listed in exercise below. Ex:  25 min  Therapeutic exercise/strength/endurance completed here in clinic today per the exercise log. Manual assisted passive flexibility exercises for the hamstring/iliopsoas while in supine. PT Exercise Log         EXERCISE 2022   Nu-step 10'   Slant board y   Hip flexor passive exercise y   [address] w/ball y   Sliders stand L y   Hip ER with ppt green   A-P potstirrer org   Tandem stance y                                                    Ex: 15 min  Man: 15 min  NMR:  min    ASSESSMENT  [x]  See Plan of Care  [x]  Patient will continue to benefit from skilled therapy to address remaining functional deficits:     He does have difficulty with functional bed mobility due to lower back pain/stiffness. Fewer soft tissue restrictions through lumbar spine musculature and posture does seem more upright. PLAN  Continue with current plan of care and progress as appropriate towards functional goals.   [x]  Upgrade activities as tolerated     [x]  Continue plan of care  []  Discharge due to:_  [] Other:_       Tabby Beach PTA  7/19/2022    5:57 PM

## 2022-07-20 NOTE — PROGRESS NOTES
I reviewed the notes, assessments, and/or procedures performed by Ashish Cali PTA, I concur with her documentation of Jerome Bautista

## 2022-07-28 ENCOUNTER — OFFICE VISIT (OUTPATIENT)
Dept: ORTHOPEDIC SURGERY | Age: 77
End: 2022-07-28
Payer: MEDICARE

## 2022-07-28 DIAGNOSIS — M54.16 LUMBAR BACK PAIN WITH RADICULOPATHY AFFECTING RIGHT LOWER EXTREMITY: Primary | ICD-10-CM

## 2022-07-28 DIAGNOSIS — R26.2 DIFFICULTY WALKING: ICD-10-CM

## 2022-07-28 PROCEDURE — 97110 THERAPEUTIC EXERCISES: CPT

## 2022-07-28 PROCEDURE — 97140 MANUAL THERAPY 1/> REGIONS: CPT

## 2022-07-28 NOTE — PROGRESS NOTES
Patient Name: Yamila Curtis  Date:2022  : 1945  [x]  Patient  Verified  Payor: Catherinehanna Garcia / Plan: [place] MEDICARE PART A & B / Product Type: Medicare /    Total Treatment Time (min): 60  1:1 Treatment Time ( [address] only): 40   Referring provider: Cristy Corey MD     1. Lumbar back pain with radiculopathy affecting right lower extremity  2. Difficulty walking    SUBJECTIVE    Patient reports back pain returned over the weekend and feels as bad as it did before his injections. He cannot link increase in pain to any specific event/activity and he believes his injections are wearing off. He states pain starts in his low back and wraps around his waist in both directions. He states pain is intense and frequent, but not constant. He has been using SPC to help with walking. OBJECTIVE  Modality:   []  E-Stim: type _ x _ min     []att   []unatt   []w/US   []w/ice   []w/heat  []  Ultrasound: []cont   []pulse    _ W/cm2 x _  min   []1MHz   []3MHz  [x]  Ice pack _  Post       []  Hot pack _  Pre       []  Other:    Man: 15 min  Sacral mob/float techniques performed while patient in left side-lying. Myofascial techniques to the lumbar paraspinals, QL, and piriformis performed while in side-lying as well. Gentle R LE long axis traction and MFR to anterior thigh/hip. NMR:  min  Neuromuscular reeducation/proprioceptive training listed in exercise below. Ex:  25 min  Therapeutic exercise/strength/endurance completed here in clinic today per the exercise log. Exercises modified per subjective reports.             PT Exercise Log         EXERCISE 2022   Nu-step 10'   Slant board y   Hip flexor passive exercise HOLD   DKTC w/ball HOLD   Sliders stand L y   Hip ER with ppt HOLD   A-P potstirrer HOLD   Tandem stance y                                                    Ex: 15 min  Man: 25 min  NMR:  min    ASSESSMENT  [x]  See Plan of Care  [x]  Patient will continue to benefit from skilled therapy to address remaining functional deficits:     Gait was significant slower today with shortened step length and flexed posturing. He did have difficulty with all transfers and most exercises due to low back and girth pain. He does have a virtual appointment with ortho MD re: status post-injection. PLAN  Continue with current plan of care and progress as appropriate towards functional goals.   [x]  Upgrade activities as tolerated     [x]  Continue plan of care  []  Discharge due to:_  [] Other:_       Nicolás Deluca, HARRY  7/28/2022    5:57 PM

## 2022-08-01 ENCOUNTER — OFFICE VISIT (OUTPATIENT)
Dept: ORTHOPEDIC SURGERY | Age: 77
End: 2022-08-01
Payer: MEDICARE

## 2022-08-01 VITALS — HEIGHT: 69 IN | WEIGHT: 180 LBS | BODY MASS INDEX: 26.66 KG/M2

## 2022-08-01 DIAGNOSIS — M25.562 LEFT KNEE PAIN, UNSPECIFIED CHRONICITY: ICD-10-CM

## 2022-08-01 DIAGNOSIS — M17.12 PRIMARY OSTEOARTHRITIS OF LEFT KNEE: Primary | ICD-10-CM

## 2022-08-01 DIAGNOSIS — Z96.651 STATUS POST RIGHT KNEE REPLACEMENT: ICD-10-CM

## 2022-08-01 PROCEDURE — 99213 OFFICE O/P EST LOW 20 MIN: CPT | Performed by: PHYSICIAN ASSISTANT

## 2022-08-01 PROCEDURE — 20610 DRAIN/INJ JOINT/BURSA W/O US: CPT | Performed by: PHYSICIAN ASSISTANT

## 2022-08-01 PROCEDURE — 3017F COLORECTAL CA SCREEN DOC REV: CPT | Performed by: PHYSICIAN ASSISTANT

## 2022-08-01 PROCEDURE — 1123F ACP DISCUSS/DSCN MKR DOCD: CPT | Performed by: PHYSICIAN ASSISTANT

## 2022-08-01 PROCEDURE — G8432 DEP SCR NOT DOC, RNG: HCPCS | Performed by: PHYSICIAN ASSISTANT

## 2022-08-01 PROCEDURE — G8427 DOCREV CUR MEDS BY ELIG CLIN: HCPCS | Performed by: PHYSICIAN ASSISTANT

## 2022-08-01 PROCEDURE — G8536 NO DOC ELDER MAL SCRN: HCPCS | Performed by: PHYSICIAN ASSISTANT

## 2022-08-01 PROCEDURE — G8417 CALC BMI ABV UP PARAM F/U: HCPCS | Performed by: PHYSICIAN ASSISTANT

## 2022-08-01 PROCEDURE — 1101F PT FALLS ASSESS-DOCD LE1/YR: CPT | Performed by: PHYSICIAN ASSISTANT

## 2022-08-01 NOTE — PROGRESS NOTES
I have reviewed the notes, assessments, and/or procedures performed by Luigi Casper PTA, I concur with her/his documentation of Tobias Morales.

## 2022-08-01 NOTE — PROGRESS NOTES
Jerome Bautista (: 1945) is a 78 y.o. male, patient, here for evaluation of the following chief complaint(s):  Knee Pain (Left/RTK: about 7 years ago)       SUBJECTIVE/OBJECTIVE:  Jerome Bautista presents today complaining of left knee pain. He was evaluated for this approximately 8 weeks ago at Ortho on-call. Cortisone injection at that visit provided significant relief, but it was short-lived. He is here today to discuss continued conservative treatment options. Right total knee done by Dr. Rhiannon Kunz 5 years ago. Doing well, no complaints. History of spine surgery in . Continues to have radicular pain down his legs. PHYSICAL EXAM:  Vitals: Ht 5' 8.5\" (1.74 m)   Wt 180 lb (81.6 kg)   BMI 26.97 kg/m²   Body mass index is 26.97 kg/m². 78y.o. year old M in no acute distress. Slow, deliberate gait pattern. Ambulates with a cane for assistance. Negative Stinchfield bilaterally. Mild varus alignment left knee. Medial joint line tenderness. Range of motion limited by pain, 5-95 today. No effusion. Chronic lower extremity skin changes. Motor 5/5. No distal edema. IMAGING:  Radiographs: XR Results (most recent):  Results from Appointment encounter on 22    XR KNEE LT MIN 4 V    Narrative  Four views (AP, PA-flex, lateral & sunrise) of the left knee were taken and reviewed today using digital radiography which reveal complete loss of medial joint space. Severe patellofemoral changes. Incidental finding satisfactory position and alignment of right total knee arthroplasty components without evidence of loosening. ASSESSMENT/PLAN:  1. Primary osteoarthritis of left knee  -     hyaluronate sodium, cross-linked (GEL-ONE) injection syrg 30 mg; 30 mg, Intra artICUlar, ONCE, 1 dose, On Mon 22 at 1600  2. Status post right knee replacement  3.  Left knee pain, unspecified chronicity  -     XR KNEE LT MIN 4 V; Future    The xray and exam findings were discussed with the patient today. Severe left knee osteoarthritis. Discussed risks/benefits of conservative vs. operative interventions at this time to include NSAIDs, PT, cortisone injections, and surgery. Cortisone injection 8 weeks ago provided significant relief, but it was short-lived. It is too soon to consider repeat cortisone injection today. Discussed the option of gel injections which he opted to try. Discussed timing of future injections, needing at least 3 months in between cortisone injections, and 6 months between gel shots. He understands natural disease progression, and definitive treatment option of total knee as symptoms dictate. He understands he cannot have surgery within 3 months of any injection. Discussed risks/benefits of gel injection and patient gave verbal consent. Under sterile conditions, the left knee was injected with 30mg gel-one intra-articularly, tolerated the procedure well. No follow-ups on file. Review Of Systems     Patient denies any recent fever, chills, nausea, vomiting, chest pain, or shortness of breath. Allergies   Allergen Reactions    Wasp [Venom-Wasp] Anaphylaxis and Swelling    Sulfa (Sulfonamide Antibiotics) Other (comments)     Lower extremity redness       Current Outpatient Medications   Medication Sig    amLODIPine (NORVASC) 5 mg tablet Take 5 mg by mouth nightly. cyanocobalamin, vitamin B-12, (Vitamin B-12) 5,000 mcg subl 1 Tab by SubLINGual route daily. gabapentin (NEURONTIN) 300 mg capsule Take 300 mg by mouth as needed for Pain. aspirin delayed-release 81 mg tablet Take 81 mg by mouth daily. atorvastatin (LIPITOR) 40 mg tablet Take 20 mg by mouth nightly. enalapril (VASOTEC) 10 mg tablet Take 10 mg by mouth nightly. levothyroxine (SYNTHROID) 200 mcg tablet Take 200 mcg by mouth. Daily 6 days a week, Mon, Tues, Wed, Thurs, Fri ,Sun    EPINEPHrine (EPIPEN) 0.3 mg/0.3 mL injection 0.3 mg by IntraMUSCular route once as needed.     sildenafil (REVATIO) 20 mg tablet Take 20 mg by mouth as needed. furosemide (LASIX) 20 mg tablet Take 20 mg by mouth as needed. TAKES 4 TIMES A WEEK NORMALLY     No current facility-administered medications for this visit. Past Medical History:   Diagnosis Date    Arthritis     lower back    CAD (coronary artery disease)     stent x 1 2001 LDA    Chronic lower back pain     Dr Eric Orr     Chronic pain     right knee - resolved with knee replacement 4/2017    Hypercholesteremia     Hypertension     Hypothyroidism     Ill-defined condition     Bleeding prolonged    Skin cancer 2020      RLEG   L FLANK AREA    Sun-damaged skin         Past Surgical History:   Procedure Laterality Date    COLONOSCOPY N/A 10/20/2017    COLONOSCOPY performed by Alireza Hope MD at [facility] AMBULATORY OR    HX CATARACT REMOVAL Right 02/2017    HX CORONARY STENT PLACEMENT  2000    Dr Darline Negrete    HX GI      COLONOSCOPY    HX HEART CATHETERIZATION  2001    HX HERNIA REPAIR  approx 2010    @ [place] [facility]    HX LUMBAR FUSION  2006     L3-L4 fusion    HX TONSILLECTOMY  age 13    IL CARDIAC SURG PROCEDURE UNLIST      1 STENT    IL COLON CA SCRN NOT  [address]  10/20/2017         IL TOTAL KNEE ARTHROPLASTY Right 04/2017       Family History   Problem Relation Age of Onset    Hypertension Mother     Heart Disease Father     No Known Problems Sister     No Known Problems Brother     Anesth Problems Neg Hx         Social History     Socioeconomic History    Marital status:      Spouse name: Not on file    Number of children: Not on file    Years of education: Not on file    Highest education level: Not on file   Occupational History    Not on file   Tobacco Use    Smoking status: Never    Smokeless tobacco: Never   Substance and Sexual Activity    Alcohol use:  Yes     Alcohol/week: 3.0 standard drinks     Types: 3 Shots of liquor per week    Drug use: No    Sexual activity: Not on file   Other Topics Concern    Not on file   Social History Narrative    Not on file     Social Determinants of Health     Financial Resource Strain: Not on file   Food Insecurity: Not on file   Transportation Needs: Not on file   Physical Activity: Not on file   Stress: Not on file   Social Connections: Not on file   Intimate Partner Violence: Not on file   Housing Stability: Not on file       Orders Placed This Encounter    XR KNEE LT MIN 4 V     Standing Status:   Future     Number of Occurrences:   1     Standing Expiration Date:   8/2/2023    hyaluronate sodium, cross-linked (GEL-ONE) injection syrg 30 mg      Mike Nunes M.D. was available for immediate consultation as the supervising physician. An electronic signature was used to authenticate this note.   -- Yoandy Montoya PA-C

## 2022-08-01 NOTE — LETTER
8/1/2022    Patient: Jyothi Azevedo   YOB: 1945   Date of Visit: 8/1/2022     Vickie Carrillo, [address] [address] [address]  Via Fax: [phone]    Dear Vickie Carrillo MD,      Thank you for referring Mr. Jose Carlos Tomlin to [facility] for evaluation. My notes for this consultation are attached. If you have questions, please do not hesitate to call me. I look forward to following your patient along with you.       Sincerely,    Jordan Ramirez MD

## 2022-08-03 ENCOUNTER — OFFICE VISIT (OUTPATIENT)
Dept: ORTHOPEDIC SURGERY | Age: 77
End: 2022-08-03
Payer: MEDICARE

## 2022-08-03 DIAGNOSIS — R26.2 DIFFICULTY WALKING: ICD-10-CM

## 2022-08-03 DIAGNOSIS — M54.16 LUMBAR BACK PAIN WITH RADICULOPATHY AFFECTING RIGHT LOWER EXTREMITY: Primary | ICD-10-CM

## 2022-08-03 PROCEDURE — 97140 MANUAL THERAPY 1/> REGIONS: CPT

## 2022-08-03 PROCEDURE — 97110 THERAPEUTIC EXERCISES: CPT

## 2022-08-03 NOTE — PROGRESS NOTES
Patient Name: Ariel Melendez  Date:8/3/2022  : 1945  [x]  Patient  Verified  Payor: Mimi Miller / Plan: [place] MEDICARE PART A & B / Product Type: Medicare /    Total Treatment Time (min): 60  1:1 Treatment Time ( [address] only): 30   Referring provider: Meggan Barbosa MD     1. Lumbar back pain with radiculopathy affecting right lower extremity  2. Difficulty walking    SUBJECTIVE    Patient reports he has seen overall improvements since his last appointment, but is still experiencing nerve pain that wraps around the waist. He states pain occurs mostly with bending/squatting. He states he is carrying his cane \"just in case\" but is not totally dependant on it. OBJECTIVE  Modality:   []  E-Stim: type _ x _ min     []att   []unatt   []w/US   []w/ice   []w/heat  []  Ultrasound: []cont   []pulse    _ W/cm2 x _  min   []1MHz   []3MHz  [x]  Ice pack _  Post       []  Hot pack _  Pre       []  Other:    Man: 15 min  Sacral mob/float techniques performed while patient in left side-lying. Myofascial techniques to the lumbar paraspinals, QL, and piriformis performed while in side-lying as well. Gentle R LE long axis traction and MFR to anterior thigh/hip. NMR:  min  Neuromuscular reeducation/proprioceptive training listed in exercise below. Ex:  25 min  Therapeutic exercise/strength/endurance completed here in clinic today per the exercise log. Exercises modified per subjective reports.             PT Exercise Log         EXERCISE 8/3/2022   Nu-step 10'   Slant board y   Hip flexor passive exercise HOLD   DKTC w/ball HOLD   Sliders stand L y   Hip ER with ppt grn   A-P potstirrer HOLD   Tandem stance y                                                    Ex: 15 min  Man: 25 min  NMR:  min    ASSESSMENT  [x]  See Plan of Care  [x]  Patient will continue to benefit from skilled therapy to address remaining functional deficits:     He was able to stand more upright today and had less pain with movement in the clinic and functional transfers. He continues to be very restricted with B hip extension ROM and hypertonic in lumbar paraspinals. PLAN  Continue with current plan of care and progress as appropriate towards functional goals.   [x]  Upgrade activities as tolerated     [x]  Continue plan of care  []  Discharge due to:_  [] Other:_       Mai Councilman, PTA  8/3/2022    5:57 PM

## 2022-08-03 NOTE — PROGRESS NOTES
I have reviewed the notes, assessments, and/or procedures performed by Petra Fontaine PTA, I concur with her/his documentation of Clara Corado.

## 2022-08-11 ENCOUNTER — OFFICE VISIT (OUTPATIENT)
Dept: URGENT CARE | Age: 77
End: 2022-08-11
Payer: MEDICARE

## 2022-08-11 VITALS
HEIGHT: 69 IN | BODY MASS INDEX: 27.85 KG/M2 | SYSTOLIC BLOOD PRESSURE: 154 MMHG | DIASTOLIC BLOOD PRESSURE: 88 MMHG | TEMPERATURE: 98.1 F | OXYGEN SATURATION: 96 % | HEART RATE: 85 BPM | WEIGHT: 188 LBS | RESPIRATION RATE: 16 BRPM

## 2022-08-11 DIAGNOSIS — J02.9 SORE THROAT: ICD-10-CM

## 2022-08-11 DIAGNOSIS — U07.1 COVID-19: Primary | ICD-10-CM

## 2022-08-11 LAB
S PYO AG THROAT QL: NEGATIVE
SARS-COV-2 PCR, POC: POSITIVE
VALID INTERNAL CONTROL?: YES

## 2022-08-11 PROCEDURE — 1123F ACP DISCUSS/DSCN MKR DOCD: CPT | Performed by: NURSE PRACTITIONER

## 2022-08-11 PROCEDURE — 87635 SARS-COV-2 COVID-19 AMP PRB: CPT | Performed by: NURSE PRACTITIONER

## 2022-08-11 PROCEDURE — G8427 DOCREV CUR MEDS BY ELIG CLIN: HCPCS | Performed by: NURSE PRACTITIONER

## 2022-08-11 PROCEDURE — 99203 OFFICE O/P NEW LOW 30 MIN: CPT | Performed by: NURSE PRACTITIONER

## 2022-08-11 PROCEDURE — G8417 CALC BMI ABV UP PARAM F/U: HCPCS | Performed by: NURSE PRACTITIONER

## 2022-08-11 PROCEDURE — 1101F PT FALLS ASSESS-DOCD LE1/YR: CPT | Performed by: NURSE PRACTITIONER

## 2022-08-11 PROCEDURE — G8536 NO DOC ELDER MAL SCRN: HCPCS | Performed by: NURSE PRACTITIONER

## 2022-08-11 PROCEDURE — G8432 DEP SCR NOT DOC, RNG: HCPCS | Performed by: NURSE PRACTITIONER

## 2022-08-11 PROCEDURE — 87880 STREP A ASSAY W/OPTIC: CPT | Performed by: NURSE PRACTITIONER

## 2022-08-11 PROCEDURE — 3017F COLORECTAL CA SCREEN DOC REV: CPT | Performed by: NURSE PRACTITIONER

## 2022-08-11 RX ORDER — NIRMATRELVIR AND RITONAVIR 300-100 MG
3 KIT ORAL EVERY 12 HOURS
Qty: 1 BOX | Refills: 0 | Status: SHIPPED | OUTPATIENT
Start: 2022-08-11 | End: 2022-08-16

## 2022-08-11 NOTE — PROGRESS NOTES
Subjective: (As above and below)     The patient/guardian gave verbal consent to treat. Chief Complaint   Patient presents with    Cold Symptoms     Pt  c/o sore throat, nasal congestion and cough for past 3 days. Allen Barnhart is a 78 y.o. male who presents for evaluation of : nasal congestion, cough, sore throat. Symptom onset 3 days ago . Preceding illness: none. Worst symptom is sore throat. No other identified aggravating or alleviating factors. Symptoms are constant and overall not improved. Promotes no decrease in PO intake of fluids. Denies: severe lethargy, SOB, vomiting/diarrhea, chest pain, chest pain with breathing, severe headache,  fevers . Known Exposure to COVID-19: no      ROS  Review of Systems - negative except as listed above    Reviewed PmHx, RxHx, FmHx, SocHx, AllgHx and updated in chart. Family History   Problem Relation Age of Onset    Hypertension Mother     Heart Disease Father     No Known Problems Sister     No Known Problems Brother     Anesth Problems Neg Hx        Past Medical History:   Diagnosis Date    Arthritis     lower back    CAD (coronary artery disease)     stent x 1 2001 LDA    Chronic lower back pain     Dr Gianfranco Kim     Chronic pain     right knee - resolved with knee replacement 4/2017    Hypercholesteremia     Hypertension     Hypothyroidism     Ill-defined condition     Bleeding prolonged    Skin cancer 2020      RLEG   L FLANK AREA    Sun-damaged skin       Social History     Socioeconomic History    Marital status:    Tobacco Use    Smoking status: Never    Smokeless tobacco: Never   Substance and Sexual Activity    Alcohol use: Yes     Alcohol/week: 3.0 standard drinks     Types: 3 Shots of liquor per week    Drug use: No          Current Outpatient Medications   Medication Sig    nirmatrelvir-ritonavir (Paxlovid, EUA,) 300 mg (150 mg x 2)-100 mg tablet Take 3 Tablets by mouth every twelve (12) hours for 5 days.     amLODIPine (NORVASC) 5 mg tablet Take 5 mg by mouth nightly. cyanocobalamin, vitamin B-12, (Vitamin B-12) 5,000 mcg subl 1 Tab by SubLINGual route daily. gabapentin (NEURONTIN) 300 mg capsule Take 300 mg by mouth as needed for Pain. aspirin delayed-release 81 mg tablet Take 81 mg by mouth daily. atorvastatin (LIPITOR) 40 mg tablet Take 20 mg by mouth nightly. enalapril (VASOTEC) 10 mg tablet Take 10 mg by mouth nightly. levothyroxine (SYNTHROID) 200 mcg tablet Take 200 mcg by mouth. Daily 6 days a week, Mon, Tues, Wed, Thurs, Fri ,Sun    EPINEPHrine (EPIPEN) 0.3 mg/0.3 mL injection 0.3 mg by IntraMUSCular route once as needed. sildenafil (REVATIO) 20 mg tablet Take 20 mg by mouth as needed. furosemide (LASIX) 20 mg tablet Take 20 mg by mouth as needed. TAKES 4 TIMES A WEEK NORMALLY     No current facility-administered medications for this visit. Objective:     Vitals:    08/11/22 0833   BP: (!) 154/88   Pulse: 85   Resp: 16   Temp: 98.1 °F (36.7 °C)   SpO2: 96%   Weight: 188 lb (85.3 kg)   Height: 5' 8.5\" (1.74 m)       Physical Exam  General appearance - appears well hydrated and does not appear toxic, no acute distress  Eyes - EOMs intact. Non injected. No scleral icterus   Ears - no external swelling. TMs normal bilat. Nose - nasal congestion. No purulent drainage  Mouth - OP clear without swelling, exudate or lesion. Mucus membranes moist. Uvula midline. Neck/Lymphatics - trachea midline, full AROM, no LAD of neck  Chest - Normal breathing effort no wheeze rales, rhonchi or diminishments bilaterally. Heart - RRR, no murmurs  Skin - no observable rashes or pallor  Neurologic- alert and oriented x 3  Psychiatric- normal mood, behavior and though content. Assessment/ Plan:     1. COVID-19    - POCT COVID-19, SARS-COV-2, PCR  - nirmatrelvir-ritonavir (Paxlovid, EUA,) 300 mg (150 mg x 2)-100 mg tablet; Take 3 Tablets by mouth every twelve (12) hours for 5 days. Dispense: 1 Box; Refill: 0    2.  Sore throat    - AMB POC RAPID STREP A  - POCT COVID-19, SARS-COV-2, PCR        Covid 19 test result today POSITIVE. Strep throat is negative. Informed decision to start paxlovid  Potential med interactions discussed in detail and meds to hold reviewed with patient. No evidence suggesting complication of illness at this time. Will discharge home with close monitoring and follow up. Supportive home care for mild symptoms advised- maintain adequate fluid intake, over the counter Tylenol (for fever, aches, pains, chills), deep breathing exercises  Recommendation for self isolation/quarantine based on current CDC guidelines      Test Results:  Recent Results (from the past 6 hour(s))   AMB POC RAPID STREP A    Collection Time: 08/11/22  8:54 AM   Result Value Ref Range    VALID INTERNAL CONTROL POC Yes     Group A Strep Ag Negative Negative   POCT COVID-19, SARS-COV-2, PCR    Collection Time: 08/11/22  8:56 AM   Result Value Ref Range    SARS-COV-2 PCR, POC Positive (A) Negative       Follow up: Follow up immediately for any new, worsening or changes or if symptoms are not improving over the next 5-7 days.          Christopher Hartmann, NP

## 2022-08-12 ENCOUNTER — TELEPHONE (OUTPATIENT)
Dept: URGENT CARE | Age: 77
End: 2022-08-12

## 2022-08-12 NOTE — TELEPHONE ENCOUNTER
Spoke with patient on phone  Reporting some upset stomach, gas and diarrhea since going on paxlovid  Had approx 5 bouts of loose stool. Non bloody. No abdominal pain, fevers, weakness, vomiting or dizziness. We discussed risks/benefits balance of diarrhea vs staying on medication or stopping medication. I advised he try OTC imodium for single dose and to take medication with larger about of food to see if we can resolve the loose stool. If this doesn't help or he has persistent diarrhea should stop paxlovid. Follow up in clinic advised for new, worsening or changes. He verbalized being ok with this plan.

## 2022-08-23 ENCOUNTER — OFFICE VISIT (OUTPATIENT)
Dept: ORTHOPEDIC SURGERY | Age: 77
End: 2022-08-23
Payer: MEDICARE

## 2022-08-23 DIAGNOSIS — R26.2 DIFFICULTY WALKING: ICD-10-CM

## 2022-08-23 DIAGNOSIS — M17.12 PRIMARY OSTEOARTHRITIS OF LEFT KNEE: ICD-10-CM

## 2022-08-23 DIAGNOSIS — M54.16 LUMBAR BACK PAIN WITH RADICULOPATHY AFFECTING RIGHT LOWER EXTREMITY: Primary | ICD-10-CM

## 2022-08-23 PROCEDURE — 97110 THERAPEUTIC EXERCISES: CPT

## 2022-08-23 PROCEDURE — 97140 MANUAL THERAPY 1/> REGIONS: CPT

## 2022-08-23 NOTE — PROGRESS NOTES
Patient Name: Brandie Rock  Date:2022  : 1945  [x]  Patient  Verified  Payor: Ariela Wise / Plan: [place] MEDICARE PART A & B / Product Type: Medicare /    Total Treatment Time (min): 60  1:1 Treatment Time ( [address] only): 39   Referring provider: Waqar Blanca MD     1. Lumbar back pain with radiculopathy affecting right lower extremity  2. Difficulty walking  3. Primary osteoarthritis of left knee    SUBJECTIVE    Patient reports back pain continues to improve while at rest since starting therapy, but he has poor endurance when walking due to onset of hip/back pain. OBJECTIVE  Modality:   []  E-Stim: type _ x _ min     []att   []unatt   []w/US   []w/ice   []w/heat  []  Ultrasound: []cont   []pulse    _ W/cm2 x _  min   []1MHz   []3MHz  [x]  Ice pack _  Post       []  Hot pack _  Pre       []  Other:    Man: 15 min  Sacral mob/float techniques performed while patient in left side-lying. Myofascial techniques to the lumbar paraspinals, QL, and piriformis performed while in side-lying as well. Gentle R LE long axis traction and MFR to anterior thigh/hip. NMR:  min  Neuromuscular reeducation/proprioceptive training listed in exercise below. Ex:  25 min  Therapeutic exercise/strength/endurance completed here in clinic today per the exercise log. Exercises modified per subjective reports. PT Exercise Log         EXERCISE 2022   Nu-step 10'   Slant board y   Hip flexor passive exercise y   DKTC w/ball HOLD   Sliders stand L y   Hip ER with ppt grn   A-P potstirrer org   Tandem stance y                                                    Ex: 25 min  Man: 15 min  NMR:  min    ASSESSMENT  [x]  See Plan of Care  [x]  Patient will continue to benefit from skilled therapy to address remaining functional deficits:     Still with restrictions in the lumbar spine and hip musculature that contribute to difficulty with bed mobility and walking community distances.  Pain levels have overall improved with therapy. PLAN  Continue with current plan of care and progress as appropriate towards functional goals.   [x]  Upgrade activities as tolerated     [x]  Continue plan of care  []  Discharge due to:_  [] Other:_       Chas Owen, HARRY  8/23/2022    5:57 PM

## 2022-08-24 NOTE — PROGRESS NOTES
I have reviewed the notes, assessments, and/or procedures performed by Alexander Harvey PTA, I concur with her/his documentation of Jose De Jesus Gonzales.

## 2022-08-25 ENCOUNTER — OFFICE VISIT (OUTPATIENT)
Dept: ORTHOPEDIC SURGERY | Age: 77
End: 2022-08-25
Payer: MEDICARE

## 2022-08-25 DIAGNOSIS — M54.16 LUMBAR BACK PAIN WITH RADICULOPATHY AFFECTING RIGHT LOWER EXTREMITY: Primary | ICD-10-CM

## 2022-08-25 DIAGNOSIS — R26.2 DIFFICULTY WALKING: ICD-10-CM

## 2022-08-25 PROCEDURE — 97140 MANUAL THERAPY 1/> REGIONS: CPT

## 2022-08-25 PROCEDURE — 97110 THERAPEUTIC EXERCISES: CPT

## 2022-08-25 NOTE — PROGRESS NOTES
Patient Name: Jyothi Azevedo  Date:2022  : 1945  [x]  Patient  Verified  Payor: Lissett Meera / Plan: [place] & [place] / Product Type: Medicare /    Total Treatment Time (min): 60  1:1 Treatment Time ( [address] only): 39   Referring provider: Palak Amato MD     1. Lumbar back pain with radiculopathy affecting right lower extremity  2. Difficulty walking    SUBJECTIVE    Patient reports his back pain is improving, but he still has difficulty with transfers or walking long distances. OBJECTIVE    MOLBPDQ 36%    Modality:   []  E-Stim: type _ x _ min     []att   []unatt   []w/US   []w/ice   []w/heat  []  Ultrasound: []cont   []pulse    _ W/cm2 x _  min   []1MHz   []3MHz  [x]  Ice pack _  Post       []  Hot pack _  Pre       []  Other:    Man: 15 min  Sacral mob/float techniques performed while patient in left side-lying. Myofascial techniques to the lumbar paraspinals, QL, and piriformis performed while in side-lying as well. Gentle R LE long axis traction and MFR to anterior thigh/hip. NMR:  min  Neuromuscular reeducation/proprioceptive training listed in exercise below. Ex:  25 min  Therapeutic exercise/strength/endurance completed here in clinic today per the exercise log. Exercises modified per subjective reports. PT Exercise Log         EXERCISE 2022   Nu-step 10'   Slant board y   Hip flexor passive exercise y   DKTC w/ball HOLD   Sliders stand L y   Hip ER with ppt grn   A-P potstirrer org   Tandem stance y                                                    Ex: 25 min  Man: 15 min  NMR:  min    ASSESSMENT  [x]  See Plan of Care  [x]  Patient will continue to benefit from skilled therapy to address remaining functional deficits:     He continues to be stiff/weak with functional transfers and bed mobility and has limited tolerance to walking community distances.      PLAN  Continue with current plan of care and progress as appropriate towards functional goals.  [x]  Upgrade activities as tolerated     [x]  Continue plan of care  []  Discharge due to:_  [] Other:_       Chas Owen PTA  8/25/2022    5:57 PM

## 2022-08-25 NOTE — PROGRESS NOTES
I have reviewed the notes, assessments, and/or procedures performed by Kassandra Mcguire PTA, I concur with her/his documentation of Candice Juarez.

## 2022-09-26 ENCOUNTER — OFFICE VISIT (OUTPATIENT)
Dept: ORTHOPEDIC SURGERY | Age: 77
End: 2022-09-26
Payer: MEDICARE

## 2022-09-26 VITALS — BODY MASS INDEX: 26.66 KG/M2 | WEIGHT: 180 LBS | HEIGHT: 69 IN

## 2022-09-26 DIAGNOSIS — M17.12 PRIMARY OSTEOARTHRITIS OF LEFT KNEE: Primary | ICD-10-CM

## 2022-09-26 PROCEDURE — 3017F COLORECTAL CA SCREEN DOC REV: CPT | Performed by: ORTHOPAEDIC SURGERY

## 2022-09-26 PROCEDURE — 99214 OFFICE O/P EST MOD 30 MIN: CPT | Performed by: ORTHOPAEDIC SURGERY

## 2022-09-26 PROCEDURE — 1101F PT FALLS ASSESS-DOCD LE1/YR: CPT | Performed by: ORTHOPAEDIC SURGERY

## 2022-09-26 PROCEDURE — G8417 CALC BMI ABV UP PARAM F/U: HCPCS | Performed by: ORTHOPAEDIC SURGERY

## 2022-09-26 PROCEDURE — G8427 DOCREV CUR MEDS BY ELIG CLIN: HCPCS | Performed by: ORTHOPAEDIC SURGERY

## 2022-09-26 PROCEDURE — G8432 DEP SCR NOT DOC, RNG: HCPCS | Performed by: ORTHOPAEDIC SURGERY

## 2022-09-26 PROCEDURE — G8536 NO DOC ELDER MAL SCRN: HCPCS | Performed by: ORTHOPAEDIC SURGERY

## 2022-09-26 PROCEDURE — 1123F ACP DISCUSS/DSCN MKR DOCD: CPT | Performed by: ORTHOPAEDIC SURGERY

## 2022-09-26 NOTE — PROGRESS NOTES
Burak Castano (: 1945) is a 78 y.o. male, patient, here for evaluation of the following chief complaint(s):  Knee Pain (Left )       SUBJECTIVE/OBJECTIVE:  Burak Castano presents today for persistent left knee pain. Known severe osteoarthritis. Jerrod Chen gave him a gel 1 injection about 8 weeks ago which did not help at all. About 8 weeks before that he had a corticosteroid injection at Ortho on-call, which only provided mild improvement of symptoms for a few weeks. He is miserable. He only has a little bit of aching after activities, but he has severe sharp stabbing pain frequently throughout his day. Always occurs with simultaneous weightbearing and twisting. He is unable to do much of anything. Difficulty with stairs. Does not have wakening night pain. Cannot take anti-inflammatories due to history of coronary disease. He has been through extensive physical therapy. Right total knee by me 5 years ago continues to do well. PHYSICAL EXAM:  Vitals: Ht 5' 9\" (1.753 m)   Wt 180 lb (81.6 kg)   BMI 26.58 kg/m²   Body mass index is 26.58 kg/m². 78y.o. year old M, no distress. Antalgic gait on the left. Pain-free motion left hip. Negative Stinchfield. Left knee is in mild varus. Tender medial joint line. No effusion. Full extension with flexion limited to approximately 100 degrees. Patella tracks centrally. Mild pseudolaxity present, otherwise no instability. 1+ symmetrical distal edema. No motor or sensory deficits. Symmetrical 2+ dorsalis pedis pulses. IMAGING:  Radiographs: Left knee x-rays from early August of this year demonstrate severe osteoarthritis with complete loss of medial compartment joint space and moderate loss patellofemoral space. ASSESSMENT/PLAN:  1. Primary osteoarthritis of left knee    Severe osteoarthritis left knee. We discussed continued conservative treatment measures versus left total knee replacement.   Symptoms have progressed despite comprehensive conservative treatment measures outlined above and he desires to proceed with left total knee replacement. We discussed risks, benefits, and alternatives in detail, as well as anticipated hospital stay and course of rehabilitation. He understands increased risk for perioperative medical complications due to coronary history. He will see his primary care physician prior to surgery. He will be seeing his cardiologist for routine follow-up this week. All questions answered. Plan to use topical tranexamic acid intraoperatively, aspirin for DVT prophylaxis. Return for surgery. Review Of Systems  ROS    Positive for: Musculoskeletal  Last edited by Jose Alberto Romero on 9/26/2022  8:43 AM.         Patient denies any recent fever, chills, nausea, vomiting, chest pain, or shortness of breath. Allergies   Allergen Reactions    Wasp [Venom-Wasp] Anaphylaxis and Swelling    Sulfa (Sulfonamide Antibiotics) Other (comments)     Lower extremity redness       Current Outpatient Medications   Medication Sig    amLODIPine (NORVASC) 5 mg tablet Take 5 mg by mouth nightly. cyanocobalamin, vitamin B-12, 5,000 mcg subl 1 Tab by SubLINGual route daily. gabapentin (NEURONTIN) 300 mg capsule Take 300 mg by mouth as needed for Pain. aspirin delayed-release 81 mg tablet Take 81 mg by mouth daily. atorvastatin (LIPITOR) 40 mg tablet Take 20 mg by mouth nightly. enalapril (VASOTEC) 10 mg tablet Take 10 mg by mouth nightly. levothyroxine (SYNTHROID) 200 mcg tablet Take 200 mcg by mouth. Daily 6 days a week, Mon, Tues, Wed, Thurs, Fri ,Sun    EPINEPHrine (EPIPEN) 0.3 mg/0.3 mL injection 0.3 mg by IntraMUSCular route once as needed. sildenafil (REVATIO) 20 mg tablet Take 20 mg by mouth as needed. furosemide (LASIX) 20 mg tablet Take 20 mg by mouth as needed. TAKES 4 TIMES A WEEK NORMALLY     No current facility-administered medications for this visit.        Past Medical History:   Diagnosis Date    Arthritis     lower back    CAD (coronary artery disease)     stent x 1 2001 LDA    Chronic lower back pain     Dr Rehman Lotus     Chronic pain     right knee - resolved with knee replacement 4/2017    Hypercholesteremia     Hypertension     Hypothyroidism     Ill-defined condition     Bleeding prolonged    Skin cancer 2020      RLEG   L FLANK AREA    Sun-damaged skin         Past Surgical History:   Procedure Laterality Date    COLONOSCOPY N/A 10/20/2017    COLONOSCOPY performed by Ginger Coffey MD at [facility] AMBULAT[facility] OR    HX CATARACT REMOVAL Right 02/2017    HX CORONARY STENT PLACEMENT  2000    Dr Diana Awad     GI      COLONOSCOPY    HX HEART CATHETERIZATION  2001    HX HERNIA REPAIR  approx 2010    @ [facility]    HX LUMBAR FUSION  2006     L3-L4 fusion    HX TONSILLECTOMY  age 13    WI CARDIAC SURG PROCEDURE UNLIST      1 STENT    WI COLON CA SCRN NOT  [address]  10/20/2017         WI TOTAL KNEE ARTHROPLASTY Right 04/2017       Family History   Problem Relation Age of Onset    Hypertension Mother     Heart Disease Father     No Known Problems Sister     No Known Problems Brother     Anesth Problems Neg Hx         Social History     Socioeconomic History    Marital status:      Spouse name: Not on file    Number of children: Not on file    Years of education: Not on file    Highest education level: Not on file   Occupational History    Not on file   Tobacco Use    Smoking status: Never    Smokeless tobacco: Never   Substance and Sexual Activity    Alcohol use:  Yes     Alcohol/week: 3.0 standard drinks     Types: 3 Shots of liquor per week    Drug use: No    Sexual activity: Not on file   Other Topics Concern    Not on file   Social History Narrative    Not on file     Social Determinants of Health     Financial Resource Strain: Not on file   Food Insecurity: Not on file   Transportation Needs: Not on file   Physical Activity: Not on file   Stress: Not on file Social Connections: Not on file   Intimate Partner Violence: Not on file   Housing Stability: Not on file       No orders of the defined types were placed in this encounter. An electronic signature was used to authenticate this note.   -- Grace Sexton MD

## 2022-09-26 NOTE — LETTER
2022    Patient: Candice Juarez   YOB: 1945   Date of Visit: 2022     Chris Scott MD  [address] [place] [address]  [address] [phone]  Via Fax: [phone]    Dear Chris Scott MD,      Thank you for referring Mr. Melvin Sun to [facility] for evaluation. My notes for this consultation are attached. If you have questions, please do not hesitate to call me. I look forward to following your patient along with you.       Sincerely,    Ced Freeman MD

## 2022-09-27 DIAGNOSIS — M17.12 PRIMARY OSTEOARTHRITIS OF LEFT KNEE: Primary | ICD-10-CM

## 2022-10-06 LAB
HBA1C MFR BLD HPLC: 5.9 %
PSA, EXTERNAL: 0.9

## 2022-10-20 ENCOUNTER — HOSPITAL ENCOUNTER (OUTPATIENT)
Dept: PREADMISSION TESTING | Age: 77
Discharge: HOME | End: 2022-10-20
Payer: MEDICARE

## 2022-10-20 VITALS
BODY MASS INDEX: 28.47 KG/M2 | HEIGHT: 69 IN | SYSTOLIC BLOOD PRESSURE: 144 MMHG | WEIGHT: 192.24 LBS | DIASTOLIC BLOOD PRESSURE: 83 MMHG | HEART RATE: 59 BPM | RESPIRATION RATE: 18 BRPM | TEMPERATURE: 97.4 F

## 2022-10-20 LAB
ABO + RH BLD: NORMAL
ANION GAP SERPL CALC-SCNC: 2 MMOL/L (ref 5–15)
APPEARANCE UR: CLEAR
BACTERIA URNS QL MICRO: NEGATIVE /HPF
BILIRUB UR QL: NEGATIVE
BLOOD GROUP ANTIBODIES SERPL: NORMAL
BUN SERPL-MCNC: 15 MG/DL (ref 6–20)
BUN/CREAT SERPL: 14 (ref 12–20)
CALCIUM SERPL-MCNC: 9.1 MG/DL (ref 8.5–10.1)
CHLORIDE SERPL-SCNC: 110 MMOL/L (ref 97–108)
CO2 SERPL-SCNC: 31 MMOL/L (ref 21–32)
COLOR UR: NORMAL
CREAT SERPL-MCNC: 1.08 MG/DL (ref 0.7–1.3)
EPITH CASTS URNS QL MICRO: NORMAL /LPF
ERYTHROCYTE [DISTWIDTH] IN BLOOD BY AUTOMATED COUNT: 13.3 % (ref 11.5–14.5)
EST. AVERAGE GLUCOSE BLD GHB EST-MCNC: 126 MG/DL
GLUCOSE SERPL-MCNC: 82 MG/DL (ref 65–100)
GLUCOSE UR STRIP.AUTO-MCNC: NEGATIVE MG/DL
HBA1C MFR BLD: 6 % (ref 4–5.6)
HCT VFR BLD AUTO: 47.8 % (ref 36.6–50.3)
HGB BLD-MCNC: 15.5 G/DL (ref 12.1–17)
HGB UR QL STRIP: NEGATIVE
HYALINE CASTS URNS QL MICRO: NORMAL /LPF (ref 0–5)
INR PPP: 1 (ref 0.9–1.1)
KETONES UR QL STRIP.AUTO: NEGATIVE MG/DL
LEUKOCYTE ESTERASE UR QL STRIP.AUTO: NEGATIVE
MCH RBC QN AUTO: 32.1 PG (ref 26–34)
MCHC RBC AUTO-ENTMCNC: 32.4 G/DL (ref 30–36.5)
MCV RBC AUTO: 99 FL (ref 80–99)
NITRITE UR QL STRIP.AUTO: NEGATIVE
NRBC # BLD: 0 K/UL (ref 0–0.01)
NRBC BLD-RTO: 0 PER 100 WBC
PH UR STRIP: 7.5 [PH] (ref 5–8)
PLATELET # BLD AUTO: 260 K/UL (ref 150–400)
PMV BLD AUTO: 9.9 FL (ref 8.9–12.9)
POTASSIUM SERPL-SCNC: 5.2 MMOL/L (ref 3.5–5.1)
PROT UR STRIP-MCNC: NEGATIVE MG/DL
PROTHROMBIN TIME: 10.5 SEC (ref 9–11.1)
RBC # BLD AUTO: 4.83 M/UL (ref 4.1–5.7)
RBC #/AREA URNS HPF: NORMAL /HPF (ref 0–5)
SODIUM SERPL-SCNC: 143 MMOL/L (ref 136–145)
SP GR UR REFRACTOMETRY: 1.01 (ref 1–1.03)
SPECIMEN EXP DATE BLD: NORMAL
UA: UC IF INDICATED,UAUC: NORMAL
UROBILINOGEN UR QL STRIP.AUTO: 0.2 EU/DL (ref 0.2–1)
WBC # BLD AUTO: 3.8 K/UL (ref 4.1–11.1)
WBC URNS QL MICRO: NORMAL /HPF (ref 0–4)

## 2022-10-20 PROCEDURE — 80048 BASIC METABOLIC PNL TOTAL CA: CPT

## 2022-10-20 PROCEDURE — 36415 COLL VENOUS BLD VENIPUNCTURE: CPT

## 2022-10-20 PROCEDURE — 85027 COMPLETE CBC AUTOMATED: CPT

## 2022-10-20 PROCEDURE — 81001 URINALYSIS AUTO W/SCOPE: CPT

## 2022-10-20 PROCEDURE — 86900 BLOOD TYPING SEROLOGIC ABO: CPT

## 2022-10-20 PROCEDURE — 85610 PROTHROMBIN TIME: CPT

## 2022-10-20 PROCEDURE — 83036 HEMOGLOBIN GLYCOSYLATED A1C: CPT

## 2022-10-20 RX ORDER — LANOLIN ALCOHOL/MO/W.PET/CERES
400 CREAM (GRAM) TOPICAL EVERY EVENING
COMMUNITY

## 2022-10-20 RX ORDER — GLUCOSAMINE HCL 500 MG
75 TABLET ORAL DAILY
COMMUNITY

## 2022-10-20 RX ORDER — ASCORBIC ACID 250 MG
250 TABLET ORAL DAILY
COMMUNITY

## 2022-10-20 NOTE — PERIOP NOTES
PT SHOWED NURSE PICTURE OF COVID VACCINE CARD AND NURSE VERIFIED IN CC. CALLED DR CAMARGO'S OFFICE AT [facility] TO REQUEST OFFICE VISIT NOTES AND EKG. THEY WILL FAX. RECEIVED AND PLACED ON CHART.

## 2022-10-20 NOTE — PERIOP NOTES
[facility] INSTRUCTIONS  ORTHOPAEDIC    Surgery Date:   10/27/22    Your surgeon's office or [facility] staff will call you between 4 PM- 8 PM the day before surgery with your arrival time. If your surgery is on a Monday, you will receive a call the preceding Friday. Please report to [facility] Patient Access/Admitting on the 1st floor. Bring your insurance card, photo identification, and any copayment (if applicable). If you are going home the same day of your surgery, you must have a responsible adult to drive you home. You need to have a responsible adult to stay with you the first 24 hours after surgery and you should not drive a car for 24 hours following your surgery. Do NOT eat any solid foods after midnight the night before surgery including candy, mints or gum. You may drink clear liquids from midnight until 1 hour prior to arrival time. You may drink up to 12 ounces at one time every 4 hours. Do NOT drink alcohol or smoke 24 hours before surgery. STOP smoking for 14 days prior as it helps with breathing and healing after surgery. If your arrival time is 3pm or later, you may eat a light breakfast before 8am (toast, bagel-no butter, black coffee, plain tea, fruit juice-no pulp) Please note special instructions, if applicable, below for medications. If you are being admitted to the hospital,please leave personal belongings/luggage in your car until you have an assigned hospital room number. Please wear comfortable clothes. Wear your glasses instead of contacts. We ask that all money, jewelry and valuables be left at home. Wear no make up, particularly mascara, the day of surgery. All body piercings, rings, and jewelry need to be removed and left at home. Please remove any nail polish or artificial nails from your fingernails. Please wear your hair loose or down. Please no pony-tails, buns, or any metal hair accessories.  If you shower the morning of surgery, please do not apply any lotions or powders afterwards. You may wear deodorant. Do not shave any body area within 24 hours of your surgery. Please follow all instructions to avoid any potential surgical cancellation. Should your physical condition change, (i.e. fever, cold, flu, etc.) please notify your surgeon as soon as possible. It is important to be on time. If a situation occurs where you may be delayed, please call:  [phone] / [phone] on the day of surgery. The Preadmission Testing staff can be reached at [phone]. Special instructions: [address] DAY OF SURGERY    Current Outpatient Medications   Medication Sig    ascorbic acid, vitamin C, (Vitamin C) 250 mg tablet Take 250 mg by mouth daily. Cholecalciferol, Vitamin D3, 75 mcg (3,000 unit) tab Take 75 mcg by mouth daily. magnesium oxide (MAG-OX) 400 mg tablet Take 400 mg by mouth every evening. amLODIPine (NORVASC) 5 mg tablet Take 2.5 mg by mouth nightly. cyanocobalamin, vitamin B-12, 5,000 mcg subl 1 Tab by SubLINGual route daily. gabapentin (NEURONTIN) 300 mg capsule Take 300 mg by mouth three (3) times daily as needed for Pain. aspirin delayed-release 81 mg tablet Take 81 mg by mouth daily. atorvastatin (LIPITOR) 40 mg tablet Take 20 mg by mouth nightly. enalapril (VASOTEC) 10 mg tablet Take 20 mg by mouth nightly. levothyroxine (SYNTHROID) 200 mcg tablet Take 200 mcg by mouth. Daily 6 days a week, Mon, Tues, Wed, Thurs, Fri ,Sun    EPINEPHrine (EPIPEN) 0.3 mg/0.3 mL injection 0.3 mg by IntraMUSCular route once as needed. furosemide (LASIX) 20 mg tablet Take 40 mg by mouth as needed. AS NEEDED FOR SWELLING     No current facility-administered medications for this encounter.        YOU MUST ONLY TAKE THESE MEDICATIONS THE MORNING OF SURGERY WITH A SIP OF WATER: LEVOTHYROXINE  MEDICATIONS TO TAKE THE MORNING OF SURGERY ONLY IF NEEDED: GABAPENTIN  HOLD these prescription medications BEFORE Surgery: DO NOT TAKE FUROSEMIDE DAY OF SURGERY  Ask your surgeon/prescribing physician about when/if to STOP taking these medications: ASPIRIN  Stop any non-steroidal anti-inflammatory drugs (i.e. Ibuprofen, Naproxen, Advil, Aleve) 3 days before surgery. You may take Tylenol. STOP all vitamins and herbal supplements 1 week prior to  surgery. If you are currently taking Plavix, Coumadin, or any other blood-thinning/anticoagulant medication contact your prescribing physician for instructions. Preventing Infections Before and After - Your Surgery    IMPORTANT INSTRUCTIONS    You play an important role in your health and preparation for surgery. To reduce the germs on your skin you will need to shower with CHG soap (Chorhexidine gluconate 4%) two times before surgery. CHG soap (Hibiclens, Hex-A-Clens or store brand)  CHG soap will be provided at your Preadmission Testing (PAT) appointment. If you do not have a PAT appointment before surgery, you may arrange to  CHG soap from our office or purchase CHG soap at a pharmacy, grocery or department store. You need to purchase TWO 4 ounce bottles to use for your 2 showers. Steps to follow:  Fredrich Pen your hair with your normal shampoo and your body with regular soap and rinse well to remove shampoo and soap from your skin. Wet a clean washcloth and turn off the shower. Put CHG soap on washcloth and apply to your entire body from the neck down. Do not use on your head, face or private parts(genitals). Do not use CHG soap on open sores, wounds or areas of skin irritation. Wash you body gently for 5 minutes. Do not wash your skin too hard. This soap does not create lather. Pay special attention to your underarms and from your belly button to your feet. Turn the shower back on and rinse well to get CHG soap off your body. Pat your skin dry with a clean, dry towel. Do not apply lotions or moisturizer.   Put on clean clothes and sleep on fresh bed sheets and do not allow pets to sleep with you. Shower with CHG soap 2 times before your surgery  The evening before your surgery  The morning of your surgery      Tips to help prevent infections after your surgery:  Protect your surgical wound from germs:  Hand washing is the most important thing you and your caregivers can do to prevent infections. Keep your bandage clean and dry! Do not touch your surgical wound. Use clean, freshly washed towels and washcloths every time you shower; do not share bath linens with others. Until your surgical wound is healed, wear clothing and sleep on bed linens each day that are clean and freshly washed. Do not allow pets to sleep in your bed with you or touch your surgical wound. Do not smoke - smoking delays wound healing. This may be a good time to stop smoking. If you have diabetes, it is important for you to manage your blood sugar levels properly before your surgery as well as after your surgery. Poorly managed blood sugar levels slow down wound healing and prevent you from healing completely. Prevention of Infection  Testing for Staphylococcus aureus on your skin before surgery    Staphylococcus aureus (staph) is a common bacteria that is found on the body. It normally does not cause infection on healthy skin. Before surgery, you will be tested to see if you have staph by swabbing the inside of your nose. When you have an incision with surgery, the goal is to protect that incision from infection. Removal of the staph bacteria before surgery can decrease the risk of a surgical site infection. If your nose swab is positive for staph you will be called. Your treatment will include 2 steps:  Prescription for Mupirocin ointment to be used in each nostril twice a day for 5 days. Showering with Chlorhexidine (CHG) liquid soap for 5 days prior to surgery. How to use Mupirocin ointment in your nose   the prescription from your pharmacy.  You will receive a large tube of ointment which will be big enough for all of your treatments. You will apply this ointment to each nostril 2 times a day for 5 days. Wash your hands with  gel or soap and water for 20 seconds before using ointment. Place a pea-sized amount of ointment on a cotton Q-tip. Apply ointment just inside of each nostril with the Q-tip. Do not push Q-tip or ointment deep inside you nose. Press your nostrils together and massage for a few seconds. Wash your hands with  gel or soap and water after you are finished. Do not get ointment near your eyes. If it gets into your eyes, rinse them with cool water. If you need to use nasal spray, clean the tip of the bottle with alcohol before use and do not use both at the same time. If you are scheduled for COVID testing during the 5 days, do NOT apply morning dose until after the COVID test has been performed. How to use Chlorhexidine (CHG) 4% liquid soap  Purchase an 8 ounce bottle of CHG liquid soap (Chlorhexidine 4%, Hibiclens, Hex-A-Clens or store brand) at a pharmacy or grocery store. Wash your hair with your normal shampoo and your body with regular soap and rinse well to remove shampoo and soap from your skin. Wet a clean washcloth and turn off the shower. Put CHG soap on washcloth and apply to your entire body from the neck down. Do not use on your head, face or private parts(genitals). Do not use CHG soap on open sores, wounds or areas of skin irritation. Wash your body gently for 5 minutes. Do not wash your skin too hard. This soap does not create lather. Pay special attention to your underarms and from your belly button to your feet. Turn the shower back on and rinse well to get CHG soap off your body. Pat your skin dry with a clean, dry towel. Do not apply lotions or moisturizer. Put on clean clothes and sleep on fresh bed sheets the night before surgery. Do not allow pets to sleep with you.     Eating and Drinking Before Surgery    You may eat a regular dinner at the usual time on the day before your surgery. Do NOT eat any solid foods after midnight unless your arrival time at the hospital is 3pm or later. You may drink clear liquids only from 12 midnight until 1 hours prior to your arrival time at the hospital on the day of your surgery. Do NOT drink alcohol. Clear liquids include:  Water  Fruit juices without pulp( i.e. apple juice)  Carbonated beverages  Black coffee (no cream/milk)  Tea (no cream/milk)  Gatorade  You may drink up to 12-16 ounces at one time every 4 hours between the hours of midnight and 1 hour before your arrival time at the hospital. Example- if your arrival time at the hospital is 6am, you may drink 12-16 ounces of clear liquids no later than 5am.  If your arrival time at the hospital is 3pm or later, you may eat a light breakfast before 8am.  A light breakfast includes: Toast or bagel (no butter)  Black coffee (no cream/milk)  Tea (no cream/milk)  Fruit juices without pulp ( i.e. apple juice)  Do NOT eat meat, eggs, vegetables or fruit  If you have any questions, please contact your surgeon's office. Patient Information Regarding COVID Restrictions    Day of Procedure    Please park in the parking deck or any designated visitor parking lot. Enter the facility through the Main Entrance of the hospital.  On the day of surgery, please provide the cell phone number of the person who will be waiting for you to the Patient Access representative at the time of registration. Masks are highly recommended in the hospital, but not required. Once your procedure and the immediate recovery period is completed, a nurse in the recovery area will contact your designated visitor to inform them of your room number or to otherwise review other pertinent information regarding your care. Social distancing practices are strongly encouraged in waiting areas and the cafeteria. The patient was contacted in person.    She verbalized understanding of all instructions does not  need reinforcement.

## 2022-10-21 NOTE — PERIOP NOTES
Tere Patterson [facility](1945) was seen at [address] [place] [address] on 10/20/22. They have surgery scheduled with Dr. Alexei Rangel on 10/27/22. The results of their VERONIKA STOP-BANG Scoring Tool indicates the potential need for a referral to sleep medicine. Results forwarded to PCP for follow-up/further recommendations regarding evaluation for sleep apnea. VERONIKA STOP-BANG Scoring Tool    [x] Does the patient snore loud enough to be heard through a closed door? [x] Does the patient often feel tired, fatigued or sleep during the daytime or after a \"good\" night's sleep? [] Has anyone observed the patient stop breathing during their sleep? [x] Does the patient have or are they treated for HTN? [] Is the patient's BMI >35? [] Is the patient's neck size >17\"(male) or >16\"(female)? [x] Is the patient older than 48? [x] Is the patient male?     VERONIKA Risk Score: [address] [facility], [place]

## 2022-10-21 NOTE — PERIOP NOTES
PAT Nurse Practitioner   Pre-Operative Chart Review/Assessment:-ORTHOPEDIC                Patient Name:  Benjamin Norris                                                           Age:   78 y.o.    :  1945     Today's Date:  10/24/2022     Date of PAT:   10/20/22      Date of Surgery:    10/27/22      Procedure(s):  Left Total Knee Arthroplasty     Surgeon:   Dr. Krystian Bowie:      1)  Medical Clearance/PCP:  Eileen Black MD      2)  Cardiac Clearance:  Pt followed by Dr. Thakkar([facility]). LOV  22. Condition stable, no changes made to current regimen. OV note and EKG on chart and scanned under media. 3)  Diabetic Treatment Consult:  Not indicated. A1c-6.0      4)  Sleep Apnea evaluation:   VERONIKA score of 5. Pt reports loud snoring that can be heard through a closed door, daytime fatigue, and a diagnosis of HTN. Pt denies witnessed pauses in breathing. Referral sent to PCP for F/U and recommendations.         5) Treatment for MRSA/Staph Aureus:  Neg      6) Additional Concerns:  HTN, CAD s/p stent, hx of lumbar fusion                Vital Signs:         Vitals:    10/20/22 1108   BP: (!) 144/83   Pulse: (!) 59   Resp: 18   Temp: 97.4 °F (36.3 °C)   Weight: 87.2 kg (192 lb 3.9 oz)   Height: 5' 9\" (1.753 m)          Body mass index is 28.39 kg/m².         ____________________________________________  PAST MEDICAL HISTORY  Past Medical History:   Diagnosis Date    Arthritis     lower back    CAD (coronary artery disease)     stent x 1  LDA    Chronic lower back pain     Dr Howard Vieyra     Chronic pain     right knee - resolved with knee replacement 2017    Chronic pain     left knee    Hypercholesteremia     Hypertension     Hypothyroidism     Ill-defined condition     Bleeding prolonged    Skin cancer       RLEG   L FLANK AREA    Sun-damaged skin       ____________________________________________  PAST SURGICAL HISTORY  Past Surgical History:   Procedure Laterality Date COLONOSCOPY N/A 10/20/2017    COLONOSCOPY performed by Allen Chapman MD at [facility] AMBULATORY OR    HX CATARACT REMOVAL Right 02/2017    HX CORONARY STENT PLACEMENT  2000    Dr Sarah Kirkland  2001    HX HERNIA REPAIR  approx 2010    @ [facility] AND [facility]    HX LUMBAR FUSION  2006    L3-L4 fusion 2006, L2-S1 fusion 12/2020    HX TONSILLECTOMY  age 13    HX WISDOM TEETH EXTRACTION      RI CARDIAC SURG PROCEDURE UNLIST      1 STENT    RI COLON CA SCRN NOT  [address]  10/20/2017         RI TOTAL KNEE ARTHROPLASTY Right 04/2017      ____________________________________________  HOME MEDICATIONS  Current Outpatient Medications   Medication Sig    ascorbic acid, vitamin C, (Vitamin C) 250 mg tablet Take 250 mg by mouth daily. Cholecalciferol, Vitamin D3, 75 mcg (3,000 unit) tab Take 75 mcg by mouth daily. magnesium oxide (MAG-OX) 400 mg tablet Take 400 mg by mouth every evening. amLODIPine (NORVASC) 5 mg tablet Take 2.5 mg by mouth nightly. cyanocobalamin, vitamin B-12, 5,000 mcg subl 1 Tab by SubLINGual route daily. gabapentin (NEURONTIN) 300 mg capsule Take 300 mg by mouth three (3) times daily as needed for Pain. aspirin delayed-release 81 mg tablet Take 81 mg by mouth daily. atorvastatin (LIPITOR) 40 mg tablet Take 20 mg by mouth nightly. enalapril (VASOTEC) 10 mg tablet Take 20 mg by mouth nightly. levothyroxine (SYNTHROID) 200 mcg tablet Take 200 mcg by mouth. Daily 6 days a week, Mon, Tues, Wed, Thurs, Fri ,Sun    EPINEPHrine (EPIPEN) 0.3 mg/0.3 mL injection 0.3 mg by IntraMUSCular route once as needed. furosemide (LASIX) 20 mg tablet Take 40 mg by mouth as needed.  AS NEEDED FOR SWELLING     No current facility-administered medications for this encounter.      ____________________________________________  ALLERGIES  Allergies   Allergen Reactions    Wasp [Venom-Wasp] Anaphylaxis and Swelling    Sulfa (Sulfonamide Antibiotics) Other (comments)     Lower extremity redness      ____________________________________________  SOCIAL HISTORY  Social History     Tobacco Use    Smoking status: Never    Smokeless tobacco: Never   Substance Use Topics    Alcohol use: Yes     Alcohol/week: 6.0 standard drinks     Types: 6 Shots of liquor per week      ____________________________________________   Internal Administration   First Dose COVID-19, MODERNA BLUE border, Primary or Immunocompromised, (age 18y+), IM, 100 mcg/0.5mL  02/25/2021   Second Dose COVID-19, MODERNA BLUE border, Primary or Immunocompromised, (age 18y+), IM, 100 mcg/0.5mL  03/26/2021      Last COVID Lab SARS-CoV-2 ( )   Date Value   11/28/2020 Not Detected                    Labs:     Hospital Outpatient Visit on 10/20/2022   Component Date Value Ref Range Status    Sodium 10/20/2022 143  136 - 145 mmol/L Final    Potassium 10/20/2022 5.2 (A)  3.5 - 5.1 mmol/L Final    Chloride 10/20/2022 110 (A)  97 - 108 mmol/L Final    CO2 10/20/2022 31  21 - 32 mmol/L Final    Anion gap 10/20/2022 2 (A)  5 - 15 mmol/L Final    Glucose 10/20/2022 82  65 - 100 mg/dL Final    BUN 10/20/2022 15  6 - 20 MG/DL Final    Creatinine 10/20/2022 1.08  0.70 - 1.30 MG/DL Final    BUN/Creatinine ratio 10/20/2022 14  12 - 20   Final    eGFR 10/20/2022 >60  >60 ml/min/1.73m2 Final    Comment:      Pediatric calculator link: [employer].at. org/professionals/kdoqi/gfr_calculatorped       Effective Oct 3, 2022       These results are not intended for use in patients <25years of age. eGFR results are calculated without a race factor using  the 2021 CKD-EPI equation. Careful clinical correlation is recommended, particularly when comparing to results calculated using previous equations. The CKD-EPI equation is less accurate in patients with extremes of muscle mass, extra-renal metabolism of creatinine, excessive creatine ingestion, or following therapy that affects renal tubular secretion.       Calcium 10/20/2022 9.1  8.5 - 10.1 MG/DL Final    WBC 10/20/2022 3.8 (A)  4.1 - 11.1 K/uL Final    RBC 10/20/2022 4.83  4.10 - 5.70 M/uL Final    HGB 10/20/2022 15.5  12.1 - 17.0 g/dL Final    HCT 10/20/2022 47.8  36.6 - 50.3 % Final    MCV 10/20/2022 99.0  80.0 - 99.0 FL Final    MCH 10/20/2022 32.1  26.0 - 34.0 PG Final    MCHC 10/20/2022 32.4  30.0 - 36.5 g/dL Final    RDW 10/20/2022 13.3  11.5 - 14.5 % Final    PLATELET 60/92/9094 939  150 - 400 K/uL Final    MPV 10/20/2022 9.9  8.9 - 12.9 FL Final    NRBC 10/20/2022 0.0  0  WBC Final    ABSOLUTE NRBC 10/20/2022 0.00  0.00 - 0.01 K/uL Final    Crossmatch Expiration 10/20/2022 10/30/2022,2359   Final    ABO/Rh(D) 10/20/2022 O POSITIVE   Final    Antibody screen 10/20/2022 NEG   Final    INR 10/20/2022 1.0  0.9 - 1.1   Final    A single therapeutic range for Vit K antagonists may not be optimal for all indications - see June, 2008 issue of Chest, American College of Chest Physicians Evidence-Based Clinical Practice Guidelines, 8th Edition.     Prothrombin time 10/20/2022 10.5  9.0 - 11.1 sec Final    Color 10/20/2022 YELLOW/STRAW    Final    Color Reference Range: Straw, Yellow or Dark Yellow    Appearance 10/20/2022 CLEAR  CLEAR   Final    Specific gravity 10/20/2022 1.009  1.003 - 1.030   Final    pH (UA) 10/20/2022 7.5  5.0 - 8.0   Final    Protein 10/20/2022 Negative  NEG mg/dL Final    Glucose 10/20/2022 Negative  NEG mg/dL Final    Ketone 10/20/2022 Negative  NEG mg/dL Final    Bilirubin 10/20/2022 Negative  NEG   Final    Blood 10/20/2022 Negative  NEG   Final    Urobilinogen 10/20/2022 0.2  0.2 - 1.0 EU/dL Final    Nitrites 10/20/2022 Negative  NEG   Final    Leukocyte Esterase 10/20/2022 Negative  NEG   Final    UA:UC IF INDICATED 10/20/2022 CULTURE NOT INDICATED BY UA RESULT  CNI   Final    WBC 10/20/2022 0-4  0 - 4 /hpf Final    RBC 10/20/2022 0-5  0 - 5 /hpf Final    Epithelial cells 10/20/2022 FEW  FEW /lpf Final    Epithelial cell category consists of squamous cells and /or transitional urothelial cells. Renal tubular cells, if present, are separately identified as such. Bacteria 10/20/2022 Negative  NEG /hpf Final    Hyaline cast 10/20/2022 0-2  0 - 5 /lpf Final    Hemoglobin A1c 10/20/2022 6.0 (A)  4.0 - 5.6 % Final    Comment: NEW METHOD  PLEASE NOTE NEW REFERENCE RANGE  (NOTE)  HbA1C Interpretive Ranges  <5.7              Normal  5.7 - 6.4         Consider Prediabetes  >6.5              Consider Diabetes      Est. average glucose 10/20/2022 126  mg/dL Final    Special Requests: 10/20/2022 NO SPECIAL REQUESTS    Final    Culture result: 10/20/2022 MRSA NOT PRESENT    Final    Culture result: 10/20/2022     Final                    Value:Screening of patient nares for MRSA is for surveillance purposes and, if positive, to facilitate isolation considerations in high risk settings. It is not intended for automatic decolonization interventions per se as regimens are not sufficiently effective to warrant routine use. Skin:     Denies open wounds, cuts, sores, rashes or other areas of concern in PAT assessment.           Chago Sun NP  Available via [facility]

## 2022-10-22 LAB
BACTERIA SPEC CULT: NORMAL
BACTERIA SPEC CULT: NORMAL
SERVICE CMNT-IMP: NORMAL

## 2022-10-27 ENCOUNTER — ANESTHESIA (OUTPATIENT)
Dept: SURGERY | Age: 77
End: 2022-10-27
Payer: MEDICARE

## 2022-10-27 ENCOUNTER — ANESTHESIA EVENT (OUTPATIENT)
Dept: SURGERY | Age: 77
End: 2022-10-27
Payer: MEDICARE

## 2022-10-27 ENCOUNTER — HOSPITAL ENCOUNTER (OUTPATIENT)
Age: 77
Setting detail: OBSERVATION
Discharge: HOME HEALTH | End: 2022-10-29
Attending: ORTHOPAEDIC SURGERY | Admitting: ORTHOPAEDIC SURGERY
Payer: MEDICARE

## 2022-10-27 DIAGNOSIS — M17.12 PRIMARY OSTEOARTHRITIS OF LEFT KNEE: Primary | ICD-10-CM

## 2022-10-27 DIAGNOSIS — Z96.652 S/P TOTAL KNEE REPLACEMENT, LEFT: ICD-10-CM

## 2022-10-27 DIAGNOSIS — M17.12 PRIMARY OSTEOARTHRITIS OF LEFT KNEE: ICD-10-CM

## 2022-10-27 LAB
GLUCOSE BLD STRIP.AUTO-MCNC: 82 MG/DL (ref 65–117)
SERVICE CMNT-IMP: NORMAL

## 2022-10-27 PROCEDURE — 74011250636 HC RX REV CODE- 250/636: Performed by: NURSE ANESTHETIST, CERTIFIED REGISTERED

## 2022-10-27 PROCEDURE — 77030010507 HC ADH SKN DERMBND J&J -B: Performed by: ORTHOPAEDIC SURGERY

## 2022-10-27 PROCEDURE — 74011000250 HC RX REV CODE- 250

## 2022-10-27 PROCEDURE — 76210000016 HC OR PH I REC 1 TO 1.5 HR: Performed by: ORTHOPAEDIC SURGERY

## 2022-10-27 PROCEDURE — G0378 HOSPITAL OBSERVATION PER HR: HCPCS

## 2022-10-27 PROCEDURE — 2709999900 HC NON-CHARGEABLE SUPPLY: Performed by: ORTHOPAEDIC SURGERY

## 2022-10-27 PROCEDURE — 2709999900 HC NON-CHARGEABLE SUPPLY

## 2022-10-27 PROCEDURE — 74011250636 HC RX REV CODE- 250/636

## 2022-10-27 PROCEDURE — 74011000250 HC RX REV CODE- 250: Performed by: NURSE ANESTHETIST, CERTIFIED REGISTERED

## 2022-10-27 PROCEDURE — 74011000250 HC RX REV CODE- 250: Performed by: ANESTHESIOLOGY

## 2022-10-27 PROCEDURE — C1713 ANCHOR/SCREW BN/BN,TIS/BN: HCPCS | Performed by: ORTHOPAEDIC SURGERY

## 2022-10-27 PROCEDURE — 74011000258 HC RX REV CODE- 258

## 2022-10-27 PROCEDURE — 27447 TOTAL KNEE ARTHROPLASTY: CPT | Performed by: PHYSICIAN ASSISTANT

## 2022-10-27 PROCEDURE — 74011000250 HC RX REV CODE- 250: Performed by: PHYSICIAN ASSISTANT

## 2022-10-27 PROCEDURE — 76010000172 HC OR TIME 2.5 TO 3 HR INTENSV-TIER 1: Performed by: ORTHOPAEDIC SURGERY

## 2022-10-27 PROCEDURE — 77030007866 HC KT SPN ANES BBMI -B: Performed by: ANESTHESIOLOGY

## 2022-10-27 PROCEDURE — 74011250636 HC RX REV CODE- 250/636: Performed by: ANESTHESIOLOGY

## 2022-10-27 PROCEDURE — 77030006835 HC BLD SAW SAG STRY -B: Performed by: ORTHOPAEDIC SURGERY

## 2022-10-27 PROCEDURE — 74011250636 HC RX REV CODE- 250/636: Performed by: STUDENT IN AN ORGANIZED HEALTH CARE EDUCATION/TRAINING PROGRAM

## 2022-10-27 PROCEDURE — 77030031139 HC SUT VCRL2 J&J -A: Performed by: ORTHOPAEDIC SURGERY

## 2022-10-27 PROCEDURE — 51798 US URINE CAPACITY MEASURE: CPT

## 2022-10-27 PROCEDURE — 20985 CPTR-ASST DIR MS PX: CPT | Performed by: PHYSICIAN ASSISTANT

## 2022-10-27 PROCEDURE — C1776 JOINT DEVICE (IMPLANTABLE): HCPCS | Performed by: ORTHOPAEDIC SURGERY

## 2022-10-27 PROCEDURE — 77030019905 HC CATH URETH INTMIT MDII -A: Performed by: ORTHOPAEDIC SURGERY

## 2022-10-27 PROCEDURE — 76060000036 HC ANESTHESIA 2.5 TO 3 HR: Performed by: ORTHOPAEDIC SURGERY

## 2022-10-27 PROCEDURE — 77030002933 HC SUT MCRYL J&J -A: Performed by: ORTHOPAEDIC SURGERY

## 2022-10-27 PROCEDURE — 77030000032 HC CUF TRNQT ZIMM -B: Performed by: ORTHOPAEDIC SURGERY

## 2022-10-27 PROCEDURE — 77030033067 HC SUT PDO STRATFX SPIR J&J -B: Performed by: ORTHOPAEDIC SURGERY

## 2022-10-27 PROCEDURE — 77030039497 HC CST PAD STERILE CHCS -A: Performed by: ORTHOPAEDIC SURGERY

## 2022-10-27 PROCEDURE — 74011250636 HC RX REV CODE- 250/636: Performed by: PHYSICIAN ASSISTANT

## 2022-10-27 PROCEDURE — 77030020269 HC MISC IMPL: Performed by: ORTHOPAEDIC SURGERY

## 2022-10-27 PROCEDURE — 77030040750 HC INSTR NAV SYS DISP ORLN -G: Performed by: ORTHOPAEDIC SURGERY

## 2022-10-27 PROCEDURE — 82962 GLUCOSE BLOOD TEST: CPT

## 2022-10-27 PROCEDURE — 27447 TOTAL KNEE ARTHROPLASTY: CPT | Performed by: ORTHOPAEDIC SURGERY

## 2022-10-27 PROCEDURE — 77030006807 HC BLD SAW RECIP KMET -B: Performed by: ORTHOPAEDIC SURGERY

## 2022-10-27 PROCEDURE — 74011250637 HC RX REV CODE- 250/637: Performed by: PHYSICIAN ASSISTANT

## 2022-10-27 DEVICE — EMPOWR VVC KNEETM TIBIAL INSERT, SIZE 8, 14MM
Type: IMPLANTABLE DEVICE | Site: KNEE | Status: FUNCTIONAL
Brand: DJO SURGICAL

## 2022-10-27 DEVICE — DJO EMPOWR KNEETM, FIN BP, NP, 8L
Type: IMPLANTABLE DEVICE | Site: KNEE | Status: FUNCTIONAL
Brand: DJO SURGICAL

## 2022-10-27 DEVICE — DOMED TRI-PEG PATELLA, 32X8MM, E-PLUS
Type: IMPLANTABLE DEVICE | Site: KNEE | Status: FUNCTIONAL
Brand: DJO SURGICAL

## 2022-10-27 DEVICE — IMPL CAPPED KNEE K1 TOTAL/HEMI STD CEMENTED DJO: Type: IMPLANTABLE DEVICE | Status: FUNCTIONAL

## 2022-10-27 DEVICE — CEMENT BNE MED VISC 80 GM GENTAMICIN PALACOS MV+G PRO: Type: IMPLANTABLE DEVICE | Site: KNEE | Status: FUNCTIONAL

## 2022-10-27 DEVICE — EMPOWR PS KNEETM FEMUR, NONPOROUS, 7L
Type: IMPLANTABLE DEVICE | Site: KNEE | Status: FUNCTIONAL
Brand: DJO SURGICAL

## 2022-10-27 RX ORDER — ONDANSETRON 2 MG/ML
INJECTION INTRAMUSCULAR; INTRAVENOUS AS NEEDED
Status: DISCONTINUED | OUTPATIENT
Start: 2022-10-27 | End: 2022-10-27 | Stop reason: HOSPADM

## 2022-10-27 RX ORDER — PROPOFOL 10 MG/ML
INJECTION, EMULSION INTRAVENOUS
Status: DISCONTINUED | OUTPATIENT
Start: 2022-10-27 | End: 2022-10-27 | Stop reason: HOSPADM

## 2022-10-27 RX ORDER — SODIUM CHLORIDE, SODIUM LACTATE, POTASSIUM CHLORIDE, CALCIUM CHLORIDE 600; 310; 30; 20 MG/100ML; MG/100ML; MG/100ML; MG/100ML
INJECTION, SOLUTION INTRAVENOUS
Status: DISCONTINUED | OUTPATIENT
Start: 2022-10-27 | End: 2022-10-27 | Stop reason: HOSPADM

## 2022-10-27 RX ORDER — SODIUM CHLORIDE 0.9 % (FLUSH) 0.9 %
5-40 SYRINGE (ML) INJECTION AS NEEDED
Status: DISCONTINUED | OUTPATIENT
Start: 2022-10-27 | End: 2022-10-27 | Stop reason: HOSPADM

## 2022-10-27 RX ORDER — ONDANSETRON 2 MG/ML
4 INJECTION INTRAMUSCULAR; INTRAVENOUS AS NEEDED
Status: DISCONTINUED | OUTPATIENT
Start: 2022-10-27 | End: 2022-10-27 | Stop reason: HOSPADM

## 2022-10-27 RX ORDER — FACIAL-BODY WIPES
10 EACH TOPICAL DAILY PRN
Status: DISCONTINUED | OUTPATIENT
Start: 2022-10-28 | End: 2022-10-30 | Stop reason: HOSPADM

## 2022-10-27 RX ORDER — OXYCODONE HYDROCHLORIDE 5 MG/1
5 TABLET ORAL
Status: DISCONTINUED | OUTPATIENT
Start: 2022-10-27 | End: 2022-10-28

## 2022-10-27 RX ORDER — HYDROMORPHONE HYDROCHLORIDE 1 MG/ML
0.2 INJECTION, SOLUTION INTRAMUSCULAR; INTRAVENOUS; SUBCUTANEOUS
Status: DISCONTINUED | OUTPATIENT
Start: 2022-10-27 | End: 2022-10-27 | Stop reason: HOSPADM

## 2022-10-27 RX ORDER — ALBUTEROL SULFATE 0.83 MG/ML
2.5 SOLUTION RESPIRATORY (INHALATION) AS NEEDED
Status: DISCONTINUED | OUTPATIENT
Start: 2022-10-27 | End: 2022-10-27 | Stop reason: HOSPADM

## 2022-10-27 RX ORDER — SODIUM CHLORIDE 0.9 % (FLUSH) 0.9 %
5-40 SYRINGE (ML) INJECTION AS NEEDED
Status: DISCONTINUED | OUTPATIENT
Start: 2022-10-27 | End: 2022-10-30 | Stop reason: HOSPADM

## 2022-10-27 RX ORDER — SODIUM CHLORIDE 0.9 % (FLUSH) 0.9 %
5-40 SYRINGE (ML) INJECTION EVERY 8 HOURS
Status: DISCONTINUED | OUTPATIENT
Start: 2022-10-27 | End: 2022-10-27 | Stop reason: HOSPADM

## 2022-10-27 RX ORDER — CEFAZOLIN SODIUM 1 G/3ML
INJECTION, POWDER, FOR SOLUTION INTRAMUSCULAR; INTRAVENOUS AS NEEDED
Status: DISCONTINUED | OUTPATIENT
Start: 2022-10-27 | End: 2022-10-27 | Stop reason: HOSPADM

## 2022-10-27 RX ORDER — SODIUM CHLORIDE, SODIUM LACTATE, POTASSIUM CHLORIDE, CALCIUM CHLORIDE 600; 310; 30; 20 MG/100ML; MG/100ML; MG/100ML; MG/100ML
1000 INJECTION, SOLUTION INTRAVENOUS CONTINUOUS
Status: DISCONTINUED | OUTPATIENT
Start: 2022-10-27 | End: 2022-10-27 | Stop reason: HOSPADM

## 2022-10-27 RX ORDER — SODIUM CHLORIDE 0.9 % (FLUSH) 0.9 %
5-40 SYRINGE (ML) INJECTION EVERY 8 HOURS
Status: DISCONTINUED | OUTPATIENT
Start: 2022-10-27 | End: 2022-10-30 | Stop reason: HOSPADM

## 2022-10-27 RX ORDER — GLYCOPYRROLATE 0.2 MG/ML
INJECTION INTRAMUSCULAR; INTRAVENOUS AS NEEDED
Status: DISCONTINUED | OUTPATIENT
Start: 2022-10-27 | End: 2022-10-27 | Stop reason: HOSPADM

## 2022-10-27 RX ORDER — ASPIRIN 81 MG/1
81 TABLET ORAL 2 TIMES DAILY
Status: DISCONTINUED | OUTPATIENT
Start: 2022-10-27 | End: 2022-10-30 | Stop reason: HOSPADM

## 2022-10-27 RX ORDER — ROPIVACAINE HYDROCHLORIDE 5 MG/ML
INJECTION, SOLUTION EPIDURAL; INFILTRATION; PERINEURAL
Status: COMPLETED | OUTPATIENT
Start: 2022-10-27 | End: 2022-10-27

## 2022-10-27 RX ORDER — HYDROCODONE BITARTRATE AND ACETAMINOPHEN 5; 325 MG/1; MG/1
1 TABLET ORAL AS NEEDED
Status: DISCONTINUED | OUTPATIENT
Start: 2022-10-27 | End: 2022-10-27 | Stop reason: HOSPADM

## 2022-10-27 RX ORDER — MIDAZOLAM HYDROCHLORIDE 1 MG/ML
0.5 INJECTION, SOLUTION INTRAMUSCULAR; INTRAVENOUS
Status: DISCONTINUED | OUTPATIENT
Start: 2022-10-27 | End: 2022-10-27 | Stop reason: HOSPADM

## 2022-10-27 RX ORDER — SODIUM CHLORIDE 0.9 % (FLUSH) 0.9 %
5-40 SYRINGE (ML) INJECTION AS NEEDED
Status: CANCELLED | OUTPATIENT
Start: 2022-10-27

## 2022-10-27 RX ORDER — LEVOTHYROXINE SODIUM 200 UG/1
200 TABLET ORAL
Status: DISCONTINUED | OUTPATIENT
Start: 2022-10-28 | End: 2022-10-30 | Stop reason: HOSPADM

## 2022-10-27 RX ORDER — ACETAMINOPHEN 500 MG
500 TABLET ORAL
Status: DISCONTINUED | OUTPATIENT
Start: 2022-10-27 | End: 2022-10-29

## 2022-10-27 RX ORDER — ONDANSETRON 2 MG/ML
4 INJECTION INTRAMUSCULAR; INTRAVENOUS AS NEEDED
Status: CANCELLED | OUTPATIENT
Start: 2022-10-27

## 2022-10-27 RX ORDER — LIDOCAINE HYDROCHLORIDE 10 MG/ML
0.1 INJECTION, SOLUTION EPIDURAL; INFILTRATION; INTRACAUDAL; PERINEURAL AS NEEDED
Status: DISCONTINUED | OUTPATIENT
Start: 2022-10-27 | End: 2022-10-27 | Stop reason: HOSPADM

## 2022-10-27 RX ORDER — SODIUM CHLORIDE 9 MG/ML
25 INJECTION, SOLUTION INTRAVENOUS CONTINUOUS
Status: DISCONTINUED | OUTPATIENT
Start: 2022-10-27 | End: 2022-10-27 | Stop reason: HOSPADM

## 2022-10-27 RX ORDER — ASPIRIN 81 MG/1
81 TABLET ORAL 2 TIMES DAILY
Qty: 60 TABLET | Refills: 0 | Status: SHIPPED | OUTPATIENT
Start: 2022-10-27

## 2022-10-27 RX ORDER — SODIUM CHLORIDE, SODIUM LACTATE, POTASSIUM CHLORIDE, CALCIUM CHLORIDE 600; 310; 30; 20 MG/100ML; MG/100ML; MG/100ML; MG/100ML
50 INJECTION, SOLUTION INTRAVENOUS CONTINUOUS
Status: DISCONTINUED | OUTPATIENT
Start: 2022-10-27 | End: 2022-10-27 | Stop reason: HOSPADM

## 2022-10-27 RX ORDER — POLYETHYLENE GLYCOL 3350 17 G/17G
17 POWDER, FOR SOLUTION ORAL DAILY
Status: DISCONTINUED | OUTPATIENT
Start: 2022-10-28 | End: 2022-10-30 | Stop reason: HOSPADM

## 2022-10-27 RX ORDER — ATORVASTATIN CALCIUM 20 MG/1
20 TABLET, FILM COATED ORAL
Status: DISCONTINUED | OUTPATIENT
Start: 2022-10-27 | End: 2022-10-30 | Stop reason: HOSPADM

## 2022-10-27 RX ORDER — MIDAZOLAM HYDROCHLORIDE 1 MG/ML
INJECTION, SOLUTION INTRAMUSCULAR; INTRAVENOUS AS NEEDED
Status: DISCONTINUED | OUTPATIENT
Start: 2022-10-27 | End: 2022-10-27 | Stop reason: HOSPADM

## 2022-10-27 RX ORDER — PROPOFOL 10 MG/ML
INJECTION, EMULSION INTRAVENOUS AS NEEDED
Status: DISCONTINUED | OUTPATIENT
Start: 2022-10-27 | End: 2022-10-27 | Stop reason: HOSPADM

## 2022-10-27 RX ORDER — DEXAMETHASONE SODIUM PHOSPHATE 4 MG/ML
INJECTION, SOLUTION INTRA-ARTICULAR; INTRALESIONAL; INTRAMUSCULAR; INTRAVENOUS; SOFT TISSUE AS NEEDED
Status: DISCONTINUED | OUTPATIENT
Start: 2022-10-27 | End: 2022-10-27 | Stop reason: HOSPADM

## 2022-10-27 RX ORDER — GABAPENTIN 300 MG/1
300 CAPSULE ORAL
Status: DISCONTINUED | OUTPATIENT
Start: 2022-10-27 | End: 2022-10-30 | Stop reason: HOSPADM

## 2022-10-27 RX ORDER — ONDANSETRON 2 MG/ML
4 INJECTION INTRAMUSCULAR; INTRAVENOUS
Status: ACTIVE | OUTPATIENT
Start: 2022-10-27 | End: 2022-10-28

## 2022-10-27 RX ORDER — AMLODIPINE BESYLATE 5 MG/1
2.5 TABLET ORAL
Status: DISCONTINUED | OUTPATIENT
Start: 2022-10-27 | End: 2022-10-30 | Stop reason: HOSPADM

## 2022-10-27 RX ORDER — HYDROMORPHONE HYDROCHLORIDE 1 MG/ML
0.5 INJECTION, SOLUTION INTRAMUSCULAR; INTRAVENOUS; SUBCUTANEOUS
Status: ACTIVE | OUTPATIENT
Start: 2022-10-27 | End: 2022-10-28

## 2022-10-27 RX ORDER — NALOXONE HYDROCHLORIDE 0.4 MG/ML
0.4 INJECTION, SOLUTION INTRAMUSCULAR; INTRAVENOUS; SUBCUTANEOUS AS NEEDED
Status: DISCONTINUED | OUTPATIENT
Start: 2022-10-27 | End: 2022-10-30 | Stop reason: HOSPADM

## 2022-10-27 RX ORDER — FENTANYL CITRATE 50 UG/ML
INJECTION, SOLUTION INTRAMUSCULAR; INTRAVENOUS
Status: COMPLETED
Start: 2022-10-27 | End: 2022-10-27

## 2022-10-27 RX ORDER — HYDROXYZINE HYDROCHLORIDE 10 MG/1
10 TABLET, FILM COATED ORAL
Status: DISCONTINUED | OUTPATIENT
Start: 2022-10-27 | End: 2022-10-30 | Stop reason: HOSPADM

## 2022-10-27 RX ORDER — LISINOPRIL 20 MG/1
20 TABLET ORAL EVERY EVENING
Status: DISCONTINUED | OUTPATIENT
Start: 2022-10-27 | End: 2022-10-30 | Stop reason: HOSPADM

## 2022-10-27 RX ORDER — FENTANYL CITRATE 50 UG/ML
25 INJECTION, SOLUTION INTRAMUSCULAR; INTRAVENOUS
Status: COMPLETED | OUTPATIENT
Start: 2022-10-27 | End: 2022-10-27

## 2022-10-27 RX ORDER — DEXMEDETOMIDINE HYDROCHLORIDE 100 UG/ML
INJECTION, SOLUTION INTRAVENOUS AS NEEDED
Status: DISCONTINUED | OUTPATIENT
Start: 2022-10-27 | End: 2022-10-27 | Stop reason: HOSPADM

## 2022-10-27 RX ORDER — KETOROLAC TROMETHAMINE 30 MG/ML
15 INJECTION, SOLUTION INTRAMUSCULAR; INTRAVENOUS EVERY 6 HOURS
Status: COMPLETED | OUTPATIENT
Start: 2022-10-28 | End: 2022-10-28

## 2022-10-27 RX ORDER — FENTANYL CITRATE 50 UG/ML
25 INJECTION, SOLUTION INTRAMUSCULAR; INTRAVENOUS
Status: CANCELLED | OUTPATIENT
Start: 2022-10-27

## 2022-10-27 RX ORDER — ACETAMINOPHEN 500 MG
500 TABLET ORAL EVERY 4 HOURS
Qty: 100 TABLET | Refills: 0 | Status: SHIPPED
Start: 2022-10-27 | End: 2022-10-28

## 2022-10-27 RX ORDER — AMOXICILLIN 250 MG
1 CAPSULE ORAL 2 TIMES DAILY
Status: DISCONTINUED | OUTPATIENT
Start: 2022-10-27 | End: 2022-10-30 | Stop reason: HOSPADM

## 2022-10-27 RX ORDER — DIPHENHYDRAMINE HYDROCHLORIDE 50 MG/ML
12.5 INJECTION, SOLUTION INTRAMUSCULAR; INTRAVENOUS AS NEEDED
Status: DISCONTINUED | OUTPATIENT
Start: 2022-10-27 | End: 2022-10-27 | Stop reason: HOSPADM

## 2022-10-27 RX ORDER — OXYCODONE HYDROCHLORIDE 5 MG/1
2.5-5 TABLET ORAL
Qty: 42 TABLET | Refills: 0 | Status: SHIPPED
Start: 2022-10-27 | End: 2022-10-28

## 2022-10-27 RX ORDER — SODIUM CHLORIDE 9 MG/ML
INJECTION, SOLUTION INTRAVENOUS
Status: DISCONTINUED | OUTPATIENT
Start: 2022-10-27 | End: 2022-10-27 | Stop reason: HOSPADM

## 2022-10-27 RX ORDER — OXYCODONE HYDROCHLORIDE 5 MG/1
2.5 TABLET ORAL
Status: DISCONTINUED | OUTPATIENT
Start: 2022-10-27 | End: 2022-10-28

## 2022-10-27 RX ORDER — PREGABALIN 75 MG/1
75 CAPSULE ORAL ONCE
Status: COMPLETED | OUTPATIENT
Start: 2022-10-27 | End: 2022-10-27

## 2022-10-27 RX ORDER — AMOXICILLIN 250 MG
1 CAPSULE ORAL
Qty: 60 TABLET | Refills: 0 | Status: SHIPPED | OUTPATIENT
Start: 2022-10-27

## 2022-10-27 RX ORDER — MORPHINE SULFATE 2 MG/ML
2 INJECTION, SOLUTION INTRAMUSCULAR; INTRAVENOUS
Status: DISCONTINUED | OUTPATIENT
Start: 2022-10-27 | End: 2022-10-27 | Stop reason: HOSPADM

## 2022-10-27 RX ORDER — SODIUM CHLORIDE 0.9 % (FLUSH) 0.9 %
5-40 SYRINGE (ML) INJECTION EVERY 8 HOURS
Status: CANCELLED | OUTPATIENT
Start: 2022-10-27

## 2022-10-27 RX ORDER — LIDOCAINE HYDROCHLORIDE 20 MG/ML
INJECTION, SOLUTION EPIDURAL; INFILTRATION; INTRACAUDAL; PERINEURAL AS NEEDED
Status: DISCONTINUED | OUTPATIENT
Start: 2022-10-27 | End: 2022-10-27 | Stop reason: HOSPADM

## 2022-10-27 RX ORDER — ACETAMINOPHEN 500 MG
1000 TABLET ORAL ONCE
Status: COMPLETED | OUTPATIENT
Start: 2022-10-27 | End: 2022-10-27

## 2022-10-27 RX ORDER — HYDROMORPHONE HYDROCHLORIDE 1 MG/ML
0.2 INJECTION, SOLUTION INTRAMUSCULAR; INTRAVENOUS; SUBCUTANEOUS
Status: CANCELLED | OUTPATIENT
Start: 2022-10-27

## 2022-10-27 RX ORDER — SODIUM CHLORIDE 9 MG/ML
125 INJECTION, SOLUTION INTRAVENOUS CONTINUOUS
Status: DISPENSED | OUTPATIENT
Start: 2022-10-27 | End: 2022-10-28

## 2022-10-27 RX ADMIN — ROPIVACAINE HYDROCHLORIDE 25 ML: 5 INJECTION, SOLUTION EPIDURAL; INFILTRATION; PERINEURAL at 09:32

## 2022-10-27 RX ADMIN — FENTANYL CITRATE 25 MCG: 50 INJECTION, SOLUTION INTRAMUSCULAR; INTRAVENOUS at 15:17

## 2022-10-27 RX ADMIN — TRANEXAMIC ACID 1 G: 100 INJECTION, SOLUTION INTRAVENOUS at 11:31

## 2022-10-27 RX ADMIN — DEXMEDETOMIDINE HYDROCHLORIDE 8 MCG: 100 INJECTION, SOLUTION, CONCENTRATE INTRAVENOUS at 11:24

## 2022-10-27 RX ADMIN — ASPIRIN 81 MG: 81 TABLET, COATED ORAL at 21:51

## 2022-10-27 RX ADMIN — DEXMEDETOMIDINE HYDROCHLORIDE 4 MCG: 100 INJECTION, SOLUTION, CONCENTRATE INTRAVENOUS at 11:30

## 2022-10-27 RX ADMIN — CEFAZOLIN 2 G: 330 INJECTION, POWDER, FOR SOLUTION INTRAMUSCULAR; INTRAVENOUS at 11:30

## 2022-10-27 RX ADMIN — PHENYLEPHRINE HYDROCHLORIDE 20 MCG/MIN: 10 INJECTION INTRAVENOUS at 11:45

## 2022-10-27 RX ADMIN — SODIUM CHLORIDE, POTASSIUM CHLORIDE, SODIUM LACTATE AND CALCIUM CHLORIDE: 600; 310; 30; 20 INJECTION, SOLUTION INTRAVENOUS at 10:56

## 2022-10-27 RX ADMIN — AMLODIPINE BESYLATE 2.5 MG: 5 TABLET ORAL at 21:51

## 2022-10-27 RX ADMIN — LISINOPRIL 20 MG: 20 TABLET ORAL at 21:51

## 2022-10-27 RX ADMIN — FENTANYL CITRATE 25 MCG: 50 INJECTION, SOLUTION INTRAMUSCULAR; INTRAVENOUS at 15:12

## 2022-10-27 RX ADMIN — SENNOSIDES AND DOCUSATE SODIUM 1 TABLET: 50; 8.6 TABLET ORAL at 21:51

## 2022-10-27 RX ADMIN — ACETAMINOPHEN 1000 MG: 500 TABLET ORAL at 09:00

## 2022-10-27 RX ADMIN — OXYCODONE 5 MG: 5 TABLET ORAL at 23:15

## 2022-10-27 RX ADMIN — GLYCOPYRROLATE 0.2 MG: 0.2 INJECTION INTRAMUSCULAR; INTRAVENOUS at 12:40

## 2022-10-27 RX ADMIN — DEXAMETHASONE SODIUM PHOSPHATE 4 MG: 4 INJECTION, SOLUTION INTRAMUSCULAR; INTRAVENOUS at 11:30

## 2022-10-27 RX ADMIN — PROPOFOL 15 MCG/KG/MIN: 10 INJECTION, EMULSION INTRAVENOUS at 11:15

## 2022-10-27 RX ADMIN — ONDANSETRON HYDROCHLORIDE 4 MG: 2 INJECTION, SOLUTION INTRAMUSCULAR; INTRAVENOUS at 11:30

## 2022-10-27 RX ADMIN — DEXMEDETOMIDINE HYDROCHLORIDE 4 MCG: 100 INJECTION, SOLUTION, CONCENTRATE INTRAVENOUS at 11:53

## 2022-10-27 RX ADMIN — CEFAZOLIN 2 G: 1 INJECTION, POWDER, FOR SOLUTION INTRAMUSCULAR; INTRAVENOUS at 21:52

## 2022-10-27 RX ADMIN — ATORVASTATIN CALCIUM 20 MG: 20 TABLET, FILM COATED ORAL at 21:51

## 2022-10-27 RX ADMIN — PROPOFOL 30 MG: 10 INJECTION, EMULSION INTRAVENOUS at 11:07

## 2022-10-27 RX ADMIN — OXYCODONE 2.5 MG: 5 TABLET ORAL at 19:36

## 2022-10-27 RX ADMIN — SODIUM CHLORIDE: 900 INJECTION, SOLUTION INTRAVENOUS at 12:50

## 2022-10-27 RX ADMIN — PREGABALIN 75 MG: 75 CAPSULE ORAL at 09:00

## 2022-10-27 RX ADMIN — MEPIVACAINE HYDROCHLORIDE 60 MG: 20 INJECTION, SOLUTION EPIDURAL; INFILTRATION at 11:04

## 2022-10-27 RX ADMIN — PROPOFOL 30 MG: 10 INJECTION, EMULSION INTRAVENOUS at 11:00

## 2022-10-27 RX ADMIN — LIDOCAINE HYDROCHLORIDE 25 MG: 20 INJECTION, SOLUTION EPIDURAL; INFILTRATION; INTRACAUDAL; PERINEURAL at 11:00

## 2022-10-27 RX ADMIN — FENTANYL CITRATE 25 MCG: 50 INJECTION, SOLUTION INTRAMUSCULAR; INTRAVENOUS at 15:29

## 2022-10-27 RX ADMIN — FENTANYL CITRATE 25 MCG: 50 INJECTION, SOLUTION INTRAMUSCULAR; INTRAVENOUS at 16:32

## 2022-10-27 RX ADMIN — SODIUM CHLORIDE, POTASSIUM CHLORIDE, SODIUM LACTATE AND CALCIUM CHLORIDE 50 ML/HR: 600; 310; 30; 20 INJECTION, SOLUTION INTRAVENOUS at 09:00

## 2022-10-27 RX ADMIN — MIDAZOLAM 2 MG: 1 INJECTION INTRAMUSCULAR; INTRAVENOUS at 10:56

## 2022-10-27 RX ADMIN — ACETAMINOPHEN 500 MG: 500 TABLET ORAL at 21:51

## 2022-10-27 RX ADMIN — SODIUM CHLORIDE 125 ML/HR: 9 INJECTION, SOLUTION INTRAVENOUS at 19:36

## 2022-10-27 NOTE — ANESTHESIA PROCEDURE NOTES
Spinal Block    Start time: 10/27/2022 11:01 AM  End time: 10/27/2022 11:05 AM  Performed by: Jarad Mayo CRNA  Authorized by: Trini Stark MD     Pre-procedure:   Indications: primary anesthetic  Preanesthetic Checklist: patient identified, risks and benefits discussed, anesthesia consent, site marked, patient being monitored, timeout performed and fire risk safety assessment completed and verbalized    Timeout Time: 11:02 EDT      Spinal Block:   Patient Position:  Seated  Prep Region:  Lumbar  Prep: Betadine      Location:  L3-4  Technique:  Single shot  Local: mepivacaine (PF) (POLOCAINE) 2%  PF injection - Other   60 mg - 10/27/2022 11:04:00 AM    Med Admin Time: 10/27/2022 11:04 AM    Needle:   Needle Type:  Pencil-tip  Needle Gauge:  24 G  Attempts:  1      Events: CSF confirmed, no blood with aspiration and no paresthesia        Assessment:  Insertion:  Uncomplicated  Patient tolerance:  Patient tolerated the procedure well with no immediate complications

## 2022-10-27 NOTE — DISCHARGE SUMMARY
[address] SUMMARY     Name: Nita Davis       MR#: [id number]    : 1945  ADMIT DATE: 10/27/2022  DISCHARGE DATE:      ADMISSION DIAGNOSIS: Osteoarthritis of left knee, unspecified osteoarthritis type [M17.12]  Primary osteoarthritis of left knee [M17.12]     DISCHARGE DIAGNOSIS: Osteoarthritis of left knee, unspecified osteoarthritis type [M17.12]     PROCEDURE PERFORMED: Procedure(s):  LEFT TOTAL KNEE ARTHROPLASTY (SPINAL/IV SEDATION W/BLOCK)     CONSULTATIONS:  None. HISTORY OF PRESENT ILLNESS: Chronic left knee pain. Osteoarthritis diagnosis. Recommended total knee arthroplasty after failed conservative measures     HOSPITAL COURSE:  The patient underwent the aforementioned procedure on date of admission under spinal anesthesia with adductor canal block. There were no immediate postoperative complications. He was started on a multimodal pain regimen and DVT prophylaxis. DISPOSITION: The patient made slow, steady progress with physical therapy and was appropriate for discharge to Home in stable condition on postoperative day 2. DISCHARGE MEDICATIONS:  Reinitiate preadmission medications. In addition, the patient will be on ASA for DVT prophylaxis and low dose oxycodone and Tylenol for pain. DISCHARGE INSTRUCTIONS:  Detailed printed instructions were provided to the patient. Follow up with Dr. Emely Perry in approximately 3 weeks. The patient will receive home health physical therapy in the meantime.     Signed by: Priyanka Franco PA-C  10/27/2022

## 2022-10-27 NOTE — ANESTHESIA PREPROCEDURE EVALUATION
Anesthetic History   No history of anesthetic complications            Review of Systems / Medical History  Patient summary reviewed, nursing notes reviewed and pertinent labs reviewed    Pulmonary  Within defined limits                 Neuro/Psych   Within defined limits           Cardiovascular    Hypertension          CAD (s/p stent) and cardiac stents    Exercise tolerance: >4 METS     GI/Hepatic/Renal  Within defined limits              Endo/Other      Hypothyroidism: well controlled  Arthritis     Other Findings   Comments: Chronic back pain           Physical Exam    Airway  Mallampati: II  TM Distance: 4 - 6 cm  Neck ROM: normal range of motion   Mouth opening: Normal     Cardiovascular    Rhythm: regular  Rate: normal      Pertinent negatives: No murmur   Dental    Dentition: Implants and Caps/crowns     Pulmonary  Breath sounds clear to auscultation               Abdominal  GI exam deferred       Other Findings            Anesthetic Plan    ASA: 3  Anesthesia type: spinal, regional and MAC          Induction: Intravenous  Anesthetic plan and risks discussed with: Patient

## 2022-10-27 NOTE — BRIEF OP NOTE
Brief Postoperative Note    Patient: Jw Gomez  YOB: 1945  MRN: [id number]    Date of Procedure: 10/27/2022     Pre-Op Diagnosis: Osteoarthritis of left knee, unspecified osteoarthritis type [M17.12]    Post-Op Diagnosis: Same as preoperative diagnosis. Procedure(s):  LEFT TOTAL KNEE ARTHROPLASTY (SPINAL/IV SEDATION W/BLOCK)    Surgeon(s):  Adrian Farrell MD    Surgical Assistant: Physician Assistant: Nevaeh Mcdonald PA-C  Surg Asst-1: Katalina Jordan    Anesthesia: Spinal     Estimated Blood Loss (mL): 221    Complications: None    Specimens: * No specimens in log *     Implants:   Implant Name Type Inv.  Item Serial No.  Lot No. LRB No. Used Action   CEMENT BNE MED VISC 80 GM GENTAMICIN PALACOS MV+G PRO - SNA  CEMENT BNE MED VISC 80 GM GENTAMICIN PALACOS MV+G PRO NA [employer]_ [id number] Left 1 Implanted   BASEPLATE TIB SZ 8 LT NP STEMMABLE EMPOWR - SNA  BASEPLATE TIB SZ 8 LT NP STEMMABLE EMPOWR NA [facility] [employer]_ [id number] Left 1 Implanted   COMPONENT FEM SZ 7 LT KNEE NP POST STBL EMPOWR - SNA  COMPONENT FEM SZ 7 LT KNEE NP POST STBL EMPOWR NA [facility] [employer] [facility]_ [id number] Left 1 Implanted   COMPONENT PATELLAR 3 PEG 32X8 MM KNEE DOMED POLYETH E-PLUS - SNA  COMPONENT PATELLAR 3 PEG 32X8 MM KNEE DOMED POLYETH E-PLUS NA [facility] - [employer] SURG[employer]_ [id number] Left 1 Implanted   INSERT TIB 8 14 MM KNEE EMPOWR VVC KNEE - SNA  INSERT TIB 8 14 MM KNEE EMPOWR VVC KNEE NA [facility] - [employer]_ [id number] Left 1 Implanted       Drains: * No LDAs found *    Findings: oa left knee    Electronically Signed by Aleyda Perry PA-C on 10/27/2022 at 1:21 PM

## 2022-10-27 NOTE — ANESTHESIA PROCEDURE NOTES
Peripheral Block    Start time: 10/27/2022 9:30 AM  End time: 10/27/2022 9:32 AM  Performed by: Eda Garcia DO  Authorized by: Eda Garcia DO       Pre-procedure: Indications: at surgeon's request and post-op pain management    Preanesthetic Checklist: patient identified, risks and benefits discussed, site marked, timeout performed and patient being monitored      Block Type:   Block Type:   Adductor canal  Laterality:  Left  Monitoring:  Standard ASA monitoring, continuous pulse ox, frequent vital sign checks, heart rate, responsive to questions and oxygen  Injection Technique:  Single shot  Procedures: ultrasound guided    Patient Position: supine  Prep: chlorhexidine    Location:  Mid thigh  Needle Type:  Stimuplex  Needle Gauge:  21 G  Needle Localization:  Ultrasound guidance and anatomical landmarks  Medication Injected:  Ropivacaine (PF) (NAROPIN)(0.5%) 5 mg/mL injection - Peripheral Nerve Block   25 mL - 10/27/2022 9:32:00 AM  Med Admin Time: 10/27/2022 9:32 AM    Assessment:  Number of attempts:  1  Injection Assessment:  Incremental injection every 5 mL, local visualized surrounding nerve on ultrasound, negative aspiration for blood, no paresthesia, no intravascular symptoms and ultrasound image on chart  Patient tolerance:  Patient tolerated the procedure well with no immediate complications

## 2022-10-27 NOTE — DISCHARGE INSTRUCTIONS
Post-op Discharge Instructions Following Total Joint Replacement  Ariel St MD  [address]  [phone]  Follow-up Office Visit  See Dr. Daija Rhoades approximately 3-4 weeks from date of surgery. Call [phone] to make an appointment. Call Ira Gaston LPN if you have questions or concerns, [phone]. Activity  Use your walker for ambulation. Weight bearing as tolerated unless instructed otherwise by the physical therapist. Get up every hour you are awake and take a brief walk. Lengthen walking distance daily as your strength improves. Continue using your walker until seen in the office for your first follow up visit. Practice your exercises 3 times daily as instructed by the physical therapist. Spike Jim for 20 minutes after exercising. No driving until seen in the office for your first follow up visit. Incision Care  The light brown Aquacel surgical dressing is waterproof and is to remain on your incision for 7 days. On the 7th day, carefully lift the edge of the dressing to break the adhesive seal and gently peel it off. If your Aquacel dressings comes loose or falls off before the 7th day, replace it with a dry sterile gauze dressing and change this dressing daily. Once there is no drainage on the bandage, you mean leave the incision open to air. You may take a shower with the Aquacel dressing in place. After you remove the Aquacel dressing on day 7, you may continue to shower and get your incision wet in the shower. Do not submerge your incision under water in a bathtub, hot tub, swimming pool, etc. until after you have been evaluated at your first office visit. Medications  Blood Clot Prevention: Take medication as prescribed by your physician for 4 weeks postop. Pain Management: Take pain medication as prescribed; wean yourself off of pain medication as your pain lessens. Take with food. You make also take Tylenol every 4-6 hours as needed for pain.   Do not exceed 3 grams (3000mg) per day. Place an ice bag on or around the incision for 20 minutes on / 20 minutes off as needed throughout the day and night, especially after exercising. Stool Softener: You may want to take a stool softener (such as Senokot-S or Colace) to prevent constipation while taking pain medication. If constipation occurs, you may also use a laxative (such as Dulcolax tablets, Miralax, or a suppository). Diet  Resume usual diet at home. Drink plenty of fluids. Eat foods high in fiber and protein. Calcium and Vitamin D supplements recommended. Avoid alcoholic beverages. No smoking. When to call your Orthopaedic Surgeon: If you call after 5pm or on a weekend, the on call physician will return your call  Pain that is not relieved by pain medication, ice, and activity modification  Signs of infection (red incision, continuous drainage from the incision, malodorous drainage, persistent fever greater than 101 degrees Fahrenheit)  Signs of a blood clot in your leg (calf pain, tenderness, redness, and/or swelling of the lower leg)  ?   When to call your Primary Care Physician  Concerns about your medical conditions such as diabetes, high blood pressure, asthma, congestive heart failure  Call if blood sugars are elevated, if you have a persistent headache or dizziness, coughing or congestion, constipation or diarrhea, burning with urination, abnormal heart rate (fast or slow)  When to call 911 and go to the nearest Emergency Room  Acute onset of chest pain, shortness of breath, difficulty breathing

## 2022-10-28 LAB
ANION GAP SERPL CALC-SCNC: 6 MMOL/L (ref 5–15)
BUN SERPL-MCNC: 19 MG/DL (ref 6–20)
BUN/CREAT SERPL: 17 (ref 12–20)
CALCIUM SERPL-MCNC: 8.7 MG/DL (ref 8.5–10.1)
CHLORIDE SERPL-SCNC: 107 MMOL/L (ref 97–108)
CO2 SERPL-SCNC: 25 MMOL/L (ref 21–32)
CREAT SERPL-MCNC: 1.14 MG/DL (ref 0.7–1.3)
GLUCOSE SERPL-MCNC: 134 MG/DL (ref 65–100)
HGB BLD-MCNC: 12.9 G/DL (ref 12.1–17)
POTASSIUM SERPL-SCNC: 4.3 MMOL/L (ref 3.5–5.1)
SODIUM SERPL-SCNC: 138 MMOL/L (ref 136–145)

## 2022-10-28 PROCEDURE — 97530 THERAPEUTIC ACTIVITIES: CPT

## 2022-10-28 PROCEDURE — 80048 BASIC METABOLIC PNL TOTAL CA: CPT

## 2022-10-28 PROCEDURE — 96374 THER/PROPH/DIAG INJ IV PUSH: CPT

## 2022-10-28 PROCEDURE — 36415 COLL VENOUS BLD VENIPUNCTURE: CPT

## 2022-10-28 PROCEDURE — 85018 HEMOGLOBIN: CPT

## 2022-10-28 PROCEDURE — G0378 HOSPITAL OBSERVATION PER HR: HCPCS

## 2022-10-28 PROCEDURE — 74011250637 HC RX REV CODE- 250/637: Performed by: PHYSICIAN ASSISTANT

## 2022-10-28 PROCEDURE — 74011250636 HC RX REV CODE- 250/636: Performed by: PHYSICIAN ASSISTANT

## 2022-10-28 PROCEDURE — 97161 PT EVAL LOW COMPLEX 20 MIN: CPT

## 2022-10-28 PROCEDURE — 97116 GAIT TRAINING THERAPY: CPT

## 2022-10-28 PROCEDURE — 74011000250 HC RX REV CODE- 250: Performed by: PHYSICIAN ASSISTANT

## 2022-10-28 RX ORDER — OXYCODONE AND ACETAMINOPHEN 5; 325 MG/1; MG/1
1-2 TABLET ORAL
Qty: 42 TABLET | Refills: 0 | Status: SHIPPED | OUTPATIENT
Start: 2022-10-28 | End: 2022-11-02 | Stop reason: SDUPTHER

## 2022-10-28 RX ORDER — OXYCODONE AND ACETAMINOPHEN 5; 325 MG/1; MG/1
1-2 TABLET ORAL
Status: DISCONTINUED | OUTPATIENT
Start: 2022-10-28 | End: 2022-10-30 | Stop reason: HOSPADM

## 2022-10-28 RX ADMIN — CEFAZOLIN 2 G: 1 INJECTION, POWDER, FOR SOLUTION INTRAMUSCULAR; INTRAVENOUS at 05:44

## 2022-10-28 RX ADMIN — AMLODIPINE BESYLATE 2.5 MG: 5 TABLET ORAL at 21:44

## 2022-10-28 RX ADMIN — KETOROLAC TROMETHAMINE 15 MG: 30 INJECTION, SOLUTION INTRAMUSCULAR at 05:44

## 2022-10-28 RX ADMIN — OXYCODONE AND ACETAMINOPHEN 2 TABLET: 5; 325 TABLET ORAL at 15:01

## 2022-10-28 RX ADMIN — ATORVASTATIN CALCIUM 20 MG: 20 TABLET, FILM COATED ORAL at 21:44

## 2022-10-28 RX ADMIN — LISINOPRIL 20 MG: 20 TABLET ORAL at 19:08

## 2022-10-28 RX ADMIN — GABAPENTIN 300 MG: 300 CAPSULE ORAL at 21:44

## 2022-10-28 RX ADMIN — SODIUM CHLORIDE 125 ML/HR: 9 INJECTION, SOLUTION INTRAVENOUS at 03:53

## 2022-10-28 RX ADMIN — SENNOSIDES AND DOCUSATE SODIUM 1 TABLET: 50; 8.6 TABLET ORAL at 19:08

## 2022-10-28 RX ADMIN — OXYCODONE AND ACETAMINOPHEN 2 TABLET: 5; 325 TABLET ORAL at 10:16

## 2022-10-28 RX ADMIN — ASPIRIN 81 MG: 81 TABLET, COATED ORAL at 09:17

## 2022-10-28 RX ADMIN — KETOROLAC TROMETHAMINE 15 MG: 30 INJECTION, SOLUTION INTRAMUSCULAR at 00:17

## 2022-10-28 RX ADMIN — SENNOSIDES AND DOCUSATE SODIUM 1 TABLET: 50; 8.6 TABLET ORAL at 09:17

## 2022-10-28 RX ADMIN — SODIUM CHLORIDE, PRESERVATIVE FREE 10 ML: 5 INJECTION INTRAVENOUS at 14:00

## 2022-10-28 RX ADMIN — ASPIRIN 81 MG: 81 TABLET, COATED ORAL at 19:08

## 2022-10-28 RX ADMIN — KETOROLAC TROMETHAMINE 15 MG: 30 INJECTION, SOLUTION INTRAMUSCULAR at 12:28

## 2022-10-28 RX ADMIN — ACETAMINOPHEN 500 MG: 500 TABLET ORAL at 05:44

## 2022-10-28 RX ADMIN — POLYETHYLENE GLYCOL 3350 17 G: 17 POWDER, FOR SOLUTION ORAL at 09:16

## 2022-10-28 RX ADMIN — ACETAMINOPHEN 500 MG: 500 TABLET ORAL at 15:01

## 2022-10-28 RX ADMIN — ACETAMINOPHEN 500 MG: 500 TABLET ORAL at 09:17

## 2022-10-28 RX ADMIN — OXYCODONE AND ACETAMINOPHEN 1 TABLET: 5; 325 TABLET ORAL at 22:55

## 2022-10-28 RX ADMIN — GABAPENTIN 300 MG: 300 CAPSULE ORAL at 00:17

## 2022-10-28 RX ADMIN — KETOROLAC TROMETHAMINE 15 MG: 30 INJECTION, SOLUTION INTRAMUSCULAR at 19:08

## 2022-10-28 RX ADMIN — OXYCODONE AND ACETAMINOPHEN 2 TABLET: 5; 325 TABLET ORAL at 19:09

## 2022-10-28 RX ADMIN — LEVOTHYROXINE SODIUM 200 MCG: 0.2 TABLET ORAL at 06:52

## 2022-10-28 RX ADMIN — OXYCODONE 5 MG: 5 TABLET ORAL at 05:43

## 2022-10-28 NOTE — PERIOP NOTES
TRANSFER - OUT REPORT:    Verbal report given to Daiana Herron RN on Lauryn Tobias  being transferred to [address] [facility] for routine post - op       Report consisted of patients Situation, Background, Assessment and   Recommendations(SBAR). Time Pre op antibiotic given:11:30  Anesthesia Stop time: 13:51    Information from the following report(s) SBAR, Procedure Summary, Intake/Output, MAR, and Cardiac Rhythm NSR  was reviewed with the receiving nurse. Opportunity for questions and clarification was provided. Is the patient on 02? NO    Is the patient on a monitor? NO    Is the nurse transporting with the patient? NO    Surgical Waiting Area notified of patient's transfer from PACU? YES      The following personal items collected during your admission accompanied patient upon transfer:   Dental Appliance: Dental Appliances: None  Vision: Visual Aid: Glasses (READERS)  Hearing Aid:    Jewelry: Jewelry: None  Clothing: Clothing: At bedside (Clothing/belonging bag returned in PACU, placed on stretcher.)  Other Valuables:  Other Valuables: None  Valuables sent to safe:

## 2022-10-28 NOTE — PROGRESS NOTES
Problem: Mobility Impaired (Adult and Pediatric)  Goal: *Acute Goals and Plan of Care (Insert Text)  Outcome: Progressing Towards Goal  Note: FUNCTIONAL STATUS PRIOR TO ADMISSION: Patient was modified independent using a rolling walker for functional mobility in household for past 2 wks, cane outside of home. HOME SUPPORT PRIOR TO ADMISSION: The patient lived with wife but did not require assist.    Physical Therapy Goals  Initiated 10/28/2022    1. Patient will move from supine to sit and sit to supine  and scoot up and down in bed with modified independence within 4 days. 2. Patient will perform sit to stand with modified independence within 4 days. 3. Patient will ambulate with modified independence for > 150 feet with the least restrictive device within 4 days. 4. Patient will ascend/descend 4 stairs with one handrail(s) with supervision within 4 days. 5. Patient will perform home exercise program per protocol with supervision/set-up within 4 days. 6. Patient will demonstrate AROM 0-90 degrees in operative joint within 4 days. PHYSICAL THERAPY EVALUATION  Patient: Gracia Medina (74 y.o. male)  Date: 10/28/2022  Primary Diagnosis: Osteoarthritis of left knee, unspecified osteoarthritis type [M17.12]  Primary osteoarthritis of left knee [M17.12]  Procedure(s) (LRB):  LEFT TOTAL KNEE ARTHROPLASTY (SPINAL/IV SEDATION W/BLOCK) (Left) 1 Day Post-Op   Precautions:   WBAT    ASSESSMENT  Based on the objective data described below, the patient presents POD # 1 left TKR with pain right knee, decreased AROM/strength and function left leg, decreased activity tolerance, decline in functional mobility and impaired standing balance/gait with RW. Pt reporting pain 8/10 left knee with weight bearing and heavy reliance on RW to decrease pain. Pt s/p right TKR 5 years ago. Anticipate pt will require 2- 3 additional PT sessions and improved pain control to clear for discharge from PT standpoint.     Current Level of Function Impacting Discharge (mobility/balance): Pain left knee, decreased amb tolerance, CGA for transfers/amb with RW    Functional Outcome Measure: The patient scored 55/100 on the Barthel Index outcome measure. Other factors to consider for discharge: no steps to enter, wife to assist as needed at discharge, has required DME     Patient will benefit from skilled therapy intervention to address the above noted impairments. PLAN :  Recommendations and Planned Interventions: bed mobility training, transfer training, gait training, therapeutic exercises, patient and family training/education, and therapeutic activities      Frequency/Duration: Patient will be followed by physical therapy:  twice daily to address goals.     Recommendation for discharge: (in order for the patient to meet his/her long term goals)  Physical therapy at least 2 days/week in the home     This discharge recommendation:  Has been made in collaboration with the attending provider and/or case management    IF patient discharges home will need the following DME: patient owns DME required for discharge         SUBJECTIVE:   Patient stated It really hurts when I put weight on my left leg - I'm really dependent on the RW.    OBJECTIVE DATA SUMMARY:   HISTORY:    Past Medical History:   Diagnosis Date    Arthritis     lower back    CAD (coronary artery disease)     stent x 1 2001 LDA    Chronic lower back pain     Dr Rachael Álvarez     Chronic pain     right knee - resolved with knee replacement 4/2017    Chronic pain 2022    left knee    Hypercholesteremia     Hypertension     Hypothyroidism     Ill-defined condition     Bleeding prolonged    Skin cancer 2020      RLEG   L FLANK AREA    Sun-damaged skin      Past Surgical History:   Procedure Laterality Date    COLONOSCOPY N/A 10/20/2017    COLONOSCOPY performed by Coleman [facility] at [facility]    HX CATARACT REMOVAL Right 02/2017    HX [facility] [address] [facility]    HX GI      COLONOSCOPY    HX HEART CATHETERIZATION  2001    HX HERNIA REPAIR  approx 2010    @ [place] [facility] AND R GROIN    HX LUMBAR FUSION  2006    L3-L4 fusion 2006, L2-S1 fusion 12/2020    HX TONSILLECTOMY  age 13    HX WISDOM TEETH EXTRACTION      FL CARDIAC SURG PROCEDURE UNLIST      1 STENT    FL COLON CA SCRN NOT  [address] IND  10/20/2017         FL TOTAL KNEE ARTHROPLASTY Right 04/2017       Personal factors and/or comorbidities impacting plan of care: as above    Home Situation  Home Environment: Private residence  # Steps to Enter: 0  One/Two Story Residence: Two story, live on 1st floor  Living Alone: No  Support Systems: Spouse/Significant Other  Patient Expects to be Discharged to[de-identified] Home with home health  Current DME Used/Available at Home: Precilla Newberry, rolling, Cane, straight, Raised toilet seat, Grab bars  Tub or Shower Type: Shower (built in bench)    EXAMINATION/PRESENTATION/DECISION MAKING:   Critical Behavior:  Neurologic State: Alert  Orientation Level: Oriented X4  Cognition: Appropriate decision making, Appropriate safety awareness  Safety/Judgement: Awareness of environment, Good awareness of safety precautions  Hearing: Auditory  Auditory Impairment: None  Hearing Aids/Status: Does not own  Skin:  Left knee - Aquacel dressing in place - no drainage noted    Range Of Motion:  AROM: Within functional limits (except left leg)                       Strength:    Strength: Within functional limits (except left leg)                    Tone & Sensation:   Tone: Normal              Sensation: Intact               Coordination:  Coordination: Within functional limits    Functional Mobility:  Bed Mobility:     Supine to Sit: Stand-by assistance  Sit to Supine: Other (comment) (pt remained in chair)  Scooting: Stand-by assistance  Transfers:  Sit to Stand: Contact guard assistance  Stand to Sit: Contact guard assistance        Bed to Chair: Contact guard assistance; Adaptive equipment; Additional time              Balance:   Sitting: Intact; Without support  Standing: Impaired; With support  Standing - Static: Good;Constant support (RW)  Standing - Dynamic : Fair;Constant support (heavy UE support on RW)  Ambulation/Gait Training:  Distance (ft): 50 Feet (ft)  Assistive Device: Walker, rolling;Gait belt  Ambulation - Level of Assistance: Contact guard assistance        Gait Abnormalities: Antalgic;Decreased step clearance (increased weight bearing thru arms)  Right Side Weight Bearing: Full  Left Side Weight Bearing: As tolerated  Base of Support: Center of gravity altered;Shift to right  Stance: Left decreased  Speed/Sepideh: Slow  Step Length: Left lengthened;Right shortened  Swing Pattern: Left asymmetrical        Functional Measure:  Barthel Index:    Bathin  Bladder: 10  Bowels: 10  Groomin  Dressin  Feeding: 10  Mobility: 0  Stairs: 0  Toilet Use: 5  Transfer (Bed to Chair and Back): 10  Total: 55/100       The Barthel ADL Index: Guidelines  1. The index should be used as a record of what a patient does, not as a record of what a patient could do. 2. The main aim is to establish degree of independence from any help, physical or verbal, however minor and for whatever reason. 3. The need for supervision renders the patient not independent. 4. A patient's performance should be established using the best available evidence. Asking the patient, friends/relatives and nurses are the usual sources, but direct observation and common sense are also important. However direct testing is not needed. 5. Usually the patient's performance over the preceding 24-48 hours is important, but occasionally longer periods will be relevant. 6. Middle categories imply that the patient supplies over 50 per cent of the effort. 7. Use of aids to be independent is allowed.     Score Interpretation (from [address] [address])    Independent   60-79 Minimally independent   40-59 Partially dependent   20-39 Very dependent <20 Totally dependent     -Leandro Storey, Barthel, D.W. (6563). Functional evaluation: the Barthel Index. [address] ([address] (1997). The Barthel activities of daily living index: self-reporting versus actual performance in the old (> or = 75 years). Journal of [address] [place] [address] 45(7), 14 [facility], J.HERIBERTOF, Leia Sherman., Nathan Jennings (1999). Measuring the change in disability after inpatient rehabilitation; comparison of the responsiveness of the Barthel Index and Functional [place]ard Measure. Journal of Neurology, Neurosurgery, and Psychiatry, 66(4), 811-286. Pavel Bryson, N.J.A, MICHI Berry, & Geremias Pagan MPREM. (2004) Assessment of post-stroke quality of life in cost-effectiveness studies: The usefulness of the Barthel Index and the EuroQoL-5D. Quality of Life Research, 15, 993-78           Physical Therapy Evaluation Charge Determination   History Examination Presentation Decision-Making   LOW Complexity : Zero comorbidities / personal factors that will impact the outcome / POC LOW Complexity : 1-2 Standardized tests and measures addressing body structure, function, activity limitation and / or participation in recreation  LOW Complexity : Stable, uncomplicated  LOW Complexity : FOTO score of       Based on the above components, the patient evaluation is determined to be of the following complexity level: LOW     Pain Rating:  Left knee 8/10    Activity Tolerance:   Fair - POD #1 - limited by pain    After treatment patient left in no apparent distress:   Call bell within reach, Caregiver / family present, and up in recliner - legs elevated and ice applied left day    COMMUNICATION/EDUCATION:   The patients plan of care was discussed with: Registered nurse and NP - Yari Lewis .      Fall prevention education was provided and the patient/caregiver indicated understanding., Patient/family have participated as able in goal setting and plan of care. , and Patient/family agree to work toward stated goals and plan of care.     Thank you for this referral.  Sigrid Berumen, PT   Time Calculation: 40 mins

## 2022-10-28 NOTE — ANESTHESIA POSTPROCEDURE EVALUATION
Post-Anesthesia Evaluation and Assessment    Patient: Cullen Blake MRN: [id number]  SSN: [id number]    YOB: 1945  Age: 78 y.o. Sex: male      I have evaluated the patient and they are stable and ready for discharge from the PACU. Cardiovascular Function/Vital Signs  Visit Vitals  /66 (BP 1 Location: Right upper arm, BP Patient Position: Sitting)   Pulse 68   Temp 36.5 °C (97.7 °F)   Resp 16   Ht 5' 9\" (1.753 m)   Wt 87.2 kg (192 lb 3.9 oz)   SpO2 95%   BMI 28.39 kg/m²       Patient is status post Spinal anesthesia for Procedure(s):  LEFT TOTAL KNEE ARTHROPLASTY (SPINAL/IV SEDATION W/BLOCK). Nausea/Vomiting: None    Postoperative hydration reviewed and adequate. Pain:  Pain Scale 1: Numeric (0 - 10) (10/28/22 1100)  Pain Intensity 1: 6 (10/28/22 1100)   Managed    Neurological Status:   Neuro (WDL): Exceptions to WDL (10/27/22 1500)  Neuro  Neurologic State: Alert (10/28/22 1000)  Orientation Level: Oriented X4 (10/28/22 1000)  Cognition: Appropriate decision making; Appropriate safety awareness (10/28/22 1000)  LUE Motor Response: Purposeful (10/27/22 1500)  LLE Motor Response: Pharmacologically paralyzed;Numbness (10/27/22 1500)  RUE Motor Response: Purposeful (10/27/22 1500)  RLE Motor Response: Pharmacologically paralyzed;Numbness (10/27/22 1500)   At baseline    Mental Status, Level of Consciousness: Alert and  oriented to person, place, and time    Pulmonary Status:   O2 Device: None (Room air) (10/27/22 2106)   Adequate oxygenation and airway patent    Complications related to anesthesia: None    Post-anesthesia assessment completed. No concerns    Signed By: Emiliana Shannon MD     October 28, 2022              Procedure(s):  LEFT TOTAL KNEE ARTHROPLASTY (SPINAL/IV SEDATION W/BLOCK).     spinal, regional, MAC    <BSHSIANPOST>    INITIAL Post-op Vital signs:   Vitals Value Taken Time   /76 10/27/22 2000   Temp 36.9 °C (98.4 °F) 10/27/22 1900   Pulse 74 10/27/22 2033 Resp 14 10/27/22 2033   SpO2 93 % 10/27/22 2033   Vitals shown include unvalidated device data.

## 2022-10-28 NOTE — PROGRESS NOTES
Ortho NP Note    POD# 1  s/p LEFT TOTAL KNEE ARTHROPLASTY (SPINAL/IV SEDATION W/BLOCK)     Note of Arnie García reviewed. Agree with assessment/plan. Update:  Called and cancelled oxycodone and tylenol previously ordered by Adriano WHITE at [address] [place].   Resent rx for Percocet to pharmacy on file per patient request.     Charisse Carey NP  Available via [employer]

## 2022-10-28 NOTE — OP NOTES
[facility]  OPERATIVE REPORT    Name:  Kim Cardenas  MR#:  [id number]  :  1945  ACCOUNT #:  [de-identified]  DATE OF SERVICE:  10/27/2022    PREOPERATIVE DIAGNOSIS:  Osteoarthritis, left knee. POSTOPERATIVE DIAGNOSIS:  Osteoarthritis, left knee. PROCEDURE PERFORMED:  Left total knee arthroplasty. SURGEON:  Dalila Castelan MD    ASSISTANT:  Jena Greer PA-C    ANESTHESIA:  Spinal with sedation as well as adductor canal block. COMPLICATIONS:  None. SPECIMENS REMOVED:  None. IMPLANTS:  [employer] size 7 posterior stabilized femur, size 8 tibial tray with 14-mm VVC polyethylene insert and 32-mm patella. ESTIMATED BLOOD LOSS:  250 mL. INDICATIONS:  The patient is a 53-year-old gentleman with progressive left knee pain due to severe osteoarthritis. Symptoms have progressed despite comprehensive conservative treatment. He presents for left total knee replacement. Risks, benefits, alternatives of procedure were reviewed with him in detail and he desires to proceed. PROCEDURE:  Anesthesia team placed an adductor canal block in the left thigh before taking the patient to the operating room and they also placed a spinal.  Preoperative IV antibiotics were administered. Padded pneumatic tourniquet was placed around left upper thigh. Left lower extremity was prepped and draped in the usual sterile fashion. Tourniquet was inflated to 275. I utilized the midline anterior knee incision to perform a medial parapatellar arthrotomy and existing short longitudinal incision centered over the patella was incorporated into my incision. Progressive medial release was performed to facilitate exposure and soft tissue balance throughout the procedure. As soon as the knee was flexed, the tourniquet was released.   There was extensive chalky material throughout many of the soft tissues within the knee including the menisci, and complete infiltration and replacement of the popliteus tendon. This was consistent with the patient's long history of gout. There was also significant buildup on the undersurface of the quadriceps tendon. A 10-mm distal femoral resection was performed using OrthAlign to navigate a neutral cut relative to mechanical axis of the femur. PCL was excised. Proximal tibial resection was performed using an extramedullary alignment guide. Menisci were excised. Moderate medial release was required to produce a symmetrical extension gap for the 14-mm spacer block and flexion could not utilize gap , as the popliteus tendon was incompetent and the lateral flexion gap opened up significantly to varus stress. I utilized the classic femoral sizer to size the femur to a size 7. Femoral rotation was gauged off of Houston's line as well as the transepicondylar axis. The femur was prepared for a size 7 component utilizing appropriate jig. Remaining posterior osteophytes were removed from the femur and subperiosteal posterior capsular release was performed. Because the knee was stable in extension and mid flexion, and the lateral side was unstable in flexion, I elected to utilize a posterior stabilized femoral component with a VVC polyethylene insert. The box was prepared for the femur. Trials were inserted and with 14-mm posterior stabilized insert in place, the knee had full extension to gravity. Satisfactory coronal plane stability and soft tissue tension and extension in mid flexion and again, the knee opened up to varus stress on the lateral side in flexion. Patella was prepared for a 32-mm component and tracked centrally using no thumbs technique. Tourniquet was reinflated. Trials were removed and tibial preparation was completed. There was a large cyst in the posterior tibial metaphysis, slightly medial to midline. The cyst was completely debrided with curettes and packed with autologous bone graft.   Bony surfaces were copiously irrigated by pulse lavage and dried before the real components were cemented into place using antibiotic impregnated cement. Excess cement was removed. The knee was reduced in full extension with the real insert in place during cement setup. Periarticular soft tissues were injected with solution containing 0.5% ropivacaine with epinephrine as well as clonidine and Toradol. Tourniquet was released. Wound was irrigated. After adequate setup time, the knee was taken through range of motion and coronal plane stability was satisfactory with the VVC insert in place while the knee was flexed. Again, the knee had full extension to gravity, approximately 110 degrees of flexion to gravity. Arthrotomy was closed with combination of heavy Vicryl sutures as well as a running #2 Stratafix suture. Skin and subcutaneous layers were closed in layered fashion with Vicryl and a running Monocryl subcuticular stitch. The wound was dressed with Dermabond and Aquacel occlusive dressing as well as sterile compressive dressing. The patient's bladder was decompressed with straight catheterization before he was transported to postanesthesia care unit in stable condition. All counts were correct at the end of the procedure. The physician assistant was critical throughout the procedure to assist with patient positioning, retraction, and closure.   There were no [employer] residents or fellows available to Kerrie Willis MD      [id number]  D:  10/27/2022 18:08  T:  10/28/2022 1:21  JOB #:  [id number]  CC:  Marcellus Fox MD

## 2022-10-28 NOTE — PROGRESS NOTES
Orthopaedics Daily Progress Note                            Date of Surgery:  10/27/2022      Patient: Sarah Coulter   YOB: 1945  Age: 78 y.o. SUBJECTIVE:   1 Day Post-Op following LEFT TOTAL KNEE ARTHROPLASTY (SPINAL/IV SEDATION W/BLOCK). The patient's post operative pain is controlled. No CP/SOB. No N/V. The patient's mobility will be evaluated today during PT sessions. OBJECTIVE:     Vital Signs:    Visit Vitals  /68 (BP 1 Location: Right upper arm, BP Patient Position: Lying)   Pulse 68   Temp 97.9 °F (36.6 °C)   Resp 16   Ht 5' 9\" (1.753 m)   Wt 192 lb 3.9 oz (87.2 kg)   SpO2 94%   BMI 28.39 kg/m²       Physical Exam:  General: A&Ox3. The patient is cooperative, and in no acute distress. Respiratory: Respirations are unlabored. Surgical site(s): dressing clean, dry  Musculoskeletal: Calves are soft, supple, and non-tender upon palpation. Motor 5/5. Neurological:  Neurovascularly intact with good dorsi and plantar flexion. Pulses symmetrical.    Laboratory Values:             Recent Results (from the past 12 hour(s))   METABOLIC PANEL, BASIC    Collection Time: 10/28/22  3:47 AM   Result Value Ref Range    Sodium 138 136 - 145 mmol/L    Potassium 4.3 3.5 - 5.1 mmol/L    Chloride 107 97 - 108 mmol/L    CO2 25 21 - 32 mmol/L    Anion gap 6 5 - 15 mmol/L    Glucose 134 (H) 65 - 100 mg/dL    BUN 19 6 - 20 MG/DL    Creatinine 1.14 0.70 - 1.30 MG/DL    BUN/Creatinine ratio 17 12 - 20      eGFR >60 >60 ml/min/1.73m2    Calcium 8.7 8.5 - 10.1 MG/DL   HEMOGLOBIN    Collection Time: 10/28/22  3:47 AM   Result Value Ref Range    HGB 12.9 12.1 - 17.0 g/dL         PLAN:     S/P LEFT TOTAL KNEE ARTHROPLASTY (SPINAL/IV SEDATION W/BLOCK) -Continue WBAT. -Mobilize and continue with PT/OT until discharged     Hemodynamics Hgb today is 12.9. Wound Monitor postop dressing; no postop dressing changes necessary. Reinforce PRN.      Post Operative Pain Pain Control: stable, mild-to-moderate joint symptoms intermittently, reasonably well controlled by current meds. DVT Prophylaxis Continue with SCD'S, Ankle Pump Exercises. ASA 81mg BID     Discharge Disposition Discharge plan: Home with HHPT pending PT clearance.        Signed By: Doug Beckman PA-C  October 28, 2022 8:47 AM

## 2022-10-28 NOTE — PROGRESS NOTES
Transition Of Care: The patient plans to discharge home with [facility] home health and wife to transport when stable for discharge. RUR: N/A    The patient is from home and lives with wife. Orthopedics, PT/OT following. The patient plans to discharge home. Care Management Interventions  PCP Verified by CM: Yes  Palliative Care Criteria Met (RRAT>21 & CHF Dx)?: No  Mode of Transport at Discharge: Other (see comment)  Transition of Care Consult (CM Consult): [address]: No  Reason Outside [place]: Physician referred to specific agency  MyChart Signup: No  Discharge Durable Medical Equipment: No  Health Maintenance Reviewed: Yes  Physical Therapy Consult: Yes  Occupational Therapy Consult: Yes  Speech Therapy Consult: No  Support Systems: Spouse/Significant Other  Confirm Follow Up Transport: Family  The Plan for Transition of Care is Related to the Following Treatment Goals : The patient plans to discharge home. The Patient and/or Patient Representative was Provided with a Choice of Provider and Agrees with the Discharge Plan?: Yes  Freedom of Choice List was Provided with Basic Dialogue that Supports the Patient's Individualized Plan of Care/Goals, Treatment Preferences and Shares the Quality Data Associated with the Providers?: Yes   Resource Information Provided?: No  Discharge Location  Patient Expects to be Discharged to[de-identified] Home with home health     Reason for Admission:  Left Total Knee                     RUR Score:   N/A                  Plan for utilizing home health: The Plan for Transition of Care is related to the following treatment goals: Home Health. The patient has no preferences. The Patient was provided with a choice of provider and agrees   with the discharge plan.  [x] Yes [] No    Freedom of choice list was provided with basic dialogue that supports the patient's individualized plan of care/goals, treatment preferences and shares the quality data associated with the providers. [x] Yes [] No         PCP: First and Last name:  Lauren Willis MD     Name of Practice:    Are you a current patient: Yes/No: Y   Approximate date of last visit: Oct, 2022   Can you participate in a virtual visit with your PCP: Y                    Current Advanced Directive/Advance Care Plan: Full Code      Healthcare Decision Maker:   Click here to complete [place] including selection of the Healthcare Decision Maker Relationship (ie \"Primary\")           Wife: Annamarie Sotoer: [phone]                  Transition of Care Plan:            CM met with the patient in room 563. The patient is alert and oriented x4. Confirmed demographics. The patient is independent with ADL's/IADL's, drives a vehicle, owns a rolling walker and uses [employer] on [address] [place] [address] [place]. The patient lives with his wife in a 2 story private residence with no steps to enter and bedroom on 1st level. The patient plans to discharge home with home health and wife to transport when stable for discharge. CM following for discharge needs.     Gokul Bal RN/CRM

## 2022-10-28 NOTE — PROGRESS NOTES
I prayed with Tano Lackey and his wife and celebrated with Tano Lackey the [address].   Father Naila Enciso

## 2022-10-28 NOTE — PROGRESS NOTES
Medicare Outpatient Observation Notice (MOON) provided to patient/representative with verbal explanation of the notice. Time allotted for questions regarding the notice. Patient /representative provided a completed copy of the MOON notice. Copy placed on bedside chart.   Tobi Ng, Care Management Assistant

## 2022-10-28 NOTE — PROGRESS NOTES
Problem: Mobility Impaired (Adult and Pediatric)  Goal: *Acute Goals and Plan of Care (Insert Text)  10/28/2022 1024 by Efren Jj PT  Outcome: Progressing Towards Goal  Note: FUNCTIONAL STATUS PRIOR TO ADMISSION: Patient was modified independent using a rolling walker for functional mobility in household for past 2 wks, cane outside of home. HOME SUPPORT PRIOR TO ADMISSION: The patient lived with wife but did not require assist.    Physical Therapy Goals  Initiated 10/28/2022    1. Patient will move from supine to sit and sit to supine  and scoot up and down in bed with modified independence within 4 days. 2. Patient will perform sit to stand with modified independence within 4 days. 3. Patient will ambulate with modified independence for > 150 feet with the least restrictive device within 4 days. 4. Patient will ascend/descend 4 stairs with one handrail(s) with supervision within 4 days. 5. Patient will perform home exercise program per protocol with supervision/set-up within 4 days. 6. Patient will demonstrate AROM 0-90 degrees in operative joint within 4 days. PHYSICAL THERAPY TREATMENT  Patient: Sergio Enriquez (51 y.o. male)  Date: 10/28/2022  Diagnosis: Osteoarthritis of left knee, unspecified osteoarthritis type [M17.12]  Primary osteoarthritis of left knee [M17.12] <principal problem not specified>  Procedure(s) (LRB):  LEFT TOTAL KNEE ARTHROPLASTY (SPINAL/IV SEDATION W/BLOCK) (Left) 1 Day Post-Op  Precautions: WBAT  Chart, physical therapy assessment, plan of care and goals were reviewed. ASSESSMENT  Patient continues with skilled PT services and is progressing towards goals. Pain better controlled this pm - and able to increase amb distance. Pt tolerating up in chair and amb with staff supervision to bathroom. Pt with loss of balance posteriorly x 2 upon initial standing - improved with cues to weight shift forward prior to attempting to stand.   Anticipate discharge after am session if able to perform stairs. Current Level of Function Impacting Discharge (mobility/balance): Standby guard to occ min assist sit to stand; CGA for amb with RW    Other factors to consider for discharge:          PLAN :  Patient continues to benefit from skilled intervention to address the above impairments. Continue treatment per established plan of care. to address goals. Recommendation for discharge: (in order for the patient to meet his/her long term goals)  Physical therapy at least 2 days/week in the home     This discharge recommendation:  Has been made in collaboration with the attending provider and/or case management    IF patient discharges home will need the following DME: patient owns DME required for discharge       SUBJECTIVE:   Patient stated The pain a lot better after the Percocet.     OBJECTIVE DATA SUMMARY:   Critical Behavior:  Neurologic State: Alert  Orientation Level: Oriented X4  Cognition: Appropriate decision making  Safety/Judgement: Awareness of environment, Good awareness of safety precautions    Range of Motion:  AROM: Within functional limits (except left leg)                         Functional Mobility Training:  Bed Mobility:  Not assessed - pt received and returned to bedside chair      Transfers:  Sit to Stand: Contact guard assistance; min assist x 2 for loss of balance posteriorly - required verbal cues to weight shift forward when initiating stand  Stand to Sit: Contact guard assistance        Bed to Chair: Contact guard assistance; Adaptive equipment; Additional time                    Balance:  Sitting: Intact; Without support  Standing: Impaired; With support  Standing - Static: Good;Constant support  Standing - Dynamic : Fair;Constant support  Ambulation/Gait Training:  Distance (ft): 100 Feet (ft)  Assistive Device: Walker, rolling;Gait belt  Ambulation - Level of Assistance: Contact guard assistance        Gait Abnormalities: Antalgic;Decreased step clearance  Right Side Weight Bearing: Full  Left Side Weight Bearing: As tolerated  Base of Support: Center of gravity altered;Shift to right  Stance: Left decreased  Speed/Sepideh: Slow  Step Length: Right shortened;Left shortened  Swing Pattern: Left asymmetrical          Pain Rating:  Left knee 2/10 following Percocet    Activity Tolerance:   Improving - increasing weight bearing activity tolerance    After treatment patient left in no apparent distress:   Call bell within reach and recliner with legs elevated - ice applied left knee    COMMUNICATION/COLLABORATION:   The patients plan of care was discussed with: Registered nurse.      Vikki Dhillon, PT   Time Calculation: 20 mins

## 2022-10-28 NOTE — PROGRESS NOTES
Patient assessed for readiness to ambulate. Vital Signs  Level of Consciousness: Alert (0)  Temp: 97.3 °F (36.3 °C)  Temp Source: Oral  Pulse (Heart Rate): 69  Heart Rate Source: Monitor  Cardiac Rhythm: Sinus Rhythm  Resp Rate: 16  BP: 130/69  MAP (Monitor): 91  MAP (Calculated): 89  BP 1 Location: Right upper arm  BP 1 Method: Automatic  BP Patient Position: At rest  MEWS Score: 1. Patient ambulated with assistance of 2 nurses. Patient ambulated with gait belt and walker. Patient walked to sidestepped to Head of Bed. Patient returned safely to bed.

## 2022-10-29 VITALS
BODY MASS INDEX: 28.47 KG/M2 | SYSTOLIC BLOOD PRESSURE: 137 MMHG | TEMPERATURE: 99 F | WEIGHT: 192.24 LBS | RESPIRATION RATE: 18 BRPM | HEIGHT: 69 IN | OXYGEN SATURATION: 95 % | DIASTOLIC BLOOD PRESSURE: 70 MMHG | HEART RATE: 76 BPM

## 2022-10-29 PROBLEM — M17.12 PRIMARY OSTEOARTHRITIS OF LEFT KNEE: Status: RESOLVED | Noted: 2022-10-27 | Resolved: 2022-10-29

## 2022-10-29 PROBLEM — M17.10 PRIMARY LOCALIZED OSTEOARTHROSIS OF THE KNEE: Status: RESOLVED | Noted: 2017-04-06 | Resolved: 2022-10-29

## 2022-10-29 PROCEDURE — 74011250636 HC RX REV CODE- 250/636: Performed by: ORTHOPAEDIC SURGERY

## 2022-10-29 PROCEDURE — 96376 TX/PRO/DX INJ SAME DRUG ADON: CPT

## 2022-10-29 PROCEDURE — 97116 GAIT TRAINING THERAPY: CPT

## 2022-10-29 PROCEDURE — 74011250637 HC RX REV CODE- 250/637: Performed by: ORTHOPAEDIC SURGERY

## 2022-10-29 PROCEDURE — 97530 THERAPEUTIC ACTIVITIES: CPT

## 2022-10-29 PROCEDURE — G0378 HOSPITAL OBSERVATION PER HR: HCPCS

## 2022-10-29 PROCEDURE — 74011250637 HC RX REV CODE- 250/637: Performed by: PHYSICIAN ASSISTANT

## 2022-10-29 RX ORDER — OXYCODONE HYDROCHLORIDE 5 MG/1
5 TABLET ORAL
Status: DISCONTINUED | OUTPATIENT
Start: 2022-10-29 | End: 2022-10-29

## 2022-10-29 RX ORDER — KETOROLAC TROMETHAMINE 30 MG/ML
15 INJECTION, SOLUTION INTRAMUSCULAR; INTRAVENOUS
Status: DISCONTINUED | OUTPATIENT
Start: 2022-10-29 | End: 2022-10-30 | Stop reason: HOSPADM

## 2022-10-29 RX ADMIN — OXYCODONE AND ACETAMINOPHEN 2 TABLET: 5; 325 TABLET ORAL at 04:03

## 2022-10-29 RX ADMIN — OXYCODONE AND ACETAMINOPHEN 1 TABLET: 5; 325 TABLET ORAL at 09:48

## 2022-10-29 RX ADMIN — KETOROLAC TROMETHAMINE 15 MG: 30 INJECTION, SOLUTION INTRAMUSCULAR at 02:13

## 2022-10-29 RX ADMIN — GABAPENTIN 300 MG: 300 CAPSULE ORAL at 10:02

## 2022-10-29 RX ADMIN — KETOROLAC TROMETHAMINE 15 MG: 30 INJECTION, SOLUTION INTRAMUSCULAR at 12:01

## 2022-10-29 RX ADMIN — SENNOSIDES AND DOCUSATE SODIUM 1 TABLET: 50; 8.6 TABLET ORAL at 09:48

## 2022-10-29 RX ADMIN — OXYCODONE AND ACETAMINOPHEN 1 TABLET: 5; 325 TABLET ORAL at 14:00

## 2022-10-29 RX ADMIN — POLYETHYLENE GLYCOL 3350 17 G: 17 POWDER, FOR SOLUTION ORAL at 09:48

## 2022-10-29 RX ADMIN — OXYCODONE 5 MG: 5 TABLET ORAL at 02:13

## 2022-10-29 RX ADMIN — ASPIRIN 81 MG: 81 TABLET, COATED ORAL at 09:48

## 2022-10-29 NOTE — PROGRESS NOTES
S: Seen and examined at bedside in the AM.  Pain control issues overnight but these resolved. He did well with physical therapy and was cleared for discharge with home health    O: Oriented x3, no acute distress  MSK: Left lower extremity with a surgical dressing in place. Compartments are soft and compressible down the extremity calves are soft and nontender. Plantarflexion dorsiflexion 5 out of 5 neurovascularly intact throughout the lower extremity into the foot    A: Status post left total knee arthroplasty    P: Patient has done well with physical therapy in past all modalities.   Anticipating discharge home with home health

## 2022-10-29 NOTE — PROGRESS NOTES
Note: FUNCTIONAL STATUS PRIOR TO ADMISSION: Patient was modified independent using a rolling walker for functional mobility in household for past 2 wks, cane outside of home. HOME SUPPORT PRIOR TO ADMISSION: The patient lived with wife but did not require assist.     Physical Therapy Goals  Initiated 10/28/2022     1. Patient will move from supine to sit and sit to supine  and scoot up and down in bed with modified independence within 4 days. 2. Patient will perform sit to stand with modified independence within 4 days. 3. Patient will ambulate with modified independence for > 150 feet with the least restrictive device within 4 days. 4. Patient will ascend/descend 4 stairs with one handrail(s) with supervision within 4 days. 5. Patient will perform home exercise program per protocol with supervision/set-up within 4 days. 6. Patient will demonstrate AROM 0-90 degrees in operative joint within 4 days. Problem: Patient Education: Go to Patient Education Activity  Goal: Patient/Family Education  Outcome: Progressing Towards Goal    PHYSICAL THERAPY MORNING SESSION NOTE  Patient: Agnes Malloy (89 y.o. male)  Date: 10/29/2022  Primary Diagnosis: Osteoarthritis of left knee, unspecified osteoarthritis type [M17.12]  Primary osteoarthritis of left knee [M17.12]  Procedure(s) (LRB):  LEFT TOTAL KNEE ARTHROPLASTY (SPINAL/IV SEDATION W/BLOCK) (Left) 2 Days Post-Op   Precautions:   WBAT    Agnes Malloy was seen for morning PT session. He is walking 175 feet at SBA level of assistance and with a RW . The primary limiting factors are pain control. Currently, expect Agnes Malloy will need 1-2 more session(s) to meet the therapy goals in preparation for returning home. The full therapy note will follow after this afternoon's session.     Morning session functional mobility:  Bed Mobility:     Supine to Sit: Other (comment) (NT, started and ended session in chair)  Sit to Supine: Other (comment) (NT, started and ended session in chair)  Scooting: Modified independent  Transfers:  Sit to Stand: Stand-by assistance  Stand to Sit: Stand-by assistance                       Balance:   Sitting: Intact; Without support  Standing: Intact; With support  Standing - Static: Good;Constant support  Standing - Dynamic : Fair;Constant support  Ambulation/Gait Training:  Distance (ft): 175 Feet (ft)  Assistive Device: Walker, rolling;Gait belt  Ambulation - Level of Assistance: Stand-by assistance        Gait Abnormalities: Antalgic;Decreased step clearance        Base of Support: Center of gravity altered;Shift to right  Stance: Left decreased  Speed/Sepideh: Slow  Step Length: Left shortened;Right shortened  Swing Pattern: Left asymmetrical         Thank you for this referral.  Denise Godoy, PT   Time Calculation: 23 mins

## 2022-10-29 NOTE — PROGRESS NOTES
Problem: Falls - Risk of  Goal: *Absence of Falls  Description: Document Marty Gamma Fall Risk and appropriate interventions in the flowsheet. Outcome: Progressing Towards Goal  Note: Fall Risk Interventions:  Mobility Interventions: Bed/chair exit alarm, Communicate number of staff needed for ambulation/transfer, Patient to call before getting OOB         Medication Interventions: Assess postural VS orthostatic hypotension, Bed/chair exit alarm, Patient to call before getting OOB, Evaluate medications/consider consulting pharmacy    Elimination Interventions: Call light in reach, Patient to call for help with toileting needs, Bed/chair exit alarm              Problem: Patient Education: Go to Patient Education Activity  Goal: Patient/Family Education  Outcome: Progressing Towards Goal     Problem: Patient Education: Go to Patient Education Activity  Goal: Patient/Family Education  Outcome: Progressing Towards Goal     Problem: Patient Education: Go to Patient Education Activity  Goal: Patient/Family Education  Outcome: Progressing Towards Goal     Problem: Knee Replacement: Day of Surgery/Unit  Goal: Off Pathway (Use only if patient is Off Pathway)  Outcome: Progressing Towards Goal  Goal: Activity/Safety  Outcome: Progressing Towards Goal  Note: Patient educated to call for help before getting out of bed, patient to call for help before getting out of bed, call bell left in patient's hand  Goal: Consults, if ordered  Outcome: Progressing Towards Goal  Goal: Diagnostic Test/Procedures  Outcome: Progressing Towards Goal  Goal: Nutrition/Diet  Outcome: Progressing Towards Goal  Goal: Medications  Outcome: Progressing Towards Goal  Goal: Respiratory  Outcome: Progressing Towards Goal  Note: Patient educated on how to use incentive spirometer and to use it ten times per hour.   Goal: Treatments/Interventions/Procedures  Outcome: Progressing Towards Goal  Goal: Psychosocial  Outcome: Progressing Towards Goal  Goal: *Initiate mobility  Outcome: Progressing Towards Goal  Goal: *Optimal pain control at patient's stated goal  Outcome: Progressing Towards Goal  Goal: *Hemodynamically stable  Outcome: Progressing Towards Goal     Problem: Knee Replacement: Post-Op Day 1  Goal: Off Pathway (Use only if patient is Off Pathway)  Outcome: Progressing Towards Goal  Goal: Activity/Safety  Outcome: Progressing Towards Goal  Goal: Diagnostic Test/Procedures  Outcome: Progressing Towards Goal  Goal: Nutrition/Diet  Outcome: Progressing Towards Goal  Goal: Medications  Outcome: Progressing Towards Goal  Goal: Respiratory  Outcome: Progressing Towards Goal  Goal: Treatments/Interventions/Procedures  Outcome: Progressing Towards Goal  Goal: Psychosocial  Outcome: Progressing Towards Goal  Goal: Discharge Planning  Outcome: Progressing Towards Goal  Goal: *Demonstrates progressive activity  Outcome: Progressing Towards Goal  Goal: *Optimal pain control at patient's stated goal  Outcome: Progressing Towards Goal  Goal: *Hemodynamically stable  Outcome: Progressing Towards Goal  Goal: *Discharge plan identified  Outcome: Progressing Towards Goal

## 2022-10-29 NOTE — PROGRESS NOTES
Care Management:    Transition of Care Plan:     RUR: NA  Disposition: home with [facility]  Transportation: wife      Received call from patient's RN. Patient is discharged. Has patient been accepted by home health. Reviewed Care Port. He has been accepted by [facility]. Sent them a message via [facility] that patient is discharging today.      COCO Jenkins

## 2022-10-29 NOTE — PROGRESS NOTES
Bedside and Verbal shift change report given to Mag Ryan RN (oncoming nurse) by Yoni Lopez RN (offgoing nurse). Report included the following information SBAR and MAR.

## 2022-10-31 ENCOUNTER — PATIENT OUTREACH (OUTPATIENT)
Dept: CASE MANAGEMENT | Age: 77
End: 2022-10-31

## 2022-10-31 NOTE — PROGRESS NOTES
Post Discharge Follow-up contact after Joint Replacement    Patient discharged on 10/29/22  By  Jakob Gupta   following  left knee Arthroplasty. Spoke with patient today, who reports that \" using ice and doing okay. \"  Denies Fever, Shortness of Breath or Chest Pain. Home Health has not visited, but  is scheduled for today . Patient also reports:  Surgical dressing clean, dry, intact  Calf is non-tender, operative extremity has moderate swelling. Pain is well managed. Discussed use of ice & elevation. Patient is progressing with therapy and is exercising independently. Taking Aspirin for anticoagulation, Percocet for pain. Patient is not experiencing symptoms of constipation & urinating without difficulty. Discussed side effects of anticoagulants & pain medications (bleeding/bruising, constipation, lightheaded/dizziness)  Follow up appointment is scheduled. Discussed calling surgeon Dr Americo Ortega  for drainage, bleeding, swelling in operative extremity, fever or pain. Discussed calling PCP Dr Sonya Gilman with other medical issues.

## 2022-10-31 NOTE — NURSE NAVIGATOR
[address]  [facility]     FROM:                                TO: [facility]                                                      ([address] [place] [address] or Facility name)  Bailee Crespo [address]  [address] [place] [address]  Dept: [address]: [phone]                                      Room#:  563/01                                                       Nurse Navigator:  Shaila Mueller RN         SITUATION      ASAScore: ASA 3 - Patient with moderate systemic disease with functional limitations    Admitted:  10/27/2022  Hospital Day: 3      Attending Provider:  No att. providers found     Consultations:  None    PCP:  Pita Lee MD   [phone]     Admitting Dx:  Osteoarthritis of left knee, unspecified osteoarthritis type [M17.12]  Primary osteoarthritis of left knee [M17.12]       Active Problems:    * No active hospital problems. *    4 Days Post-Op of   Procedure(s):  LEFT TOTAL KNEE ARTHROPLASTY (SPINAL/IV SEDATION W/BLOCK)   BY: Roberto Fox MD             ON: 10/27/2022                  Code Status: Prior             Advance Directive? Yes Not W Pt (Send w/patient)     Isolation:  There are currently no Active Isolations       MDRO: No current active infections    BACKGROUND     Allergies:   Allergies   Allergen Reactions    Wasp [Venom-Wasp] Anaphylaxis and Swelling    Sulfa (Sulfonamide Antibiotics) Other (comments)     Lower extremity redness       Past Medical History:   Diagnosis Date    Arthritis     lower back    CAD (coronary artery disease)     stent x 1 2001 LDA    Chronic lower back pain     Dr Anh Kelley     Chronic pain     right knee - resolved with knee replacement 4/2017    Chronic pain 2022    left knee    Hypercholesteremia     Hypertension     Hypothyroidism     Ill-defined condition     Bleeding prolonged    Skin cancer 2020      RLEG   L FLANK AREA    Sun-damaged skin        Past Surgical History: Procedure Laterality Date    COLONOSCOPY N/A 10/20/2017    COLONOSCOPY performed by Timoteo Oliveira MD at [facility] AMBULATORY OR    HX CATARACT REMOVAL Right 2017    HX CORONARY STENT PLACEMENT      Dr Maame Vazquez      [address]  approx 2010    @ [facility] AND R GROIN    HX LUMBAR FUSION  2006    L3-L4 fusion , L2-S1 fusion 2020    HX TONSILLECTOMY  age 13    HX WISDOM TEETH EXTRACTION      CT CARDIAC SURG PROCEDURE UNLIST      1 STENT    CT COLON CA SCRN NOT  [address]  10/20/2017         CT TOTAL KNEE ARTHROPLASTY Right 2017       Prior to Admission Medications   Prescriptions Last Dose Informant Patient Reported? Taking? Cholecalciferol, Vitamin D3, 75 mcg (3,000 unit) tab 10/20/2022  Yes Yes   Sig: Take 75 mcg by mouth daily. EPINEPHrine (EPIPEN) 0.3 mg/0.3 mL injection   Yes No   Si.3 mg by IntraMUSCular route once as needed. amLODIPine (NORVASC) 5 mg tablet 10/26/2022 at 2200  Yes Yes   Sig: Take 2.5 mg by mouth nightly. ascorbic acid, vitamin C, (VITAMIN C) 250 mg tablet 10/20/2022  Yes Yes   Sig: Take 250 mg by mouth daily. aspirin delayed-release 81 mg tablet 10/26/2022 at 2200  Yes No   Sig: Take 81 mg by mouth daily. atorvastatin (LIPITOR) 40 mg tablet 10/26/2022 at 2200  Yes Yes   Sig: Take 20 mg by mouth nightly. cyanocobalamin, vitamin B-12, 5,000 mcg subl 10/20/2022  Yes Yes   Si Tab by SubLINGual route daily. enalapril (VASOTEC) 10 mg tablet 10/27/2022 at 0500  Yes Yes   Sig: Take 20 mg by mouth nightly. furosemide (LASIX) 20 mg tablet 10/25/2022 at 0800  Yes No   Sig: Take 40 mg by mouth as needed. AS NEEDED FOR SWELLING   gabapentin (NEURONTIN) 300 mg capsule 10/26/2022 at 1500  Yes Yes   Sig: Take 300 mg by mouth three (3) times daily as needed for Pain.   levothyroxine (SYNTHROID) 200 mcg tablet 10/27/2022 at 0500  Yes Yes   Sig: Take 200 mcg by mouth.  Daily 6 days a week, Mon, Tues, Wed, Thurs, Fri ,Sun   magnesium oxide (MAG-OX) 400 mg tablet 10/20/2022  Yes Yes   Sig: Take 400 mg by mouth every evening. Facility-Administered Medications: None       Vaccinations:    Immunization History   Administered Date(s) Administered    COVID-19, MODERNA BLUE border, Primary or Immunocompromised, (age 18y+), IM, 100 mcg/0.5mL 02/25/2021, 03/26/2021    Influenza Vaccine (Tri) Adjuvanted (>65 Yrs FLUAD TRI 03149) 10/07/2019    Zoster Recombinant 10/07/2019, 01/16/2020         ASSESSMENT   Age: 78 y.o. Gender: male        Height: Height: 5' 9\" (175.3 cm)                    Weight:Weight: 87.2 kg (192 lb 3.9 oz)     No data found. Active Orders   There are no active orders of the following types: Diet. Orientation: Orientation Level: Oriented X4    Active Lines/Drains:  (Peg Tube / Connors / CL or S/L?):no    Urinary Status: Voiding      Last BM: Last Bowel Movement Date: 10/27/22     Skin Integrity: Incision (comment)             Mobility: Slightly limited   Weight Bearing Status: WBAT (Weight Bearing as Tolerated)      Gait Training  Assistive Device: Walker, rolling, Gait belt  Ambulation - Level of Assistance: Modified independent  Distance (ft): 175 Feet (ft)     On Anticoagulation?  YES  Aspirin                                         Pain Medications given:  PERCOCET                                   Lab Results   Component Value Date/Time    Glucose 134 (H) 10/28/2022 03:47 AM    Hemoglobin A1c 6.0 (H) 10/20/2022 11:14 AM    INR 1.0 10/20/2022 11:14 AM    INR 1.0 03/06/2017 12:38 PM    HGB 12.9 10/28/2022 03:47 AM    HGB 15.5 10/20/2022 11:14 AM    HGB 8.5 (L) 12/05/2020 03:53 AM    HGB 9.0 (L) 12/04/2020 01:33 AM       Readmission Risks:  Score:         RECOMMENDATION     See After Visit Summary (AVS) for:  Discharge instructions  After [address]    Medication Reconciliation          [facility] Orthopaedic Nurse Navigator  ARYA Pink, RN-BC       Office  [phone]  Cell [phone]  Fax      [phone]  [email or web address]             . Little[place]

## 2022-11-02 DIAGNOSIS — Z96.652 S/P TOTAL KNEE REPLACEMENT, LEFT: ICD-10-CM

## 2022-11-02 RX ORDER — OXYCODONE AND ACETAMINOPHEN 5; 325 MG/1; MG/1
1-2 TABLET ORAL
Qty: 42 TABLET | Refills: 0 | Status: SHIPPED | OUTPATIENT
Start: 2022-11-02 | End: 2022-11-09

## 2022-11-04 ENCOUNTER — TELEPHONE (OUTPATIENT)
Dept: ORTHOPEDIC SURGERY | Age: 77
End: 2022-11-04

## 2022-11-04 NOTE — TELEPHONE ENCOUNTER
Chief Complaint   Patient presents with    Post-Op Problem     Received voicemail message from [facility]), she stated that patient was experiencing lower left leg pain (operative leg), +4 pitting edema, with no drainage, or sign of infection. Returned her call (next day, out of office), left her a voicemail message that I would reach out to patient. Follow-up     Spoke with patient he stated that he was feeling better today. He stated that he doubled up on diuretic medication and applied supported hose. Patient also stated that he was seen by home health today and they did not see any reason for doppler at this time.

## 2022-11-21 ENCOUNTER — OFFICE VISIT (OUTPATIENT)
Dept: ORTHOPEDIC SURGERY | Age: 77
End: 2022-11-21
Payer: MEDICARE

## 2022-11-21 VITALS — WEIGHT: 175 LBS | BODY MASS INDEX: 26.52 KG/M2 | HEIGHT: 68 IN

## 2022-11-21 DIAGNOSIS — Z09 SURGERY FOLLOW-UP: ICD-10-CM

## 2022-11-21 DIAGNOSIS — Z96.652 S/P TOTAL KNEE REPLACEMENT, LEFT: Primary | ICD-10-CM

## 2022-11-21 PROBLEM — F11.99 OPIOID USE, UNSPECIFIED WITH UNSPECIFIED OPIOID-INDUCED DISORDER (HCC): Status: ACTIVE | Noted: 2022-11-21

## 2022-11-21 PROCEDURE — 99024 POSTOP FOLLOW-UP VISIT: CPT | Performed by: PHYSICIAN ASSISTANT

## 2022-11-21 RX ORDER — TRAMADOL HYDROCHLORIDE 50 MG/1
50 TABLET ORAL
Qty: 20 TABLET | Refills: 0 | Status: SHIPPED | OUTPATIENT
Start: 2022-11-21 | End: 2022-12-11

## 2022-11-21 NOTE — PROGRESS NOTES
Cullen Blake (: 1945) is a 78 y.o. male, patient, here for evaluation of the following chief complaint(s):  Surgical Follow-up (PO #1: LTK: 10/27/22/)       SUBJECTIVE/OBJECTIVE:  Cullen Blake presents today for first postop visit status post left TKA on 10/27/22. Home therapy is scheduled to come through Friday. He is making good progress, walking with his cane, concentrating on standing up straight. Taking oxycodone at night about every other day. He is also using CBD Gummies during the day and CBD Gummies with THC at night. He never took aspirin twice a day postop as prescribed, only once a day. PHYSICAL EXAM:  Vitals: Ht 5' 8\" (1.727 m)   Wt 175 lb (79.4 kg)   BMI 26.61 kg/m²   Body mass index is 26.61 kg/m². 78y.o. year old M in no acute distress. Ambulates with a slight limp on the left, cane in the contralateral hand. Neutral alignment of the left knee. Compression stocking in place. He has an occlusive dressing over his incision just for comfort. Incision is completely sealed and dry. Some eschar remain, no warmth, erythema, drainage. Range of motion lacks 1 to 2 degrees of full extension with flexion to 100, subjectively 107. Preop 0-100. Motor 5/5. IMAGING:  Radiographs: XR Results (most recent):  Results from Appointment encounter on 22    XR KNEE LT 3 V    Narrative  3 views of the left knee today including AP, lateral and sunrise using digital radiography demonstrate satisfactory position and alignment of cemented cruciate substituting components. No evidence of loosening. Patella tracks centrally. ASSESSMENT/PLAN:  1. S/P total knee replacement, left  -     REFERRAL TO PHYSICAL THERAPY  -     XR KNEE LT 3 V; Future  -     traMADoL (ULTRAM) 50 mg tablet; Take 1 Tablet by mouth nightly as needed for Pain for up to 20 days. Max Daily Amount: 50 mg., Normal, Disp-20 Tablet, R-0  2.  Surgery follow-up    First postop visit status post left total knee arthroplasty on 10/27/2022. Wean from [facility] [address]. Small prescription of tramadol given for nighttime only. He is advised against using CBD/THC Gummies at the same time. Continue daily aspirin dosing. Transition to outpatient physical therapy. Follow-up in 4 weeks. Return in about 4 weeks (around 12/19/2022) for POV #2 with Dr. Americo Ortega. Review Of Systems  ROS    Positive for: Skin, Musculoskeletal  Last edited by Joy Kim on 11/21/2022  1:28 PM.         Patient denies any recent fever, chills, nausea, vomiting, chest pain, or shortness of breath. Allergies   Allergen Reactions    Wasp [Venom-Wasp] Anaphylaxis and Swelling    Sulfa (Sulfonamide Antibiotics) Other (comments)     Lower extremity redness       Current Outpatient Medications   Medication Sig    traMADoL (ULTRAM) 50 mg tablet Take 1 Tablet by mouth nightly as needed for Pain for up to 20 days. Max Daily Amount: 50 mg.    aspirin delayed-release 81 mg tablet Take 1 Tablet by mouth two (2) times a day. Indications: blood clot prevention    amLODIPine (NORVASC) 5 mg tablet Take 2.5 mg by mouth nightly. cyanocobalamin, vitamin B-12, 5,000 mcg subl 1 Tab by SubLINGual route daily. atorvastatin (LIPITOR) 40 mg tablet Take 20 mg by mouth nightly. enalapril (VASOTEC) 10 mg tablet Take 20 mg by mouth nightly. levothyroxine (SYNTHROID) 200 mcg tablet Take 200 mcg by mouth. Daily 6 days a week, Mon, Tues, Wed, Thurs, Fri ,Sun    furosemide (LASIX) 20 mg tablet Take 40 mg by mouth as needed. AS NEEDED FOR SWELLING    senna-docusate (PERICOLACE) 8.6-50 mg per tablet Take 1 Tablet by mouth two (2) times daily as needed for Constipation. ascorbic acid, vitamin C, (VITAMIN C) 250 mg tablet Take 250 mg by mouth daily. Cholecalciferol, Vitamin D3, 75 mcg (3,000 unit) tab Take 75 mcg by mouth daily.     magnesium oxide (MAG-OX) 400 mg tablet Take 400 mg by mouth every evening.    gabapentin (NEURONTIN) 300 mg capsule Take 300 mg by mouth three (3) times daily as needed for Pain. EPINEPHrine (EPIPEN) 0.3 mg/0.3 mL injection 0.3 mg by IntraMUSCular route once as needed. No current facility-administered medications for this visit.        Past Medical History:   Diagnosis Date    Arthritis     lower back    CAD (coronary artery disease)     stent x 1 2001 LDA    Chronic lower back pain     Dr Della Voss     Chronic pain     right knee - resolved with knee replacement 4/2017    Chronic pain 2022    left knee    Hypercholesteremia     Hypertension     Hypothyroidism     Ill-defined condition     Bleeding prolonged    Skin cancer 2020      RLEG   L FLANK AREA    Sun-damaged skin         Past Surgical History:   Procedure Laterality Date    COLONOSCOPY N/A 10/20/2017    COLONOSCOPY performed by Debbie Cannon MD at [facility] AMBUL[facility] OR    HX CATARACT REMOVAL Right 02/2017    HX CORONARY STENT PLACEMENT  2000    Dr Sallie Mcdaniels    HX GI      COLONOSCOPY    [address]  2001    HX HERNIA REPAIR  approx 2010    @ [place] UMBILCAL AND R GROIN    HX KNEE REPLACEMENT Left 10/27/2022    HX LUMBAR FUSION  2006    L3-L4 fusion 2006, L2-S1 fusion 12/2020    HX TONSILLECTOMY  age 11    HX [de-identified] TEETH EXTRACTION      ID CARDIAC SURG PROCEDURE UNLIST      1 STENT    ID COLON CA SCRN NOT HI RSK IND  10/20/2017         ID TOTAL KNEE ARTHROPLASTY Right 04/2017       Family History   Problem Relation Age of Onset    Hypertension Mother     Heart Disease Father     No Known Problems Sister     No Known Problems Brother     Heart Disease Paternal Uncle     No Known Problems Son     No Known Problems Daughter     Anesth Problems Neg Hx         Social History     Socioeconomic History    Marital status:      Spouse name: Not on file    Number of children: Not on file    Years of education: Not on file    Highest education level: Not on file   Occupational History    Not on file   Tobacco Use    Smoking status: Never    Smokeless tobacco: Never   Vaping Use    Vaping Use: Never used   Substance and Sexual Activity    Alcohol use: Yes     Alcohol/week: 6.0 standard drinks     Types: 6 Shots of liquor per week    Drug use: No    Sexual activity: Not on file   Other Topics Concern    Not on file   Social History Narrative    Not on file     Social Determinants of Health     Financial Resource Strain: Not on file   Food Insecurity: Not on file   Transportation Needs: Not on file   Physical Activity: Not on file   Stress: Not on file   Social Connections: Not on file   Intimate Partner Violence: Not on file   Housing Stability: Not on file         Orders Placed This Encounter    XR KNEE LT 3 V     Room 6     Standing Status:   Future     Number of Occurrences:   1     Standing Expiration Date:   11/22/2023    REFERRAL TO PHYSICAL THERAPY     Referral Priority:   Routine     Referral Type:   PT/OT/ST     Referral Reason:   Specialty Services Required     Number of Visits Requested:   1    traMADoL (ULTRAM) 50 mg tablet     Sig: Take 1 Tablet by mouth nightly as needed for Pain for up to 20 days. Max Daily Amount: 50 mg. Dispense:  20 Tablet     Refill:  0        Jason R. Hilda Ormond, M.D. was available for immediate consultation as the supervising physician. An electronic signature was used to authenticate this note.   -- Sydnee Ralph PA-C

## 2022-12-05 ENCOUNTER — OFFICE VISIT (OUTPATIENT)
Dept: ORTHOPEDIC SURGERY | Age: 77
End: 2022-12-05
Payer: MEDICARE

## 2022-12-05 DIAGNOSIS — Z96.652 S/P TOTAL KNEE REPLACEMENT, LEFT: Primary | ICD-10-CM

## 2022-12-05 DIAGNOSIS — M25.562 POSTOPERATIVE PAIN OF LEFT KNEE: ICD-10-CM

## 2022-12-05 DIAGNOSIS — G89.18 POSTOPERATIVE PAIN OF LEFT KNEE: ICD-10-CM

## 2022-12-05 DIAGNOSIS — M17.12 PRIMARY OSTEOARTHRITIS OF LEFT KNEE: ICD-10-CM

## 2022-12-05 PROCEDURE — 97140 MANUAL THERAPY 1/> REGIONS: CPT | Performed by: PHYSICAL THERAPIST

## 2022-12-05 PROCEDURE — 97110 THERAPEUTIC EXERCISES: CPT | Performed by: PHYSICAL THERAPIST

## 2022-12-05 PROCEDURE — 97162 PT EVAL MOD COMPLEX 30 MIN: CPT | Performed by: PHYSICAL THERAPIST

## 2022-12-05 NOTE — PROGRESS NOTES
Patient Name: Jono Mendosa  Date:2022  : 1945  [x]  Patient  Verified  Payor: Haley Emily / Plan: [place] MEDICARE PART [place] & B / Product Type: Medicare /    Total Treatment Time (min): 45  1:1 Treatment Time ( [address] only):    Mary Beck MD  1. S/P total knee replacement, left  2. Primary osteoarthritis of left knee  3. Postoperative pain of left knee      Subjective:    Patient is a 78 y.o. male referred to physical therapy by Dr. Enrique Lane with a diagnosis of a left total knee replacement performed as result of osteoarthritis on 2022. He is well-known is here in therapy as we have treated him for multiple unrelated issues in the past.  He has had an L2-S1 revision fusion in  by an outside physician group. He did have a right total knee replacement in 2017. He reports that he has completed home health physical therapy following his most recent left knee surgery. He states that his standing/walking endurance is limited to approximately 30 minutes or half a block. Some of this limitation is due to his left knee while some of it is due to his back. He states that he is sleeping well at night however he still has some swelling and tenderness throughout his knee. He rates his discomfort approximately a 6/10. Based on physician dictation, his preoperative range of motion was 0 to 100 degrees while range of motion in the OR reached 0-110. Past medical history and medication list can otherwise be reviewed per the EHR. Objective: Incisions:Unable to visualize secondary to clothing restriction but subjectively denies drainage/redness/signs of infection. Gait: Ambulates with a stiff leg gait pattern and known flexed trunk posture due to some of his pre-existing lower back issues. Strength: Isometric quad contraction is weakened as compared to the contralateral side.   Left knee ROM: -8 degrees of extension to 100 degrees of flexion  Right knee ROM: -3 degrees of extension to 105 degrees of flexion  Circumfererence: increased by approximately 1cm as compared to contralateral side  LEFS: 43/80          Ex: 15 min  Treatment today to include instruction in a home exercise program as well as providing patient with written and visual handouts. Man: 10 min    PF/TF mobilization in multiple angles of flexion/extension to improve ROM. NMR:  min  Verbal and tactile cueing for neuromuscular reeducation of isometric quad contraction. All questions were addressed. Assessment:    Patient presents with increased pain and decreased ROM, strength, and mobility consistent with left total knee replacement performed on October 27, 2022 as a result of osteoarthritis. Long Term Goals. 8 weeks  1. Patient will demonstrate the ability to ambulate on level surfaces, uneven surfaces, stairs with a smooth and nonantalgic gait pattern. 2.  Patient will demonstrate improved AROM of the knee to within 95% or greater as compared to the contralateral side to assist with home/work/community/recreational ADL activity. 3.  Patient will report pain to be consistently less than or equal to 1/10 with all home/work/community/recreational ADL activity. 4.  Report standing/walking endurance increased to at least 90 minutes to assist with home/work/community/recreational ADL activity without the need to sit and rest due to knee pain. Short Term Goals. 2 visits. Patient will demonstrate independence with HEP. Plan:  Plan of care: Physical therapy consisted of frequency of 2/week for the next 8 weeks. Physical therapy will consist of therapeutic exercise, modalities, patient education, neuromuscular reeducation, manual therapy, therapeutic activity, dry needling, and instruction in home exercise program as appropriate.     Eval  Ex: 15  Man: 10  NMR:     The referring physician has reviewed and approved this evaluation and plan of care as noted by the electronic signature attached to note.    Leslye Lassiter DPT, ATC

## 2022-12-07 ENCOUNTER — OFFICE VISIT (OUTPATIENT)
Dept: ORTHOPEDIC SURGERY | Age: 77
End: 2022-12-07
Payer: MEDICARE

## 2022-12-07 DIAGNOSIS — Z96.652 S/P TOTAL KNEE REPLACEMENT, LEFT: Primary | ICD-10-CM

## 2022-12-07 DIAGNOSIS — M17.12 PRIMARY OSTEOARTHRITIS OF LEFT KNEE: ICD-10-CM

## 2022-12-07 DIAGNOSIS — M25.562 POSTOPERATIVE PAIN OF LEFT KNEE: ICD-10-CM

## 2022-12-07 DIAGNOSIS — G89.18 POSTOPERATIVE PAIN OF LEFT KNEE: ICD-10-CM

## 2022-12-07 PROCEDURE — 97110 THERAPEUTIC EXERCISES: CPT

## 2022-12-07 PROCEDURE — 97140 MANUAL THERAPY 1/> REGIONS: CPT

## 2022-12-07 NOTE — PROGRESS NOTES
PT DAILY TREATMENT NOTE    Patient Name: Cullen Blake  Date:2022  : 1945  [x]  Patient  Verified  Payor: Marty Patel / Plan: [place] MEDICARE PART A & B / Product Type: Medicare /    Total Treatment Time (min): 60   1:1 Treatment Time ( [address] only): 40   Referring Provider: Bethany Tsai MD    1. S/P total knee replacement, left  2. Primary osteoarthritis of left knee  3. Postoperative pain of left knee    SUBJECTIVE  Subjective functional status/changes:   [] No changes reported    Patient reports he feels very stiff and sore. OBJECTIVE/TREATMENT    ROM 5-109    Manual Therapy x 15 mins:   PF/TF mobilizations to affected limb to promote improved joint mobility. PROM for knee flexion and extension mobility. STM/MFR to the distal IT band, quadriceps, peripatellar structures and popliteal musculature. Passive quad and hamstring stretching in supine and with leg off table. Gentle incisional mobilizations. Neuromuscular Re-education (NMR) x   minutes:  []  Kinesiotaping for   []  Neuromuscular reeducation to the VMO with use of [place] electrical stimulation in conjunction with active contraction and exercises. []  balance/proprioceptive exercises and activities in clinic as listed below as NMR. Therapeutic Exercise x 25 mins:   Strengthening/Endurance/ADL function/Neuromuscular reeducation activities/exercises supervised and completed as listed below. EXERCISE X= completed on  2022   Ramesh Man.  10'   slantboard x   PPT hip ER grn   LAQ/SAQ 4/2   TKE grn                                                               Added/Changed Exercises:  []  Advanced to address: [] functional strength/ROM deficits [] balance/proprioceptive tasks  []  Modified: [] per subjective reports [] for patient time constraints [] for clinic time constraints    Modality:  []  E-Stim: type _ x _ min     []att   []unatt   []w/ice   []w/heat  []  Ultrasound: []cont   []pulse    _ W/cm2 x _  min   []1MHz []3MHz  []  Ice pack: post      []  Hot pack: pre    []  Other:     Patient Education: [] Review HEP    [] Progressed/Changed HEP to include:  [] positioning   [] body mechanics   [] transfers   [] heat/ice application      ASSESSMENT    Very tight with quad stretching with leg off side of table. Flexion gains since IE. Works slowly through exercises without pain.      Progress towards goals / Updated goals:    PLAN  [x]  Upgrade activities as tolerated      [x]  Continue plan of care  []  Discharge due to:  []  Other:      Co-treatment by Jodi Diane, HARRY 12/7/2022

## 2022-12-13 NOTE — PROGRESS NOTES
I have reviewed the notes, assessments, and/or procedures performed by Marlene Eubanks PTA, I concur with her/his documentation of Laurny Tobias.

## 2022-12-14 ENCOUNTER — OFFICE VISIT (OUTPATIENT)
Dept: ORTHOPEDIC SURGERY | Age: 77
End: 2022-12-14
Payer: MEDICARE

## 2022-12-14 DIAGNOSIS — M25.562 POSTOPERATIVE PAIN OF LEFT KNEE: ICD-10-CM

## 2022-12-14 DIAGNOSIS — G89.18 POSTOPERATIVE PAIN OF LEFT KNEE: ICD-10-CM

## 2022-12-14 DIAGNOSIS — Z96.652 S/P TOTAL KNEE REPLACEMENT, LEFT: Primary | ICD-10-CM

## 2022-12-14 DIAGNOSIS — M17.12 PRIMARY OSTEOARTHRITIS OF LEFT KNEE: ICD-10-CM

## 2022-12-14 NOTE — PROGRESS NOTES
PT DAILY TREATMENT NOTE    Patient Name: Lissett Mack  Date:2022  : 1945  [x]  Patient  Verified  Payor: Arline Shea / Plan: [place] MEDICARE PART A & B / Product Type: Medicare /    Total Treatment Time (min): 60   1:1 Treatment Time ( [address] only): 40   Referring Provider: Natalia Brown MD    1. S/P total knee replacement, left  2. Primary osteoarthritis of left knee  3. Postoperative pain of left knee    SUBJECTIVE  Subjective functional status/changes:   [] No changes reported    Patient reports his swelling seems to be improving. Feels sore/tight. OBJECTIVE/TREATMENT    ROM 5-109    Manual Therapy x 15 mins:   PF/TF mobilizations to affected limb to promote improved joint mobility. PROM for knee flexion and extension mobility. STM/MFR to the distal IT band, quadriceps, peripatellar structures and popliteal musculature. Passive quad and hamstring stretching in supine and with leg off table. Gentle incisional mobilizations. Neuromuscular Re-education (NMR) x   minutes:  []  Kinesiotaping for   []  Neuromuscular reeducation to the VMO with use of [place] electrical stimulation in conjunction with active contraction and exercises. []  balance/proprioceptive exercises and activities in clinic as listed below as NMR. Therapeutic Exercise x 25 mins:   Strengthening/Endurance/ADL function/Neuromuscular reeducation activities/exercises supervised and completed as listed below. EXERCISE X= completed on  2022   Ramesh Man.  10'   slantboard x   PPT hip ER grn   LAQ/SAQ 6/4   TKE grn                                                               Added/Changed Exercises:  []  Advanced to address: [] functional strength/ROM deficits [] balance/proprioceptive tasks  []  Modified: [] per subjective reports [] for patient time constraints [] for clinic time constraints    Modality:  []  E-Stim: type _ x _ min     []att   []unatt   []w/ice   []w/heat  []  Ultrasound: []cont   []pulse    _ W/cm2 x _  min   []1MHz   []3MHz  []  Ice pack: post      []  Hot pack: pre    []  Other:     Patient Education: [] Review HEP    [] Progressed/Changed HEP to include:  [] positioning   [] body mechanics   [] transfers   [] heat/ice application      ASSESSMENT    Very restricted through IT band and anterior hip/thigh. Knee ROM progressing but continues with flexion loss during gait.     Progress towards goals / Updated goals:    PLAN  [x]  Upgrade activities as tolerated      [x]  Continue plan of care  []  Discharge due to:  []  Other:      Co-treatment by Karena Iniguez, HARRY 12/14/2022

## 2022-12-16 NOTE — PROGRESS NOTES
I have reviewed the notes, assessments, and/or procedures performed by Marlene Eubanks PTA, I concur with her/his documentation of Lauryn Tobias.

## 2022-12-19 ENCOUNTER — OFFICE VISIT (OUTPATIENT)
Dept: ORTHOPEDIC SURGERY | Age: 77
End: 2022-12-19
Payer: MEDICARE

## 2022-12-19 VITALS — HEIGHT: 68 IN | WEIGHT: 175 LBS | BODY MASS INDEX: 26.52 KG/M2

## 2022-12-19 DIAGNOSIS — Z96.652 S/P TOTAL KNEE REPLACEMENT, LEFT: Primary | ICD-10-CM

## 2022-12-19 DIAGNOSIS — Z09 SURGERY FOLLOW-UP: ICD-10-CM

## 2022-12-19 PROCEDURE — 99024 POSTOP FOLLOW-UP VISIT: CPT | Performed by: ORTHOPAEDIC SURGERY

## 2022-12-19 NOTE — PROGRESS NOTES
Sagar Villar (: 1945) is a 78 y.o. male, patient, here for evaluation of the following chief complaint(s):  Surgical Follow-up (PO #1: LTK: 10/27/22)       SUBJECTIVE/OBJECTIVE:  Sagar Villar presents today for routine follow-up almost 2 months out from left primary total knee replacement. Overall he is very happy with his progress. Continues with outpatient therapy, 2 visits weekly. He has a few more weeks scheduled. He has appropriate soreness with activity but no pain. Relates significant improvement in overall mobility. PHYSICAL EXAM:  Vitals: Ht 5' 8\" (1.727 m)   Wt 175 lb (79.4 kg)   BMI 26.61 kg/m²   Body mass index is 26.61 kg/m². 78y.o. year old M, no distress. Ambulates with trace limp at start up, then gait normalizes. Left anterior knee incision is well-healed. Mild residual local knee swelling and warmth. No tenderness. Lacks 5 degrees of active extension, full passive extension. Flexion to approximately 105 110 degrees. Preoperative motion 0-100. Mild distal edema on the left that he states improves overnight. No calf tenderness. IMAGING:  Radiographs: No images today. ASSESSMENT/PLAN:  1. S/P total knee replacement, left  2. Surgery follow-up    Satisfactory progress, 2 months postop. He will finish out outpatient therapy over the next few weeks and transition to home exercise program.  Return in 10 months for routine 1 year postop x-ray of left knee, sooner if needed. We will also x-ray his right total knee at that visit and call that his 5-year postop x-ray. Return for routine 1 year postop visit. Review Of Systems  ROS    Positive for: Musculoskeletal  Last edited by Nasrin Fierro on 2022  2:29 PM.         Patient denies any recent fever, chills, nausea, vomiting, chest pain, or shortness of breath.     Allergies   Allergen Reactions    Wasp [Venom-Wasp] Anaphylaxis and Swelling    Sulfa (Sulfonamide Antibiotics) Other (comments)     Lower extremity redness       Current Outpatient Medications   Medication Sig    aspirin delayed-release 81 mg tablet Take 1 Tablet by mouth two (2) times a day. Indications: blood clot prevention    senna-docusate (PERICOLACE) 8.6-50 mg per tablet Take 1 Tablet by mouth two (2) times daily as needed for Constipation. ascorbic acid, vitamin C, (VITAMIN C) 250 mg tablet Take 250 mg by mouth daily. Cholecalciferol, Vitamin D3, 75 mcg (3,000 unit) tab Take 75 mcg by mouth daily. magnesium oxide (MAG-OX) 400 mg tablet Take 400 mg by mouth every evening. amLODIPine (NORVASC) 5 mg tablet Take 2.5 mg by mouth nightly. cyanocobalamin, vitamin B-12, 5,000 mcg subl 1 Tab by SubLINGual route daily. gabapentin (NEURONTIN) 300 mg capsule Take 300 mg by mouth three (3) times daily as needed for Pain. atorvastatin (LIPITOR) 40 mg tablet Take 20 mg by mouth nightly. enalapril (VASOTEC) 10 mg tablet Take 20 mg by mouth nightly. levothyroxine (SYNTHROID) 200 mcg tablet Take 200 mcg by mouth. Daily 6 days a week, Mon, Tues, Wed, Thurs, Fri ,Sun    EPINEPHrine (EPIPEN) 0.3 mg/0.3 mL injection 0.3 mg by IntraMUSCular route once as needed. furosemide (LASIX) 20 mg tablet Take 40 mg by mouth as needed. AS NEEDED FOR SWELLING     No current facility-administered medications for this visit.        Past Medical History:   Diagnosis Date    Arthritis     lower back    CAD (coronary artery disease)     stent x 1 2001 LDA    Chronic lower back pain     Dr Tess Jj     Chronic pain     right knee - resolved with knee replacement 4/2017    Chronic pain 2022    left knee    Hypercholesteremia     Hypertension     Hypothyroidism     Ill-defined condition     Bleeding prolonged    Skin cancer 2020      RLEG   L FLANK AREA    Sun-damaged skin         Past Surgical History:   Procedure Laterality Date    COLONOSCOPY N/A 10/20/2017    COLONOSCOPY performed by Consuela Dakin, MD at [facility] AMBULATORY OR    HX CATARACT REMOVAL Right 02/2017    HX CORONARY STENT PLACEMENT  2000    Dr Antonio Garcia    HX GI      COLONOSCOPY    HX HEART CATHETERIZATION  2001    HX HERNIA REPAIR  approx 2010    @ [place] UMBILCAL AND R GROIN    HX KNEE REPLACEMENT Left 10/27/2022    HX LUMBAR FUSION  2006    L3-L4 fusion 2006, L2-S1 fusion 12/2020    HX TONSILLECTOMY  age 13    HX [de-identified] TEETH EXTRACTION      SC CARDIAC SURG PROCEDURE UNLIST      1 STENT    SC COLON CA SCRN NOT HI RSK IND  10/20/2017         SC TOTAL KNEE ARTHROPLASTY Right 04/2017       Family History   Problem Relation Age of Onset    Hypertension Mother     Heart Disease Father     No Known Problems Sister     No Known Problems Brother     Heart Disease Paternal Uncle     No Known Problems Son     No Known Problems Daughter     Anesth Problems Neg Hx         Social History     Socioeconomic History    Marital status:      Spouse name: Not on file    Number of children: Not on file    Years of education: Not on file    Highest education level: Not on file   Occupational History    Not on file   Tobacco Use    Smoking status: Never    Smokeless tobacco: Never   Vaping Use    Vaping Use: Never used   Substance and Sexual Activity    Alcohol use: Yes     Alcohol/week: 6.0 standard drinks     Types: 6 Shots of liquor per week    Drug use: No    Sexual activity: Not on file   Other Topics Concern    Not on file   Social History Narrative    Not on file     Social Determinants of Health     Financial Resource Strain: Not on file   Food Insecurity: Not on file   Transportation Needs: Not on file   Physical Activity: Not on file   Stress: Not on file   Social Connections: Not on file   Intimate Partner Violence: Not on file   Housing Stability: Not on file       No orders of the defined types were placed in this encounter. An electronic signature was used to authenticate this note.   -- Ricardo Damico MD

## 2022-12-19 NOTE — LETTER
12/19/2022    Patient: Cullen Blake   YOB: 1945   Date of Visit: 12/19/2022     Delfino Goff MD  [address] [place] [address]  [phone]  Via Fax: [phone]    Dear Delfino Goff MD,      Thank you for referring Mr. Bel Junior to [facility] for evaluation. My notes for this consultation are attached. If you have questions, please do not hesitate to call me. I look forward to following your patient along with you.       Sincerely,    Mahala Cockayne, MD

## 2022-12-22 ENCOUNTER — OFFICE VISIT (OUTPATIENT)
Dept: ORTHOPEDIC SURGERY | Age: 77
End: 2022-12-22
Payer: MEDICARE

## 2022-12-22 DIAGNOSIS — G89.18 POSTOPERATIVE PAIN OF LEFT KNEE: ICD-10-CM

## 2022-12-22 DIAGNOSIS — Z96.652 S/P TOTAL KNEE REPLACEMENT, LEFT: Primary | ICD-10-CM

## 2022-12-22 DIAGNOSIS — M25.562 POSTOPERATIVE PAIN OF LEFT KNEE: ICD-10-CM

## 2022-12-22 DIAGNOSIS — M17.12 PRIMARY OSTEOARTHRITIS OF LEFT KNEE: ICD-10-CM

## 2022-12-22 NOTE — PROGRESS NOTES
I have reviewed the notes, assessments, and/or procedures performed by Cecile Britton PTA, I concur with her/his documentation of Kenia Quigley.

## 2022-12-22 NOTE — PROGRESS NOTES
PT DAILY TREATMENT NOTE    Patient Name: Charlotte Sands  Date:2022  : 1945  [x]  Patient  Verified  Payor: [place] / Plan: [place] MEDIC[place] & B / Product Type: Medicare /    Total Treatment Time (min): 55+   1:1 Treatment Time ( [address] only): 55+   Referring Provider: Triston Murphy MD    1. S/P total knee replacement, left  2. Primary osteoarthritis of left knee  3. Postoperative pain of left knee    SUBJECTIVE  Subjective functional status/changes:   [] No changes reported    Patient states taping was helpful for managing swelling. IT bands tight/sore. OBJECTIVE/TREATMENT    ROM 5-109    Manual Therapy x 20 mins:   PF/TF mobilizations to affected limb to promote improved joint mobility. PROM for knee flexion and extension mobility. STM/MFR to the distal IT band, quadriceps, peripatellar structures and popliteal musculature. Passive quad and hamstring stretching in supine and with leg off table. Gentle incisional mobilizations. Neuromuscular Re-education (NMR) x  10 minutes:  [x]  Kinesiotaping applied in a peripatellar \"Y\" pattern to promote lymphatic drainage. []  Neuromuscular reeducation to the VMO with use of [place] electrical stimulation in conjunction with active contraction and exercises. [x]  balance/proprioceptive exercises and activities in clinic as listed below as NMR. Therapeutic Exercise x 25 mins:   Strengthening/Endurance/ADL function/Neuromuscular reeducation activities/exercises supervised and completed as listed below. EXERCISE X= completed on  2022   NuStep Man.  10'   slantboard x   PPT hip ER grn   LAQ/SAQ 6/4   TKE grn   Tandem stance *NMR x   Lat steps w/foam x   cups x                                                   Added/Changed Exercises:  [x]  Advanced to address: [x] functional strength/ROM deficits [x] balance/proprioceptive tasks  []  Modified: [] per subjective reports [] for patient time constraints [] for clinic time constraints    Modality:  []  E-Stim: type _ x _ min     []att   []unatt   []w/ice   []w/heat  []  Ultrasound: []cont   []pulse    _ W/cm2 x _  min   []1MHz   []3MHz  []  Ice pack: post      []  Hot pack: pre    []  Other:     Patient Education: [] Review HEP    [] Progressed/Changed HEP to include:  [] positioning   [] body mechanics   [] transfers   [] heat/ice application      ASSESSMENT    Extension ROM improves with mobilizations but lag present at rest and during gait. Fatigues with new exercises today but no specific c/o pain.      Progress towards goals / Updated goals:    PLAN  [x]  Upgrade activities as tolerated      [x]  Continue plan of care  []  Discharge due to:  []  Other:      Co-treatment by Rob Winston PTA 12/22/2022

## 2022-12-27 ENCOUNTER — OFFICE VISIT (OUTPATIENT)
Dept: ORTHOPEDIC SURGERY | Age: 77
End: 2022-12-27
Payer: MEDICARE

## 2022-12-27 DIAGNOSIS — Z96.652 S/P TOTAL KNEE REPLACEMENT, LEFT: Primary | ICD-10-CM

## 2022-12-27 DIAGNOSIS — M17.12 PRIMARY OSTEOARTHRITIS OF LEFT KNEE: ICD-10-CM

## 2022-12-27 DIAGNOSIS — M25.562 POSTOPERATIVE PAIN OF LEFT KNEE: ICD-10-CM

## 2022-12-27 DIAGNOSIS — G89.18 POSTOPERATIVE PAIN OF LEFT KNEE: ICD-10-CM

## 2022-12-27 PROCEDURE — 97110 THERAPEUTIC EXERCISES: CPT

## 2022-12-27 PROCEDURE — 97140 MANUAL THERAPY 1/> REGIONS: CPT

## 2022-12-27 NOTE — PROGRESS NOTES
PT DAILY TREATMENT NOTE    Patient Name: Lauryn Tobias  Date:2022  : 1945  [x]  Patient  Verified  Payor: Latosha Fritz / Plan: [place] MEDICARE PART A & B / Product Type: Medicare /    Total Treatment Time (min): 55+   1:1 Treatment Time ( [address] only): 35  Referring Provider: Amy Brown MD    1. S/P total knee replacement, left  2. Primary osteoarthritis of left knee  3. Postoperative pain of left knee    SUBJECTIVE  Subjective functional status/changes:   [] No changes reported    Patient reports his knee is feeling pretty good with ADLs. Still gets stiff. Still with IT band discomfort. OBJECTIVE/TREATMENT    ROM 5-109    Manual Therapy x 15 mins:   PF/TF mobilizations to affected limb to promote improved joint mobility. PROM for knee flexion and extension mobility. STM/MFR to the distal IT band, quadriceps, peripatellar structures and popliteal musculature. Passive quad and hamstring stretching in supine and with leg off table. Gentle incisional mobilizations. Neuromuscular Re-education (NMR) x  5 minutes:  [x]  Kinesiotaping applied in a peripatellar \"Y\" pattern to promote lymphatic drainage. []  Neuromuscular reeducation to the VMO with use of [place] electrical stimulation in conjunction with active contraction and exercises. [x]  balance/proprioceptive exercises and activities in clinic as listed below as NMR. Therapeutic Exercise x 15 mins:   Strengthening/Endurance/ADL function/Neuromuscular reeducation activities/exercises supervised and completed as listed below. EXERCISE X= completed on  2022   Ramesh Man.  10'   slantboard x   PPT hip ER grn   LAQ/SAQ 6/4   TKE grn   Tandem stance *NMR x   Lat steps w/foam x   cups x   TG press Lv 16                                               Added/Changed Exercises:  [x]  Advanced to address: [x] functional strength/ROM deficits [x] balance/proprioceptive tasks  []  Modified: [] per subjective reports [] for patient time constraints [] for clinic time constraints    Modality:  []  E-Stim: type _ x _ min     []att   []unatt   []w/ice   []w/heat  []  Ultrasound: []cont   []pulse    _ W/cm2 x _  min   []1MHz   []3MHz  []  Ice pack: post      []  Hot pack: pre    []  Other:     Patient Education: [] Review HEP    [] Progressed/Changed HEP to include:  [] positioning   [] body mechanics   [] transfers   [] heat/ice application      ASSESSMENT    Passive extension improved compared to previous visits. Swelling is decreasing with more consistency. Still with generalized weakness and stability loss in the knee contributing to difficulty with step-up tasks.       Progress towards goals / Updated goals:    PLAN  [x]  Upgrade activities as tolerated      [x]  Continue plan of care  []  Discharge due to:  []  Other:      Co-treatment by Dio Hernandez, \[facility]\"" 12/27/2022

## 2022-12-29 NOTE — PROGRESS NOTES
I have reviewed the notes, assessments, and/or procedures performed by Billy Hyde PTA, I concur with her/his documentation of Agnes Malloy.

## 2023-01-06 ENCOUNTER — TRANSCRIBE ORDER (OUTPATIENT)
Dept: REGISTRATION | Age: 78
End: 2023-01-06

## 2023-01-06 ENCOUNTER — HOSPITAL ENCOUNTER (OUTPATIENT)
Dept: GENERAL RADIOLOGY | Age: 78
End: 2023-01-06
Payer: MEDICARE

## 2023-01-06 DIAGNOSIS — M10.9 GOUT: Primary | ICD-10-CM

## 2023-01-06 DIAGNOSIS — M10.9 GOUT: ICD-10-CM

## 2023-01-06 PROCEDURE — 73630 X-RAY EXAM OF FOOT: CPT

## 2023-01-10 ENCOUNTER — OFFICE VISIT (OUTPATIENT)
Dept: ORTHOPEDIC SURGERY | Age: 78
End: 2023-01-10
Payer: MEDICARE

## 2023-01-10 DIAGNOSIS — Z96.652 S/P TOTAL KNEE REPLACEMENT, LEFT: Primary | ICD-10-CM

## 2023-01-10 DIAGNOSIS — M25.562 POSTOPERATIVE PAIN OF LEFT KNEE: ICD-10-CM

## 2023-01-10 DIAGNOSIS — G89.18 POSTOPERATIVE PAIN OF LEFT KNEE: ICD-10-CM

## 2023-01-10 DIAGNOSIS — M17.12 PRIMARY OSTEOARTHRITIS OF LEFT KNEE: ICD-10-CM

## 2023-01-10 PROCEDURE — 97110 THERAPEUTIC EXERCISES: CPT

## 2023-01-10 PROCEDURE — 97140 MANUAL THERAPY 1/> REGIONS: CPT

## 2023-01-10 NOTE — PROGRESS NOTES
PT DAILY TREATMENT NOTE    Patient Name: Jono Mendosa  Date:1/10/2023  : 1945  [x]  Patient  Verified  Payor: Haley Diaz / Plan: [place] MEDICARE PART A & B / Product Type: Medicare /    Total Treatment Time (min): 55+   1:1 Treatment Time ( [address] only): 35  Referring Provider: Mary Beck MD    1. S/P total knee replacement, left  2. Primary osteoarthritis of left knee  3. Postoperative pain of left knee    SUBJECTIVE  Subjective functional status/changes:   [] No changes reported    Patient reports he has very little knee pain, but still feels stiff/tight. OBJECTIVE/TREATMENT    ROM 0-110 after manual stretching    Manual Therapy x 15 mins:   PF/TF mobilizations to affected limb to promote improved joint mobility. PROM for knee flexion and extension mobility. STM/MFR to the distal IT band, quadriceps, peripatellar structures and popliteal musculature. Passive quad and hamstring stretching in supine and with leg off table. Gentle incisional mobilizations. Neuromuscular Re-education (NMR) x  5 minutes:  []  Kinesiotaping applied in a peripatellar \"Y\" pattern to promote lymphatic drainage. []  Neuromuscular reeducation to the VMO with use of [place] electrical stimulation in conjunction with active contraction and exercises. [x]  balance/proprioceptive exercises and activities in clinic as listed below as NMR. Therapeutic Exercise x 15 mins:   Strengthening/Endurance/ADL function/Neuromuscular reeducation activities/exercises supervised and completed as listed below. EXERCISE X= completed on  1/10/2023   NuStep Man.  10'   slantboard x   PPT hip ER grn   LAQ/SAQ 6/4   TKE grn   Tandem stance *NMR x   Lat steps w/foam x   cups x   TG press Lv 16                                               Added/Changed Exercises:  [x]  Advanced to address: [x] functional strength/ROM deficits [x] balance/proprioceptive tasks  []  Modified: [] per subjective reports [] for patient time constraints [] for clinic time constraints    Modality:  []  E-Stim: type _ x _ min     []att   []unatt   []w/ice   []w/heat  []  Ultrasound: []cont   []pulse    _ W/cm2 x _  min   []1MHz   []3MHz  []  Ice pack: post      []  Hot pack: pre    []  Other:     Patient Education: [] Review HEP    [x] Progressed/Changed HEP to include: KTC crossbody stretching  [] positioning   [] body mechanics   [] transfers   [] heat/ice application      ASSESSMENT    Does well with exercise progression to address functional quad/HS/glut strength. He was able to achieve full extension ROM today after manual stretching and MFR to IT band.       Progress towards goals / Updated goals:    PLAN  [x]  Upgrade activities as tolerated      [x]  Continue plan of care  []  Discharge due to:  []  Other:      Co-treatment by Tanisha Jones PTA 1/10/2023

## 2023-01-17 ENCOUNTER — OFFICE VISIT (OUTPATIENT)
Dept: ORTHOPEDIC SURGERY | Age: 78
End: 2023-01-17
Payer: MEDICARE

## 2023-01-17 DIAGNOSIS — Z09 SURGERY FOLLOW-UP: ICD-10-CM

## 2023-01-17 DIAGNOSIS — Z96.652 S/P TOTAL KNEE REPLACEMENT, LEFT: ICD-10-CM

## 2023-01-17 DIAGNOSIS — R26.2 DIFFICULTY WALKING: Primary | ICD-10-CM

## 2023-01-17 NOTE — PROGRESS NOTES
PT DAILY TREATMENT NOTE    Patient Name: Joaquim Gr  Date:2023  : 1945  [x]  Patient  Verified  Payor: [place] / Plan: [place] MEDICARE PART A & B / Product Type: Medicare /    Total Treatment Time (min): 55+   1:1 Treatment Time ( [address] only): 35  Referring Provider: Clem Atkinson,*    1. Difficulty walking  2. Surgery follow-up  3. S/P total knee replacement, left    SUBJECTIVE  Subjective functional status/changes:   [] No changes reported    Patient reports his knee is feeling really good. C/o pain in ITB. OBJECTIVE/TREATMENT  TTP over left ITB  ROM 0-110 after manual stretching    Manual Therapy x 15 mins:   PF/TF mobilizations to affected limb to promote improved joint mobility. PROM for knee flexion and extension mobility. STM/MFR to the distal IT band, quadriceps, peripatellar structures and popliteal musculature. Passive quad and hamstring stretching in supine and with leg off table. Gentle incisional mobilizations. Neuromuscular Re-education (NMR) x  5 minutes:  []  Kinesiotaping applied in a peripatellar \"Y\" pattern to promote lymphatic drainage. []  Neuromuscular reeducation to the VMO with use of [place] electrical stimulation in conjunction with active contraction and exercises. [x]  balance/proprioceptive exercises and activities in clinic as listed below as NMR. Therapeutic Exercise x 15 mins:   Strengthening/Endurance/ADL function/Neuromuscular reeducation activities/exercises supervised and completed as listed below. EXERCISE X= completed on  2023   NuStep Man.  10'   slantboard x   PPT hip ER grn   LAQ/SAQ 6/4   TKE grn   Tandem stance *NMR x   Lat steps w/foam x   cups x   TG press Lv 16                                               Added/Changed Exercises:  [x]  Advanced to address: [x] functional strength/ROM deficits [x] balance/proprioceptive tasks  []  Modified: [] per subjective reports [] for patient time constraints [] for clinic time constraints    Modality:  []  E-Stim: type _ x _ min     []att   []unatt   []w/ice   []w/heat  []  Ultrasound: []cont   []pulse    _ W/cm2 x _  min   []1MHz   []3MHz  []  Ice pack: post      []  Hot pack: pre    []  Other:     Patient Education: [] Review HEP    [x] Progressed/Changed HEP to include: KTC crossbody stretching  [] positioning   [] body mechanics   [] transfers   [] heat/ice application      ASSESSMENT  Patient tolerating exercise. Has increased quad strength. Ambulating w/heel/toe gait w/lumbar forward flexion. TTP over ITB. Progressing well per POC.   Progress towards goals / Updated goals:    PLAN  [x]  Upgrade activities as tolerated      [x]  Continue plan of care  []  Discharge due to:  []  Other:      Co-treatment by Gavin Winn, PT 1/17/2023

## 2023-01-19 ENCOUNTER — OFFICE VISIT (OUTPATIENT)
Dept: ORTHOPEDIC SURGERY | Age: 78
End: 2023-01-19
Payer: MEDICARE

## 2023-01-19 DIAGNOSIS — Z96.652 S/P TOTAL KNEE REPLACEMENT, LEFT: Primary | ICD-10-CM

## 2023-01-19 DIAGNOSIS — G89.18 POSTOPERATIVE PAIN OF LEFT KNEE: ICD-10-CM

## 2023-01-19 DIAGNOSIS — M25.562 POSTOPERATIVE PAIN OF LEFT KNEE: ICD-10-CM

## 2023-01-19 DIAGNOSIS — M17.12 PRIMARY OSTEOARTHRITIS OF LEFT KNEE: ICD-10-CM

## 2023-01-19 NOTE — PROGRESS NOTES
PT DAILY TREATMENT NOTE    Patient Name: Satish Yeung  Date:2023  : 1945  [x]  Patient  Verified  Payor: Anuj Sexton / Plan: [place] MEDICARE PART A & B / Product Type: Medicare /    Total Treatment Time (min): 55+   1:1 Treatment Time ( [address] only): 35  Referring Provider: Carolyn Steiner MD    1. S/P total knee replacement, left  2. Postoperative pain of left knee  3. Primary osteoarthritis of left knee    SUBJECTIVE  Subjective functional status/changes:   [] No changes reported    Still with varying lateral hip and thigh pain along the IT band but otherwise feels his knee is progressing well. OBJECTIVE/TREATMENT  TTP over left ITB  ROM 0-110 after manual stretching    Manual Therapy x 15 mins:   PF/TF mobilizations to affected limb to promote improved joint mobility. PROM for knee flexion and extension mobility. STM/MFR to the distal IT band, quadriceps, peripatellar structures and popliteal musculature. Passive quad and hamstring stretching in supine and with leg off table. Gentle incisional mobilizations. Neuromuscular Re-education (NMR) x  5 minutes:  []  Kinesiotaping applied in a peripatellar \"Y\" pattern to promote lymphatic drainage. []  Neuromuscular reeducation to the VMO with use of [place] electrical stimulation in conjunction with active contraction and exercises. [x]  balance/proprioceptive exercises and activities in clinic as listed below as NMR. Therapeutic Exercise x 15 mins:   Strengthening/Endurance/ADL function/Neuromuscular reeducation activities/exercises supervised and completed as listed below. EXERCISE X= completed on  2023   Kathleenalvin Man.  10'   slantboard x   PPT hip ER grn   LAQ/SAQ 6/4   TKE grn   Tandem stance *NMR x   Lat steps w/foam x   cups x   TG press Lv 16                                               Added/Changed Exercises:  [x]  Advanced to address: [x] functional strength/ROM deficits [x] balance/proprioceptive tasks  []  Modified: [] per subjective reports [] for patient time constraints [] for clinic time constraints    Modality:  []  E-Stim: type _ x _ min     []att   []unatt   []w/ice   []w/heat  []  Ultrasound: []cont   []pulse    _ W/cm2 x _  min   []1MHz   []3MHz  []  Ice pack: post      []  Hot pack: pre    []  Other:     Patient Education: [] Review HEP    [] Progressed/Changed HEP to include:   [] positioning   [] body mechanics   [] transfers   [] heat/ice application      ASSESSMENT    Still with some generalized edema and stiffness at endrange flexion but at consistent levels with his contralateral TKR. Benefits from continued skilled physical therapy to maximize his return to functional mobility and decrease his subjective symptoms which do remain. PLAN  [x]  Upgrade activities as tolerated      [x]  Continue plan of care - initial goals remain appropriate.    []  Discharge due to:  []  Other:      Levar Martines, PT, DPT 1/19/2023

## 2023-02-20 ENCOUNTER — TRANSCRIBE ORDER (OUTPATIENT)
Dept: SCHEDULING | Age: 78
End: 2023-02-20

## 2023-02-20 DIAGNOSIS — M48.061 SPINAL STENOSIS, LUMBAR REGION, WITHOUT NEUROGENIC CLAUDICATION: Primary | ICD-10-CM

## 2023-03-05 ENCOUNTER — HOSPITAL ENCOUNTER (OUTPATIENT)
Dept: MRI IMAGING | Age: 78
Discharge: HOME | End: 2023-03-05
Attending: NEUROLOGICAL SURGERY
Payer: MEDICARE

## 2023-03-05 DIAGNOSIS — M48.061 SPINAL STENOSIS, LUMBAR REGION, WITHOUT NEUROGENIC CLAUDICATION: ICD-10-CM

## 2023-03-05 PROCEDURE — 72148 MRI LUMBAR SPINE W/O DYE: CPT

## 2023-03-27 ENCOUNTER — OFFICE VISIT (OUTPATIENT)
Dept: INTERNAL MEDICINE CLINIC | Age: 78
End: 2023-03-27
Payer: MEDICARE

## 2023-03-27 VITALS
RESPIRATION RATE: 14 BRPM | SYSTOLIC BLOOD PRESSURE: 126 MMHG | HEIGHT: 68 IN | OXYGEN SATURATION: 93 % | BODY MASS INDEX: 28.82 KG/M2 | TEMPERATURE: 98.3 F | WEIGHT: 190.2 LBS | HEART RATE: 70 BPM | DIASTOLIC BLOOD PRESSURE: 69 MMHG

## 2023-03-27 DIAGNOSIS — E03.9 ACQUIRED HYPOTHYROIDISM: ICD-10-CM

## 2023-03-27 DIAGNOSIS — I10 ESSENTIAL HYPERTENSION: ICD-10-CM

## 2023-03-27 DIAGNOSIS — N40.0 BENIGN PROSTATIC HYPERPLASIA, UNSPECIFIED WHETHER LOWER URINARY TRACT SYMPTOMS PRESENT: ICD-10-CM

## 2023-03-27 DIAGNOSIS — M1A.09X0 CHRONIC GOUT OF MULTIPLE SITES, UNSPECIFIED CAUSE: ICD-10-CM

## 2023-03-27 DIAGNOSIS — I87.2 CHRONIC VENOUS INSUFFICIENCY: ICD-10-CM

## 2023-03-27 DIAGNOSIS — I25.83 CORONARY ARTERY DISEASE DUE TO LIPID RICH PLAQUE: Primary | ICD-10-CM

## 2023-03-27 DIAGNOSIS — E78.2 MIXED HYPERLIPIDEMIA: ICD-10-CM

## 2023-03-27 DIAGNOSIS — R68.89 FORGETFULNESS: ICD-10-CM

## 2023-03-27 DIAGNOSIS — R73.03 PREDIABETES: ICD-10-CM

## 2023-03-27 DIAGNOSIS — M48.062 SPINAL STENOSIS OF LUMBAR REGION WITH NEUROGENIC CLAUDICATION: ICD-10-CM

## 2023-03-27 DIAGNOSIS — I25.10 CORONARY ARTERY DISEASE DUE TO LIPID RICH PLAQUE: Primary | ICD-10-CM

## 2023-03-27 PROBLEM — F11.99 OPIOID USE, UNSPECIFIED WITH UNSPECIFIED OPIOID-INDUCED DISORDER (HCC): Status: RESOLVED | Noted: 2022-11-21 | Resolved: 2023-03-27

## 2023-03-27 PROCEDURE — G8417 CALC BMI ABV UP PARAM F/U: HCPCS | Performed by: INTERNAL MEDICINE

## 2023-03-27 PROCEDURE — G8427 DOCREV CUR MEDS BY ELIG CLIN: HCPCS | Performed by: INTERNAL MEDICINE

## 2023-03-27 PROCEDURE — G8536 NO DOC ELDER MAL SCRN: HCPCS | Performed by: INTERNAL MEDICINE

## 2023-03-27 PROCEDURE — 99214 OFFICE O/P EST MOD 30 MIN: CPT | Performed by: INTERNAL MEDICINE

## 2023-03-27 PROCEDURE — 3017F COLORECTAL CA SCREEN DOC REV: CPT | Performed by: INTERNAL MEDICINE

## 2023-03-27 PROCEDURE — G8510 SCR DEP NEG, NO PLAN REQD: HCPCS | Performed by: INTERNAL MEDICINE

## 2023-03-27 PROCEDURE — 1101F PT FALLS ASSESS-DOCD LE1/YR: CPT | Performed by: INTERNAL MEDICINE

## 2023-03-27 RX ORDER — INDOMETHACIN 50 MG/1
50 CAPSULE ORAL 3 TIMES DAILY
COMMUNITY

## 2023-03-27 RX ORDER — ALLOPURINOL 100 MG/1
100 TABLET ORAL DAILY
COMMUNITY

## 2023-03-27 RX ORDER — CYANOCOBALAMIN/FOLIC ACID 1MG-400MCG
1 LOZENGE SUBLINGUAL DAILY
COMMUNITY
End: 2023-03-27

## 2023-03-27 NOTE — PROGRESS NOTES
1. \"Have you been to the ER, urgent care clinic since your last visit? Hospitalized since your last visit? \" New patient    2. \"Have you seen or consulted any other health care providers outside of the [address] [place] [address] since your last visit? \"  New patient    3. For patients aged 39-70: Has the patient had a colonoscopy / FIT/ Cologuard? Yes - no Care Gap present      If the patient is female:    4. For patients aged 41-77: Has the patient had a mammogram within the past 2 years? NA - based on age or sex      11. For patients aged 21-65: Has the patient had a pap smear?  NA - based on age or sex

## 2023-03-27 NOTE — PROGRESS NOTES
Di Villela is a 78 y.o. male who presents for evaluation of npv. Used to follow with dr Jimbo Morin, last saw him in oct 2022. Overall healthy, though is struggling with lumbar radiculopathy. Has plans to have lumbar fusion done by dr Kira Goodwin on may 2. His wife has been my patient for a few years. She is concerned about memory decline--he does not agree, but is willing to do formal testing. ROS:  Constitutional: negative for fevers, chills, anorexia and weight loss  Eyes:   negative for visual disturbance and irritation  ENT:   negative for tinnitus,sore throat,nasal congestion,ear pain,hoarseness  Respiratory:  negative for cough, hemoptysis, dyspnea,wheezing  CV:   negative for chest pain, palpitations, lower extremity edema  GI:   negative for nausea, vomiting, diarrhea, abdominal pain,melena  Genitourinary: negative for frequency, dysuria and hematuria  Musculoskel: negative for myalgias, arthralgias, back pain, muscle weakness, joint pain.   ++lbp with radiculopathy  Neurological:  negative for headaches, dizziness, focal weakness, numbness  Psychiatric:     Negative for depression or anxiety      Past Medical History:   Diagnosis Date    Arthritis     lower back    CAD (coronary artery disease)     stent x 1 2001 LDA    Chronic lower back pain     Dr Molina Diane     Chronic pain     right knee - resolved with knee replacement 4/2017    Chronic pain 2022    left knee    Hypercholesteremia     Hypertension     Hypothyroidism     Ill-defined condition     Bleeding prolonged    Skin cancer 2020      RLEG   L FLANK AREA    Sun-damaged skin        Past Surgical History:   Procedure Laterality Date    COLONOSCOPY N/A 10/20/2017    COLONOSCOPY performed by Alcides Morgan MD at [facility] AMBULATORY OR    HX CATARACT REMOVAL Right 02/2017    HX CORONARY STENT PLACEMENT  2000    Dr Irena Moreira  2001    [address]  approx 2010    @ [facility]MBIL[facility] R [facility]    HX KNEE REPLACEMENT Left 10/27/2022    HX LUMBAR FUSION  2006    L3-L4 fusion 2006, L2-S1 fusion 12/2020    HX TONSILLECTOMY  age 13    HX [de-identified] TEETH EXTRACTION      TX ARTHRP KNE CONDYLE&PLATU MEDIAL&LAT COMPARTMENTS Right 04/2017    TX COLON CA SCRN NOT HI RSK IND  10/20/2017         TX UNLISTED PROCEDURE CARDIAC SURGERY      1 STENT       Family History   Problem Relation Age of Onset    Hypertension Mother     Heart Disease Father     No Known Problems Sister     No Known Problems Brother     Heart Disease Paternal Uncle     No Known Problems Son     No Known Problems Daughter     Anesth Problems Neg Hx        Social History     Socioeconomic History    Marital status:      Spouse name: Not on file    Number of children: Not on file    Years of education: Not on file    Highest education level: Not on file   Occupational History    Not on file   Tobacco Use    Smoking status: Never    Smokeless tobacco: Never   Vaping Use    Vaping Use: Never used   Substance and Sexual Activity    Alcohol use:  Yes     Alcohol/week: 6.0 standard drinks     Types: 6 Shots of liquor per week    Drug use: No    Sexual activity: Not on file   Other Topics Concern    Not on file   Social History Narrative    Not on file     Social Determinants of Health     Financial Resource Strain: Low Risk     Difficulty of Paying Living Expenses: Not hard at all   Food Insecurity: No Food Insecurity    Worried About Running Out of Food in the Last Year: Never true    Ran Out of Food in the Last Year: Never true   Transportation Needs: Not on file   Physical Activity: Not on file   Stress: Not on file   Social Connections: Not on file   Intimate Partner Violence: Not on file   Housing Stability: Not on file          Visit Vitals  /69 (BP 1 Location: Left upper arm, BP Patient Position: Sitting)   Pulse 70   Temp 98.3 °F (36.8 °C) (Temporal)   Resp 14   Ht 5' 8\" (1.727 m)   Wt 190 lb 3.2 oz (86.3 kg)   SpO2 93%   BMI 28.92 kg/m²       Physical Examination:   General - Well appearing male  HEENT - PERRL, TM no erythema/opacification, normal nasal turbinates, no oropharyngeal erythema or exudate, MMM  Neck - supple, no bruits, no thyroidomegaly, no lymphadenopathy  Pulm - clear to auscultation bilaterally  Cardio - RRR, normal S1 S2, no murmur  Abd - soft, nontender, no masses, no HSM  Extrem - no edema, +2 distal pulses  Neuro-  No focal deficits, CN intact     Assessment/Plan:     Lumbar radiculopathy--neurontin and indocin allow him to be somewhat active. Has sx scheduled for may 2 with dr Simin Herrera. Has already had L2-L5 fusion. Cad with hx stent, 2001--on asa, vasotec, norvasc.   Has appt with dr Michelle Duane tomorrow  Hyperlipids--on lipitor, check flp, cmp  Hypothyroid--on synthroid, check tsh, ft4  Htn--controlled with vasotec, norvasc, lasix  Chronic gout, no tophi--on allopurinol, check uric acid level  Chronic venous stasis--encouraged to wear compression stockings during day  Forgetfulness--referral to neuropsychology, dr Javi Jiang  Bph--check psa, on saw palmetto    Rtc 7 months for awnayely Glasgow III, DO

## 2023-03-28 LAB
ALBUMIN SERPL-MCNC: 3.9 G/DL (ref 3.5–5)
ALBUMIN/GLOB SERPL: 1.3 (ref 1.1–2.2)
ALP SERPL-CCNC: 122 U/L (ref 45–117)
ALT SERPL-CCNC: 18 U/L (ref 12–78)
ANION GAP SERPL CALC-SCNC: 3 MMOL/L (ref 5–15)
AST SERPL-CCNC: 21 U/L (ref 15–37)
BASOPHILS # BLD: 0 K/UL (ref 0–0.1)
BASOPHILS NFR BLD: 1 % (ref 0–1)
BILIRUB SERPL-MCNC: 1.3 MG/DL (ref 0.2–1)
BUN SERPL-MCNC: 23 MG/DL (ref 6–20)
BUN/CREAT SERPL: 21 (ref 12–20)
CALCIUM SERPL-MCNC: 9.9 MG/DL (ref 8.5–10.1)
CHLORIDE SERPL-SCNC: 107 MMOL/L (ref 97–108)
CHOLEST SERPL-MCNC: 110 MG/DL
CO2 SERPL-SCNC: 30 MMOL/L (ref 21–32)
COMMENT, HOLDF: NORMAL
CREAT SERPL-MCNC: 1.1 MG/DL (ref 0.7–1.3)
DIFFERENTIAL METHOD BLD: ABNORMAL
EOSINOPHIL # BLD: 0.2 K/UL (ref 0–0.4)
EOSINOPHIL NFR BLD: 5 % (ref 0–7)
ERYTHROCYTE [DISTWIDTH] IN BLOOD BY AUTOMATED COUNT: 13.3 % (ref 11.5–14.5)
EST. AVERAGE GLUCOSE BLD GHB EST-MCNC: 126 MG/DL
GLOBULIN SER CALC-MCNC: 3.1 G/DL (ref 2–4)
GLUCOSE SERPL-MCNC: 95 MG/DL (ref 65–100)
HBA1C MFR BLD: 6 % (ref 4–5.6)
HCT VFR BLD AUTO: 45.5 % (ref 36.6–50.3)
HCV AB SERPL QL IA: NONREACTIVE
HDLC SERPL-MCNC: 58 MG/DL
HDLC SERPL: 1.9 (ref 0–5)
HGB BLD-MCNC: 14.5 G/DL (ref 12.1–17)
IMM GRANULOCYTES # BLD AUTO: 0 K/UL (ref 0–0.04)
IMM GRANULOCYTES NFR BLD AUTO: 0 % (ref 0–0.5)
LDLC SERPL CALC-MCNC: 40 MG/DL (ref 0–100)
LYMPHOCYTES # BLD: 1 K/UL (ref 0.8–3.5)
LYMPHOCYTES NFR BLD: 24 % (ref 12–49)
MCH RBC QN AUTO: 30 PG (ref 26–34)
MCHC RBC AUTO-ENTMCNC: 31.9 G/DL (ref 30–36.5)
MCV RBC AUTO: 94 FL (ref 80–99)
MONOCYTES # BLD: 0.4 K/UL (ref 0–1)
MONOCYTES NFR BLD: 10 % (ref 5–13)
NEUTS SEG # BLD: 2.4 K/UL (ref 1.8–8)
NEUTS SEG NFR BLD: 60 % (ref 32–75)
NRBC # BLD: 0 K/UL (ref 0–0.01)
NRBC BLD-RTO: 0 PER 100 WBC
PLATELET # BLD AUTO: 286 K/UL (ref 150–400)
PMV BLD AUTO: 10 FL (ref 8.9–12.9)
POTASSIUM SERPL-SCNC: 4.4 MMOL/L (ref 3.5–5.1)
PROT SERPL-MCNC: 7 G/DL (ref 6.4–8.2)
PSA SERPL-MCNC: 2.1 NG/ML (ref 0.01–4)
RBC # BLD AUTO: 4.84 M/UL (ref 4.1–5.7)
SAMPLES BEING HELD,HOLD: NORMAL
SODIUM SERPL-SCNC: 140 MMOL/L (ref 136–145)
T4 FREE SERPL-MCNC: 1.3 NG/DL (ref 0.8–1.5)
TRIGL SERPL-MCNC: 60 MG/DL (ref ?–150)
TSH SERPL DL<=0.05 MIU/L-ACNC: 0.19 UIU/ML (ref 0.36–3.74)
URATE SERPL-MCNC: 4.8 MG/DL (ref 3.5–7.2)
VLDLC SERPL CALC-MCNC: 12 MG/DL
WBC # BLD AUTO: 4 K/UL (ref 4.1–11.1)

## 2023-03-29 NOTE — PROGRESS NOTES
Letter mailed at this time. Per Dr. Robert Mckenzie:  Labs look fine.  No new concerns.  Continue same meds.  Good luck with upcoming surgery.

## 2023-04-25 ENCOUNTER — HOSPITAL ENCOUNTER (OUTPATIENT)
Dept: PREADMISSION TESTING | Age: 78
Discharge: HOME | End: 2023-04-25
Payer: MEDICARE

## 2023-04-25 VITALS
WEIGHT: 190.48 LBS | RESPIRATION RATE: 18 BRPM | BODY MASS INDEX: 28.87 KG/M2 | DIASTOLIC BLOOD PRESSURE: 78 MMHG | OXYGEN SATURATION: 94 % | HEIGHT: 68 IN | TEMPERATURE: 97.6 F | HEART RATE: 55 BPM | SYSTOLIC BLOOD PRESSURE: 112 MMHG

## 2023-04-25 LAB
ABO + RH BLD: NORMAL
ANION GAP SERPL CALC-SCNC: 0 MMOL/L (ref 5–15)
ATRIAL RATE: 56 BPM
BASOPHILS # BLD: 0 K/UL (ref 0–0.1)
BASOPHILS NFR BLD: 0 % (ref 0–1)
BLOOD GROUP ANTIBODIES SERPL: NORMAL
BUN SERPL-MCNC: 24 MG/DL (ref 6–20)
BUN/CREAT SERPL: 23 (ref 12–20)
CALCIUM SERPL-MCNC: 8.8 MG/DL (ref 8.5–10.1)
CALCULATED P AXIS, ECG09: -24 DEGREES
CALCULATED R AXIS, ECG10: 20 DEGREES
CALCULATED T AXIS, ECG11: 16 DEGREES
CHLORIDE SERPL-SCNC: 108 MMOL/L (ref 97–108)
CO2 SERPL-SCNC: 31 MMOL/L (ref 21–32)
CREAT SERPL-MCNC: 1.05 MG/DL (ref 0.7–1.3)
DIAGNOSIS, 93000: NORMAL
DIFFERENTIAL METHOD BLD: ABNORMAL
EOSINOPHIL # BLD: 0.2 K/UL (ref 0–0.4)
EOSINOPHIL NFR BLD: 3 % (ref 0–7)
ERYTHROCYTE [DISTWIDTH] IN BLOOD BY AUTOMATED COUNT: 14.6 % (ref 11.5–14.5)
GLUCOSE SERPL-MCNC: 79 MG/DL (ref 65–100)
HCT VFR BLD AUTO: 45.1 % (ref 36.6–50.3)
HGB BLD-MCNC: 14.2 G/DL (ref 12.1–17)
IMM GRANULOCYTES # BLD AUTO: 0 K/UL (ref 0–0.04)
IMM GRANULOCYTES NFR BLD AUTO: 0 % (ref 0–0.5)
LYMPHOCYTES # BLD: 0.9 K/UL (ref 0.8–3.5)
LYMPHOCYTES NFR BLD: 16 % (ref 12–49)
MCH RBC QN AUTO: 29.6 PG (ref 26–34)
MCHC RBC AUTO-ENTMCNC: 31.5 G/DL (ref 30–36.5)
MCV RBC AUTO: 94.2 FL (ref 80–99)
MONOCYTES # BLD: 0.5 K/UL (ref 0–1)
MONOCYTES NFR BLD: 9 % (ref 5–13)
NEUTS SEG # BLD: 4.1 K/UL (ref 1.8–8)
NEUTS SEG NFR BLD: 72 % (ref 32–75)
NRBC # BLD: 0 K/UL (ref 0–0.01)
NRBC BLD-RTO: 0 PER 100 WBC
P-R INTERVAL, ECG05: 148 MS
PLATELET # BLD AUTO: 320 K/UL (ref 150–400)
PMV BLD AUTO: 9.4 FL (ref 8.9–12.9)
POTASSIUM SERPL-SCNC: 4.4 MMOL/L (ref 3.5–5.1)
Q-T INTERVAL, ECG07: 430 MS
QRS DURATION, ECG06: 76 MS
QTC CALCULATION (BEZET), ECG08: 414 MS
RBC # BLD AUTO: 4.79 M/UL (ref 4.1–5.7)
SODIUM SERPL-SCNC: 139 MMOL/L (ref 136–145)
SPECIMEN EXP DATE BLD: NORMAL
VENTRICULAR RATE, ECG03: 56 BPM
WBC # BLD AUTO: 5.8 K/UL (ref 4.1–11.1)

## 2023-04-25 PROCEDURE — 93005 ELECTROCARDIOGRAM TRACING: CPT

## 2023-04-25 PROCEDURE — 80048 BASIC METABOLIC PNL TOTAL CA: CPT

## 2023-04-25 PROCEDURE — 85025 COMPLETE CBC W/AUTO DIFF WBC: CPT

## 2023-04-25 PROCEDURE — 86900 BLOOD TYPING SEROLOGIC ABO: CPT

## 2023-04-25 PROCEDURE — 36415 COLL VENOUS BLD VENIPUNCTURE: CPT

## 2023-04-25 NOTE — PERIOP NOTES
[address]  ORTHOPAEDIC    Surgery Date:   05-    Your surgeon's office or [facility] staff will call you between 4 PM- 8 PM the day before surgery with your arrival time. If your surgery is on a Monday, you will receive a call the preceding Friday. Please report to [facility] Patient Access/Admitting on the 1st floor. Bring your insurance card, photo identification, and any copayment (if applicable). If you are going home the same day of your surgery, you must have a responsible adult to drive you home. You need to have a responsible adult to stay with you the first 24 hours after surgery and you should not drive a car for 24 hours following your surgery. Do NOT eat any solid foods after midnight the night before surgery including candy, mints or gum. You may drink clear liquids from midnight until 1 hour prior to arrival time. You may drink up to 12 ounces at one time every 4 hours. Do NOT drink alcohol or smoke 24 hours before surgery. STOP smoking for 14 days prior as it helps with breathing and healing after surgery. If you are being admitted to the hospital,please leave personal belongings/luggage in your car until you have an assigned hospital room number. Please wear comfortable clothes. Wear your glasses instead of contacts. We ask that all money, jewelry and valuables be left at home. Wear no make up, particularly mascara, the day of surgery. All body piercings, rings, and jewelry need to be removed and left at home. Please remove any nail polish or artificial nails from your fingernails. Please wear your hair loose or down. Please no pony-tails, buns, or any metal hair accessories. When you shower the morning of surgery, please do not apply any lotions or powders afterwards. You may wear deodorant. Do not shave any body area within 24 hours of your surgery. Please follow all instructions to avoid any potential surgical cancellation.   Should your physical condition change, (i.e. fever, cold, flu, etc.) please notify your surgeon as soon as possible. It is important to be on time. If a situation occurs where you may be delayed, please call:  [phone] / [phone] on the day of surgery. The Preadmission Testing staff can be reached at [phone]. Special instructions:     Current Outpatient Medications   Medication Sig    allopurinoL (ZYLOPRIM) 100 mg tablet Take 2 Tablets by mouth daily. SAW PALMETTO PO Take 320 mg by mouth daily. aspirin delayed-release 81 mg tablet Take 1 Tablet by mouth two (2) times a day. Indications: blood clot prevention (Patient taking differently: Take 1 Tablet by mouth daily. Indications: blood clot prevention)    senna-docusate (PERICOLACE) 8.6-50 mg per tablet Take 1 Tablet by mouth two (2) times daily as needed for Constipation. ascorbic acid, vitamin C, (VITAMIN C) 250 mg tablet Take 1 Tablet by mouth daily. Cholecalciferol, Vitamin D3, 75 mcg (3,000 unit) tab Take 75 mcg by mouth daily. magnesium oxide (MAG-OX) 400 mg tablet Take 1 Tablet by mouth every evening. amLODIPine (NORVASC) 5 mg tablet Take 0.5 Tablets by mouth nightly. cyanocobalamin, vitamin B-12, 5,000 mcg subl 1 Tab by SubLINGual route daily. gabapentin (NEURONTIN) 300 mg capsule Take 1 Capsule by mouth three (3) times daily as needed for Pain. atorvastatin (LIPITOR) 40 mg tablet Take 0.5 Tablets by mouth nightly. enalapril (VASOTEC) 10 mg tablet Take 2 Tablets by mouth nightly. levothyroxine (SYNTHROID) 200 mcg tablet Take 1 Tablet by mouth. Daily 6 days a week, Mon, Tues, Wed, Thurs, Fri ,Sun    furosemide (LASIX) 20 mg tablet Take 2 Tablets by mouth as needed. AS NEEDED FOR SWELLING    indomethacin (INDOCIN) 50 mg capsule Take 50 mg by mouth three (3) times daily. PRN up to TID     No current facility-administered medications for this encounter.        YOU MUST ONLY TAKE THESE MEDICATIONS THE MORNING OF SURGERY WITH A SIP OF WATER: GABAPENTIN, LEVOTHYROXINE  MEDICATIONS TO TAKE THE MORNING OF SURGERY ONLY IF NEEDED:   HOLD these prescription medications BEFORE Surgery:   Ask your surgeon/prescribing physician about when/if to STOP taking these medications:   Stop any non-steroidal anti-inflammatory drugs (i.e. Ibuprofen, Naproxen, Advil, Aleve) 3 days before surgery. You may take Tylenol. STOP all vitamins and herbal supplements 1 week prior to  surgery. If you are currently taking Plavix, Coumadin, or any other blood-thinning/anticoagulant medication contact your prescribing physician for instructions. Preventing Infections Before and After - Your Surgery    IMPORTANT INSTRUCTIONS    You play an important role in your health and preparation for surgery. To reduce the germs on your skin you will need to shower with CHG soap (Chorhexidine gluconate 4%) two times before surgery. CHG soap (Hibiclens, Hex-A-Clens or store brand)  CHG soap will be provided at your Preadmission Testing (PAT) appointment. If you do not have a PAT appointment before surgery, you may arrange to  CHG soap from our office or purchase CHG soap at a pharmacy, grocery or department store. You need to purchase TWO 4 ounce bottles to use for your 2 showers. Steps to follow:  Adrián Jacobson your hair with your normal shampoo and your body with regular soap and rinse well to remove shampoo and soap from your skin. Wet a clean washcloth and turn off the shower. Put CHG soap on washcloth and apply to your entire body from the neck down. Do not use on your head, face or private parts(genitals). Do not use CHG soap on open sores, wounds or areas of skin irritation. Wash you body gently for 5 minutes. Do not wash your skin too hard. This soap does not create lather. Pay special attention to your underarms and from your belly button to your feet. Turn the shower back on and rinse well to get CHG soap off your body.   Pat your skin dry with a clean, dry towel. Do not apply lotions or moisturizer. Put on clean clothes and sleep on fresh bed sheets and do not allow pets to sleep with you. Shower with CHG soap 2 times before your surgery  The evening before your surgery  The morning of your surgery      Tips to help prevent infections after your surgery:  Protect your surgical wound from germs:  Hand washing is the most important thing you and your caregivers can do to prevent infections. Keep your bandage clean and dry! Do not touch your surgical wound. Use clean, freshly washed towels and washcloths every time you shower; do not share bath linens with others. Until your surgical wound is healed, wear clothing and sleep on bed linens each day that are clean. Do not allow pets to sleep in your bed with you or touch your surgical wound. Do not smoke - smoking delays wound healing. This may be a good time to stop smoking. If you have diabetes, it is important for you to manage your blood sugar levels properly before your surgery as well as after your surgery. Poorly managed blood sugar levels slow down wound healing and prevent you from healing completely. Prevention of Infection  Testing for Staphylococcus aureus on your skin before surgery    Staphylococcus aureus (staph) is a common bacteria that is found on the body. It normally does not cause infection on healthy skin. Before surgery, you will be tested to see if you have staph by swabbing the inside of your nose. When you have an incision with surgery, the goal is to protect that incision from infection. Removal of the staph bacteria before surgery can decrease the risk of a surgical site infection. If your nose swab is positive for staph you will be called. Your treatment will include 2 steps:  Prescription for Mupirocin ointment to be used in each nostril twice a day for 5 days. Showering with Chlorhexidine (CHG) liquid soap for 5 days prior to surgery.     How to use Mupirocin ointment in your nose   the prescription from your pharmacy. You will receive a large tube of ointment which will be big enough for all of your treatments. You will apply this ointment to each nostril 2 times a day for 5 days. Wash your hands with  gel or soap and water for 20 seconds before using ointment. Place a pea-sized amount of ointment on a cotton Q-tip. Apply ointment just inside of each nostril with the Q-tip. Do not push Q-tip or ointment deep inside you nose. Press your nostrils together and massage for a few seconds. Wash your hands with  gel or soap and water after you are finished. Do not get ointment near your eyes. If it gets into your eyes, rinse them with cool water. If you need to use nasal spray, clean the tip of the bottle with alcohol before use and do not use both at the same time. If you are scheduled for COVID testing during the 5 days, do NOT apply morning dose until after the COVID test has been performed. How to use Chlorhexidine (CHG) 4% liquid soap  Purchase an 8 ounce bottle of CHG liquid soap (Chlorhexidine 4%, Hibiclens, Hex-A-Clens or store brand) at a pharmacy or grocery store. Wash your hair with your normal shampoo and your body with regular soap and rinse well to remove shampoo and soap from your skin. Wet a clean washcloth and turn off the shower. Put CHG soap on washcloth and apply to your entire body from the neck down. Do not use on your head, face or private parts(genitals). Do not use CHG soap on open sores, wounds or areas of skin irritation. Wash your body gently for 5 minutes. Do not wash your skin too hard. This soap does not create lather. Pay special attention to your underarms and from your belly button to your feet. Turn the shower back on and rinse well to get CHG soap off your body. Pat your skin dry with a clean, dry towel. Do not apply lotions or moisturizer.   Put on clean clothes and sleep on fresh bed sheets the night before surgery. Do not allow pets to sleep with you. Eating and Drinking Before Surgery    You may eat a regular dinner at the usual time on the day before your surgery. Do NOT eat any solid foods after midnight. You may drink clear liquids only from 12 midnight until 1 hours prior to your arrival time at the hospital on the day of your surgery. Do NOT drink alcohol. Clear liquids include:  Water  Fruit juices without pulp( i.e. apple juice)  Carbonated beverages  Black coffee (no cream/milk)  Tea (no cream/milk)  Gatorade  You may drink up to 12-16 ounces at one time every 4 hours between the hours of midnight and 1 hour before your arrival time at the hospital. Example- if your arrival time at the hospital is 6am, you may drink 12-16 ounces of clear liquids no later than 5am.  If you have any questions, please contact your surgeon's office. Patient Information Regarding COVID Restrictions    Day of Procedure    Please park in the parking deck or any designated visitor parking lot. Enter the facility through the Main Entrance of the hospital.  On the day of surgery, please provide the cell phone number of the person who will be waiting for you to the Patient Access representative at the time of registration. Masks are highly recommended in the hospital, but not required. Once your procedure and the immediate recovery period is completed, a nurse in the recovery area will contact your designated visitor to inform them of your room number or to otherwise review other pertinent information regarding your care. Social distancing practices are strongly encouraged in waiting areas and the cafeteria. The patient was contacted in person. He verbalized understanding of all instructions does not  need reinforcement.

## 2023-04-25 NOTE — PERIOP NOTES
Pre op and medication instructions printed and reviewed with patient and wife Two bottles of chg soap given. Opportunity for questions given and all questions were answered surgical site infection sheet given.  Incentive spirometry given

## 2023-04-26 LAB
BACTERIA SPEC CULT: NORMAL
BACTERIA SPEC CULT: NORMAL
SERVICE CMNT-IMP: NORMAL

## 2023-04-26 NOTE — PERIOP NOTES
PAT Nurse Practitioner   Pre-Operative Chart Review/Assessment:-ORTHOPEDIC/NEUROSURGICAL SPINE                Patient Name:  Theresa Cruz                                                           Age:   78 y.o.    :  1945     Today's Date:  2023     Date of PAT:   23      Date of Surgery:    5/3/23      Procedure(s):  L1-2 LAMINECTOMY WITH REVISION FUSION, AND  INSTRUMENTATION T10-S1      Surgeon:   Dr. Jessica Bar:       1)  PCP: Cecilia Pritchett III, DO        2)  Cardiac Clearance: Pt followed by Dr. Thakkar(S). LOV 3/28/23. Clearance obtained. OV note and EKG on chart and scanned under media. 3)  Diabetic Treatment Consult: A1c not ordered. No hx of DM. BG-79 on PAT labs. 4)  Sleep Apnea evaluation:  VERONIKA score of 4. Pt reports loud snoring that can be heard through a closed door and a diagnosis of HTN. Pt denies daytime fatigue and witnessed pauses in breathing. Pt previously referred to sleep medicine. 5)  Treatment for MRSA/Staph Aureus: Neg       6)  Additional Concerns: HTN, CAD s/p stent              Vital Signs:         Vitals:    23 1203   BP: 112/78   Pulse: (!) 55   Resp: 18   Temp: 97.6 °F (36.4 °C)   SpO2: 94%   Weight: 86.4 kg (190 lb 7.6 oz)   Height: 5' 8\" (1.727 m)          Body mass index is 28.96 kg/m². Preoperative Nutrition Screen (MOLINA)   Patient's Age: 78 y.o. Patient's BMI: Estimated body mass index is 28.96 kg/m² as calculated from the following:    Height as of this encounter: 5' 8\" (1.727 m). Weight as of this encounter: 86.4 kg (190 lb 7.6 oz). If the answer to any of the following is Yes, then recommend prescribe Oral Nutrition Supplements (ONS) for at least 5 days prior to surgery and/or order referral to dietitian for further assessment and nutrition therapy. 1. Does the patient have a documented serum albumin less than 3.0 within the last 90 days? No = 0        2.  Is patient's BMI less than 18.5 (or less than 20 if age over 72)? No = 0      3. Has the patient had an unplanned weight loss of 10% of body weight or more in the last 6 months? No = 0   4. Has the patient been eating less than 50% of their normal diet in the preceding week? No = 0   MOLINA Score (number of Yes responses), 0-4 0     Plan:   2 daily immuno nutrition shakes, 5 days prior to surgery and 5 days post-operatively. 3 pre-surgery drinks.      Electronically signed by Khari Jc NP on 4/27/23 at 8:46 AM   ____________________________________________  PAST MEDICAL HISTORY  Past Medical History:   Diagnosis Date    Arthritis     lower back    CAD (coronary artery disease)     stent x 1 2001 LDA    Chronic lower back pain     Dr Santana Gilman     Chronic pain     right knee - resolved with knee replacement 4/2017    Chronic pain 2022    left knee    Hypercholesteremia     Hypertension     Hypothyroidism     Ill-defined condition     Bleeding prolonged    Skin cancer 2020      RLEG   L FLANK AREA    Sun-damaged skin       ____________________________________________  PAST SURGICAL HISTORY  Past Surgical History:   Procedure Laterality Date    COLONOSCOPY N/A 10/20/2017    COLONOSCOPY performed by Esmer Jameson MD at \[facility]\"" AMBULATORY OR    HX CATARACT REMOVAL Right 02/2017    HX CORONARY STENT PLACEMENT  2000    Dr Grupo Womack  2001    [address]  approx 2010    @ [place] UMBILCAL AND R GROIN    HX KNEE REPLACEMENT Left 10/27/2022    HX LUMBAR FUSION  2006    L3-L4 fusion 2006, L2-S1 fusion 12/2020    HX TONSILLECTOMY  age 13    HX WISDOM TEETH EXTRACTION      IN ARTHRP KNE CONDYLE&PLATU MEDIAL&LAT COMPARTMENTS Right 04/2017    IN COLON CA SCRN NOT HI RSK IND  10/20/2017         IN UNLISTED PROCEDURE CARDIAC SURGERY      1 STENT     ____________________________________________  HOME MEDICATIONS    Current Outpatient Medications   Medication Sig    allopurinoL (ZYLOPRIM) 100 mg tablet Take 2 Tablets by mouth daily. SAW PALMETTO PO Take 320 mg by mouth daily. aspirin delayed-release 81 mg tablet Take 1 Tablet by mouth two (2) times a day. Indications: blood clot prevention (Patient taking differently: Take 1 Tablet by mouth daily. Indications: blood clot prevention)    senna-docusate (PERICOLACE) 8.6-50 mg per tablet Take 1 Tablet by mouth two (2) times daily as needed for Constipation. ascorbic acid, vitamin C, (VITAMIN C) 250 mg tablet Take 1 Tablet by mouth daily. Cholecalciferol, Vitamin D3, 75 mcg (3,000 unit) tab Take 75 mcg by mouth daily. magnesium oxide (MAG-OX) 400 mg tablet Take 1 Tablet by mouth every evening. amLODIPine (NORVASC) 5 mg tablet Take 0.5 Tablets by mouth nightly. cyanocobalamin, vitamin B-12, 5,000 mcg subl 1 Tab by SubLINGual route daily. gabapentin (NEURONTIN) 300 mg capsule Take 1 Capsule by mouth three (3) times daily as needed for Pain. atorvastatin (LIPITOR) 40 mg tablet Take 0.5 Tablets by mouth nightly. enalapril (VASOTEC) 10 mg tablet Take 2 Tablets by mouth nightly. levothyroxine (SYNTHROID) 200 mcg tablet Take 1 Tablet by mouth. Daily 6 days a week, Mon, Tues, Wed, Thurs, Fri ,Sun    furosemide (LASIX) 20 mg tablet Take 2 Tablets by mouth as needed. AS NEEDED FOR SWELLING    indomethacin (INDOCIN) 50 mg capsule Take 50 mg by mouth three (3) times daily. PRN up to TID     No current facility-administered medications for this encounter.      ____________________________________________  ALLERGIES  Allergies   Allergen Reactions    Wasp [Venom-Wasp] Anaphylaxis and Swelling    Hydrochlorothiazide Rash     Other reaction(s): Rash    Sulfa (Sulfonamide Antibiotics) Other (comments)     Lower extremity redness      ____________________________________________  SOCIAL HISTORY  Social History     Tobacco Use    Smoking status: Never    Smokeless tobacco: Never   Substance Use Topics    Alcohol use:  Yes     Alcohol/week: 6.0 standard drinks     Types: 6 Shots of liquor per week      ____________________________________________   Internal Administration   First Dose COVID-19, JESUSA BLUE border, Primary or Immunocompromised, (age 18y+), IM, 100 mcg/0.5mL  02/25/2021   Second Dose COVID-19, MODERNA BLUE border, Primary or Immunocompromised, (age 18y+), IM, 100 mcg/0.5mL  03/26/2021      Last COVID Lab SARS-CoV-2 ( )   Date Value   11/28/2020 Not Detected                  ____________________________________________    Labs:     Hospital Outpatient Visit on 04/25/2023   Component Date Value Ref Range Status    WBC 04/25/2023 5.8  4.1 - 11.1 K/uL Final    RBC 04/25/2023 4.79  4.10 - 5.70 M/uL Final    HGB 04/25/2023 14.2  12.1 - 17.0 g/dL Final    HCT 04/25/2023 45.1  36.6 - 50.3 % Final    MCV 04/25/2023 94.2  80.0 - 99.0 FL Final    MCH 04/25/2023 29.6  26.0 - 34.0 PG Final    MCHC 04/25/2023 31.5  30.0 - 36.5 g/dL Final    RDW 04/25/2023 14.6 (H)  11.5 - 14.5 % Final    PLATELET 68/62/9237 972  150 - 400 K/uL Final    MPV 04/25/2023 9.4  8.9 - 12.9 FL Final    NRBC 04/25/2023 0.0  0  WBC Final    ABSOLUTE NRBC 04/25/2023 0.00  0.00 - 0.01 K/uL Final    NEUTROPHILS 04/25/2023 72  32 - 75 % Final    LYMPHOCYTES 04/25/2023 16  12 - 49 % Final    MONOCYTES 04/25/2023 9  5 - 13 % Final    EOSINOPHILS 04/25/2023 3  0 - 7 % Final    BASOPHILS 04/25/2023 0  0 - 1 % Final    IMMATURE GRANULOCYTES 04/25/2023 0  0.0 - 0.5 % Final    ABS. NEUTROPHILS 04/25/2023 4.1  1.8 - 8.0 K/UL Final    ABS. LYMPHOCYTES 04/25/2023 0.9  0.8 - 3.5 K/UL Final    ABS. MONOCYTES 04/25/2023 0.5  0.0 - 1.0 K/UL Final    ABS. EOSINOPHILS 04/25/2023 0.2  0.0 - 0.4 K/UL Final    ABS. BASOPHILS 04/25/2023 0.0  0.0 - 0.1 K/UL Final    ABS. IMM.  GRANS. 04/25/2023 0.0  0.00 - 0.04 K/UL Final    DF 04/25/2023 AUTOMATED    Final    Sodium 04/25/2023 139  136 - 145 mmol/L Final    Potassium 04/25/2023 4.4  3.5 - 5.1 mmol/L Final    Chloride 04/25/2023 108  97 - 108 mmol/L Final CO2 04/25/2023 31  21 - 32 mmol/L Final    Anion gap 04/25/2023 0 (L)  5 - 15 mmol/L Final    Glucose 04/25/2023 79  65 - 100 mg/dL Final    BUN 04/25/2023 24 (H)  6 - 20 MG/DL Final    Creatinine 04/25/2023 1.05  0.70 - 1.30 MG/DL Final    BUN/Creatinine ratio 04/25/2023 23 (H)  12 - 20   Final    eGFR 04/25/2023 >60  >60 ml/min/1.73m2 Final    Comment:      Pediatric calculator link: [employer].at. org/professionals/kdoqi/gfr_calculatorped       These results are not intended for use in patients <25years of age. eGFR results are calculated without a race factor using  the 2021 CKD-EPI equation. Careful clinical correlation is recommended, particularly when comparing to results calculated using previous equations. The CKD-EPI equation is less accurate in patients with extremes of muscle mass, extra-renal metabolism of creatinine, excessive creatine ingestion, or following therapy that affects renal tubular secretion. Calcium 04/25/2023 8.8  8.5 - 10.1 MG/DL Final    Special Requests: 04/25/2023 NO SPECIAL REQUESTS    Final    Culture result: 04/25/2023 MRSA NOT PRESENT    Final    Culture result: 04/25/2023     Final                    Value:Screening of patient nares for MRSA is for surveillance purposes and, if positive, to facilitate isolation considerations in high risk settings. It is not intended for automatic decolonization interventions per se as regimens are not sufficiently effective to warrant routine use.       Crossmatch Expiration 04/25/2023 05/05/2023,2359   Final    ABO/Rh(D) 04/25/2023 O POSITIVE   Final    Antibody screen 04/25/2023 NEG   Final    Ventricular Rate 04/25/2023 56  BPM Final    Atrial Rate 04/25/2023 56  BPM Final    P-R Interval 04/25/2023 148  ms Final    QRS Duration 04/25/2023 76  ms Final    Q-T Interval 04/25/2023 430  ms Final    QTC Calculation (Bezet) 04/25/2023 414  ms Final    Calculated P Axis 04/25/2023 -24  degrees Final    Calculated R Axis 04/25/2023 20  degrees Final    Calculated T Axis 04/25/2023 16  degrees Final    Diagnosis 04/25/2023    Final                    Value:Sinus bradycardia  Low voltage QRS  Borderline ECG  When compared with ECG of 24-NOV-2020 13:03,  premature atrial complexes are no longer present  Confirmed by Agnes Pickard M.D., Mando Krueger ([id number]) on 4/25/2023 4:04:48 PM     Office Visit on 03/27/2023   Component Date Value Ref Range Status    Uric acid 03/27/2023 4.8  3.5 - 7.2 MG/DL Final    Prostate Specific Ag 03/27/2023 2.1  0.01 - 4.0 ng/mL Final    Comment: Method used is Fluentify  (NOTE)  Many types of test methods are used to measure PSA and can yield   different results on any given specimen. Therefore PSA results from   different laboratories on the same patient are not directly   comparable. In addition, PSA values by themselves should not be   interpreted as the presence or absence of cancer. PSA values used to   monitor for biochemical recurrence of prostate cancer should be   interpreted in accordance with current clinical guidelines (e.g. the   2013 American Urological Association (AUA) guidelines and the 2015    Association of Urology (EAU) guidelines). Hep C virus Ab Interp. 03/27/2023 NONREACTIVE  NONREACTIVE   Final    Method used is Siemens Advia Centaur    Hemoglobin A1c 03/27/2023 6.0 (H)  4.0 - 5.6 % Final    Comment: NEW METHOD  PLEASE NOTE NEW REFERENCE RANGE  (NOTE)  HbA1C Interpretive Ranges  <5.7              Normal  5.7 - 6.4         Consider Prediabetes  >6.5              Consider Diabetes      Est. average glucose 03/27/2023 126  mg/dL Final    T4, Free 03/27/2023 1.3  0.8 - 1.5 NG/DL Final    TSH 03/27/2023 0.19 (L)  0.36 - 3.74 uIU/mL Final    Comment:   Due to TSH heterogeneity, both structurally and degree of glycosylation, monoclonal antibodies used in the TSH assay may not accurately quantitate TSH.  Therefore, this result should be correlated with clinical findings as well as with other assessments of thyroid function, e.g., free T4, free T3. Cholesterol, total 03/27/2023 110  <200 MG/DL Final    Triglyceride 03/27/2023 60  <150 MG/DL Final    Based on NCEP-ATP III:  Triglycerides <150 mg/dL  is considered normal, 150-199 mg/dL  borderline high,  200-499 mg/dL high and  greater than or equal to 500 mg/dL very high. HDL Cholesterol 03/27/2023 58  MG/DL Final    Based on NCEP ATP III, HDL Cholesterol <40 mg/dL is considered low and >60 mg/dL is elevated. LDL, calculated 03/27/2023 40  0 - 100 MG/DL Final    Comment: Based on the NCEP-ATP: LDL-C concentrations are considered  optimal <100 mg/dL,  near optimal/above Normal 100-129 mg/dL  Borderline High: 130-159, High: 160-189 mg/dL  Very High: Greater than or equal to 190 mg/dL      VLDL, calculated 03/27/2023 12  MG/DL Final    CHOL/HDL Ratio 03/27/2023 1.9  0.0 - 5.0   Final    Sodium 03/27/2023 140  136 - 145 mmol/L Final    Potassium 03/27/2023 4.4  3.5 - 5.1 mmol/L Final    Chloride 03/27/2023 107  97 - 108 mmol/L Final    CO2 03/27/2023 30  21 - 32 mmol/L Final    Anion gap 03/27/2023 3 (L)  5 - 15 mmol/L Final    Glucose 03/27/2023 95  65 - 100 mg/dL Final    BUN 03/27/2023 23 (H)  6 - 20 MG/DL Final    Creatinine 03/27/2023 1.10  0.70 - 1.30 MG/DL Final    BUN/Creatinine ratio 03/27/2023 21 (H)  12 - 20   Final    eGFR 03/27/2023 >60  >60 ml/min/1.73m2 Final    Comment:   Pediatric calculator link: [employer].at. org/professionals/kdoqi/gfr_calculatorped    These results are not intended for use in patients <25years of age. eGFR results are calculated without a race factor using  the 2021 CKD-EPI equation. Careful clinical correlation is recommended, particularly when comparing to results calculated using previous equations.   The CKD-EPI equation is less accurate in patients with extremes of muscle mass, extra-renal metabolism of creatinine, excessive creatine ingestion, or following therapy that affects renal tubular secretion. Calcium 03/27/2023 9.9  8.5 - 10.1 MG/DL Final    Bilirubin, total 03/27/2023 1.3 (H)  0.2 - 1.0 MG/DL Final    ALT (SGPT) 03/27/2023 18  12 - 78 U/L Final    AST (SGOT) 03/27/2023 21  15 - 37 U/L Final    Alk. phosphatase 03/27/2023 122 (H)  45 - 117 U/L Final    Protein, total 03/27/2023 7.0  6.4 - 8.2 g/dL Final    Albumin 03/27/2023 3.9  3.5 - 5.0 g/dL Final    Globulin 03/27/2023 3.1  2.0 - 4.0 g/dL Final    A-G Ratio 03/27/2023 1.3  1.1 - 2.2   Final    WBC 03/27/2023 4.0 (L)  4.1 - 11.1 K/uL Final    RBC 03/27/2023 4.84  4.10 - 5.70 M/uL Final    HGB 03/27/2023 14.5  12.1 - 17.0 g/dL Final    HCT 03/27/2023 45.5  36.6 - 50.3 % Final    MCV 03/27/2023 94.0  80.0 - 99.0 FL Final    MCH 03/27/2023 30.0  26.0 - 34.0 PG Final    MCHC 03/27/2023 31.9  30.0 - 36.5 g/dL Final    RDW 03/27/2023 13.3  11.5 - 14.5 % Final    PLATELET 67/13/1367 258  150 - 400 K/uL Final    MPV 03/27/2023 10.0  8.9 - 12.9 FL Final    NRBC 03/27/2023 0.0  0  WBC Final    ABSOLUTE NRBC 03/27/2023 0.00  0.00 - 0.01 K/uL Final    NEUTROPHILS 03/27/2023 60  32 - 75 % Final    LYMPHOCYTES 03/27/2023 24  12 - 49 % Final    MONOCYTES 03/27/2023 10  5 - 13 % Final    EOSINOPHILS 03/27/2023 5  0 - 7 % Final    BASOPHILS 03/27/2023 1  0 - 1 % Final    IMMATURE GRANULOCYTES 03/27/2023 0  0.0 - 0.5 % Final    ABS. NEUTROPHILS 03/27/2023 2.4  1.8 - 8.0 K/UL Final    ABS. LYMPHOCYTES 03/27/2023 1.0  0.8 - 3.5 K/UL Final    ABS. MONOCYTES 03/27/2023 0.4  0.0 - 1.0 K/UL Final    ABS. EOSINOPHILS 03/27/2023 0.2  0.0 - 0.4 K/UL Final    ABS. BASOPHILS 03/27/2023 0.0  0.0 - 0.1 K/UL Final    ABS. IMM. GRANS. 03/27/2023 0.0  0.00 - 0.04 K/UL Final    DF 03/27/2023 AUTOMATED    Final    SAMPLES BEING HELD 03/27/2023 1SST   Final    COMMENT 03/27/2023 Add-on orders for these samples will be processed based on acceptable specimen integrity and analyte stability, which may vary by analyte.     Final        Skin:     Denies open wounds, cuts, sores, rashes or other areas of concern in PAT assessment.         Anatoliy Winn NP  Available via [address] [place] [address]

## 2023-05-02 ENCOUNTER — ANESTHESIA EVENT (OUTPATIENT)
Dept: SURGERY | Age: 78
End: 2023-05-02
Payer: MEDICARE

## 2023-05-03 ENCOUNTER — HOSPITAL ENCOUNTER (INPATIENT)
Age: 78
LOS: 3 days | Discharge: STILL ADMITTED OR ADMINISTRATIVE | DRG: 460 | End: 2023-05-06
Attending: NEUROLOGICAL SURGERY | Admitting: NEUROLOGICAL SURGERY
Payer: MEDICARE

## 2023-05-03 ENCOUNTER — ANESTHESIA (OUTPATIENT)
Dept: SURGERY | Age: 78
End: 2023-05-03
Payer: MEDICARE

## 2023-05-03 ENCOUNTER — HOSPITAL ENCOUNTER (INPATIENT)
Facility: HOSPITAL | Age: 78
LOS: 3 days | Discharge: HOME | DRG: 460 | End: 2023-05-06
Attending: NEUROLOGICAL SURGERY | Admitting: NEUROLOGICAL SURGERY
Payer: MEDICARE

## 2023-05-03 DIAGNOSIS — Z98.1 S/P LUMBAR FUSION: Primary | ICD-10-CM

## 2023-05-03 LAB
GLUCOSE BLD STRIP.AUTO-MCNC: 100 MG/DL (ref 65–117)
SERVICE CMNT-IMP: NORMAL

## 2023-05-03 PROCEDURE — 74011250636 HC RX REV CODE- 250/636: Performed by: ANESTHESIOLOGY

## 2023-05-03 PROCEDURE — C1713 ANCHOR/SCREW BN/BN,TIS/BN: HCPCS | Performed by: NEUROLOGICAL SURGERY

## 2023-05-03 PROCEDURE — 74011250637 HC RX REV CODE- 250/637: Performed by: ANESTHESIOLOGY

## 2023-05-03 PROCEDURE — 74011000250 HC RX REV CODE- 250: Performed by: NURSE ANESTHETIST, CERTIFIED REGISTERED

## 2023-05-03 PROCEDURE — 77030013079 HC BLNKT BAIR HGGR 3M -A: Performed by: ANESTHESIOLOGY

## 2023-05-03 PROCEDURE — 77030002933 HC SUT MCRYL J&J -A: Performed by: NEUROLOGICAL SURGERY

## 2023-05-03 PROCEDURE — 65270000029 HC RM PRIVATE

## 2023-05-03 PROCEDURE — 74011000250 HC RX REV CODE- 250: Performed by: NEUROLOGICAL SURGERY

## 2023-05-03 PROCEDURE — 00NX0ZZ RELEASE THORACIC SPINAL CORD, OPEN APPROACH: ICD-10-PCS | Performed by: NEUROLOGICAL SURGERY

## 2023-05-03 PROCEDURE — 2709999900 HC NON-CHARGEABLE SUPPLY: Performed by: NEUROLOGICAL SURGERY

## 2023-05-03 PROCEDURE — 77030029099 HC BN WAX SSPC -A: Performed by: NEUROLOGICAL SURGERY

## 2023-05-03 PROCEDURE — 0RGA071 FUSION OF THORACOLUMBAR VERTEBRAL JOINT WITH AUTOLOGOUS TISSUE SUBSTITUTE, POSTERIOR APPROACH, POSTERIOR COLUMN, OPEN APPROACH: ICD-10-PCS | Performed by: NEUROLOGICAL SURGERY

## 2023-05-03 PROCEDURE — 01N80ZZ RELEASE THORACIC NERVE, OPEN APPROACH: ICD-10-PCS | Performed by: NEUROLOGICAL SURGERY

## 2023-05-03 PROCEDURE — 74011250636 HC RX REV CODE- 250/636: Performed by: NEUROLOGICAL SURGERY

## 2023-05-03 PROCEDURE — 77030026438 HC STYL ET INTUB CARD -A: Performed by: ANESTHESIOLOGY

## 2023-05-03 PROCEDURE — 9990 CHARGE CONVERSION

## 2023-05-03 PROCEDURE — 0SG0071 FUSION OF LUMBAR VERTEBRAL JOINT WITH AUTOLOGOUS TISSUE SUBSTITUTE, POSTERIOR APPROACH, POSTERIOR COLUMN, OPEN APPROACH: ICD-10-PCS | Performed by: NEUROLOGICAL SURGERY

## 2023-05-03 PROCEDURE — 76210000017 HC OR PH I REC 1.5 TO 2 HR: Performed by: NEUROLOGICAL SURGERY

## 2023-05-03 PROCEDURE — 77010033678 HC OXYGEN DAILY

## 2023-05-03 PROCEDURE — 82962 GLUCOSE BLOOD TEST: CPT

## 2023-05-03 PROCEDURE — 76010000176 HC OR TIME 4.5 TO 5 HR INTENSV-TIER 1: Performed by: NEUROLOGICAL SURGERY

## 2023-05-03 PROCEDURE — 77030014650 HC SEAL MTRX FLOSEL BAXT -C: Performed by: NEUROLOGICAL SURGERY

## 2023-05-03 PROCEDURE — 74011250636 HC RX REV CODE- 250/636: Performed by: NURSE ANESTHETIST, CERTIFIED REGISTERED

## 2023-05-03 PROCEDURE — 77030008684 HC TU ET CUF COVD -B: Performed by: ANESTHESIOLOGY

## 2023-05-03 PROCEDURE — 01NR0ZZ RELEASE SACRAL NERVE, OPEN APPROACH: ICD-10-PCS | Performed by: NEUROLOGICAL SURGERY

## 2023-05-03 PROCEDURE — 01NB0ZZ RELEASE LUMBAR NERVE, OPEN APPROACH: ICD-10-PCS | Performed by: NEUROLOGICAL SURGERY

## 2023-05-03 PROCEDURE — 00NY0ZZ RELEASE LUMBAR SPINAL CORD, OPEN APPROACH: ICD-10-PCS | Performed by: NEUROLOGICAL SURGERY

## 2023-05-03 PROCEDURE — 76060000041 HC ANESTHESIA 5 TO 5.5 HR: Performed by: NEUROLOGICAL SURGERY

## 2023-05-03 PROCEDURE — 77030012407 HC DRN WND BARD -B: Performed by: NEUROLOGICAL SURGERY

## 2023-05-03 PROCEDURE — 77030034475 HC MISC IMPL SPN: Performed by: NEUROLOGICAL SURGERY

## 2023-05-03 PROCEDURE — 8E0WXBZ COMPUTER ASSISTED PROCEDURE OF TRUNK REGION: ICD-10-PCS | Performed by: NEUROLOGICAL SURGERY

## 2023-05-03 PROCEDURE — 77030010813: Performed by: NEUROLOGICAL SURGERY

## 2023-05-03 PROCEDURE — 0SG1071 FUSION OF 2 OR MORE LUMBAR VERTEBRAL JOINTS WITH AUTOLOGOUS TISSUE SUBSTITUTE, POSTERIOR APPROACH, POSTERIOR COLUMN, OPEN APPROACH: ICD-10-PCS | Performed by: NEUROLOGICAL SURGERY

## 2023-05-03 PROCEDURE — 0QP104Z REMOVAL OF INTERNAL FIXATION DEVICE FROM SACRUM, OPEN APPROACH: ICD-10-PCS | Performed by: NEUROLOGICAL SURGERY

## 2023-05-03 PROCEDURE — 77030022302 HC GRFT BN SPN SC OSTEO -H1: Performed by: NEUROLOGICAL SURGERY

## 2023-05-03 PROCEDURE — 77030004391 HC BUR FLUT MEDT -C: Performed by: NEUROLOGICAL SURGERY

## 2023-05-03 PROCEDURE — 77030003029 HC SUT VCRL J&J -B: Performed by: NEUROLOGICAL SURGERY

## 2023-05-03 PROCEDURE — 77030038600 HC TU BPLR IRR DISP STRY -B: Performed by: NEUROLOGICAL SURGERY

## 2023-05-03 PROCEDURE — 0QP004Z REMOVAL OF INTERNAL FIXATION DEVICE FROM LUMBAR VERTEBRA, OPEN APPROACH: ICD-10-PCS | Performed by: NEUROLOGICAL SURGERY

## 2023-05-03 PROCEDURE — 74011250637 HC RX REV CODE- 250/637: Performed by: NEUROLOGICAL SURGERY

## 2023-05-03 DEVICE — GRAFT BNE ELITE TRINITY LG --: Type: IMPLANTABLE DEVICE | Site: SPINE LUMBAR | Status: FUNCTIONAL

## 2023-05-03 DEVICE — DBM 7509211 MAGNIFUSE 1 X 10CM
Type: IMPLANTABLE DEVICE | Site: SPINE LUMBAR | Status: FUNCTIONAL
Brand: MAGNIFUSE® BONE GRAFT

## 2023-05-03 RX ORDER — MIDAZOLAM HYDROCHLORIDE 1 MG/ML
1 INJECTION, SOLUTION INTRAMUSCULAR; INTRAVENOUS AS NEEDED
Status: DISCONTINUED | OUTPATIENT
Start: 2023-05-03 | End: 2023-05-03 | Stop reason: HOSPADM

## 2023-05-03 RX ORDER — GABAPENTIN 300 MG/1
300 CAPSULE ORAL
Status: DISCONTINUED | OUTPATIENT
Start: 2023-05-03 | End: 2023-05-06 | Stop reason: HOSPADM

## 2023-05-03 RX ORDER — LIDOCAINE HYDROCHLORIDE 10 MG/ML
0.1 INJECTION, SOLUTION EPIDURAL; INFILTRATION; INTRACAUDAL; PERINEURAL AS NEEDED
Status: DISCONTINUED | OUTPATIENT
Start: 2023-05-03 | End: 2023-05-03 | Stop reason: HOSPADM

## 2023-05-03 RX ORDER — PHENYLEPHRINE HCL IN 0.9% NACL 0.4MG/10ML
SYRINGE (ML) INTRAVENOUS AS NEEDED
Status: DISCONTINUED | OUTPATIENT
Start: 2023-05-03 | End: 2023-05-03 | Stop reason: HOSPADM

## 2023-05-03 RX ORDER — ACETAMINOPHEN 325 MG/1
650 TABLET ORAL ONCE
Status: COMPLETED | OUTPATIENT
Start: 2023-05-03 | End: 2023-05-03

## 2023-05-03 RX ORDER — SODIUM CHLORIDE, SODIUM LACTATE, POTASSIUM CHLORIDE, CALCIUM CHLORIDE 600; 310; 30; 20 MG/100ML; MG/100ML; MG/100ML; MG/100ML
25 INJECTION, SOLUTION INTRAVENOUS CONTINUOUS
Status: DISCONTINUED | OUTPATIENT
Start: 2023-05-03 | End: 2023-05-03 | Stop reason: HOSPADM

## 2023-05-03 RX ORDER — ACETAMINOPHEN 500 MG
1000 TABLET ORAL EVERY 6 HOURS
Status: DISCONTINUED | OUTPATIENT
Start: 2023-05-03 | End: 2023-05-06 | Stop reason: HOSPADM

## 2023-05-03 RX ORDER — SODIUM CHLORIDE 9 MG/ML
75 INJECTION, SOLUTION INTRAVENOUS CONTINUOUS
Status: DISPENSED | OUTPATIENT
Start: 2023-05-03 | End: 2023-05-04

## 2023-05-03 RX ORDER — NALOXONE HYDROCHLORIDE 0.4 MG/ML
0.4 INJECTION, SOLUTION INTRAMUSCULAR; INTRAVENOUS; SUBCUTANEOUS AS NEEDED
Status: DISCONTINUED | OUTPATIENT
Start: 2023-05-03 | End: 2023-05-06 | Stop reason: HOSPADM

## 2023-05-03 RX ORDER — SODIUM CHLORIDE 0.9 % (FLUSH) 0.9 %
5-40 SYRINGE (ML) INJECTION EVERY 8 HOURS
Status: DISCONTINUED | OUTPATIENT
Start: 2023-05-03 | End: 2023-05-03 | Stop reason: HOSPADM

## 2023-05-03 RX ORDER — TRAMADOL HYDROCHLORIDE 50 MG/1
50 TABLET ORAL
Status: DISCONTINUED | OUTPATIENT
Start: 2023-05-03 | End: 2023-05-06 | Stop reason: HOSPADM

## 2023-05-03 RX ORDER — AMOXICILLIN 250 MG
1 CAPSULE ORAL
Status: DISCONTINUED | OUTPATIENT
Start: 2023-05-03 | End: 2023-05-06 | Stop reason: HOSPADM

## 2023-05-03 RX ORDER — ONDANSETRON 2 MG/ML
INJECTION INTRAMUSCULAR; INTRAVENOUS AS NEEDED
Status: DISCONTINUED | OUTPATIENT
Start: 2023-05-03 | End: 2023-05-03 | Stop reason: HOSPADM

## 2023-05-03 RX ORDER — FENTANYL CITRATE 50 UG/ML
INJECTION, SOLUTION INTRAMUSCULAR; INTRAVENOUS AS NEEDED
Status: DISCONTINUED | OUTPATIENT
Start: 2023-05-03 | End: 2023-05-03 | Stop reason: HOSPADM

## 2023-05-03 RX ORDER — NEOSTIGMINE METHYLSULFATE 1 MG/ML
INJECTION, SOLUTION INTRAVENOUS AS NEEDED
Status: DISCONTINUED | OUTPATIENT
Start: 2023-05-03 | End: 2023-05-03 | Stop reason: HOSPADM

## 2023-05-03 RX ORDER — HYDROMORPHONE HYDROCHLORIDE 1 MG/ML
0.2 INJECTION, SOLUTION INTRAMUSCULAR; INTRAVENOUS; SUBCUTANEOUS
Status: DISCONTINUED | OUTPATIENT
Start: 2023-05-03 | End: 2023-05-03 | Stop reason: HOSPADM

## 2023-05-03 RX ORDER — SODIUM CHLORIDE 0.9 % (FLUSH) 0.9 %
5-40 SYRINGE (ML) INJECTION AS NEEDED
Status: DISCONTINUED | OUTPATIENT
Start: 2023-05-03 | End: 2023-05-03 | Stop reason: HOSPADM

## 2023-05-03 RX ORDER — MORPHINE SULFATE 2 MG/ML
2 INJECTION, SOLUTION INTRAMUSCULAR; INTRAVENOUS
Status: DISCONTINUED | OUTPATIENT
Start: 2023-05-03 | End: 2023-05-03 | Stop reason: HOSPADM

## 2023-05-03 RX ORDER — SODIUM CHLORIDE 0.9 % (FLUSH) 0.9 %
5-40 SYRINGE (ML) INJECTION EVERY 8 HOURS
Status: DISCONTINUED | OUTPATIENT
Start: 2023-05-03 | End: 2023-05-06 | Stop reason: HOSPADM

## 2023-05-03 RX ORDER — DEXAMETHASONE SODIUM PHOSPHATE 4 MG/ML
INJECTION, SOLUTION INTRA-ARTICULAR; INTRALESIONAL; INTRAMUSCULAR; INTRAVENOUS; SOFT TISSUE AS NEEDED
Status: DISCONTINUED | OUTPATIENT
Start: 2023-05-03 | End: 2023-05-03 | Stop reason: HOSPADM

## 2023-05-03 RX ORDER — LANOLIN ALCOHOL/MO/W.PET/CERES
1000 CREAM (GRAM) TOPICAL DAILY
Status: DISCONTINUED | OUTPATIENT
Start: 2023-05-04 | End: 2023-05-06 | Stop reason: HOSPADM

## 2023-05-03 RX ORDER — OXYCODONE HYDROCHLORIDE 5 MG/1
5 TABLET ORAL
Status: DISCONTINUED | OUTPATIENT
Start: 2023-05-03 | End: 2023-05-06 | Stop reason: HOSPADM

## 2023-05-03 RX ORDER — SODIUM CHLORIDE, SODIUM LACTATE, POTASSIUM CHLORIDE, CALCIUM CHLORIDE 600; 310; 30; 20 MG/100ML; MG/100ML; MG/100ML; MG/100ML
100 INJECTION, SOLUTION INTRAVENOUS CONTINUOUS
Status: DISCONTINUED | OUTPATIENT
Start: 2023-05-03 | End: 2023-05-03 | Stop reason: HOSPADM

## 2023-05-03 RX ORDER — HYDROMORPHONE HYDROCHLORIDE 2 MG/ML
INJECTION, SOLUTION INTRAMUSCULAR; INTRAVENOUS; SUBCUTANEOUS AS NEEDED
Status: DISCONTINUED | OUTPATIENT
Start: 2023-05-03 | End: 2023-05-03 | Stop reason: HOSPADM

## 2023-05-03 RX ORDER — MIDAZOLAM HYDROCHLORIDE 1 MG/ML
0.5 INJECTION, SOLUTION INTRAMUSCULAR; INTRAVENOUS
Status: DISCONTINUED | OUTPATIENT
Start: 2023-05-03 | End: 2023-05-03 | Stop reason: HOSPADM

## 2023-05-03 RX ORDER — FENTANYL CITRATE 50 UG/ML
50 INJECTION, SOLUTION INTRAMUSCULAR; INTRAVENOUS AS NEEDED
Status: DISCONTINUED | OUTPATIENT
Start: 2023-05-03 | End: 2023-05-03 | Stop reason: HOSPADM

## 2023-05-03 RX ORDER — ROCURONIUM BROMIDE 10 MG/ML
INJECTION, SOLUTION INTRAVENOUS AS NEEDED
Status: DISCONTINUED | OUTPATIENT
Start: 2023-05-03 | End: 2023-05-03 | Stop reason: HOSPADM

## 2023-05-03 RX ORDER — ATORVASTATIN CALCIUM 20 MG/1
20 TABLET, FILM COATED ORAL
Status: DISCONTINUED | OUTPATIENT
Start: 2023-05-03 | End: 2023-05-06 | Stop reason: HOSPADM

## 2023-05-03 RX ORDER — MIDAZOLAM HYDROCHLORIDE 1 MG/ML
INJECTION, SOLUTION INTRAMUSCULAR; INTRAVENOUS AS NEEDED
Status: DISCONTINUED | OUTPATIENT
Start: 2023-05-03 | End: 2023-05-03 | Stop reason: HOSPADM

## 2023-05-03 RX ORDER — OXYCODONE HYDROCHLORIDE 5 MG/1
5 TABLET ORAL AS NEEDED
Status: DISCONTINUED | OUTPATIENT
Start: 2023-05-03 | End: 2023-05-03 | Stop reason: HOSPADM

## 2023-05-03 RX ORDER — SUCCINYLCHOLINE CHLORIDE 20 MG/ML
INJECTION INTRAMUSCULAR; INTRAVENOUS AS NEEDED
Status: DISCONTINUED | OUTPATIENT
Start: 2023-05-03 | End: 2023-05-03 | Stop reason: HOSPADM

## 2023-05-03 RX ORDER — MELATONIN
75 DAILY
Status: DISCONTINUED | OUTPATIENT
Start: 2023-05-04 | End: 2023-05-06 | Stop reason: HOSPADM

## 2023-05-03 RX ORDER — DIPHENHYDRAMINE HYDROCHLORIDE 50 MG/ML
12.5 INJECTION, SOLUTION INTRAMUSCULAR; INTRAVENOUS AS NEEDED
Status: DISCONTINUED | OUTPATIENT
Start: 2023-05-03 | End: 2023-05-03 | Stop reason: HOSPADM

## 2023-05-03 RX ORDER — OXYCODONE HYDROCHLORIDE 5 MG/1
10 TABLET ORAL
Status: DISCONTINUED | OUTPATIENT
Start: 2023-05-03 | End: 2023-05-06 | Stop reason: HOSPADM

## 2023-05-03 RX ORDER — HYDROMORPHONE HYDROCHLORIDE 1 MG/ML
1 INJECTION, SOLUTION INTRAMUSCULAR; INTRAVENOUS; SUBCUTANEOUS
Status: ACTIVE | OUTPATIENT
Start: 2023-05-03 | End: 2023-05-04

## 2023-05-03 RX ORDER — SODIUM CHLORIDE 0.9 % (FLUSH) 0.9 %
5-40 SYRINGE (ML) INJECTION AS NEEDED
Status: DISCONTINUED | OUTPATIENT
Start: 2023-05-03 | End: 2023-05-06 | Stop reason: HOSPADM

## 2023-05-03 RX ORDER — LIDOCAINE HYDROCHLORIDE 20 MG/ML
INJECTION, SOLUTION EPIDURAL; INFILTRATION; INTRACAUDAL; PERINEURAL AS NEEDED
Status: DISCONTINUED | OUTPATIENT
Start: 2023-05-03 | End: 2023-05-03 | Stop reason: HOSPADM

## 2023-05-03 RX ORDER — DIPHENHYDRAMINE HCL 25 MG
25 CAPSULE ORAL
Status: DISCONTINUED | OUTPATIENT
Start: 2023-05-03 | End: 2023-05-06 | Stop reason: HOSPADM

## 2023-05-03 RX ORDER — LEVOTHYROXINE SODIUM 200 UG/1
200 TABLET ORAL
Status: DISCONTINUED | OUTPATIENT
Start: 2023-05-04 | End: 2023-05-06 | Stop reason: HOSPADM

## 2023-05-03 RX ORDER — SODIUM CHLORIDE 9 MG/ML
25 INJECTION, SOLUTION INTRAVENOUS CONTINUOUS
Status: DISCONTINUED | OUTPATIENT
Start: 2023-05-03 | End: 2023-05-03 | Stop reason: HOSPADM

## 2023-05-03 RX ORDER — LISINOPRIL 5 MG/1
20 TABLET ORAL
Status: DISCONTINUED | OUTPATIENT
Start: 2023-05-03 | End: 2023-05-06 | Stop reason: HOSPADM

## 2023-05-03 RX ORDER — LANOLIN ALCOHOL/MO/W.PET/CERES
400 CREAM (GRAM) TOPICAL EVERY EVENING
Status: DISCONTINUED | OUTPATIENT
Start: 2023-05-03 | End: 2023-05-06 | Stop reason: HOSPADM

## 2023-05-03 RX ORDER — PROPOFOL 10 MG/ML
INJECTION, EMULSION INTRAVENOUS AS NEEDED
Status: DISCONTINUED | OUTPATIENT
Start: 2023-05-03 | End: 2023-05-03 | Stop reason: HOSPADM

## 2023-05-03 RX ORDER — DOCUSATE SODIUM 100 MG/1
100 CAPSULE, LIQUID FILLED ORAL 2 TIMES DAILY
Status: DISCONTINUED | OUTPATIENT
Start: 2023-05-03 | End: 2023-05-06 | Stop reason: HOSPADM

## 2023-05-03 RX ORDER — ONDANSETRON 2 MG/ML
4 INJECTION INTRAMUSCULAR; INTRAVENOUS AS NEEDED
Status: DISCONTINUED | OUTPATIENT
Start: 2023-05-03 | End: 2023-05-03 | Stop reason: HOSPADM

## 2023-05-03 RX ORDER — SODIUM CHLORIDE, SODIUM LACTATE, POTASSIUM CHLORIDE, CALCIUM CHLORIDE 600; 310; 30; 20 MG/100ML; MG/100ML; MG/100ML; MG/100ML
INJECTION, SOLUTION INTRAVENOUS
Status: DISCONTINUED | OUTPATIENT
Start: 2023-05-03 | End: 2023-05-03 | Stop reason: HOSPADM

## 2023-05-03 RX ORDER — ALLOPURINOL 100 MG/1
200 TABLET ORAL DAILY
Status: DISCONTINUED | OUTPATIENT
Start: 2023-05-04 | End: 2023-05-06 | Stop reason: HOSPADM

## 2023-05-03 RX ORDER — FENTANYL CITRATE 50 UG/ML
25 INJECTION, SOLUTION INTRAMUSCULAR; INTRAVENOUS
Status: DISCONTINUED | OUTPATIENT
Start: 2023-05-03 | End: 2023-05-03 | Stop reason: HOSPADM

## 2023-05-03 RX ORDER — AMLODIPINE BESYLATE 5 MG/1
2.5 TABLET ORAL
Status: DISCONTINUED | OUTPATIENT
Start: 2023-05-03 | End: 2023-05-06 | Stop reason: HOSPADM

## 2023-05-03 RX ORDER — ONDANSETRON 2 MG/ML
4 INJECTION INTRAMUSCULAR; INTRAVENOUS
Status: ACTIVE | OUTPATIENT
Start: 2023-05-03 | End: 2023-05-04

## 2023-05-03 RX ORDER — ROPIVACAINE HYDROCHLORIDE 5 MG/ML
30 INJECTION, SOLUTION EPIDURAL; INFILTRATION; PERINEURAL AS NEEDED
Status: DISCONTINUED | OUTPATIENT
Start: 2023-05-03 | End: 2023-05-03 | Stop reason: HOSPADM

## 2023-05-03 RX ORDER — GLYCOPYRROLATE 0.2 MG/ML
INJECTION INTRAMUSCULAR; INTRAVENOUS AS NEEDED
Status: DISCONTINUED | OUTPATIENT
Start: 2023-05-03 | End: 2023-05-03 | Stop reason: HOSPADM

## 2023-05-03 RX ADMIN — GLYCOPYRROLATE 0.2 MG: 0.2 INJECTION INTRAMUSCULAR; INTRAVENOUS at 08:10

## 2023-05-03 RX ADMIN — ROCURONIUM BROMIDE 40 MG: 10 SOLUTION INTRAVENOUS at 08:05

## 2023-05-03 RX ADMIN — DOCUSATE SODIUM 100 MG: 100 CAPSULE, LIQUID FILLED ORAL at 17:22

## 2023-05-03 RX ADMIN — HYDROMORPHONE HYDROCHLORIDE 1 MG: 2 INJECTION, SOLUTION INTRAMUSCULAR; INTRAVENOUS; SUBCUTANEOUS at 11:45

## 2023-05-03 RX ADMIN — Medication 120 MCG: at 07:54

## 2023-05-03 RX ADMIN — ROCURONIUM BROMIDE 10 MG: 10 SOLUTION INTRAVENOUS at 07:34

## 2023-05-03 RX ADMIN — ROCURONIUM BROMIDE 20 MG: 10 SOLUTION INTRAVENOUS at 09:45

## 2023-05-03 RX ADMIN — ONDANSETRON HYDROCHLORIDE 4 MG: 2 INJECTION, SOLUTION INTRAMUSCULAR; INTRAVENOUS at 11:45

## 2023-05-03 RX ADMIN — WATER 2 G: 1 INJECTION INTRAMUSCULAR; INTRAVENOUS; SUBCUTANEOUS at 19:20

## 2023-05-03 RX ADMIN — NEOSTIGMINE METHYLSULFATE 3 MG: 1 INJECTION, SOLUTION INTRAVENOUS at 11:45

## 2023-05-03 RX ADMIN — DEXAMETHASONE SODIUM PHOSPHATE 8 MG: 4 INJECTION, SOLUTION INTRAMUSCULAR; INTRAVENOUS at 07:53

## 2023-05-03 RX ADMIN — PHENYLEPHRINE HYDROCHLORIDE 40 MCG/MIN: 10 INJECTION INTRAVENOUS at 07:40

## 2023-05-03 RX ADMIN — SUCCINYLCHOLINE CHLORIDE 100 MG: 20 INJECTION, SOLUTION INTRAMUSCULAR; INTRAVENOUS at 07:34

## 2023-05-03 RX ADMIN — MIDAZOLAM 2 MG: 1 INJECTION INTRAMUSCULAR; INTRAVENOUS at 07:30

## 2023-05-03 RX ADMIN — WATER 2 G: 1 INJECTION INTRAMUSCULAR; INTRAVENOUS; SUBCUTANEOUS at 07:45

## 2023-05-03 RX ADMIN — FENTANYL CITRATE 100 MCG: 50 INJECTION, SOLUTION INTRAMUSCULAR; INTRAVENOUS at 07:34

## 2023-05-03 RX ADMIN — LISINOPRIL 20 MG: 5 TABLET ORAL at 21:06

## 2023-05-03 RX ADMIN — Medication 80 MCG: at 08:00

## 2023-05-03 RX ADMIN — SODIUM CHLORIDE, POTASSIUM CHLORIDE, SODIUM LACTATE AND CALCIUM CHLORIDE: 600; 310; 30; 20 INJECTION, SOLUTION INTRAVENOUS at 06:58

## 2023-05-03 RX ADMIN — ATORVASTATIN CALCIUM 20 MG: 20 TABLET, FILM COATED ORAL at 21:07

## 2023-05-03 RX ADMIN — LIDOCAINE HYDROCHLORIDE 80 MG: 20 INJECTION, SOLUTION EPIDURAL; INFILTRATION; INTRACAUDAL; PERINEURAL at 07:34

## 2023-05-03 RX ADMIN — ACETAMINOPHEN 650 MG: 325 TABLET ORAL at 06:39

## 2023-05-03 RX ADMIN — GLYCOPYRROLATE 0.6 MG: 0.2 INJECTION INTRAMUSCULAR; INTRAVENOUS at 11:45

## 2023-05-03 RX ADMIN — ROCURONIUM BROMIDE 20 MG: 10 SOLUTION INTRAVENOUS at 08:45

## 2023-05-03 RX ADMIN — PROPOFOL 170 MG: 10 INJECTION, EMULSION INTRAVENOUS at 07:34

## 2023-05-03 RX ADMIN — Medication 400 MG: at 17:22

## 2023-05-03 RX ADMIN — HYDROMORPHONE HYDROCHLORIDE 1 MG: 2 INJECTION, SOLUTION INTRAMUSCULAR; INTRAVENOUS; SUBCUTANEOUS at 12:11

## 2023-05-03 RX ADMIN — Medication 80 MCG: at 07:40

## 2023-05-03 RX ADMIN — SODIUM CHLORIDE, POTASSIUM CHLORIDE, SODIUM LACTATE AND CALCIUM CHLORIDE: 600; 310; 30; 20 INJECTION, SOLUTION INTRAVENOUS at 07:38

## 2023-05-03 RX ADMIN — WATER 2 G: 1 INJECTION INTRAMUSCULAR; INTRAVENOUS; SUBCUTANEOUS at 11:45

## 2023-05-03 RX ADMIN — Medication: at 13:17

## 2023-05-03 RX ADMIN — AMLODIPINE BESYLATE 2.5 MG: 5 TABLET ORAL at 21:07

## 2023-05-03 RX ADMIN — ACETAMINOPHEN 1000 MG: 500 TABLET ORAL at 17:21

## 2023-05-03 RX ADMIN — SODIUM CHLORIDE 125 ML/HR: 9 INJECTION, SOLUTION INTRAVENOUS at 12:53

## 2023-05-04 DIAGNOSIS — M48.062 LUMBAR STENOSIS WITH NEUROGENIC CLAUDICATION: Primary | ICD-10-CM

## 2023-05-04 LAB
ANION GAP SERPL CALC-SCNC: 2 MMOL/L (ref 5–15)
BUN SERPL-MCNC: 22 MG/DL (ref 6–20)
BUN/CREAT SERPL: 18 (ref 12–20)
CALCIUM SERPL-MCNC: 8.3 MG/DL (ref 8.5–10.1)
CHLORIDE SERPL-SCNC: 111 MMOL/L (ref 97–108)
CO2 SERPL-SCNC: 24 MMOL/L (ref 21–32)
CREAT SERPL-MCNC: 1.24 MG/DL (ref 0.7–1.3)
GLUCOSE SERPL-MCNC: 152 MG/DL (ref 65–100)
HGB BLD-MCNC: 12.5 G/DL (ref 12.1–17)
POTASSIUM SERPL-SCNC: 4.5 MMOL/L (ref 3.5–5.1)
SODIUM SERPL-SCNC: 137 MMOL/L (ref 136–145)

## 2023-05-04 PROCEDURE — 85018 HEMOGLOBIN: CPT

## 2023-05-04 PROCEDURE — 94760 N-INVAS EAR/PLS OXIMETRY 1: CPT

## 2023-05-04 PROCEDURE — 74011000250 HC RX REV CODE- 250: Performed by: NEUROLOGICAL SURGERY

## 2023-05-04 PROCEDURE — 74011250637 HC RX REV CODE- 250/637: Performed by: NEUROLOGICAL SURGERY

## 2023-05-04 PROCEDURE — 97165 OT EVAL LOW COMPLEX 30 MIN: CPT

## 2023-05-04 PROCEDURE — 65270000029 HC RM PRIVATE

## 2023-05-04 PROCEDURE — 97116 GAIT TRAINING THERAPY: CPT

## 2023-05-04 PROCEDURE — 97535 SELF CARE MNGMENT TRAINING: CPT

## 2023-05-04 PROCEDURE — 51798 US URINE CAPACITY MEASURE: CPT

## 2023-05-04 PROCEDURE — 97161 PT EVAL LOW COMPLEX 20 MIN: CPT

## 2023-05-04 PROCEDURE — 9990 CHARGE CONVERSION

## 2023-05-04 PROCEDURE — 77010033678 HC OXYGEN DAILY

## 2023-05-04 PROCEDURE — 74011250636 HC RX REV CODE- 250/636: Performed by: NURSE PRACTITIONER

## 2023-05-04 PROCEDURE — 97530 THERAPEUTIC ACTIVITIES: CPT

## 2023-05-04 PROCEDURE — 99024 POSTOP FOLLOW-UP VISIT: CPT | Performed by: NURSE PRACTITIONER

## 2023-05-04 PROCEDURE — 80048 BASIC METABOLIC PNL TOTAL CA: CPT

## 2023-05-04 PROCEDURE — 74011250636 HC RX REV CODE- 250/636: Performed by: NEUROLOGICAL SURGERY

## 2023-05-04 PROCEDURE — 36415 COLL VENOUS BLD VENIPUNCTURE: CPT

## 2023-05-04 RX ORDER — CALCIUM CARBONATE 200(500)MG
200 TABLET,CHEWABLE ORAL
Status: DISCONTINUED | OUTPATIENT
Start: 2023-05-04 | End: 2023-05-06 | Stop reason: HOSPADM

## 2023-05-04 RX ORDER — OXYCODONE HCL 10 MG/1
10 TABLET, FILM COATED, EXTENDED RELEASE ORAL EVERY 12 HOURS
Status: DISCONTINUED | OUTPATIENT
Start: 2023-05-04 | End: 2023-05-06 | Stop reason: HOSPADM

## 2023-05-04 RX ORDER — TAMSULOSIN HYDROCHLORIDE 0.4 MG/1
0.4 CAPSULE ORAL DAILY
Status: DISCONTINUED | OUTPATIENT
Start: 2023-05-04 | End: 2023-05-06 | Stop reason: HOSPADM

## 2023-05-04 RX ADMIN — CYANOCOBALAMIN TAB 500 MCG 1000 MCG: 500 TAB at 09:54

## 2023-05-04 RX ADMIN — ACETAMINOPHEN 1000 MG: 500 TABLET ORAL at 00:48

## 2023-05-04 RX ADMIN — OXYCODONE HYDROCHLORIDE 10 MG: 10 TABLET, FILM COATED, EXTENDED RELEASE ORAL at 20:56

## 2023-05-04 RX ADMIN — ACETAMINOPHEN 1000 MG: 500 TABLET ORAL at 12:07

## 2023-05-04 RX ADMIN — ALLOPURINOL 200 MG: 100 TABLET ORAL at 09:54

## 2023-05-04 RX ADMIN — SODIUM CHLORIDE 500 ML: 9 INJECTION, SOLUTION INTRAVENOUS at 10:01

## 2023-05-04 RX ADMIN — CALCIUM CARBONATE (ANTACID) CHEW TAB 500 MG 200 MG: 500 CHEW TAB at 18:14

## 2023-05-04 RX ADMIN — Medication 3000 UNITS: at 09:52

## 2023-05-04 RX ADMIN — TRAMADOL HYDROCHLORIDE 50 MG: 50 TABLET, COATED ORAL at 16:23

## 2023-05-04 RX ADMIN — DOCUSATE SODIUM 100 MG: 100 CAPSULE, LIQUID FILLED ORAL at 09:54

## 2023-05-04 RX ADMIN — ACETAMINOPHEN 1000 MG: 500 TABLET ORAL at 18:14

## 2023-05-04 RX ADMIN — ATORVASTATIN CALCIUM 20 MG: 20 TABLET, FILM COATED ORAL at 21:55

## 2023-05-04 RX ADMIN — ACETAMINOPHEN 1000 MG: 500 TABLET ORAL at 06:37

## 2023-05-04 RX ADMIN — TAMSULOSIN HYDROCHLORIDE 0.4 MG: 0.4 CAPSULE ORAL at 09:54

## 2023-05-04 RX ADMIN — WATER 2 G: 1 INJECTION INTRAMUSCULAR; INTRAVENOUS; SUBCUTANEOUS at 05:06

## 2023-05-04 RX ADMIN — DOCUSATE SODIUM 100 MG: 100 CAPSULE, LIQUID FILLED ORAL at 18:14

## 2023-05-04 RX ADMIN — LEVOTHYROXINE SODIUM 200 MCG: 0.2 TABLET ORAL at 06:37

## 2023-05-04 RX ADMIN — OXYCODONE HYDROCHLORIDE 5 MG: 5 TABLET ORAL at 13:11

## 2023-05-04 RX ADMIN — SODIUM CHLORIDE, PRESERVATIVE FREE 10 ML: 5 INJECTION INTRAVENOUS at 21:57

## 2023-05-04 RX ADMIN — OXYCODONE 10 MG: 5 TABLET ORAL at 06:37

## 2023-05-04 RX ADMIN — Medication 400 MG: at 18:14

## 2023-05-05 VITALS
DIASTOLIC BLOOD PRESSURE: 82 MMHG | TEMPERATURE: 99 F | WEIGHT: 187 LBS | BODY MASS INDEX: 28.34 KG/M2 | HEART RATE: 81 BPM | SYSTOLIC BLOOD PRESSURE: 145 MMHG | RESPIRATION RATE: 16 BRPM | HEIGHT: 68 IN | OXYGEN SATURATION: 93 %

## 2023-05-05 LAB — HGB BLD-MCNC: 11.6 G/DL (ref 12.1–17)

## 2023-05-05 PROCEDURE — 97116 GAIT TRAINING THERAPY: CPT

## 2023-05-05 PROCEDURE — 74011250637 HC RX REV CODE- 250/637: Performed by: NURSE PRACTITIONER

## 2023-05-05 PROCEDURE — 85018 HEMOGLOBIN: CPT

## 2023-05-05 PROCEDURE — 36415 COLL VENOUS BLD VENIPUNCTURE: CPT

## 2023-05-05 PROCEDURE — 9990 CHARGE CONVERSION

## 2023-05-05 PROCEDURE — 65270000029 HC RM PRIVATE

## 2023-05-05 PROCEDURE — 74011000250 HC RX REV CODE- 250: Performed by: NEUROLOGICAL SURGERY

## 2023-05-05 PROCEDURE — 97535 SELF CARE MNGMENT TRAINING: CPT

## 2023-05-05 PROCEDURE — 74011250637 HC RX REV CODE- 250/637: Performed by: NEUROLOGICAL SURGERY

## 2023-05-05 PROCEDURE — 99024 POSTOP FOLLOW-UP VISIT: CPT | Performed by: NURSE PRACTITIONER

## 2023-05-05 RX ORDER — POLYETHYLENE GLYCOL 3350 17 G/17G
17 POWDER, FOR SOLUTION ORAL DAILY
Status: DISCONTINUED | OUTPATIENT
Start: 2023-05-05 | End: 2023-05-06 | Stop reason: HOSPADM

## 2023-05-05 RX ORDER — FACIAL-BODY WIPES
10 EACH TOPICAL DAILY PRN
Status: DISCONTINUED | OUTPATIENT
Start: 2023-05-05 | End: 2023-05-06 | Stop reason: HOSPADM

## 2023-05-05 RX ORDER — GABAPENTIN 300 MG/1
300 CAPSULE ORAL 3 TIMES DAILY PRN
Status: DISCONTINUED | OUTPATIENT
Start: 2023-05-06 | End: 2023-05-06 | Stop reason: HOSPADM

## 2023-05-05 RX ORDER — SODIUM CHLORIDE 9 MG/ML
INJECTION, SOLUTION INTRAVENOUS PRN
Status: DISCONTINUED | OUTPATIENT
Start: 2023-05-06 | End: 2023-05-06 | Stop reason: HOSPADM

## 2023-05-05 RX ORDER — LISINOPRIL 20 MG/1
20 TABLET ORAL
Status: DISCONTINUED | OUTPATIENT
Start: 2023-05-06 | End: 2023-05-06 | Stop reason: HOSPADM

## 2023-05-05 RX ORDER — SODIUM CHLORIDE 0.9 % (FLUSH) 0.9 %
5-40 SYRINGE (ML) INJECTION EVERY 8 HOURS
Status: DISCONTINUED | OUTPATIENT
Start: 2023-05-06 | End: 2023-05-06 | Stop reason: HOSPADM

## 2023-05-05 RX ORDER — SODIUM CHLORIDE 9 MG/ML
75 INJECTION, SOLUTION INTRAVENOUS CONTINUOUS
Status: DISCONTINUED | OUTPATIENT
Start: 2023-05-06 | End: 2023-05-06 | Stop reason: HOSPADM

## 2023-05-05 RX ORDER — LANOLIN ALCOHOL/MO/W.PET/CERES
400 CREAM (GRAM) TOPICAL EVERY EVENING
Status: DISCONTINUED | OUTPATIENT
Start: 2023-05-06 | End: 2023-05-06 | Stop reason: HOSPADM

## 2023-05-05 RX ORDER — ACETAMINOPHEN 325 MG/1
650 TABLET ORAL
Qty: 10 TABLET | Refills: 0 | Status: SHIPPED | OUTPATIENT
Start: 2023-05-05

## 2023-05-05 RX ORDER — CHOLECALCIFEROL (VITAMIN D3) 125 MCG
1000 CAPSULE ORAL DAILY
Status: DISCONTINUED | OUTPATIENT
Start: 2023-05-06 | End: 2023-05-06 | Stop reason: HOSPADM

## 2023-05-05 RX ORDER — OXYCODONE HYDROCHLORIDE 5 MG/1
5-10 TABLET ORAL
Qty: 40 TABLET | Refills: 0 | Status: SHIPPED | OUTPATIENT
Start: 2023-05-05 | End: 2023-05-10

## 2023-05-05 RX ORDER — TAMSULOSIN HYDROCHLORIDE 0.4 MG/1
0.4 CAPSULE ORAL DAILY
Status: DISCONTINUED | OUTPATIENT
Start: 2023-05-06 | End: 2023-05-06 | Stop reason: HOSPADM

## 2023-05-05 RX ORDER — TRAMADOL HYDROCHLORIDE 50 MG/1
50 TABLET ORAL EVERY 6 HOURS PRN
Status: DISCONTINUED | OUTPATIENT
Start: 2023-05-06 | End: 2023-05-06 | Stop reason: HOSPADM

## 2023-05-05 RX ORDER — DIPHENHYDRAMINE HCL 25 MG
25 CAPSULE ORAL EVERY 6 HOURS PRN
Status: DISCONTINUED | OUTPATIENT
Start: 2023-05-06 | End: 2023-05-06 | Stop reason: HOSPADM

## 2023-05-05 RX ORDER — OXYCODONE HCL 10 MG/1
10 TABLET, FILM COATED, EXTENDED RELEASE ORAL EVERY 12 HOURS SCHEDULED
Status: DISCONTINUED | OUTPATIENT
Start: 2023-05-06 | End: 2023-05-05

## 2023-05-05 RX ORDER — ASPIRIN 81 MG/1
81 TABLET ORAL DAILY
Qty: 30 TABLET | Refills: 0 | Status: SHIPPED
Start: 2023-05-09

## 2023-05-05 RX ORDER — DOCUSATE SODIUM 100 MG/1
100 CAPSULE, LIQUID FILLED ORAL 2 TIMES DAILY
Status: DISCONTINUED | OUTPATIENT
Start: 2023-05-06 | End: 2023-05-06 | Stop reason: HOSPADM

## 2023-05-05 RX ORDER — OXYCODONE HYDROCHLORIDE 5 MG/1
5 TABLET ORAL
Status: DISCONTINUED | OUTPATIENT
Start: 2023-05-05 | End: 2023-05-06 | Stop reason: HOSPADM

## 2023-05-05 RX ORDER — VITAMIN B COMPLEX
3000 TABLET ORAL DAILY
Status: DISCONTINUED | OUTPATIENT
Start: 2023-05-06 | End: 2023-05-06 | Stop reason: HOSPADM

## 2023-05-05 RX ORDER — POLYETHYLENE GLYCOL 3350 17 G/17G
17 POWDER, FOR SOLUTION ORAL DAILY
Status: DISCONTINUED | OUTPATIENT
Start: 2023-05-06 | End: 2023-05-06 | Stop reason: HOSPADM

## 2023-05-05 RX ORDER — CALCIUM CARBONATE 200(500)MG
500 TABLET,CHEWABLE ORAL
Status: DISCONTINUED | OUTPATIENT
Start: 2023-05-06 | End: 2023-05-06 | Stop reason: HOSPADM

## 2023-05-05 RX ORDER — ATORVASTATIN CALCIUM 20 MG/1
20 TABLET, FILM COATED ORAL NIGHTLY
Status: DISCONTINUED | OUTPATIENT
Start: 2023-05-06 | End: 2023-05-06 | Stop reason: HOSPADM

## 2023-05-05 RX ORDER — ALLOPURINOL 100 MG/1
200 TABLET ORAL DAILY
Status: DISCONTINUED | OUTPATIENT
Start: 2023-05-06 | End: 2023-05-06 | Stop reason: HOSPADM

## 2023-05-05 RX ORDER — NALOXONE HYDROCHLORIDE 0.4 MG/ML
0.4 INJECTION, SOLUTION INTRAMUSCULAR; INTRAVENOUS; SUBCUTANEOUS PRN
Status: DISCONTINUED | OUTPATIENT
Start: 2023-05-06 | End: 2023-05-06 | Stop reason: HOSPADM

## 2023-05-05 RX ORDER — BISACODYL 10 MG
10 SUPPOSITORY, RECTAL RECTAL DAILY PRN
Status: DISCONTINUED | OUTPATIENT
Start: 2023-05-06 | End: 2023-05-06 | Stop reason: HOSPADM

## 2023-05-05 RX ORDER — SODIUM CHLORIDE 0.9 % (FLUSH) 0.9 %
5-40 SYRINGE (ML) INJECTION PRN
Status: DISCONTINUED | OUTPATIENT
Start: 2023-05-06 | End: 2023-05-06 | Stop reason: HOSPADM

## 2023-05-05 RX ORDER — SENNA AND DOCUSATE SODIUM 50; 8.6 MG/1; MG/1
1 TABLET, FILM COATED ORAL 2 TIMES DAILY PRN
Status: DISCONTINUED | OUTPATIENT
Start: 2023-05-06 | End: 2023-05-06 | Stop reason: HOSPADM

## 2023-05-05 RX ORDER — LEVOTHYROXINE SODIUM 0.2 MG/1
200 TABLET ORAL
Status: DISCONTINUED | OUTPATIENT
Start: 2023-05-06 | End: 2023-05-06 | Stop reason: HOSPADM

## 2023-05-05 RX ORDER — AMLODIPINE BESYLATE 5 MG/1
2.5 TABLET ORAL NIGHTLY
Status: DISCONTINUED | OUTPATIENT
Start: 2023-05-06 | End: 2023-05-06 | Stop reason: HOSPADM

## 2023-05-05 RX ORDER — ACETAMINOPHEN 500 MG
1000 TABLET ORAL EVERY 6 HOURS
Status: DISCONTINUED | OUTPATIENT
Start: 2023-05-06 | End: 2023-05-06 | Stop reason: HOSPADM

## 2023-05-05 RX ORDER — OXYCODONE HYDROCHLORIDE 5 MG/1
10 TABLET ORAL
Status: DISCONTINUED | OUTPATIENT
Start: 2023-05-06 | End: 2023-05-06 | Stop reason: HOSPADM

## 2023-05-05 RX ADMIN — Medication 3000 UNITS: at 09:25

## 2023-05-05 RX ADMIN — LEVOTHYROXINE SODIUM 200 MCG: 0.2 TABLET ORAL at 05:48

## 2023-05-05 RX ADMIN — DOCUSATE SODIUM 100 MG: 100 CAPSULE, LIQUID FILLED ORAL at 09:25

## 2023-05-05 RX ADMIN — CYANOCOBALAMIN TAB 500 MCG 1000 MCG: 500 TAB at 09:25

## 2023-05-05 RX ADMIN — OXYCODONE HYDROCHLORIDE 10 MG: 10 TABLET, FILM COATED, EXTENDED RELEASE ORAL at 21:44

## 2023-05-05 RX ADMIN — ACETAMINOPHEN 1000 MG: 500 TABLET ORAL at 17:17

## 2023-05-05 RX ADMIN — ATORVASTATIN CALCIUM 20 MG: 20 TABLET, FILM COATED ORAL at 21:44

## 2023-05-05 RX ADMIN — LISINOPRIL 20 MG: 5 TABLET ORAL at 21:44

## 2023-05-05 RX ADMIN — ACETAMINOPHEN 1000 MG: 500 TABLET ORAL at 12:18

## 2023-05-05 RX ADMIN — CALCIUM CARBONATE (ANTACID) CHEW TAB 500 MG 200 MG: 500 CHEW TAB at 17:17

## 2023-05-05 RX ADMIN — Medication 400 MG: at 17:17

## 2023-05-05 RX ADMIN — DOCUSATE SODIUM 100 MG: 100 CAPSULE, LIQUID FILLED ORAL at 17:17

## 2023-05-05 RX ADMIN — SODIUM CHLORIDE, PRESERVATIVE FREE 10 ML: 5 INJECTION INTRAVENOUS at 05:48

## 2023-05-05 RX ADMIN — AMLODIPINE BESYLATE 2.5 MG: 5 TABLET ORAL at 21:44

## 2023-05-05 RX ADMIN — OXYCODONE 10 MG: 5 TABLET ORAL at 05:46

## 2023-05-05 RX ADMIN — POLYETHYLENE GLYCOL 3350 17 G: 17 POWDER, FOR SOLUTION ORAL at 17:17

## 2023-05-05 RX ADMIN — ALLOPURINOL 200 MG: 100 TABLET ORAL at 09:25

## 2023-05-05 RX ADMIN — TAMSULOSIN HYDROCHLORIDE 0.4 MG: 0.4 CAPSULE ORAL at 09:24

## 2023-05-05 RX ADMIN — SODIUM CHLORIDE, PRESERVATIVE FREE 10 ML: 5 INJECTION INTRAVENOUS at 21:44

## 2023-05-05 RX ADMIN — CALCIUM CARBONATE (ANTACID) CHEW TAB 500 MG 200 MG: 500 CHEW TAB at 12:18

## 2023-05-05 RX ADMIN — OXYCODONE HYDROCHLORIDE 10 MG: 10 TABLET, FILM COATED, EXTENDED RELEASE ORAL at 09:25

## 2023-05-05 RX ADMIN — ACETAMINOPHEN 1000 MG: 500 TABLET ORAL at 02:10

## 2023-05-05 RX ADMIN — OXYCODONE 10 MG: 5 TABLET ORAL at 02:10

## 2023-05-06 ENCOUNTER — HOME HEALTH ADMISSION (OUTPATIENT)
Dept: HOME HEALTH SERVICES | Facility: HOME HEALTH | Age: 78
End: 2023-05-06
Payer: MEDICARE

## 2023-05-06 VITALS
BODY MASS INDEX: 28.34 KG/M2 | DIASTOLIC BLOOD PRESSURE: 72 MMHG | SYSTOLIC BLOOD PRESSURE: 125 MMHG | TEMPERATURE: 98 F | OXYGEN SATURATION: 95 % | WEIGHT: 187 LBS | RESPIRATION RATE: 16 BRPM | HEIGHT: 68 IN | HEART RATE: 92 BPM

## 2023-05-06 PROCEDURE — 2580000003 HC RX 258: Performed by: NEUROLOGICAL SURGERY

## 2023-05-06 PROCEDURE — 97530 THERAPEUTIC ACTIVITIES: CPT

## 2023-05-06 PROCEDURE — 97116 GAIT TRAINING THERAPY: CPT

## 2023-05-06 PROCEDURE — 6370000000 HC RX 637 (ALT 250 FOR IP): Performed by: NEUROLOGICAL SURGERY

## 2023-05-06 RX ORDER — ACETAMINOPHEN 325 MG/1
TABLET ORAL
Status: DISPENSED
Start: 2023-05-06 | End: 2023-05-06

## 2023-05-06 RX ORDER — CYCLOBENZAPRINE HCL 5 MG
5 TABLET ORAL 3 TIMES DAILY PRN
Qty: 30 TABLET | Refills: 0 | Status: SHIPPED | OUTPATIENT
Start: 2023-05-06 | End: 2023-05-16

## 2023-05-06 RX ORDER — OXYCODONE HYDROCHLORIDE 5 MG/1
5 TABLET ORAL EVERY 4 HOURS PRN
Qty: 30 TABLET | Refills: 0 | Status: SHIPPED | OUTPATIENT
Start: 2023-05-06 | End: 2023-05-13

## 2023-05-06 RX ADMIN — DOCUSATE SODIUM 100 MG: 100 CAPSULE, LIQUID FILLED ORAL at 08:29

## 2023-05-06 RX ADMIN — TAMSULOSIN HYDROCHLORIDE 0.4 MG: 0.4 CAPSULE ORAL at 08:29

## 2023-05-06 RX ADMIN — Medication 3000 UNITS: at 08:30

## 2023-05-06 RX ADMIN — ACETAMINOPHEN 1000 MG: 500 TABLET ORAL at 00:30

## 2023-05-06 RX ADMIN — ALLOPURINOL 200 MG: 100 TABLET ORAL at 08:30

## 2023-05-06 RX ADMIN — OXYCODONE 10 MG: 5 TABLET ORAL at 14:52

## 2023-05-06 RX ADMIN — POLYETHYLENE GLYCOL 3350 17 G: 17 POWDER, FOR SOLUTION ORAL at 08:29

## 2023-05-06 RX ADMIN — CALCIUM CARBONATE (ANTACID) CHEW TAB 500 MG 500 MG: 500 CHEW TAB at 11:44

## 2023-05-06 RX ADMIN — OXYCODONE 10 MG: 5 TABLET ORAL at 11:44

## 2023-05-06 RX ADMIN — ACETAMINOPHEN 1000 MG: 500 TABLET ORAL at 07:17

## 2023-05-06 RX ADMIN — CYANOCOBALAMIN TAB 500 MCG 1000 MCG: 500 TAB at 08:29

## 2023-05-06 RX ADMIN — CALCIUM CARBONATE (ANTACID) CHEW TAB 500 MG 500 MG: 500 CHEW TAB at 08:30

## 2023-05-06 RX ADMIN — SODIUM CHLORIDE, PRESERVATIVE FREE 10 ML: 5 INJECTION INTRAVENOUS at 07:17

## 2023-05-06 RX ADMIN — SODIUM CHLORIDE, PRESERVATIVE FREE 5 ML: 5 INJECTION INTRAVENOUS at 00:30

## 2023-05-06 RX ADMIN — ACETAMINOPHEN 1000 MG: 500 TABLET ORAL at 11:44

## 2023-05-06 ASSESSMENT — PAIN DESCRIPTION - PAIN TYPE
TYPE: ACUTE PAIN

## 2023-05-06 ASSESSMENT — PAIN DESCRIPTION - FREQUENCY
FREQUENCY: INTERMITTENT

## 2023-05-06 ASSESSMENT — PAIN DESCRIPTION - LOCATION
LOCATION: BACK

## 2023-05-06 ASSESSMENT — PAIN DESCRIPTION - DESCRIPTORS: DESCRIPTORS: ACHING

## 2023-05-06 ASSESSMENT — PAIN SCALES - GENERAL
PAINLEVEL_OUTOF10: 6
PAINLEVEL_OUTOF10: 3
PAINLEVEL_OUTOF10: 8
PAINLEVEL_OUTOF10: 3
PAINLEVEL_OUTOF10: 0

## 2023-05-06 ASSESSMENT — PAIN - FUNCTIONAL ASSESSMENT
PAIN_FUNCTIONAL_ASSESSMENT: PREVENTS OR INTERFERES SOME ACTIVE ACTIVITIES AND ADLS
PAIN_FUNCTIONAL_ASSESSMENT: ACTIVITIES ARE NOT PREVENTED

## 2023-05-06 ASSESSMENT — PAIN DESCRIPTION - ORIENTATION: ORIENTATION: POSTERIOR

## 2023-05-06 NOTE — PLAN OF CARE
Problem: Musculoskeletal - Adult  Goal: Return mobility to safest level of function  Outcome: Progressing     Problem: Musculoskeletal - Adult  Goal: Maintain proper alignment of affected body part  Outcome: Progressing     Problem: Musculoskeletal - Adult  Goal: Return ADL status to a safe level of function  Outcome: Progressing     Problem: Pain  Goal: Verbalizes/displays adequate comfort level or baseline comfort level  Outcome: Progressing     Problem: Safety - Adult  Goal: Free from fall injury  Outcome: Progressing

## 2023-05-06 NOTE — PLAN OF CARE
Problem: Physical Therapy - Adult  Goal: By Discharge: Performs mobility at highest level of function for planned discharge setting. See evaluation for individualized goals. Description: FUNCTIONAL STATUS PRIOR TO ADMISSION: Patient was independent and active without use of DME until the past few weeks when the back and leg pain worsened and he required intermittent use of SPC or RW. HOME SUPPORT PRIOR TO ADMISSION: The patient lived with his wife but did not require assist.    Physical Therapy Goals  Initiated 5/4/2023  1. Patient will move from supine to sit and sit to supine , scoot up and down, and roll side to side in bed with modified independence within 7 day(s). 2.  Patient will transfer from bed to chair and chair to bed with modified independence using the least restrictive device within 7 day(s). 3.  Patient will perform sit to stand with modified independence within 7 day(s). 4.  Patient will ambulate with modified independence for 300 feet with the least restrictive device within 7 day(s). Outcome: Progressing       PHYSICAL THERAPY TREATMENT    Patient: Bashir Esparza (69 y.o. male)  Date: 5/6/2023  Diagnosis: History of lumbar laminectomy for spinal cord decompression [Z98.890] <principal problem not specified>      Precautions:  ,  ,  ,  ,  ,  ,  ,  back      ASSESSMENT:    Patient continues to benefit from skilled PT services and is progressing towards goals. Pt agreeable to therapy. Pt was able to perform bed mobility utilizing log roll technique. Pt required assistance to kristie TLSO but able to verbalize technique. Pt was able to ambulate with rolling walker with no LOB. Pt is hopeful to discharge home today and is expressing no concerns. Pt is cleared from PT standpoint          PLAN:  Patient continues to benefit from skilled intervention to address the above impairments. Continue treatment per established plan of care.     Recommendation for discharge: (in order for the

## 2023-05-06 NOTE — PLAN OF CARE
Problem: Physical Therapy - Adult  Goal: By Discharge: Performs mobility at highest level of function for planned discharge setting. See evaluation for individualized goals. Description: FUNCTIONAL STATUS PRIOR TO ADMISSION: Patient was independent and active without use of DME until the past few weeks when the back and leg pain worsened and he required intermittent use of SPC or RW. HOME SUPPORT PRIOR TO ADMISSION: The patient lived with his wife but did not require assist.    Physical Therapy Goals  Initiated 5/4/2023  1. Patient will move from supine to sit and sit to supine , scoot up and down, and roll side to side in bed with modified independence within 7 day(s). 2.  Patient will transfer from bed to chair and chair to bed with modified independence using the least restrictive device within 7 day(s). 3.  Patient will perform sit to stand with modified independence within 7 day(s). 4.  Patient will ambulate with modified independence for 300 feet with the least restrictive device within 7 day(s). 5/6/2023 1050 by Olga Pelayo PTA  Outcome: Progressing       PHYSICAL THERAPY TREATMENT    Patient: Sofy Birmingham (51 y.o. male)  Date: 5/6/2023  Diagnosis: History of lumbar laminectomy for spinal cord decompression [Z98.890] <principal problem not specified>      Precautions:  ,  ,  ,  ,  ,  ,  ,  back      ASSESSMENT:    Patient continues to benefit from skilled PT services and is progressing towards goals. Pt reports feeling stiff from not moving. Pt agreeable to steps. Pt was able to complete 13 steps. Pt was educated on sidestep technique which was easier for descending. Pt then completed 300 feet with rolling walker. Pt is cleared from PT standpoint. PLAN:  Patient continues to benefit from skilled intervention to address the above impairments. Continue treatment per established plan of care.     Recommendation for discharge: (in order for the patient to meet his/her long term

## 2023-05-06 NOTE — DISCHARGE INSTRUCTIONS
warm water bottle, a heating pad set on low, or a warm cloth on your back. Do not put heat right over the incision. Do not go to sleep with a heating pad on your skin. Follow-up care is a key part of your treatment and safety. Be sure to make and go to all appointments, and call your doctor if you are having problems. It's also a good idea to know your test results and keep a list of the medicines you take. When should you call for help? Call 911 anytime you think you may need emergency care. For example, call if:    You passed out (lost consciousness). You have sudden chest pain and shortness of breath, or you cough up blood. You are unable to move a leg at all. Call your doctor now or seek immediate medical care if:    You have pain that does not get better after you take pain pills. You have new or worse symptoms in your legs or buttocks. Symptoms may include:  Numbness or tingling. Weakness. Pain. You lose bladder or bowel control. You have loose stitches, or your incision comes open. You have blood or fluid draining from the incision. You have signs of infection, such as: Increased pain, swelling, warmth, or redness. Pus draining from the incision. A fever. Watch closely for any changes in your health, and be sure to contact your doctor if:    You do not have a bowel movement after taking a laxative. You are not getting better as expected. Where can you learn more? Go to http://www.woods.com/ and enter N972 to learn more about \"Lumbar Spinal Fusion: What to Expect at Home. \"  Current as of: November 9, 2022               Content Version: 13.6  © 3058-8548 [employer]. Care instructions adapted under license by [address] If you have questions about a medical condition or this instruction, always ask your healthcare professional. [place] [address] any warranty or liability for your use of this information.

## 2023-05-06 NOTE — PLAN OF CARE
Problem: Physical Therapy - Adult  Goal: By Discharge: Performs mobility at highest level of function for planned discharge setting. See evaluation for individualized goals. Description: FUNCTIONAL STATUS PRIOR TO ADMISSION: Patient was independent and active without use of DME until the past few weeks when the back and leg pain worsened and he required intermittent use of SPC or RW. HOME SUPPORT PRIOR TO ADMISSION: The patient lived with his wife but did not require assist.    Physical Therapy Goals  Initiated 5/4/2023  1. Patient will move from supine to sit and sit to supine , scoot up and down, and roll side to side in bed with modified independence within 7 day(s). 2.  Patient will transfer from bed to chair and chair to bed with modified independence using the least restrictive device within 7 day(s). 3.  Patient will perform sit to stand with modified independence within 7 day(s). 4.  Patient will ambulate with modified independence for 300 feet with the least restrictive device within 7 day(s).    5/6/2023 1050 by Summer Steen PTA  Outcome: Progressing

## 2023-05-06 NOTE — CARE COORDINATION
Transition of Care Plan:    RUR: %  Prior Level of Functioning: independent  Disposition:home with home health  DME needed:has it at home  Transportation at discharge:  IM/IMM Medicare/ letter given:yes  Caregiver Contact: wife   Discharge Caregiver contacted prior to discharge? Wife Senait Dhillon  [phone]  Care Conference needed?no  Barriers to discharge: none    Patient is being discharged today. His wife will transport home. As requested CM called patient's wife as she was concerned about his ability get in his bed as it is 33 inches high . She said she will arrange for a hospital bed if needed. There is a bedroom upstairs with a lower bed if patient can climb steps. CM  talked with patient's nurse and she was able to have PT  work with patient again to climb stairs. He did fine and was cleared to go up the steps . CM informed wife of this and she was satisfied with that he can go upstairs. [facility] nurse called patient's wife-- Wife is satisfied with this choice.      [address] [place] [address] Santosh RIVAS, BSW

## 2023-05-07 ENCOUNTER — HOME CARE VISIT (OUTPATIENT)
Facility: HOME HEALTH | Age: 78
End: 2023-05-07
Payer: MEDICARE

## 2023-05-07 PROCEDURE — G0151 HHCP-SERV OF PT,EA 15 MIN: HCPCS

## 2023-05-08 ENCOUNTER — HOME CARE VISIT (OUTPATIENT)
Facility: HOME HEALTH | Age: 78
End: 2023-05-08
Payer: MEDICARE

## 2023-05-08 VITALS
TEMPERATURE: 97.8 F | OXYGEN SATURATION: 97 % | DIASTOLIC BLOOD PRESSURE: 70 MMHG | SYSTOLIC BLOOD PRESSURE: 124 MMHG | RESPIRATION RATE: 16 BRPM | HEART RATE: 72 BPM

## 2023-05-08 VITALS
HEART RATE: 83 BPM | DIASTOLIC BLOOD PRESSURE: 72 MMHG | OXYGEN SATURATION: 97 % | RESPIRATION RATE: 17 BRPM | SYSTOLIC BLOOD PRESSURE: 111 MMHG | TEMPERATURE: 98.6 F

## 2023-05-08 PROCEDURE — G0152 HHCP-SERV OF OT,EA 15 MIN: HCPCS

## 2023-05-08 ASSESSMENT — ENCOUNTER SYMPTOMS
DYSPNEA ACTIVITY LEVEL: AFTER AMBULATING MORE THAN 20 FT
PAIN LOCATION - PAIN QUALITY: SHARP
PAIN LOCATION - PAIN QUALITY: ACHING, STIFF

## 2023-05-08 NOTE — HOME HEALTH
Skilled reason for admission/summary of clinical condition:  Mr. Chip Estes admitted to home care for skilled [facility] OT needed for assessment and intervention of home safety and implementation of home rehab plan along with ability to perform ADLs and instruction on adaptive techniques. Diagnosis: recent surgery/hospitalization for L1-L2 laminectomy with revision fusion  Subjective: \"I need to be able to dress and bathe myself. \"   Caregiver: Wife. Caregiver assists with: all aspects of care at present. Caregiver unable to assist with: NA. Caregiver is available: when patient request. Caregiver was present at this visit and did participate with clinician. Medications reconciled and all medications are available in the home this visit. Medications are effective at this time per patient report. Home health supplies by type and quantity ordered/delivered this visit include: NA   Patient/caregiver instructed on plan of care and are agreeable to plan of care at this time. Patient at risk for falls: Yes   Discharge planning discussed with patient and caregiver. Discharge planning as follows: discharge when goals are met or max potential achieved. Patient/caregiver did verbalize agreement with discharge planning. Clinical Assessment (What this means for the patient overall and need for ongoing skilled care): Patient lives with his wife in two level home with bedroom and full bathroom on first level of the home. He is currently using a RW for mobility requiring max A for bathing and LB dressing tasks, mod A for toileting tasks, min A for toilet transfers, mod A for shower transfers with recommendation to obtain shower chair as build in seat not appropriate height in order to maintain back precautions. The Barthel index assessment administered and score at 50/100 indicating partially dependent with self care tasks.  MACH-10 assessment score was 6/10 indicating patient is at risk for falls as instruction was

## 2023-05-08 NOTE — HOME HEALTH
Skilled reason for admission/summary of clinical condition: 78year old male admitted to home care for PT and OT due to recent surgery/hospitalization for L1-L2 laminectomy with REVISION FUSION   Diagnosis: HTN, elevated cholesterol  Subjective: patient reports feeling a little better but is having a hard time getting in/out of bed  Caregiver: spouse. Caregiver assists with: Medications, Meals, ADL, Wound Care and Transportation Caregiver unable to assist with: needs instruction in safe transfers and gait; needs training in dressing changes. Caregiver is available Regularly Caregiver is  present at this visit and did participate with clinician. Medications reconciled and patient did not  narcotic or muslce relaxer from pharmacy and states he does not want these medications-instructed that if he does decide to use these medications to notify clinicians so they can update his medication list. Patient also reports taking only 1 tablet asprin 81, 1 x per day not 2 x per day per DC instructions and notified provider. The following education was provided regarding medications: medication interactions and look alike medications:     High alert medication teaching on asprin 81, antiplatelet therapy education;purpose, dose, and frequency, food/drug interactions, risks of co-administration with other medications such as ASA, NSAID, and herbals, bleeding prevention strategies such as the use of a soft toothbrush, gentle flossing, use of electric razor, on the potential for increased bleeding from falls and lacerations, signs and symptoms of abnormal bleeding such as unexplained bruising, dizziness/lightheadedness, red or tarry looking stool, blood in urine, blood in vomit and to call home care agency and/or physician for any problems or concerns     Patient/CG able to demonstrate knowledge through teach back with 75 percent accuracy. Medications are somewhat effective at this time.       notified of any

## 2023-05-09 ENCOUNTER — HOME CARE VISIT (OUTPATIENT)
Facility: HOME HEALTH | Age: 78
End: 2023-05-09
Payer: MEDICARE

## 2023-05-09 VITALS
RESPIRATION RATE: 17 BRPM | OXYGEN SATURATION: 96 % | SYSTOLIC BLOOD PRESSURE: 114 MMHG | DIASTOLIC BLOOD PRESSURE: 70 MMHG | TEMPERATURE: 98 F | HEART RATE: 76 BPM

## 2023-05-09 PROCEDURE — G0151 HHCP-SERV OF PT,EA 15 MIN: HCPCS

## 2023-05-09 ASSESSMENT — ENCOUNTER SYMPTOMS: PAIN LOCATION - PAIN QUALITY: ACHE

## 2023-05-10 ENCOUNTER — HOME CARE VISIT (OUTPATIENT)
Facility: HOME HEALTH | Age: 78
End: 2023-05-10
Payer: MEDICARE

## 2023-05-10 VITALS
HEART RATE: 80 BPM | OXYGEN SATURATION: 98 % | SYSTOLIC BLOOD PRESSURE: 132 MMHG | DIASTOLIC BLOOD PRESSURE: 80 MMHG | TEMPERATURE: 98.7 F

## 2023-05-10 PROCEDURE — G0152 HHCP-SERV OF OT,EA 15 MIN: HCPCS

## 2023-05-10 ASSESSMENT — ENCOUNTER SYMPTOMS: PAIN LOCATION - PAIN QUALITY: ACHING

## 2023-05-10 NOTE — HOME HEALTH
Subjective: \" I am doing ok but have had some pain around the incision area\"  Falls since last visit : no falls since last visit  Caregiver involvement changes: none  Home health supplies by type and quantity ordered/delivered this visit include: NA    Clinician asked if patient has had any physician contact since last home care visit and patient states: no  Clinician asked if patient has any new or changed medications and patient states:  no  If Yes, were medications reconciled? yes  Was the certifying physician notified of changes in medications? NA    Clinical assessment (what this visit means for the patient overall and need for ongoing skilled care) and progress or lack of progress towards SPECIFIC goals: The PT contacted Edgar Choi PT who opened the case about whether or not the pt should be taking 1-2  81 mg ASA as he has been instructed one but the DC list says two. She still has not yet heard back from the MD office. The PT worked with the pt on seated posture getting him to sit with his upper torso erect and the transferring that posture when the pt stands and ambuates. He has trouble clearing both feet when walking and slides them along the floor. The Pt spent a great deal of today's session working on erect posture during gait and taking clear steps not suffling feet. Written Teaching Material Utilized: HEP cue sheet    Interdisciplinary communication with: Edgar Choi PT to get POC update    Discharge planning as follows:  Is no longer homebound, Per physician order, Will discharge when the patient has reached their maximum functional potential and maximum safety in their home and When goals are met    Specific plan for next visit: Re-instruct patient/caregiver on HEP, gait training, Educate in s/s of infection, DVT and when to call [facility], MD or 911 and Instruct in safety issues regarding Proper use of assistive device

## 2023-05-10 NOTE — HOME HEALTH
Subjective: \"The shower chair should be here today. \"   Falls since last visit: (if yes complete the Fall Tracking Form and include bsrifallreport): No   Caregiver involvement changes: none   Home health supplies by type and quantity ordered/delivered this visit include: None     Clinician asked if patient has had any physician contact since last home care visit and patient states: No   Clinician asked if patient has any new or changed medications and patient states:  No   If Yes, were medications reconciled? N/A  Was the certifying physician notified of changes in medications? N/A     Clinical assessment (what this visit means for the patient overall and need for ongoing skilled care) and progress or lack of progress towards SPECIFIC goals:  Patient continues to demonstrate an inability to complete chair and toilet transfers along with toileting tasks and LB dressing tasks using AE without therapist providing verbal cueing for fall prevention techniques and maintaining back precautions requiring additional instruction from skilled [facility] OT to achieve higher level of functional independence and reduce falls as caregiver unable to provide level of needed assistance safely at this time. Patient making steady progress towards mod I level goals set for toilet and chair transfers along with toileting and dressing tasks. Written Teaching Material Utilized: Mari handout as part of UE HEP    Interdisciplinary communication with: Mary Conner, PT for [address] coll[address]oration   Discharge planning as follows: Discharge from [facility] OT when goals are met or maximum level of function is achieved.      Specific plan for next visit:  Focus on increasing independence and reducing risk of falls with shower transfers and bathing tasks

## 2023-05-12 ENCOUNTER — HOME CARE VISIT (OUTPATIENT)
Facility: HOME HEALTH | Age: 78
End: 2023-05-12
Payer: MEDICARE

## 2023-05-12 PROCEDURE — G0151 HHCP-SERV OF PT,EA 15 MIN: HCPCS

## 2023-05-14 VITALS
DIASTOLIC BLOOD PRESSURE: 72 MMHG | SYSTOLIC BLOOD PRESSURE: 120 MMHG | RESPIRATION RATE: 16 BRPM | OXYGEN SATURATION: 97 % | TEMPERATURE: 98.3 F | HEART RATE: 81 BPM

## 2023-05-14 ASSESSMENT — ENCOUNTER SYMPTOMS: PAIN LOCATION - PAIN QUALITY: ACHE/SORENESS

## 2023-05-15 ENCOUNTER — HOME CARE VISIT (OUTPATIENT)
Facility: HOME HEALTH | Age: 78
End: 2023-05-15
Payer: MEDICARE

## 2023-05-15 VITALS
OXYGEN SATURATION: 97 % | TEMPERATURE: 97.6 F | DIASTOLIC BLOOD PRESSURE: 69 MMHG | HEART RATE: 85 BPM | SYSTOLIC BLOOD PRESSURE: 105 MMHG

## 2023-05-15 PROCEDURE — G0152 HHCP-SERV OF OT,EA 15 MIN: HCPCS

## 2023-05-15 NOTE — HOME HEALTH
Subjective: \"I want you to show me how to get this brace on by myself. \"   Falls since last visit: (if yes complete the Fall Tracking Form and include bsrifallreport): No   Caregiver involvement changes: none   Home health supplies by type and quantity ordered/delivered this visit include: None     Clinician asked if patient has had any physician contact since last home care visit and patient states: No   Clinician asked if patient has any new or changed medications and patient states:  No   If Yes, were medications reconciled? N/A  Was the certifying physician notified of changes in medications? N/A     Clinical assessment (what this visit means for the patient overall and need for ongoing skilled care) and progress or lack of progress towards SPECIFIC goals:  Patient demonstrated the ability to complete chair and toilet transfers along with toileting tasks with therapist providing verbal cueing for fall prevention techniques and maintaining back precautions implementing at a mod I level. He remains at a continued risk for falls with dressing, bathing and shower transfers along with TLSO management requiring additional instruction from skilled [facility] OT to achieve higher level of functional independence and reduce falls as caregiver unable to provide level of needed assistance safely at this time. Patient met mod I level goals set for chair and toilet transfers along with toileting tasks. Goals will continue for dressing tasks in order to address retrieval and transport as he has demonstrated ability to perform LB dressing tasks using AE with mod I level. Written Teaching Material Utilized: None   Interdisciplinary communication with: Sofia White, [facility]   Discharge planning as follows: Discharge from [facility] OT when goals are met or maximum level of function is achieved.      Specific plan for next visit:  Focus on increasing independence and reducing risk of falls with shower transfers and bathing tasks along

## 2023-05-17 ENCOUNTER — HOME CARE VISIT (OUTPATIENT)
Facility: HOME HEALTH | Age: 78
End: 2023-05-17
Payer: MEDICARE

## 2023-05-17 VITALS
HEART RATE: 72 BPM | SYSTOLIC BLOOD PRESSURE: 118 MMHG | OXYGEN SATURATION: 97 % | RESPIRATION RATE: 16 BRPM | TEMPERATURE: 98.2 F | DIASTOLIC BLOOD PRESSURE: 65 MMHG

## 2023-05-17 VITALS
TEMPERATURE: 98.2 F | SYSTOLIC BLOOD PRESSURE: 109 MMHG | HEART RATE: 68 BPM | DIASTOLIC BLOOD PRESSURE: 73 MMHG | RESPIRATION RATE: 17 BRPM | OXYGEN SATURATION: 97 %

## 2023-05-17 PROCEDURE — G0151 HHCP-SERV OF PT,EA 15 MIN: HCPCS

## 2023-05-17 PROCEDURE — G0152 HHCP-SERV OF OT,EA 15 MIN: HCPCS

## 2023-05-17 NOTE — HOME HEALTH
Subjective: \"I'm think I'm doing much better. \"   Falls since last visit: (if yes complete the Fall Tracking Form and include bsrifallreport): No   Caregiver involvement changes: none   Home health supplies by type and quantity ordered/delivered this visit include: None      Clinician asked if patient has had any physician contact since last home care visit and patient states: Yes   Clinician asked if patient has any new or changed medications and patient states: No  If Yes, were medications reconciled? N/A   Was the certifying physician notified of changes in medications? N/A      Clinical assessment (what this visit means for the patient overall and need for ongoing skilled care) and progress or lack of progress towards SPECIFIC goals: Patient has made good steady progress during [facility] OT services the following functional gains going from max A for bathing and LB dressing tasks to mod I level using AE in order to maintain back precautions, mod A for toileting tasks to mod I level, min A for toilet transfers to mod I level, mod A for shower transfers to mod I level. The Barthel index assessment score at initial evaluation was 50/100 indicating partially dependent improving to 90/100 continueing to indicate total independence with self care tasks. MACH-10 assessment score was 6/10 at initial evaluation improving to 4/10 continuing to indicate patient is at risk for falls as instruction on fall prevention was provided throughout episode of care. Patient has met all goals set for ADLs and functional transfers implementing fall prevention techniques maintaining back precautions. Written Teaching Material Utilized: None   Interdisciplinary communication with: Preeti Peters for [address] collaboration   Discharge planning as follows: Discharge from [facility] OT services. Specific plan for next visit: Patient discharge from [facility] OT services with goals met at this time with patient verbalizing understanding of discharge.  OT discharge

## 2023-05-18 ENCOUNTER — HOME CARE VISIT (OUTPATIENT)
Facility: HOME HEALTH | Age: 78
End: 2023-05-18
Payer: MEDICARE

## 2023-05-18 PROCEDURE — G0151 HHCP-SERV OF PT,EA 15 MIN: HCPCS

## 2023-05-18 NOTE — HOME HEALTH
Subjective: \" I was pleased by the follow up visit and the MD seems pleased as well\"  Falls since last visit : no falls  Caregiver involvement changes: NA  Home health supplies by type and quantity ordered/delivered this visit include: NA  Clinician asked if patient has had any physician contact since last home care visit and patient states:yes  Clinician asked if patient has any new or changed medications and patient states:  no  If Yes, were medications reconciled? yes  Was the certifying physician notified of changes in medications? NA    Clinical assessment (what this visit means for the patient overall and need for ongoing skilled care) and progress or lack of progress towards SPECIFIC goals: The Pt is progressing his HEP and tolerating that well although PT will assess at next visit to make sure there are no new pains or issues fro additional standing HEP. The PT also instructed the pt on gait  over longer distances outside than just in the house where he has to turn around every few feet. The Pt is making good progress and was DC from OT today and will likely be looking at DC from PT next week if pain continues to be well under control and the Pt' strength and endurance continues to improve and posture and gait improve as well  Written Teaching Material Utilized exercise Cue sheet    Interdisciplinary communication with: KRISTINE    Discharge planning as follows:  Is no longer homebound, Per physician order, Will discharge when the patient has reached their maximum functional potential and maximum safety in their home and When goals are met    Specific plan for next visit: Re-instruct patient/caregiver on gait, HEP, Educate in s/s of infection and when to call [facility], [place] or 911 and Instruct in safety issues regarding Proper use of assistive device and Tripping hazards no

## 2023-05-21 VITALS
DIASTOLIC BLOOD PRESSURE: 80 MMHG | SYSTOLIC BLOOD PRESSURE: 126 MMHG | RESPIRATION RATE: 16 BRPM | TEMPERATURE: 98.1 F | HEART RATE: 67 BPM | OXYGEN SATURATION: 98 %

## 2023-05-21 ASSESSMENT — ENCOUNTER SYMPTOMS: PAIN LOCATION - PAIN QUALITY: SORENESS

## 2023-05-23 ENCOUNTER — HOME CARE VISIT (OUTPATIENT)
Facility: HOME HEALTH | Age: 78
End: 2023-05-23
Payer: MEDICARE

## 2023-05-23 PROCEDURE — G0151 HHCP-SERV OF PT,EA 15 MIN: HCPCS

## 2023-05-25 ENCOUNTER — HOME CARE VISIT (OUTPATIENT)
Facility: HOME HEALTH | Age: 78
End: 2023-05-25
Payer: MEDICARE

## 2023-05-25 VITALS
RESPIRATION RATE: 17 BRPM | RESPIRATION RATE: 16 BRPM | TEMPERATURE: 98 F | HEART RATE: 72 BPM | SYSTOLIC BLOOD PRESSURE: 100 MMHG | OXYGEN SATURATION: 96 % | DIASTOLIC BLOOD PRESSURE: 70 MMHG | HEART RATE: 68 BPM | TEMPERATURE: 98.3 F | DIASTOLIC BLOOD PRESSURE: 60 MMHG | SYSTOLIC BLOOD PRESSURE: 110 MMHG | OXYGEN SATURATION: 96 %

## 2023-05-25 PROCEDURE — G0151 HHCP-SERV OF PT,EA 15 MIN: HCPCS

## 2023-05-26 NOTE — HOME HEALTH
Subjective: \" I am doing very well at this time and feel good\"  Falls since last visit : no   Caregiver involvement changes: NA  Home health supplies by type and quantity ordered/delivered this visit include: NA    Clinician asked if patient has had any physician contact since last home care visit and patient states: no  Clinician asked if patient has any new or changed medications and patient states:  {no  If Yes, were medications reconciled? yes  Was the certifying physician notified of changes in medications? NA  Clinical assessment (what this visit means for the patient overall and need for ongoing skilled care) and progress or lack of progress towards SPECIFIC goals: The pt is making great progress and is now trying to ambulate without AD. The PT instructed him to reconsider using at least a SPC not for balance but to help him rgain more fully erect posture during gait. His strength is improving and PT anticipates DC at the next visit after checking him off again on ambulation and posture during ambulation as well  Written Teaching Material Utilized: HEP cue sheet    Interdisciplinary communication with:KRISTINE    Discharge planning as follows:  Is no longer homebound, Per physician order, Will discharge when the patient has reached their maximum functional potential and maximum safety in their home and When goals are met    Specific plan for next visit: Re-instruct patient/caregiver on HEP, gait, Educate in s/s of infection and when to call [facility], [place] or 911 and Instruct in safety issues regarding Proper use of assistive device and Tripping hazards

## 2023-05-26 NOTE — HOME HEALTH
instructions    Interdisciplinary communication with: KRISTINE    Discharge planning as follows: DC today    Specific plan for next visit: KRISTINE
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
## 2023-06-27 ENCOUNTER — TELEMEDICINE (OUTPATIENT)
Age: 78
End: 2023-06-27

## 2023-06-27 DIAGNOSIS — F41.8 ANXIETY ABOUT HEALTH: ICD-10-CM

## 2023-06-27 DIAGNOSIS — R41.3 MEMORY LOSS: Primary | ICD-10-CM

## 2023-06-27 DIAGNOSIS — Z91.49 HISTORY OF PSYCHOLOGICAL TRAUMA: ICD-10-CM

## 2023-07-07 ENCOUNTER — TELEPHONE (OUTPATIENT)
Age: 78
End: 2023-07-07

## 2023-07-13 ENCOUNTER — TELEPHONE (OUTPATIENT)
Age: 78
End: 2023-07-13

## 2023-07-13 NOTE — TELEPHONE ENCOUNTER
Left voicemail for patient to complete assessments sent via email by provider prior to coming in for testing appointment.

## 2023-07-14 ENCOUNTER — PROCEDURE VISIT (OUTPATIENT)
Age: 78
End: 2023-07-14
Payer: MEDICARE

## 2023-07-14 ENCOUNTER — TELEPHONE (OUTPATIENT)
Age: 78
End: 2023-07-14

## 2023-07-14 DIAGNOSIS — Z91.49 HISTORY OF PSYCHOLOGICAL TRAUMA: ICD-10-CM

## 2023-07-14 DIAGNOSIS — F90.0 ATTENTION DEFICIT HYPERACTIVITY DISORDER (ADHD), PREDOMINANTLY INATTENTIVE TYPE: Primary | ICD-10-CM

## 2023-07-14 DIAGNOSIS — R45.4 IRRITABILITY: ICD-10-CM

## 2023-07-14 PROCEDURE — 96133 NRPSYC TST EVAL PHYS/QHP EA: CPT | Performed by: PSYCHOLOGIST

## 2023-07-14 PROCEDURE — 96139 PSYCL/NRPSYC TST TECH EA: CPT | Performed by: PSYCHOLOGIST

## 2023-07-14 PROCEDURE — 96137 PSYCL/NRPSYC TST PHY/QHP EA: CPT | Performed by: PSYCHOLOGIST

## 2023-07-14 PROCEDURE — 96138 PSYCL/NRPSYC TECH 1ST: CPT | Performed by: PSYCHOLOGIST

## 2023-07-14 PROCEDURE — 96136 PSYCL/NRPSYC TST PHY/QHP 1ST: CPT | Performed by: PSYCHOLOGIST

## 2023-07-14 PROCEDURE — 96132 NRPSYC TST EVAL PHYS/QHP 1ST: CPT | Performed by: PSYCHOLOGIST

## 2023-07-14 NOTE — TELEPHONE ENCOUNTER
Need to ask his wife a couple questions regarding the questionnaire's she completed during the patients testing appointment. Patient said his wife will call back and ask to speak with me.

## 2023-07-14 NOTE — PROGRESS NOTES
[facility]  [address] [place] [address], [facility], [address] [place]  Office:  [phone]  Fax: [phone]                  Neuropsychological Evaluation Report    Patient Name: Jared Barajas  Age: 70 y.o. Gender: male   Handedness: right handed   Presenting Concern: memory loss  Primary Care Physician/Referring Provider: Joana Watson DO    PATIENT HISTORY (OBTAINED DURING INITIAL CLINICAL EVALUATION):    REASON FOR REFERRAL:  This comprehensive and medically necessary neuropsychological assessment was requested to assist a differential diagnosis of cognitive concerns. The use and purpose of this examination, as well as the extent and limitations of confidentiality, were explained prior to obtaining permission to participate. Instructions were provided regarding the necessity to put forth optimal effort and answer questions truthfully in order to obtain reliable and accurate test results. REVIEW OF RECORDS:  Mr. Isabel Lira was referred by his PCP for a workup of memory loss. His wife has raised concern about cognitive decline though Mr. Isabel Lira does not himself see any subjective cognitive change. A history of lumbar radiculopathy is noted. Vascular risk factors are noted. I did not see any neuroimaging in the available records. Current Outpatient Medications   Medication Sig Dispense Refill    Saw Palmetto, Serenoa repens, (SAW PALMETTO PO) Take 320 mg by mouth daily      oxyCODONE (ROXICODONE) 5 MG immediate release tablet Take 1-2 tablets by mouth every 6 hours as needed for Pain.       amLODIPine (NORVASC) 5 MG tablet Take 2.5 mg by mouth      ascorbic acid (VITAMIN C) 250 MG tablet Take 250 mg by mouth daily      aspirin 81 MG EC tablet Take 81 mg by mouth daily      atorvastatin (LIPITOR) 40 MG tablet Take 20 mg by mouth      Cholecalciferol 75 MCG (3000 UT) TABS Take 75 mcg by mouth daily      Cobalamin Combinations (B-12) 100-5000 MCG SUBL Place 1 tablet under the

## 2023-08-01 ENCOUNTER — TELEMEDICINE (OUTPATIENT)
Age: 78
End: 2023-08-01
Payer: MEDICARE

## 2023-08-01 DIAGNOSIS — F90.0 ATTENTION DEFICIT HYPERACTIVITY DISORDER (ADHD), PREDOMINANTLY INATTENTIVE TYPE: Primary | ICD-10-CM

## 2023-08-01 DIAGNOSIS — R45.4 IRRITABILITY: ICD-10-CM

## 2023-08-01 DIAGNOSIS — Z78.9 ALCOHOL USE: ICD-10-CM

## 2023-08-01 DIAGNOSIS — Z91.49 HISTORY OF PSYCHOLOGICAL TRAUMA: ICD-10-CM

## 2023-08-01 PROCEDURE — 4004F PT TOBACCO SCREEN RCVD TLK: CPT | Performed by: PSYCHOLOGIST

## 2023-08-01 PROCEDURE — 1123F ACP DISCUSS/DSCN MKR DOCD: CPT | Performed by: PSYCHOLOGIST

## 2023-08-01 PROCEDURE — 90832 PSYTX W PT 30 MINUTES: CPT | Performed by: PSYCHOLOGIST

## 2023-08-01 NOTE — PROGRESS NOTES
Interactive Psychotherapy/office feedback        Interactive office feedback session with Mr. Todd Roldan and his wife. I reviewed the results of the recent Neuropsychological Evaluation  including the observed areas of neurocognitive strengths and weaknesses. Education was provided regarding my diagnostic impressions, and treatment plan/options were discussed. I also answered numerous questions related to the clinical findings, including the various methods to improve cognition and mood. CBT, psychoeducation, and supportive psychotherapy techniques were utilized. Patient's report placed in iPrint per patient request. A copy was also mailed per patient request.      Prior to seeing the patient I reviewed the records, including the previously completed report, the records in [place], and any updated visits from other providers since I saw the patient last.       Diagnoses:      ADHD, inattentive type  Irritability R/O Mood and Anxiety Disorders  History of Psychological Trauma  Alcohol Use    The patient will follow up with the referring provider, and reported being very pleased with the services provided. Follow up with [facility] 12 months. [id number] psychotherapy 30 Minutes      This note was created using voice recognition software. Despite editing, there may be syntax errors. Illene Ink, was evaluated through a synchronous (real-time) audio-video encounter. The patient (or guardian if applicable) is aware that this is a billable service, which includes applicable co-pays. This Virtual Visit was conducted with patient's (and/or legal guardian's) consent. Patient identification was verified, and a caregiver was present when appropriate.    The patient was located at Home: [address] [place] [id number]  Provider was located at Home ([address] [place] [address]): [address] [place] [address]

## 2023-09-29 ENCOUNTER — TELEPHONE (OUTPATIENT)
Dept: PHARMACY | Facility: CLINIC | Age: 78
End: 2023-09-29

## 2023-09-29 NOTE — TELEPHONE ENCOUNTER
[facility] CLINICAL PHARMACY: ADHERENCE REVIEW  Identified care gap per [place] [facility]: ACE/ARB adherence    Last Visit: 3/27/23  Next Visit: 10/26/23    Patient also appears to be prescribed:STATIN  Medicare [address] Records claims through 9/29/2023 (Prior Year [address] = not reported; YTD [address] = 76%; Potential Fail Date: 10/8/2023):   Enalapril 10 mg  last filled on 4/30/23 for  qty 90 / 90 day supply. Next refill due: MAX 8/4/23 -PAST DUE 56 DAYS   Per [facility]:   0 refills remaining. BP Readings from Last 3 Encounters:   05/25/23 100/70   05/23/23 110/60   05/18/23 126/80     Estimated Creatinine Clearance: 54 mL/min (based on SCr of 1.24 mg/dL). Lab Results   Component Value Date    CREATININE 1.24 05/04/2023     Lab Results   Component Value Date    K 4.5 05/04/2023     STATIN ADHERENCE    Insurance Records claims through 9/29/23 (Prior Year [address] = not reported; YTD [address] = 100%; Potential Fail Date: 2024): Atorvastatin 40 mg  last filled on 8/14/23 for  qty 45 / 90 day supply. Next refill due: MAX 11/16/23    Lab Results   Component Value Date    CHOL 110 03/27/2023    TRIG 60 03/27/2023    HDL 58 03/27/2023    LDLCALC 40 03/27/2023    LABVLDL 12 03/27/2023     ALT   Date Value Ref Range Status   03/27/2023 18 12 - 78 U/L Final     AST   Date Value Ref Range Status   03/27/2023 21 15 - 37 U/L Final     The ASCVD Risk score (Ostrander DK, et al., 2019) failed to calculate for the following reasons: The valid total cholesterol range is 130 to 320 mg/dL     PLAN  The following are interventions that have been identified:   Patient overdue refilling Enalapril 10 mg and active on home medication list.   Outreach:  Pharmacy:  Citlalli Mccullough generating refill requests for Enalapril 10 mg to prescriber.  Burton Restrepo is filling Enalapril today 10/3/2023    [phone]    Zonia Hair, PharmD, [facility]

## 2023-10-19 ENCOUNTER — TELEPHONE (OUTPATIENT)
Age: 78
End: 2023-10-19

## 2023-10-19 NOTE — TELEPHONE ENCOUNTER
----- Message from Maximino Melo sent at 10/19/2023 11:30 AM EDT -----  Subject: Message to Provider    QUESTIONS  Information for Provider? Patient returned call to office. I am not able   to reach the . Please call again.  Does he need to fast for   bloodwork at his next visit?  ---------------------------------------------------------------------------  --------------  Mil FRANCIS  [phone]; OK to leave message on voicemail  ---------------------------------------------------------------------------  --------------  SCRIPT ANSWERS  undefined

## 2023-10-26 ENCOUNTER — OFFICE VISIT (OUTPATIENT)
Age: 78
End: 2023-10-26
Payer: MEDICARE

## 2023-10-26 VITALS
HEIGHT: 68 IN | HEART RATE: 58 BPM | TEMPERATURE: 98.1 F | SYSTOLIC BLOOD PRESSURE: 100 MMHG | WEIGHT: 184 LBS | BODY MASS INDEX: 27.89 KG/M2 | DIASTOLIC BLOOD PRESSURE: 58 MMHG | RESPIRATION RATE: 14 BRPM | OXYGEN SATURATION: 96 %

## 2023-10-26 DIAGNOSIS — Z23 NEED FOR PROPHYLACTIC VACCINATION AGAINST STREPTOCOCCUS PNEUMONIAE (PNEUMOCOCCUS): ICD-10-CM

## 2023-10-26 DIAGNOSIS — Z00.00 MEDICARE ANNUAL WELLNESS VISIT, SUBSEQUENT: Primary | ICD-10-CM

## 2023-10-26 DIAGNOSIS — I25.83 CORONARY ATHEROSCLEROSIS DUE TO LIPID RICH PLAQUE (CODE): ICD-10-CM

## 2023-10-26 DIAGNOSIS — M48.062 SPINAL STENOSIS, LUMBAR REGION WITH NEUROGENIC CLAUDICATION: ICD-10-CM

## 2023-10-26 DIAGNOSIS — Z23 NEEDS FLU SHOT: ICD-10-CM

## 2023-10-26 DIAGNOSIS — I95.2 HYPOTENSION DUE TO DRUGS: ICD-10-CM

## 2023-10-26 DIAGNOSIS — I87.2 VENOUS INSUFFICIENCY (CHRONIC) (PERIPHERAL): ICD-10-CM

## 2023-10-26 PROCEDURE — 99213 OFFICE O/P EST LOW 20 MIN: CPT | Performed by: INTERNAL MEDICINE

## 2023-10-26 PROCEDURE — 90694 VACC AIIV4 NO PRSRV 0.5ML IM: CPT | Performed by: INTERNAL MEDICINE

## 2023-10-26 PROCEDURE — 90677 PCV20 VACCINE IM: CPT | Performed by: INTERNAL MEDICINE

## 2023-10-26 RX ORDER — ALLOPURINOL 100 MG/1
100 TABLET ORAL DAILY
COMMUNITY

## 2023-10-26 RX ORDER — LEVOTHYROXINE SODIUM 0.2 MG/1
200 TABLET ORAL
Qty: 90 TABLET | Refills: 3 | Status: SHIPPED | OUTPATIENT
Start: 2023-10-26

## 2023-10-26 RX ORDER — INDOMETHACIN 50 MG/1
50 CAPSULE ORAL 2 TIMES DAILY WITH MEALS
COMMUNITY
End: 2023-10-26 | Stop reason: SDUPTHER

## 2023-10-26 RX ORDER — ATORVASTATIN CALCIUM 20 MG/1
20 TABLET, FILM COATED ORAL DAILY
Qty: 90 TABLET | Refills: 3 | Status: SHIPPED | OUTPATIENT
Start: 2023-10-26

## 2023-10-26 RX ORDER — INDOMETHACIN 50 MG/1
50 CAPSULE ORAL EVERY 12 HOURS PRN
Qty: 60 CAPSULE | Refills: 0 | Status: SHIPPED | OUTPATIENT
Start: 2023-10-26

## 2023-10-26 RX ORDER — SILDENAFIL CITRATE 20 MG/1
TABLET ORAL
COMMUNITY
Start: 2021-08-22

## 2023-10-26 SDOH — ECONOMIC STABILITY: FOOD INSECURITY: WITHIN THE PAST 12 MONTHS, THE FOOD YOU BOUGHT JUST DIDN'T LAST AND YOU DIDN'T HAVE MONEY TO GET MORE.: NEVER TRUE

## 2023-10-26 SDOH — ECONOMIC STABILITY: HOUSING INSECURITY
IN THE LAST 12 MONTHS, WAS THERE A TIME WHEN YOU DID NOT HAVE A STEADY PLACE TO SLEEP OR SLEPT IN A SHELTER (INCLUDING NOW)?: NO

## 2023-10-26 SDOH — ECONOMIC STABILITY: FOOD INSECURITY: WITHIN THE PAST 12 MONTHS, YOU WORRIED THAT YOUR FOOD WOULD RUN OUT BEFORE YOU GOT MONEY TO BUY MORE.: NEVER TRUE

## 2023-10-26 SDOH — ECONOMIC STABILITY: INCOME INSECURITY: HOW HARD IS IT FOR YOU TO PAY FOR THE VERY BASICS LIKE FOOD, HOUSING, MEDICAL CARE, AND HEATING?: NOT HARD AT ALL

## 2023-10-26 ASSESSMENT — PATIENT HEALTH QUESTIONNAIRE - PHQ9
SUM OF ALL RESPONSES TO PHQ QUESTIONS 1-9: 0
SUM OF ALL RESPONSES TO PHQ9 QUESTIONS 1 & 2: 0
2. FEELING DOWN, DEPRESSED OR HOPELESS: 0
1. LITTLE INTEREST OR PLEASURE IN DOING THINGS: 0
SUM OF ALL RESPONSES TO PHQ QUESTIONS 1-9: 0

## 2023-10-26 ASSESSMENT — LIFESTYLE VARIABLES
HOW OFTEN DO YOU HAVE A DRINK CONTAINING ALCOHOL: 2-3 TIMES A WEEK
HOW MANY STANDARD DRINKS CONTAINING ALCOHOL DO YOU HAVE ON A TYPICAL DAY: 1 OR 2

## 2023-10-26 NOTE — PATIENT INSTRUCTIONS
Incorporated. Care instructions adapted under license by [facility] ([facility]). If you have questions about a medical condition or this instruction, always ask your healthcare professional. [address] any warranty or liability for your use of this information. A Healthy Heart: Care Instructions  Your Care Instructions     Coronary artery disease, also called heart disease, occurs when a substance called plaque builds up in the vessels that supply oxygen-rich blood to your heart muscle. This can narrow the blood vessels and reduce blood flow. A heart attack happens when blood flow is completely blocked. A high-fat diet, smoking, and other factors increase the risk of heart disease. Your doctor has found that you have a chance of having heart disease. You can do lots of things to keep your heart healthy. It may not be easy, but you can change your diet, exercise more, and quit smoking. These steps really work to lower your chance of heart disease. Follow-up care is a key part of your treatment and safety. Be sure to make and go to all appointments, and call your doctor if you are having problems. It's also a good idea to know your test results and keep a list of the medicines you take. How can you care for yourself at home? Diet    Use less salt when you cook and eat. This helps lower your blood pressure. Taste food before salting. Add only a little salt when you think you need it. With time, your taste buds will adjust to less salt. Eat fewer snack items, fast foods, canned soups, and other high-salt, high-fat, processed foods. Read food labels and try to avoid saturated and trans fats. They increase your risk of heart disease by raising cholesterol levels. Limit the amount of solid fat-butter, margarine, and shortening-you eat. Use olive, peanut, or canola oil when you cook. Bake, broil, and steam foods instead of frying them.      Eat a variety of fruit and vegetables every

## 2023-10-26 NOTE — PROGRESS NOTES
1. \"Have you been to the ER, urgent care clinic since your last visit? Hospitalized since your last visit? \" No     2. \"Have you seen or consulted any other health care providers outside of the [address] [place] [address] since your last visit? \" Dr. Kelly Rader    3. For patients aged 43-73: Has the patient had a colonoscopy / FIT/ Cologuard?  Yes
AMBULATORY OR    CORONARY ANGIOPLASTY WITH STENT PLACEMENT  2000    Dr Haylie Soliman  approx 2010    @ [place] 2006    L3-L4 fusion 2006, L2-S1 fusion 12/2020    MA UNLISTED PROCEDURE CARDIAC SURGERY      1 STENT    TONSILLECTOMY  age 13    TOTAL KNEE ARTHROPLASTY Right 04/2017    TOTAL KNEE ARTHROPLASTY Left 10/27/2022    WISDOM TOOTH EXTRACTION         Family History   Problem Relation Age of Onset    No Known Problems Sister     Heart Disease Father     Hypertension Mother     No Known Problems Brother     No Known Problems Son     No Known Problems Daughter     Anesth Problems Neg Hx     Heart Disease Paternal Uncle        Social History     Socioeconomic History    Marital status:      Spouse name: Not on file    Number of children: Not on file    Years of education: Not on file    Highest education level: Not on file   Occupational History    Not on file   Tobacco Use    Smoking status: Never    Smokeless tobacco: Never   Substance and Sexual Activity    Alcohol use: Yes     Alcohol/week: 6.0 standard drinks of alcohol    Drug use: No    Sexual activity: Yes     Partners: Female   Other Topics Concern    Not on file   Social History Narrative    Not on file     Social Determinants of Health     Financial Resource Strain: Low Risk  (10/26/2023)    Overall Financial Resource Strain (CARDIA)     Difficulty of Paying Living Expenses: Not hard at all   Food Insecurity: No Food Insecurity (10/26/2023)    Hunger Vital Sign     Worried About Running Out of Food in the Last Year: Never true     [address] in the Last Year: Never true   Transportation Needs: Unknown (10/26/2023)    PRAPARE - Transportation     Lack of Transportation (Medical): Not on file     Lack of Transportation (Non-Medical):  No   Physical Activity: Sufficiently Active (10/26/2023)    Exercise Vital Sign     Days of Exercise per Week: 5 days     Minutes of Exercise per

## 2023-11-21 ENCOUNTER — TELEPHONE (OUTPATIENT)
Age: 78
End: 2023-11-21

## 2023-11-21 NOTE — TELEPHONE ENCOUNTER
States pt was to take bp at home    States average is 130/70 for the last couple weeks (averaged from about 15 blood pressures taken)    States so will not need to cut pill, is that correct.

## 2024-03-12 RX ORDER — INDOMETHACIN 50 MG/1
CAPSULE ORAL
Qty: 60 CAPSULE | Refills: 0 | Status: SHIPPED | OUTPATIENT
Start: 2024-03-12

## 2024-04-10 RX ORDER — INDOMETHACIN 50 MG/1
CAPSULE ORAL
Qty: 60 CAPSULE | Refills: 0 | OUTPATIENT
Start: 2024-04-10

## 2024-04-10 NOTE — TELEPHONE ENCOUNTER
indomethacin (INDOCIN) 50 MG capsule    Pt is asking for 90 day script if possible.     [phone].       Pt is out of medication.

## 2024-04-10 NOTE — TELEPHONE ENCOUNTER
PCP: Gideon Carroll III, DO    Last appt:   10/26/2023    Future Appointments   Date Time Provider Department Center   5/24/2024 11:00 AM Gideon Carroll III,  [facility]       Requested Prescriptions     Pending Prescriptions Disp Refills    indomethacin (INDOCIN) 50 MG capsule 60 capsule 0     Sig: TAKE ONE CAPSULE BY MOUTH EVERY 12 HOURS AS NEEDED FOR PAIN

## 2024-04-12 RX ORDER — INDOMETHACIN 50 MG/1
CAPSULE ORAL
Qty: 90 CAPSULE | Refills: 0 | Status: SHIPPED | OUTPATIENT
Start: 2024-04-12

## 2024-04-12 NOTE — TELEPHONE ENCOUNTER
Regarding medication:  indomethacin (INDOCIN) 50 MG capsule        Problem:  Pt has been out for a week, states needs this med--states had spinal surgeries and back issues and this is the only thing that helps    Med was denied by provider: med discontinued    Pt asking why?    Suggested options:  CALL PATIENT [phone]           Caller confirms readback of documented phone/fax number(s) as correct.

## 2024-05-13 RX ORDER — INDOMETHACIN 50 MG/1
CAPSULE ORAL
Qty: 60 CAPSULE | OUTPATIENT
Start: 2024-05-13

## 2024-05-15 ENCOUNTER — TELEPHONE (OUTPATIENT)
Age: 79
End: 2024-05-15

## 2024-05-15 RX ORDER — INDOMETHACIN 50 MG/1
CAPSULE ORAL
Qty: 60 CAPSULE | Refills: 1 | OUTPATIENT
Start: 2024-05-15

## 2024-05-15 NOTE — TELEPHONE ENCOUNTER
ndomethacin (INDOCIN) 50 MG capsule    Pt needs refill and Lauro has tried to reach us for clarification on this since 5-11-24    The refill part of bottle says there is an odd number on the refill.    Pt has been waiting all week on this.        (Previous mess closed in error 5/15 blj)    [facility]uro Joyce #[phone]    
17-Dec-2023 07:17

## 2024-05-15 NOTE — TELEPHONE ENCOUNTER
Called/Spoke with Drea From [facility]    Drea stated that pt takes 2 pills daily; provider sent in no refills for prescription;   Stated they only needed a new script for 90 tabs with refills for medication \"Indocin 50 mg\".    *Original script had 90 tabs, 0 refills.    Verbalized Understanding.    Called/ Spoke with pt's spouse in reference to medication consult  Pt spouse stated unable to determine if medication is taken once or twice; does know that medication is \"As needed\" for Inflammation.    Pt's spouse also stated that she would like provider to  talk with him about all meds currently being taken; believes he may take too much medication.    Pt's spouse inquired about provider looking at the Swelling in Legs-per spouse: \"Onion Skin\" Stated pt was given cream, uses as needed. Wont listen to anyone but provider     PCP: Gideon Carroll III,     Last appt: 10/26/2023  Future Appointments   Date Time Provider Department Center   5/24/2024 11:00 AM Gideon Carroll III, DO [facility] AM[facility]       Requested Prescriptions     Pending Prescriptions Disp Refills    indomethacin (INDOCIN) 50 MG capsule [Pharmacy Med Name: INDOMETHACIN 50 MG CAPSULE] 60 capsule 1     Sig: TAKE ONE CAPSULE BY MOUTH EVERY 12 HOURS AS NEEDED FOR PAIN       **Called Spoke with pt notifying of PCP response: \"Not able to refill this without being seen.  This is an 'as needed' med.  If going to take daily, he needs to be seen.\"    Pt verbalized understanding; Stated that if provider wasn't going to prescribe anything for pain between now and the 24; the \"pain will be brutal\"  Inquired about taking something until he is seen being per pt: \"I have gone through 3 spinal surgeries, and the pain in my back is terrible. I don't take the medication but twice a day, one in the morning and at night\"    I verbalized understanding; pt stated if there is an update or recommendation please call Mobile number on file [phone]

## 2024-05-16 NOTE — TELEPHONE ENCOUNTER
Pt would like to know why he can not get medication yet?   Pt has back pain and really needs this medication.    This is arthritis and pt has had several operations.  He is in pain today.

## 2024-05-17 ENCOUNTER — TELEPHONE (OUTPATIENT)
Age: 79
End: 2024-05-17

## 2024-05-17 NOTE — TELEPHONE ENCOUNTER
Patient states he is returning your call. Patient states a detailed message message can be left on voice Mail if no answer. Please call. Thank you

## 2024-05-17 NOTE — TELEPHONE ENCOUNTER
Spoke with patient. Advised. Verbalized understanding.   Advised can use tylenol, warm packs rotate off/on 15mins, warm baths on area for relief. Pt states also using Voltaren cream until sees PCP.      Per   Not able to refill this without being seen.  This is an 'as needed' med.  If going to take daily, he needs to be seen.     Next OV 5/24/2024

## 2024-05-24 ENCOUNTER — OFFICE VISIT (OUTPATIENT)
Age: 79
End: 2024-05-24
Payer: MEDICARE

## 2024-05-24 VITALS
DIASTOLIC BLOOD PRESSURE: 79 MMHG | BODY MASS INDEX: 28.34 KG/M2 | SYSTOLIC BLOOD PRESSURE: 137 MMHG | HEART RATE: 63 BPM | HEIGHT: 68 IN | OXYGEN SATURATION: 95 % | TEMPERATURE: 96.9 F | WEIGHT: 187 LBS | RESPIRATION RATE: 14 BRPM

## 2024-05-24 DIAGNOSIS — M48.062 SPINAL STENOSIS, LUMBAR REGION WITH NEUROGENIC CLAUDICATION: ICD-10-CM

## 2024-05-24 DIAGNOSIS — M15.9 PRIMARY OSTEOARTHRITIS INVOLVING MULTIPLE JOINTS: ICD-10-CM

## 2024-05-24 DIAGNOSIS — E78.2 MIXED HYPERLIPIDEMIA: ICD-10-CM

## 2024-05-24 DIAGNOSIS — Z00.00 MEDICARE ANNUAL WELLNESS VISIT, SUBSEQUENT: Primary | ICD-10-CM

## 2024-05-24 DIAGNOSIS — I25.83 CORONARY ATHEROSCLEROSIS DUE TO LIPID RICH PLAQUE (CODE): ICD-10-CM

## 2024-05-24 DIAGNOSIS — I10 ESSENTIAL (PRIMARY) HYPERTENSION: ICD-10-CM

## 2024-05-24 DIAGNOSIS — E03.9 ACQUIRED HYPOTHYROIDISM: ICD-10-CM

## 2024-05-24 DIAGNOSIS — M1A.00X0 IDIOPATHIC CHRONIC GOUT WITHOUT TOPHUS, UNSPECIFIED SITE: ICD-10-CM

## 2024-05-24 DIAGNOSIS — R73.03 PREDIABETES: ICD-10-CM

## 2024-05-24 DIAGNOSIS — Z12.5 PROSTATE CANCER SCREENING: ICD-10-CM

## 2024-05-24 PROCEDURE — G8419 CALC BMI OUT NRM PARAM NOF/U: HCPCS | Performed by: INTERNAL MEDICINE

## 2024-05-24 PROCEDURE — G0439 PPPS, SUBSEQ VISIT: HCPCS | Performed by: INTERNAL MEDICINE

## 2024-05-24 PROCEDURE — 99213 OFFICE O/P EST LOW 20 MIN: CPT | Performed by: INTERNAL MEDICINE

## 2024-05-24 PROCEDURE — 1036F TOBACCO NON-USER: CPT | Performed by: INTERNAL MEDICINE

## 2024-05-24 PROCEDURE — 3075F SYST BP GE 130 - 139MM HG: CPT | Performed by: INTERNAL MEDICINE

## 2024-05-24 PROCEDURE — 1123F ACP DISCUSS/DSCN MKR DOCD: CPT | Performed by: INTERNAL MEDICINE

## 2024-05-24 PROCEDURE — G8427 DOCREV CUR MEDS BY ELIG CLIN: HCPCS | Performed by: INTERNAL MEDICINE

## 2024-05-24 PROCEDURE — 3078F DIAST BP <80 MM HG: CPT | Performed by: INTERNAL MEDICINE

## 2024-05-24 RX ORDER — PANTOPRAZOLE SODIUM 40 MG/1
40 TABLET, DELAYED RELEASE ORAL
Qty: 90 TABLET | Refills: 3 | Status: SHIPPED | OUTPATIENT
Start: 2024-05-24

## 2024-05-24 RX ORDER — INDOMETHACIN 50 MG/1
50 CAPSULE ORAL 2 TIMES DAILY PRN
Qty: 180 CAPSULE | Refills: 3 | Status: SHIPPED | OUTPATIENT
Start: 2024-05-24

## 2024-05-24 ASSESSMENT — PATIENT HEALTH QUESTIONNAIRE - PHQ9
SUM OF ALL RESPONSES TO PHQ QUESTIONS 1-9: 0
SUM OF ALL RESPONSES TO PHQ9 QUESTIONS 1 & 2: 0
1. LITTLE INTEREST OR PLEASURE IN DOING THINGS: NOT AT ALL
SUM OF ALL RESPONSES TO PHQ QUESTIONS 1-9: 0
2. FEELING DOWN, DEPRESSED OR HOPELESS: NOT AT ALL
SUM OF ALL RESPONSES TO PHQ QUESTIONS 1-9: 0
SUM OF ALL RESPONSES TO PHQ QUESTIONS 1-9: 0

## 2024-05-24 ASSESSMENT — LIFESTYLE VARIABLES
HOW MANY STANDARD DRINKS CONTAINING ALCOHOL DO YOU HAVE ON A TYPICAL DAY: 1 OR 2
HOW OFTEN DO YOU HAVE A DRINK CONTAINING ALCOHOL: 2-3 TIMES A WEEK

## 2024-05-24 NOTE — PROGRESS NOTES
\"Have you been to the ER, urgent care clinic since your last visit?  Hospitalized since your last visit?\"    NO    “Have you seen or consulted any other health care providers outside of [facility] since your last visit?”    NO            Click Here for Release of Records Request  
nasal turbinates, no oropharyngeal erythema or exudate, MMM  Neck - supple, no bruits, no thyroidomegaly, no lymphadenopathy  Pulm - clear to auscultation bilaterally  Cardio - RRR, normal S1 S2, no murmur  Abd - soft, nontender, no masses, no HSM  Extrem - no edema, +2 distal pulses  Neuro-  No focal deficits, CN intact     Assessment/Plan:    Diffuse osteoarthritis--refills sent in for indocin.  Add daily protonix as well  Htn--controlled with norvasc, vasotec  Cad with stents--on asa, lipitor  Hyperlipids--on lipitor, check flp, cmp  Chronic gout--no recent flares, on allopurinol.  Check uric acid  Hypothyroid--on synthroid, check tsh, ft4  Lumbar spinal stenosis--sp surgery x 3, most recently may 2023.  Neurontin helps    Rx given for tdap  Rtc one year.        Gideon Carroll, DO

## 2024-05-28 ENCOUNTER — NURSE ONLY (OUTPATIENT)
Age: 79
End: 2024-05-28

## 2024-05-28 DIAGNOSIS — M1A.00X0 IDIOPATHIC CHRONIC GOUT WITHOUT TOPHUS, UNSPECIFIED SITE: ICD-10-CM

## 2024-05-28 DIAGNOSIS — I10 ESSENTIAL (PRIMARY) HYPERTENSION: ICD-10-CM

## 2024-05-28 DIAGNOSIS — E03.9 ACQUIRED HYPOTHYROIDISM: ICD-10-CM

## 2024-05-28 DIAGNOSIS — E78.2 MIXED HYPERLIPIDEMIA: ICD-10-CM

## 2024-05-28 DIAGNOSIS — R73.03 PREDIABETES: ICD-10-CM

## 2024-05-28 DIAGNOSIS — Z12.5 PROSTATE CANCER SCREENING: ICD-10-CM

## 2024-05-28 LAB
ALBUMIN SERPL-MCNC: 3.5 G/DL (ref 3.5–5)
ALBUMIN/GLOB SERPL: 1.1 (ref 1.1–2.2)
ALP SERPL-CCNC: 115 U/L (ref 45–117)
ALT SERPL-CCNC: 28 U/L (ref 12–78)
ANION GAP SERPL CALC-SCNC: 3 MMOL/L (ref 5–15)
AST SERPL-CCNC: 21 U/L (ref 15–37)
BASOPHILS # BLD: 0 K/UL (ref 0–0.1)
BASOPHILS NFR BLD: 1 % (ref 0–1)
BILIRUB SERPL-MCNC: 1.2 MG/DL (ref 0.2–1)
BUN SERPL-MCNC: 22 MG/DL (ref 6–20)
BUN/CREAT SERPL: 21 (ref 12–20)
CALCIUM SERPL-MCNC: 9.5 MG/DL (ref 8.5–10.1)
CHLORIDE SERPL-SCNC: 108 MMOL/L (ref 97–108)
CHOLEST SERPL-MCNC: 99 MG/DL
CO2 SERPL-SCNC: 29 MMOL/L (ref 21–32)
CREAT SERPL-MCNC: 1.04 MG/DL (ref 0.7–1.3)
DIFFERENTIAL METHOD BLD: ABNORMAL
EOSINOPHIL # BLD: 0.1 K/UL (ref 0–0.4)
EOSINOPHIL NFR BLD: 2 % (ref 0–7)
ERYTHROCYTE [DISTWIDTH] IN BLOOD BY AUTOMATED COUNT: 15.1 % (ref 11.5–14.5)
EST. AVERAGE GLUCOSE BLD GHB EST-MCNC: 120 MG/DL
GLOBULIN SER CALC-MCNC: 3.1 G/DL (ref 2–4)
GLUCOSE SERPL-MCNC: 156 MG/DL (ref 65–100)
HBA1C MFR BLD: 5.8 % (ref 4–5.6)
HCT VFR BLD AUTO: 46.4 % (ref 36.6–50.3)
HDLC SERPL-MCNC: 51 MG/DL
HDLC SERPL: 1.9 (ref 0–5)
HGB BLD-MCNC: 14.7 G/DL (ref 12.1–17)
IMM GRANULOCYTES # BLD AUTO: 0 K/UL (ref 0–0.04)
IMM GRANULOCYTES NFR BLD AUTO: 0 % (ref 0–0.5)
LDLC SERPL CALC-MCNC: 32.4 MG/DL (ref 0–100)
LYMPHOCYTES # BLD: 1.2 K/UL (ref 0.8–3.5)
LYMPHOCYTES NFR BLD: 36 % (ref 12–49)
MCH RBC QN AUTO: 29.6 PG (ref 26–34)
MCHC RBC AUTO-ENTMCNC: 31.7 G/DL (ref 30–36.5)
MCV RBC AUTO: 93.4 FL (ref 80–99)
MONOCYTES # BLD: 0.4 K/UL (ref 0–1)
MONOCYTES NFR BLD: 12 % (ref 5–13)
NEUTS SEG # BLD: 1.6 K/UL (ref 1.8–8)
NEUTS SEG NFR BLD: 49 % (ref 32–75)
NRBC # BLD: 0 K/UL (ref 0–0.01)
NRBC BLD-RTO: 0 PER 100 WBC
PLATELET # BLD AUTO: 239 K/UL (ref 150–400)
PMV BLD AUTO: 9.7 FL (ref 8.9–12.9)
POTASSIUM SERPL-SCNC: 4.1 MMOL/L (ref 3.5–5.1)
PROT SERPL-MCNC: 6.6 G/DL (ref 6.4–8.2)
PSA SERPL-MCNC: 3.6 NG/ML (ref 0.01–4)
RBC # BLD AUTO: 4.97 M/UL (ref 4.1–5.7)
SODIUM SERPL-SCNC: 140 MMOL/L (ref 136–145)
T4 FREE SERPL-MCNC: 1.7 NG/DL (ref 0.8–1.5)
TRIGL SERPL-MCNC: 78 MG/DL
TSH SERPL DL<=0.05 MIU/L-ACNC: 0.01 UIU/ML (ref 0.36–3.74)
URATE SERPL-MCNC: 4.3 MG/DL (ref 3.5–7.2)
VLDLC SERPL CALC-MCNC: 15.6 MG/DL
WBC # BLD AUTO: 3.2 K/UL (ref 4.1–11.1)

## 2024-05-29 ENCOUNTER — TELEPHONE (OUTPATIENT)
Age: 79
End: 2024-05-29

## 2024-05-29 RX ORDER — LEVOTHYROXINE SODIUM 175 UG/1
175 TABLET ORAL
Qty: 90 TABLET | Refills: 3 | Status: SHIPPED | OUTPATIENT
Start: 2024-05-29

## 2024-05-29 NOTE — TELEPHONE ENCOUNTER
Called, spoke to pt.  Two pt identifiers confirmed.     Patient informed lab results per. Gideon Carroll III, DO  See message below.    Thyroid dose appears to be bit too much.      New rx sent in to decrease dose from 200 to 188.     Next lowest dose is 175, not 188.  Sorry about that.    Other labs look good.     Stay active, stay well.     Patient request a paper copy of lab results to be mailed.  Lab results printed and placed in the mail.      Patient verbalized understanding of information discussed with no further questions at this time.      Danica Alonso LPN

## 2024-05-29 NOTE — TELEPHONE ENCOUNTER
Caller is returning a call from clinical team regarding lab results    Clinical team not available to take the call    Please call pt back.  Ok to leave details on vm per pt

## 2024-05-29 NOTE — TELEPHONE ENCOUNTER
Called pt; no answer    Able to leave a voice message to call clinic back at earliest convenience.

## 2024-06-11 RX ORDER — EPINEPHRINE 0.3 MG/.3ML
0.3 INJECTION SUBCUTANEOUS
Qty: 2 EACH | Refills: 1 | Status: SHIPPED | OUTPATIENT
Start: 2024-06-11 | End: 2024-06-11

## 2024-06-11 NOTE — TELEPHONE ENCOUNTER
Caller requests Refill of:  EPINEPHrine (EPIPEN) 0.3 MG/0.3ML SOAJ injection      Please send to:    [facility] [id number] - [place], [place] - [address] [facility] - P [phone] - F [phone] [address] [facility] [address]  Phone: [phone] Fax: [phone]         Visit / Appointment History:  Future Appointment at [facility]:  5/29/25  Last Appointment With PCP:  5/24/2024       Caller confirmed instructions and dosages as correct.    Caller was advised that Meds will be refilled as soon as possible, however there can be a 48-72 business hour turn around on refill requests.

## 2024-06-11 NOTE — TELEPHONE ENCOUNTER
PCP: Gideon Carorll III,     Last appt: 5/24/2024  Future Appointments   Date Time Provider Department Center   5/29/2025 11:00 AM Gideon Carroll III, DO [facility]       Requested Prescriptions     Pending Prescriptions Disp Refills    EPINEPHrine (EPIPEN) 0.3 MG/0.3ML SOAJ injection       Sig: Inject 0.3 mLs into the muscle once as needed

## 2024-06-17 ENCOUNTER — TELEPHONE (OUTPATIENT)
Age: 79
End: 2024-06-17

## 2024-06-17 NOTE — TELEPHONE ENCOUNTER
Placed call to patient in regards to medication request.   Patient's wife stated that they are going on a cruise and patient gets really sick and needs an Rx for motion sickness.

## 2024-06-17 NOTE — TELEPHONE ENCOUNTER
Pt is going on a cruise on 6-22-24 and pt needs motion sickness medication /little pills called in for this.    They will be gone for two weeks.  Can this be called in before Saturday to Lauro #[phone]

## 2024-06-18 RX ORDER — SCOLOPAMINE TRANSDERMAL SYSTEM 1 MG/1
1 PATCH, EXTENDED RELEASE TRANSDERMAL
Qty: 10 PATCH | Refills: 1 | Status: SHIPPED | OUTPATIENT
Start: 2024-06-18

## 2024-06-18 NOTE — TELEPHONE ENCOUNTER
Placed call to patient to inform him that physician sent Rx for scopolamine patches to pharmacy.   Patient verbalized understanding.

## 2024-07-05 ENCOUNTER — OFFICE VISIT (OUTPATIENT)
Age: 79
End: 2024-07-05

## 2024-07-05 VITALS
OXYGEN SATURATION: 94 % | TEMPERATURE: 98.5 F | DIASTOLIC BLOOD PRESSURE: 64 MMHG | HEIGHT: 68 IN | SYSTOLIC BLOOD PRESSURE: 110 MMHG | HEART RATE: 63 BPM | RESPIRATION RATE: 18 BRPM | WEIGHT: 190.2 LBS | BODY MASS INDEX: 28.82 KG/M2

## 2024-07-05 DIAGNOSIS — U07.1 COVID-19: Primary | ICD-10-CM

## 2024-07-05 DIAGNOSIS — R09.89 CHEST CONGESTION: ICD-10-CM

## 2024-07-05 LAB
Lab: ABNORMAL
QC PASS/FAIL: ABNORMAL
SARS-COV-2 RDRP RESP QL NAA+PROBE: POSITIVE

## 2024-07-05 NOTE — PROGRESS NOTES
Subjective:       History was provided by the patient.  Fco Mary is a 78 y.o. male who presents for evaluation of symptoms of a URI. Symptoms include dry cough and post nasal drip. Onset of symptoms was 3 days ago, unchanged since that time. Associated symptoms include congestion, non productive cough, and post nasal drip.  He is drinking plenty of fluids. Evaluation to date: none. Treatment to date: none  Patient's medications, allergies, past medical, surgical, social and family histories were reviewed and updated as appropriate.    Review of Systems  Pertinent items are noted in HPI.      Objective:      General appearance: alert, appears stated age, and cooperative  Ears: normal TM's and external ear canals both ears  Nose: no discharge, turbinates normal, no sinus tenderness  Throat: normal findings: pink and moist  Lungs: clear to auscultation bilaterally  Heart: S1, S2 normal  Lymph nodes: Cervical adenopathy: nodes normal          Assessment:     1. Chest congestion  - POCT COVID-19 Rapid, NAAT  Results for orders placed or performed in visit on 07/05/24   POCT COVID-19 Rapid, NAAT   Result Value Ref Range    SARS-COV-2, RdRp gene Positive (A) Negative    Lot Number [id number]     QC Pass/Fail PASS      2. COVID-19  He appears well, VSS, afebrile, SPO2 94% on room air.  No distress noted. Ears normal.  Throat and pharynx normal.  Neck supple. No adenopathy in the neck. Nose is congested. Sinuses non tender. The chest is clear, without wheezes or rales. Patient is Covid positive.     Patient is previously healthy and immunocompetent, presenting with symptoms suspicious for likely viral upper respiratory infection.  Differential includes acute bronchitis, rhinosinusitis, allergic rhinitis, bacterial pneumonia, or COVID.  Do not suspect underlying cardiopulmonary process.  I considered, but think unlikely, dangerous causes of this patient's symptoms to include ACS, CHF or COPD exacerbations, pneumonia,

## 2024-07-05 NOTE — PATIENT INSTRUCTIONS
If symptoms worsens or fail to improve follow-up with PCP or ER.     Thank you for visiting [facility] today    Nasal Congestion:  Flonase or Nasonex (over the counter) nasal spray, once a day  Saline irrigation kits help wash out sinuses 1-2 times a day  Normal saline nasal spray    Cough:  Throat lozenges, hot tea, and honey may help  Vicks VapoRub at night to help with cough and relieve muscles aches and pain  If not prescribed a cough medication, Robitussin DM is an option.  It is an over the counter cough medication containing dextromethorphan to help suppress cough at night   *Please only take when absolutely needed, as this is a controlled substance that can cause addiction   *Please only take cough syrup at nighttime as it causes drowsiness   *Do not drive or operate any machinery while taking this medication  If you have high blood pressure, take Coricidin HBP (or the generic form) instead.  Follow instructions on the box.  Congestion:  For thick mucus, take Mucinex (with Guafenesin only) to help thin the mucus.  Follow instructions on the box.  You will need to drink plenty of water with this medication.  Sore Throat:  Lozenges, as needed. Cepacol lozenges will help numb the throat  Chloraseptic spray also helps to numb throat pain  Salt water gargles to soothe throat pain  Sinus pain/pressure:  Warm, wet towel on face to help with facial sinus pain/pressure  Headache/Pain Fever/Body Aches:  If you can take NSAIDs, take Ibuprofen 400-800mg every 8 hours as needed  If you cannot take NSAIDs, take Tylenol 325-500mg every 6 hours as needed  Miscellanous:  Zyrtec/Xyzal/Allegra/Claritin during the day.  You  may also use the decongestant version of these medications.   Simple foods like chicken noodle soup, smoothies, hot tea with lemon and honey may also help  Avoid smoking  Minimize exposure to irritants    Please follow up with your primary care provider within 2-5 days if your signs and

## 2024-08-07 ENCOUNTER — TELEPHONE (OUTPATIENT)
Age: 79
End: 2024-08-07

## 2024-08-07 DIAGNOSIS — E03.8 OTHER SPECIFIED HYPOTHYROIDISM: Primary | ICD-10-CM

## 2024-08-07 NOTE — TELEPHONE ENCOUNTER
Pt called in states has taken    levothyroxine (SYNTHROID) 200 MCG tablet for at least 15 years.     Dr. Carroll changed dosage to 175 MCG since pt has gained 10 lbs even though working out every day.    Please call to discuss.

## 2024-08-07 NOTE — TELEPHONE ENCOUNTER
Returned patient's call in regards to medication.   Patient stated that he has gained 10 lbs since levothyroxine dosage was decreased in May of this year.  Patient stated that he has been exercise and eating right, but weight will not come off.     Currently taking: levothyroxine 175 mcg  Past: levothyroxine 200 mcg

## 2024-08-08 ENCOUNTER — LAB (OUTPATIENT)
Age: 79
End: 2024-08-08

## 2024-08-08 DIAGNOSIS — E03.8 OTHER SPECIFIED HYPOTHYROIDISM: ICD-10-CM

## 2024-08-08 NOTE — TELEPHONE ENCOUNTER
Spoke with patient advised that Dr. Carroll ordered labs per telephone conversation on 8/7/24.  Patient verbalized understanding.   Patient provided with lab's hours of operation.

## 2024-08-09 LAB
T4 FREE SERPL-MCNC: 1.2 NG/DL (ref 0.8–1.5)
TSH SERPL DL<=0.05 MIU/L-ACNC: 0.13 UIU/ML (ref 0.36–3.74)

## 2024-08-09 NOTE — RESULT ENCOUNTER NOTE
Patient's spouse notified of results and response from Gideon Carroll III, DO:    \"Thyroid levels look good. Continue with same dose of synthroid.\"    Spouse verbalized understanding.

## 2024-11-01 RX ORDER — ATORVASTATIN CALCIUM 20 MG/1
20 TABLET, FILM COATED ORAL DAILY
Qty: 90 TABLET | Refills: 3 | Status: SHIPPED | OUTPATIENT
Start: 2024-11-01

## 2024-11-15 ENCOUNTER — TRANSCRIBE ORDERS (OUTPATIENT)
Facility: HOSPITAL | Age: 79
End: 2024-11-15

## 2024-11-15 ENCOUNTER — HOSPITAL ENCOUNTER (OUTPATIENT)
Facility: HOSPITAL | Age: 79
Discharge: HOME | End: 2024-11-18
Payer: MEDICARE

## 2024-11-15 DIAGNOSIS — M54.59 CHRONIC PRIMARY LOW BACK PAIN: ICD-10-CM

## 2024-11-15 DIAGNOSIS — G89.29 CHRONIC PRIMARY LOW BACK PAIN: Primary | ICD-10-CM

## 2024-11-15 DIAGNOSIS — M54.59 CHRONIC PRIMARY LOW BACK PAIN: Primary | ICD-10-CM

## 2024-11-15 DIAGNOSIS — G89.29 CHRONIC PRIMARY LOW BACK PAIN: ICD-10-CM

## 2024-11-15 PROCEDURE — 73502 X-RAY EXAM HIP UNI 2-3 VIEWS: CPT

## 2024-11-15 PROCEDURE — 72110 X-RAY EXAM L-2 SPINE 4/>VWS: CPT

## 2024-12-03 PROBLEM — M16.11 PRIMARY OSTEOARTHRITIS OF RIGHT HIP: Status: ACTIVE | Noted: 2024-12-03

## 2024-12-16 ENCOUNTER — HOSPITAL ENCOUNTER (OUTPATIENT)
Facility: HOSPITAL | Age: 79
Discharge: HOME | End: 2024-12-19
Payer: MEDICARE

## 2024-12-16 ENCOUNTER — OFFICE VISIT (OUTPATIENT)
Age: 79
End: 2024-12-16
Payer: MEDICARE

## 2024-12-16 VITALS
HEIGHT: 66 IN | RESPIRATION RATE: 20 BRPM | DIASTOLIC BLOOD PRESSURE: 75 MMHG | HEART RATE: 65 BPM | SYSTOLIC BLOOD PRESSURE: 126 MMHG | BODY MASS INDEX: 31.18 KG/M2 | TEMPERATURE: 97.5 F | WEIGHT: 194 LBS | OXYGEN SATURATION: 96 %

## 2024-12-16 VITALS
HEIGHT: 66 IN | OXYGEN SATURATION: 95 % | BODY MASS INDEX: 30.86 KG/M2 | SYSTOLIC BLOOD PRESSURE: 159 MMHG | WEIGHT: 192 LBS | DIASTOLIC BLOOD PRESSURE: 83 MMHG | RESPIRATION RATE: 14 BRPM | HEART RATE: 73 BPM | TEMPERATURE: 97.9 F

## 2024-12-16 DIAGNOSIS — M16.11 PRIMARY OSTEOARTHRITIS OF RIGHT HIP: ICD-10-CM

## 2024-12-16 DIAGNOSIS — I25.10 ATHEROSCLEROSIS OF NATIVE CORONARY ARTERY OF NATIVE HEART WITHOUT ANGINA PECTORIS: ICD-10-CM

## 2024-12-16 DIAGNOSIS — Z01.818 PREOP EXAMINATION: Primary | ICD-10-CM

## 2024-12-16 DIAGNOSIS — I10 ESSENTIAL (PRIMARY) HYPERTENSION: ICD-10-CM

## 2024-12-16 LAB
ABO + RH BLD: NORMAL
ALBUMIN SERPL-MCNC: 3.6 G/DL (ref 3.5–5)
ALBUMIN/GLOB SERPL: 1.2 (ref 1.1–2.2)
ALP SERPL-CCNC: 149 U/L (ref 45–117)
ALT SERPL-CCNC: 19 U/L (ref 12–78)
ANION GAP SERPL CALC-SCNC: 4 MMOL/L (ref 2–12)
APPEARANCE UR: CLEAR
APTT PPP: 27.3 SEC (ref 22.1–31)
AST SERPL-CCNC: 16 U/L (ref 15–37)
BACTERIA URNS QL MICRO: NEGATIVE /HPF
BASOPHILS # BLD: 0 K/UL (ref 0–0.1)
BASOPHILS NFR BLD: 1 % (ref 0–1)
BILIRUB SERPL-MCNC: 1.1 MG/DL (ref 0.2–1)
BILIRUB UR QL: NEGATIVE
BLOOD GROUP ANTIBODIES SERPL: NORMAL
BUN SERPL-MCNC: 29 MG/DL (ref 6–20)
BUN/CREAT SERPL: 27 (ref 12–20)
CALCIUM SERPL-MCNC: 9.4 MG/DL (ref 8.5–10.1)
CHLORIDE SERPL-SCNC: 109 MMOL/L (ref 97–108)
CO2 SERPL-SCNC: 28 MMOL/L (ref 21–32)
COLOR UR: NORMAL
CREAT SERPL-MCNC: 1.06 MG/DL (ref 0.7–1.3)
DIFFERENTIAL METHOD BLD: ABNORMAL
EOSINOPHIL # BLD: 0.3 K/UL (ref 0–0.4)
EOSINOPHIL NFR BLD: 6 % (ref 0–7)
EPITH CASTS URNS QL MICRO: NORMAL /LPF
ERYTHROCYTE [DISTWIDTH] IN BLOOD BY AUTOMATED COUNT: 14.5 % (ref 11.5–14.5)
EST. AVERAGE GLUCOSE BLD GHB EST-MCNC: 131 MG/DL
GLOBULIN SER CALC-MCNC: 3.1 G/DL (ref 2–4)
GLUCOSE SERPL-MCNC: 75 MG/DL (ref 65–100)
GLUCOSE UR STRIP.AUTO-MCNC: NEGATIVE MG/DL
HBA1C MFR BLD: 6.2 % (ref 4–5.6)
HCT VFR BLD AUTO: 43.9 % (ref 36.6–50.3)
HGB BLD-MCNC: 14.2 G/DL (ref 12.1–17)
HGB UR QL STRIP: NEGATIVE
HYALINE CASTS URNS QL MICRO: NORMAL /LPF (ref 0–2)
IMM GRANULOCYTES # BLD AUTO: 0 K/UL (ref 0–0.04)
IMM GRANULOCYTES NFR BLD AUTO: 1 % (ref 0–0.5)
INR PPP: 1 (ref 0.9–1.1)
KETONES UR QL STRIP.AUTO: NEGATIVE MG/DL
LEUKOCYTE ESTERASE UR QL STRIP.AUTO: NEGATIVE
LYMPHOCYTES # BLD: 1.1 K/UL (ref 0.8–3.5)
LYMPHOCYTES NFR BLD: 25 % (ref 12–49)
MCH RBC QN AUTO: 30.5 PG (ref 26–34)
MCHC RBC AUTO-ENTMCNC: 32.3 G/DL (ref 30–36.5)
MCV RBC AUTO: 94.2 FL (ref 80–99)
MONOCYTES # BLD: 0.6 K/UL (ref 0–1)
MONOCYTES NFR BLD: 13 % (ref 5–13)
NEUTS SEG # BLD: 2.4 K/UL (ref 1.8–8)
NEUTS SEG NFR BLD: 54 % (ref 32–75)
NITRITE UR QL STRIP.AUTO: NEGATIVE
NRBC # BLD: 0 K/UL (ref 0–0.01)
NRBC BLD-RTO: 0 PER 100 WBC
PH UR STRIP: 5.5 (ref 5–8)
PLATELET # BLD AUTO: 296 K/UL (ref 150–400)
PMV BLD AUTO: 9.1 FL (ref 8.9–12.9)
POTASSIUM SERPL-SCNC: 4.1 MMOL/L (ref 3.5–5.1)
PROT SERPL-MCNC: 6.7 G/DL (ref 6.4–8.2)
PROT UR STRIP-MCNC: NEGATIVE MG/DL
PROTHROMBIN TIME: 10.4 SEC (ref 9–11.1)
RBC # BLD AUTO: 4.66 M/UL (ref 4.1–5.7)
RBC #/AREA URNS HPF: NORMAL /HPF (ref 0–5)
SODIUM SERPL-SCNC: 141 MMOL/L (ref 136–145)
SP GR UR REFRACTOMETRY: 1.02
SPECIMEN EXP DATE BLD: NORMAL
THERAPEUTIC RANGE: NORMAL SECS (ref 58–77)
URINE CULTURE IF INDICATED: NORMAL
UROBILINOGEN UR QL STRIP.AUTO: 0.2 EU/DL (ref 0.2–1)
WBC # BLD AUTO: 4.3 K/UL (ref 4.1–11.1)
WBC URNS QL MICRO: NORMAL /HPF (ref 0–4)

## 2024-12-16 PROCEDURE — 80053 COMPREHEN METABOLIC PANEL: CPT

## 2024-12-16 PROCEDURE — 86850 RBC ANTIBODY SCREEN: CPT

## 2024-12-16 PROCEDURE — 1159F MED LIST DOCD IN RCRD: CPT | Performed by: INTERNAL MEDICINE

## 2024-12-16 PROCEDURE — 81001 URINALYSIS AUTO W/SCOPE: CPT

## 2024-12-16 PROCEDURE — 85610 PROTHROMBIN TIME: CPT

## 2024-12-16 PROCEDURE — G8417 CALC BMI ABV UP PARAM F/U: HCPCS | Performed by: INTERNAL MEDICINE

## 2024-12-16 PROCEDURE — 3079F DIAST BP 80-89 MM HG: CPT | Performed by: INTERNAL MEDICINE

## 2024-12-16 PROCEDURE — 1123F ACP DISCUSS/DSCN MKR DOCD: CPT | Performed by: INTERNAL MEDICINE

## 2024-12-16 PROCEDURE — 1036F TOBACCO NON-USER: CPT | Performed by: INTERNAL MEDICINE

## 2024-12-16 PROCEDURE — 85730 THROMBOPLASTIN TIME PARTIAL: CPT

## 2024-12-16 PROCEDURE — 99214 OFFICE O/P EST MOD 30 MIN: CPT | Performed by: INTERNAL MEDICINE

## 2024-12-16 PROCEDURE — 1160F RVW MEDS BY RX/DR IN RCRD: CPT | Performed by: INTERNAL MEDICINE

## 2024-12-16 PROCEDURE — 86900 BLOOD TYPING SEROLOGIC ABO: CPT

## 2024-12-16 PROCEDURE — 97530 THERAPEUTIC ACTIVITIES: CPT

## 2024-12-16 PROCEDURE — 97161 PT EVAL LOW COMPLEX 20 MIN: CPT

## 2024-12-16 PROCEDURE — 36415 COLL VENOUS BLD VENIPUNCTURE: CPT

## 2024-12-16 PROCEDURE — 85025 COMPLETE CBC W/AUTO DIFF WBC: CPT

## 2024-12-16 PROCEDURE — 86901 BLOOD TYPING SEROLOGIC RH(D): CPT

## 2024-12-16 PROCEDURE — G8427 DOCREV CUR MEDS BY ELIG CLIN: HCPCS | Performed by: INTERNAL MEDICINE

## 2024-12-16 PROCEDURE — G8484 FLU IMMUNIZE NO ADMIN: HCPCS | Performed by: INTERNAL MEDICINE

## 2024-12-16 PROCEDURE — 3077F SYST BP >= 140 MM HG: CPT | Performed by: INTERNAL MEDICINE

## 2024-12-16 PROCEDURE — 83036 HEMOGLOBIN GLYCOSYLATED A1C: CPT

## 2024-12-16 RX ORDER — CEFAZOLIN SODIUM/WATER 2 G/20 ML
2000 SYRINGE (ML) INTRAVENOUS ONCE
Status: CANCELLED | OUTPATIENT
Start: 2024-12-16

## 2024-12-16 RX ORDER — SODIUM CHLORIDE, SODIUM LACTATE, POTASSIUM CHLORIDE, CALCIUM CHLORIDE 600; 310; 30; 20 MG/100ML; MG/100ML; MG/100ML; MG/100ML
INJECTION, SOLUTION INTRAVENOUS CONTINUOUS
OUTPATIENT
Start: 2024-12-16

## 2024-12-16 SDOH — ECONOMIC STABILITY: FOOD INSECURITY: WITHIN THE PAST 12 MONTHS, YOU WORRIED THAT YOUR FOOD WOULD RUN OUT BEFORE YOU GOT MONEY TO BUY MORE.: NEVER TRUE

## 2024-12-16 SDOH — ECONOMIC STABILITY: FOOD INSECURITY: WITHIN THE PAST 12 MONTHS, THE FOOD YOU BOUGHT JUST DIDN'T LAST AND YOU DIDN'T HAVE MONEY TO GET MORE.: NEVER TRUE

## 2024-12-16 SDOH — ECONOMIC STABILITY: INCOME INSECURITY: HOW HARD IS IT FOR YOU TO PAY FOR THE VERY BASICS LIKE FOOD, HOUSING, MEDICAL CARE, AND HEATING?: NOT HARD AT ALL

## 2024-12-16 ASSESSMENT — HOOS JR
BENDING TO THE FLOOR TO PICK UP OBJECT: SEVERE
RISING FROM SITTING: SEVERE
HOOS JR RAW SCORE: 17
SITTING: SEVERE
HOOS JR RAW SCORE: 17
LYING IN BED (TURNING OVER, MAINTAINING HIP POSITION): SEVERE
GOING UP OR DOWN STAIRS: MODERATE
HOOS JR TOTAL INTERVAL SCORE: 36.363
WALKING ON UNEVEN SURFACE: SEVERE

## 2024-12-16 ASSESSMENT — PROMIS GLOBAL HEALTH SCALE
IN THE PAST 7 DAYS, HOW WOULD YOU RATE YOUR FATIGUE ON AVERAGE [ON A SCALE FROM 1 (NONE) TO 5 (VERY SEVERE)]?: VERY SEVERE
IN GENERAL, WOULD YOU SAY YOUR HEALTH IS...[ON A SCALE OF 1 (POOR) TO 5 (EXCELLENT)]: VERY GOOD
IN GENERAL, WOULD YOU SAY YOUR QUALITY OF LIFE IS...[ON A SCALE OF 1 (POOR) TO 5 (EXCELLENT)]: GOOD
IN THE PAST 7 DAYS, HOW WOULD YOU RATE YOUR PAIN ON AVERAGE [ON A SCALE FROM 0 (NO PAIN) TO 10 (WORST IMAGINABLE PAIN)]?: 8
IN THE PAST 7 DAYS, HOW OFTEN HAVE YOU BEEN BOTHERED BY EMOTIONAL PROBLEMS, SUCH AS FEELING ANXIOUS, DEPRESSED, OR IRRITABLE [ON A SCALE FROM 1 (NEVER) TO 5 (ALWAYS)]?: OFTEN
IN GENERAL, HOW WOULD YOU RATE YOUR SATISFACTION WITH YOUR SOCIAL ACTIVITIES AND RELATIONSHIPS [ON A SCALE OF 1 (POOR) TO 5 (EXCELLENT)]?: VERY GOOD
IN GENERAL, PLEASE RATE HOW WELL YOU CARRY OUT YOUR USUAL SOCIAL ACTIVITIES (INCLUDES ACTIVITIES AT HOME, AT WORK, AND IN YOUR COMMUNITY, AND RESPONSIBILITIES AS A PARENT, CHILD, SPOUSE, EMPLOYEE, FRIEND, ETC) [ON A SCALE OF 1 (POOR) TO 5 (EXCELLENT)]?: GOOD
IN GENERAL, HOW WOULD YOU RATE YOUR MENTAL HEALTH, INCLUDING YOUR MOOD AND YOUR ABILITY TO THINK [ON A SCALE OF 1 (POOR) TO 5 (EXCELLENT)]?: FAIR
HOW IS THE PROMIS V1.1 BEING ADMINISTERED?: PAPER
WHO IS THE PERSON COMPLETING THE PROMIS V1.1 SURVEY?: SURROGATE
TO WHAT EXTENT ARE YOU ABLE TO CARRY OUT YOUR EVERYDAY PHYSICAL ACTIVITIES SUCH AS WALKING, CLIMBING STAIRS, CARRYING GROCERIES, OR MOVING A CHAIR [ON A SCALE OF 1 (NOT AT ALL) TO 5 (COMPLETELY)]?: A LITTLE
SUM OF RESPONSES TO QUESTIONS 2, 4, 5, & 10: 11
IN GENERAL, HOW WOULD YOU RATE YOUR PHYSICAL HEALTH [ON A SCALE OF 1 (POOR) TO 5 (EXCELLENT)]?: GOOD
SUM OF RESPONSES TO QUESTIONS 3, 6, 7, & 8: 14

## 2024-12-16 ASSESSMENT — PAIN DESCRIPTION - ORIENTATION: ORIENTATION: RIGHT

## 2024-12-16 ASSESSMENT — PAIN SCALES - GENERAL: PAINLEVEL_OUTOF10: 7

## 2024-12-16 ASSESSMENT — PAIN DESCRIPTION - LOCATION: LOCATION: BACK;HIP;GROIN

## 2024-12-16 NOTE — PROGRESS NOTES
Patient reports has RW for use at home after DOS 12/23/2024.  PROMs/HOOs completed 12/16/2024.  HIP DISABILITY AND OSTEOARTHRITIS OUTCOME SCORE    Pain - What amount of hip pain have you experienced the last week during the following activities?  1. Going up or down stairs: 2  2. Walking on an uneven surface: 3        Function, daily living - Please indicate the degree of difficulty you have experienced in the last week due to your hip  3. Rising from sitting : 3  4. Bending to the floor/ an object: 3  5. Lying in bed (turning over, maintaining hip position): 3  6. Sitting : 3        Raw Score  HOOS Jr. Raw Score: 17      PROMIS Questions    In general, would you say your health is:: 4    In general, would you say your quality of life is:: 3    In general, how would you rate your physical health?: 3    In general, how would you rate your mental health, including your mood and your ability to think?: 2    To what extent are you able to carry out your everyday physical activities such as walking, climbing stairs, carrying groceries, or moving a chair?: 2    In the past 7 days how often have you been bothered by emotional problems such as feeling anxious, depressed or irritable?: 2    In the past 7 days how would you rate your fatigue on average?: 1    In the past 7 days how would you rate your pain on average?: 8    In general, please rate how well you carry out your usual social activities and roles. (This includes activities at home, at work and in your community, and responsibilities as a parent, child, spouse, employee, friend, etc.): 3    In general, how would you rate your satisfaction with your social activities and relationships?: 4    How comfortable are you filling out medical forms by yourself?: 3    What amount of pain have you experienced in the last week in your other knee/hip?: 4    My BACK PAIN at the moment is: 4    Have you taken chronic narcotics in the last 90 days?: 0      Mode of

## 2024-12-16 NOTE — PROGRESS NOTES
[facility]  Joint/Spine Preoperative Instructions        Surgery Date 12/23/24          Time of Arrival to be called 12/20/24 roberto 2-5 pm on [phone] (     1. On the day of your surgery, please report to the Surgical Services Registration Desk and sign in at your designated time. The Surgery Center is located to the right of the Emergency Room.     2. You must have someone with you to drive you home. You should not drive a car for 24 hours following surgery. Please make arrangements for a friend or family member to stay with you for the first 24 hours after your surgery.    3. No food after midnight 12/22/24.  Medications morning of surgery should be taken with a sip of water.  Please follow pre-surgery drink instructions that were given at your Pre Admission Testing appointment.      4. We recommend you do not drink any alcoholic beverages for 24 hours before and after your surgery.    5. Contact your surgeon’s office for instructions on the following medications: non-steroidal anti-inflammatory drugs (i.e. Advil, Aleve), vitamins, and supplements. (Some surgeon’s will want you to stop these medications prior to surgery and others may allow you to take them)  **If you are currently taking Plavix, Coumadin, Aspirin and/or other blood-thinning agents, contact your surgeon for instructions.** Your surgeon will partner with the physician prescribing these medications to determine if it is safe to stop or if you need to continue taking.  Please do not stop taking these medications without instructions from your surgeon    6. Wear comfortable clothes.  Wear glasses instead of contacts.  Do not bring any money or jewelry. Please bring picture ID, insurance card, and any prearranged co-payment or hospital payment.  Do not wear make-up, particularly mascara the morning of your surgery.  Do not wear nail polish, particularly if you are having foot /hand surgery.  Wear your hair loose

## 2024-12-16 NOTE — PROGRESS NOTES
Fco Mary is a 79 y.o. male who presents for evaluation of preop exam for right SHIRA, scheduled for dec 23 with dr donald michelle.  He had his preop labs done this morning,and things all looked fine.  He has had numerous surgeries in the past and done well with them.  He denies any angina or other cardiac symptoms.  He had a stent placed in 2001, but has not had any cardiac issues since then.  Exercises regularly and lifts weights at gym when his hip allows.  Wife with him today.      ROS:  Constitutional: negative for fevers, chills, anorexia and weight loss  Eyes:   negative for visual disturbance and irritation  ENT:   negative for tinnitus,sore throat,nasal congestion,ear pain,hoarseness  Respiratory:  negative for cough, hemoptysis, dyspnea,wheezing  CV:   negative for chest pain, palpitations, lower extremity edema  GI:   negative for nausea, vomiting, diarrhea, abdominal pain,melena  Genitourinary: negative for frequency, dysuria and hematuria  Musculoskel: negative for myalgias, arthralgias, back pain, muscle weakness, ++bilateral hip joint pain  Neurological:  negative for headaches, dizziness, focal weakness, numbness  Psychiatric:     Negative for depression or anxiety      Past Medical History:   Diagnosis Date    Arthritis     lower back    At risk for sleep apnea 12/16/2024    stop bang score 5    Baker's cyst 2006    CAD (coronary artery disease)     stent x 1 2001 LDA    Chronic lower back pain     Dr Gamble     Chronic pain     right knee - resolved with knee replacement 4/2017    Chronic pain 2022    left knee    Dental infection 09/2024    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

## 2024-12-16 NOTE — PROGRESS NOTES

## 2024-12-16 NOTE — PROGRESS NOTES
\"Have you been to the ER, urgent care clinic since your last visit?  Hospitalized since your last visit?\"    NO    “Have you seen or consulted any other health care providers outside our system since your last visit?”    [facility]. Gilliam[facility]

## 2024-12-16 NOTE — PROGRESS NOTES

## 2024-12-16 NOTE — PROGRESS NOTES
Call to Dr Baker office and [facility] murphy Bucio to call back.  The patient reported he was treated with antibiotics for a dental infection in September.

## 2024-12-16 NOTE — PROGRESS NOTES
[facility]  Physical Therapy Pre-surgery evaluation  [address] [place] [address]    PHYSICAL THERAPY PRE THR SURGERY EVALUATION    Date: 2024  Patient: Fco Mary (79 y.o. male)  : 1945  Medical Diagnosis: No admission diagnoses are documented for this encounter.  Procedure(s) (LRB):  RIGHT ANTERIOR TOTAL HIP ARTHROPLASTY (SPINAL) (Right)     Treatment Diagnosis: M25.551  RIGHT HIP PAIN    Referral Source: Thom Fairbanks MD  Provider #: Thom Fairbanks  NPI #: [id number]  Precautions:    None    ASSESSMENT :  Based on the objective data described below, the patient presents with impaired gait, balance, pain, and overall high level functional mobility due to end stage degenerative joint disease in the  right hip.     Discussed anticipated disposition to home with possible discharge within a 0 to 2 day time frame post-surgery. Patient's  was present for the session.  Patient and  in agreement.     The patient indicated he is not interested to discharge day of surgery if all discharge criteria met. Patient voiced good  understanding of therapy specific criteria to discharge day of surgery. Patient with good potential for discharging same day of surgery based on social support and current condition.    GOALS: (Goals have been discussed and agreed upon with patient.)  DISCHARGE GOALS: Time Frame: 1 DAY  Patient will demonstrate increased strength, range of motion, and pain control via a home exercise program in order to minimize functional deficits in preparation for their upcoming surgery. This will be achieved by using education, demonstration and through the use of an informational handout including a home exercise program.  REHABILITATION POTENTIAL FOR STATED GOALS: Good       RECOMMENDATIONS AND PLANNED INTERVENTIONS: (Benefits and precautions of physical therapy have been discussed with the patient.)  Home Exercise Program  TREATMENT PLAN EFFECTIVE DATES:

## 2024-12-16 NOTE — PROGRESS NOTES
Patient falls asleep easily during conversation throughout appointment today. Wife in the room and states this happens often. Patient has PCP appointment today and will discuss at that time.

## 2024-12-17 LAB
BACTERIA SPEC CULT: NORMAL
BACTERIA SPEC CULT: NORMAL
SERVICE CMNT-IMP: NORMAL

## 2024-12-17 NOTE — PROGRESS NOTES
Spoke with Rupinder at Dr. Fairbanks's office and notified her that patient reported having a dental infection in September and he did finish antibiotics as prescribed by his dentist. Rupinder will make Dr. Fairbanks aware.

## 2024-12-19 NOTE — DISCHARGE INSTRUCTIONS
Post-op Discharge Instructions Following Total Joint Replacement  Thom Fairbanks MD  [facility]  [phone]  Follow-up Office Visit  See Dr. Fairbanks approximately 3-4 weeks from date of surgery. Call [phone] to make an appointment.  If you have any postop questions for Dr. Fairbanks's clinical team, please call [phone].  Activity  Use your walker for ambulation.  Weight bearing as tolerated unless instructed otherwise by the physical therapist. Get up every hour you are awake and take a brief walk. Lengthen walking distance daily as your strength improves.  Continue using your walker until seen in the office for your first follow up visit.  Practice your exercises 3 times daily as instructed by the physical therapist. Ice for 20 minutes after exercising.  No driving until seen in the office for your first follow up visit.  Incision Care  The surgical dressing is waterproof and is to remain on your incision for 7 days. On the 7th day, carefully lift the edge of the dressing to break the adhesive seal and gently peel it off.  If your surgical dressing comes loose or falls off before the 7th day, replace it with a dry sterile gauze dressing and change this dressing daily. Once there is no drainage on the bandage, you mean leave the incision open to air.  You may take a shower with the surgical dressing in place. After you remove the surgical dressing on day 7, you may continue to shower and get your incision wet in the shower. Do not submerge your incision under water in a bathtub, hot tub, swimming pool, etc. until after you have been evaluated at your first office visit.  Medications  Blood Clot Prevention: Take medication as prescribed by your physician for 4 weeks postop.  Pain Management: Take pain medication as prescribed; wean yourself off of pain medication as your pain lessens. Take with food.  You make also take Tylenol every 4-6 hours as needed for pain.  Do not exceed 3 grams (3000mg) per

## 2024-12-20 ENCOUNTER — ANESTHESIA EVENT (OUTPATIENT)
Facility: HOSPITAL | Age: 79
End: 2024-12-20
Payer: MEDICARE

## 2024-12-20 NOTE — ANESTHESIA PRE PROCEDURE
Department of Anesthesiology  Preprocedure Note       Name:  Fco Mary   Age:  79 y.o.  :  1945                                          MRN:  [id number]         Date:  2024      Surgeon: Surgeon(s):  Thom Fairbanks MD    Procedure: Procedure(s):  RIGHT ANTERIOR TOTAL HIP ARTHROPLASTY (SPINAL)    Medications prior to admission:   Prior to Admission medications    Medication Sig Start Date End Date Taking? Authorizing Provider   atorvastatin (LIPITOR) 20 MG tablet TAKE 1 TABLET BY MOUTH DAILY  Patient taking differently: Take 1 tablet by mouth nightly 24   Gideon Carroll III, DO   EPINEPHrine (EPIPEN) 0.3 MG/0.3ML SOAJ injection Inject 0.3 mLs into the muscle once as needed (anaphylactic reaction) 24  Gideon Carroll III, DO   levothyroxine (SYNTHROID) 175 MCG tablet Take 1 tablet by mouth every morning (before breakfast) 24   Gideon Carroll III,    indomethacin (INDOCIN) 50 MG capsule Take 1 capsule by mouth 2 times daily as needed for Pain  Patient not taking: Reported on 2024   Gideon Carroll III,    pantoprazole (PROTONIX) 40 MG tablet Take 1 tablet by mouth every morning (before breakfast) 24   Gideon Carroll III, DO   allopurinol (ZYLOPRIM) 100 MG tablet Take 1 tablet by mouth daily    Provider, Historical, MD   Saw Palmetto, Serenoa repens, (SAW PALMETTO PO) Take 320 mg by mouth daily  Patient not taking: Reported on 2024    Automatic Reconciliation, Ar   amLODIPine (NORVASC) 5 MG tablet Take 0.5 tablets by mouth daily    Automatic Reconciliation, Ar   aspirin 81 MG EC tablet Take 1 tablet by mouth daily  Patient not taking: Reported on 2024 10/27/22   Automatic Reconciliation, Ar   Cholecalciferol 75 MCG (3000 UT) TABS Take 75 mcg by mouth daily  Patient not taking: Reported on 2024    Automatic Reconciliation, Ar   Cobalamin Combinations (B-12) 100-5000 MCG SUBL Place 1 tablet under the

## 2024-12-23 ENCOUNTER — HOSPITAL ENCOUNTER (OUTPATIENT)
Facility: HOSPITAL | Age: 79
Setting detail: OBSERVATION
Discharge: HOME | End: 2024-12-24
Attending: ORTHOPAEDIC SURGERY | Admitting: ORTHOPAEDIC SURGERY
Payer: MEDICARE

## 2024-12-23 ENCOUNTER — APPOINTMENT (OUTPATIENT)
Facility: HOSPITAL | Age: 79
End: 2024-12-23
Attending: ORTHOPAEDIC SURGERY
Payer: MEDICARE

## 2024-12-23 ENCOUNTER — ANESTHESIA (OUTPATIENT)
Facility: HOSPITAL | Age: 79
End: 2024-12-23
Payer: MEDICARE

## 2024-12-23 DIAGNOSIS — M16.11 PRIMARY OSTEOARTHRITIS OF RIGHT HIP: ICD-10-CM

## 2024-12-23 DIAGNOSIS — Z96.641 S/P TOTAL RIGHT HIP ARTHROPLASTY: Primary | ICD-10-CM

## 2024-12-23 PROCEDURE — 6360000002 HC RX W HCPCS: Performed by: PHYSICIAN ASSISTANT

## 2024-12-23 PROCEDURE — 2580000003 HC RX 258

## 2024-12-23 PROCEDURE — 6360000002 HC RX W HCPCS

## 2024-12-23 PROCEDURE — 97161 PT EVAL LOW COMPLEX 20 MIN: CPT

## 2024-12-23 PROCEDURE — G0378 HOSPITAL OBSERVATION PER HR: HCPCS

## 2024-12-23 PROCEDURE — 2500000003 HC RX 250 WO HCPCS: Performed by: PHYSICIAN ASSISTANT

## 2024-12-23 PROCEDURE — 6370000000 HC RX 637 (ALT 250 FOR IP): Performed by: ANESTHESIOLOGY

## 2024-12-23 PROCEDURE — 2580000003 HC RX 258: Performed by: STUDENT IN AN ORGANIZED HEALTH CARE EDUCATION/TRAINING PROGRAM

## 2024-12-23 PROCEDURE — 2500000003 HC RX 250 WO HCPCS

## 2024-12-23 PROCEDURE — 6360000002 HC RX W HCPCS: Performed by: ORTHOPAEDIC SURGERY

## 2024-12-23 PROCEDURE — 3700000001 HC ADD 15 MINUTES (ANESTHESIA): Performed by: ORTHOPAEDIC SURGERY

## 2024-12-23 PROCEDURE — 2580000003 HC RX 258: Performed by: ORTHOPAEDIC SURGERY

## 2024-12-23 PROCEDURE — 97530 THERAPEUTIC ACTIVITIES: CPT

## 2024-12-23 PROCEDURE — 3700000000 HC ANESTHESIA ATTENDED CARE: Performed by: ORTHOPAEDIC SURGERY

## 2024-12-23 PROCEDURE — 7100000000 HC PACU RECOVERY - FIRST 15 MIN: Performed by: ORTHOPAEDIC SURGERY

## 2024-12-23 PROCEDURE — 6370000000 HC RX 637 (ALT 250 FOR IP): Performed by: ORTHOPAEDIC SURGERY

## 2024-12-23 PROCEDURE — 3600000004 HC SURGERY LEVEL 4 BASE: Performed by: ORTHOPAEDIC SURGERY

## 2024-12-23 PROCEDURE — 97116 GAIT TRAINING THERAPY: CPT

## 2024-12-23 PROCEDURE — G0379 DIRECT REFER HOSPITAL OBSERV: HCPCS

## 2024-12-23 PROCEDURE — 76000 FLUOROSCOPY <1 HR PHYS/QHP: CPT

## 2024-12-23 PROCEDURE — 73502 X-RAY EXAM HIP UNI 2-3 VIEWS: CPT

## 2024-12-23 PROCEDURE — 6370000000 HC RX 637 (ALT 250 FOR IP): Performed by: PHYSICIAN ASSISTANT

## 2024-12-23 PROCEDURE — 3600000014 HC SURGERY LEVEL 4 ADDTL 15MIN: Performed by: ORTHOPAEDIC SURGERY

## 2024-12-23 PROCEDURE — 2709999900 HC NON-CHARGEABLE SUPPLY: Performed by: ORTHOPAEDIC SURGERY

## 2024-12-23 PROCEDURE — 7100000001 HC PACU RECOVERY - ADDTL 15 MIN: Performed by: ORTHOPAEDIC SURGERY

## 2024-12-23 RX ORDER — OXYCODONE HYDROCHLORIDE 5 MG/1
5 TABLET ORAL
Status: DISCONTINUED | OUTPATIENT
Start: 2024-12-23 | End: 2024-12-24 | Stop reason: HOSPADM

## 2024-12-23 RX ORDER — GLYCOPYRROLATE 0.2 MG/ML
INJECTION INTRAMUSCULAR; INTRAVENOUS
Status: DISCONTINUED | OUTPATIENT
Start: 2024-12-23 | End: 2024-12-23 | Stop reason: SDUPTHER

## 2024-12-23 RX ORDER — SUCCINYLCHOLINE/SOD CL,ISO/PF 200MG/10ML
SYRINGE (ML) INTRAVENOUS
Status: DISCONTINUED | OUTPATIENT
Start: 2024-12-23 | End: 2024-12-23 | Stop reason: SDUPTHER

## 2024-12-23 RX ORDER — ONDANSETRON 2 MG/ML
4 INJECTION INTRAMUSCULAR; INTRAVENOUS
Status: DISCONTINUED | OUTPATIENT
Start: 2024-12-23 | End: 2024-12-23 | Stop reason: HOSPADM

## 2024-12-23 RX ORDER — SODIUM CHLORIDE, SODIUM LACTATE, POTASSIUM CHLORIDE, CALCIUM CHLORIDE 600; 310; 30; 20 MG/100ML; MG/100ML; MG/100ML; MG/100ML
INJECTION, SOLUTION INTRAVENOUS
Status: DISCONTINUED | OUTPATIENT
Start: 2024-12-23 | End: 2024-12-23 | Stop reason: SDUPTHER

## 2024-12-23 RX ORDER — ONDANSETRON 2 MG/ML
INJECTION INTRAMUSCULAR; INTRAVENOUS
Status: DISCONTINUED | OUTPATIENT
Start: 2024-12-23 | End: 2024-12-23 | Stop reason: SDUPTHER

## 2024-12-23 RX ORDER — PROCHLORPERAZINE EDISYLATE 5 MG/ML
5 INJECTION INTRAMUSCULAR; INTRAVENOUS
Status: DISCONTINUED | OUTPATIENT
Start: 2024-12-23 | End: 2024-12-23 | Stop reason: HOSPADM

## 2024-12-23 RX ORDER — ENALAPRIL MALEATE 10 MG/1
20 TABLET ORAL DAILY
Status: DISCONTINUED | OUTPATIENT
Start: 2024-12-23 | End: 2024-12-24 | Stop reason: HOSPADM

## 2024-12-23 RX ORDER — SODIUM CHLORIDE 0.9 % (FLUSH) 0.9 %
5-40 SYRINGE (ML) INJECTION EVERY 12 HOURS SCHEDULED
Status: DISCONTINUED | OUTPATIENT
Start: 2024-12-23 | End: 2024-12-23 | Stop reason: HOSPADM

## 2024-12-23 RX ORDER — NEOSTIGMINE METHYLSULFATE 1 MG/ML
INJECTION, SOLUTION INTRAVENOUS
Status: DISCONTINUED | OUTPATIENT
Start: 2024-12-23 | End: 2024-12-23 | Stop reason: SDUPTHER

## 2024-12-23 RX ORDER — KETOROLAC TROMETHAMINE 30 MG/ML
15 INJECTION, SOLUTION INTRAMUSCULAR; INTRAVENOUS EVERY 6 HOURS
Status: COMPLETED | OUTPATIENT
Start: 2024-12-23 | End: 2024-12-24

## 2024-12-23 RX ORDER — AMLODIPINE BESYLATE 2.5 MG/1
2.5 TABLET ORAL DAILY
Status: DISCONTINUED | OUTPATIENT
Start: 2024-12-23 | End: 2024-12-24 | Stop reason: HOSPADM

## 2024-12-23 RX ORDER — ROCURONIUM BROMIDE 10 MG/ML
INJECTION, SOLUTION INTRAVENOUS
Status: DISCONTINUED | OUTPATIENT
Start: 2024-12-23 | End: 2024-12-23 | Stop reason: SDUPTHER

## 2024-12-23 RX ORDER — NALOXONE HYDROCHLORIDE 0.4 MG/ML
INJECTION, SOLUTION INTRAMUSCULAR; INTRAVENOUS; SUBCUTANEOUS PRN
Status: DISCONTINUED | OUTPATIENT
Start: 2024-12-23 | End: 2024-12-23 | Stop reason: HOSPADM

## 2024-12-23 RX ORDER — DEXAMETHASONE SODIUM PHOSPHATE 4 MG/ML
INJECTION, SOLUTION INTRA-ARTICULAR; INTRALESIONAL; INTRAMUSCULAR; INTRAVENOUS; SOFT TISSUE
Status: DISCONTINUED | OUTPATIENT
Start: 2024-12-23 | End: 2024-12-23 | Stop reason: SDUPTHER

## 2024-12-23 RX ORDER — SODIUM CHLORIDE 0.9 % (FLUSH) 0.9 %
5-40 SYRINGE (ML) INJECTION EVERY 12 HOURS SCHEDULED
Status: DISCONTINUED | OUTPATIENT
Start: 2024-12-23 | End: 2024-12-24 | Stop reason: HOSPADM

## 2024-12-23 RX ORDER — SODIUM CHLORIDE 9 MG/ML
INJECTION, SOLUTION INTRAVENOUS PRN
Status: DISCONTINUED | OUTPATIENT
Start: 2024-12-23 | End: 2024-12-23 | Stop reason: HOSPADM

## 2024-12-23 RX ORDER — HYDROMORPHONE HYDROCHLORIDE 1 MG/ML
0.5 INJECTION, SOLUTION INTRAMUSCULAR; INTRAVENOUS; SUBCUTANEOUS EVERY 5 MIN PRN
Status: DISCONTINUED | OUTPATIENT
Start: 2024-12-23 | End: 2024-12-23 | Stop reason: HOSPADM

## 2024-12-23 RX ORDER — 0.9 % SODIUM CHLORIDE 0.9 %
500 INTRAVENOUS SOLUTION INTRAVENOUS PRN
Status: DISCONTINUED | OUTPATIENT
Start: 2024-12-23 | End: 2024-12-24 | Stop reason: HOSPADM

## 2024-12-23 RX ORDER — SODIUM CHLORIDE 0.9 % (FLUSH) 0.9 %
5-40 SYRINGE (ML) INJECTION PRN
Status: DISCONTINUED | OUTPATIENT
Start: 2024-12-23 | End: 2024-12-23 | Stop reason: HOSPADM

## 2024-12-23 RX ORDER — OXYCODONE HYDROCHLORIDE 5 MG/1
5 TABLET ORAL
Status: COMPLETED | OUTPATIENT
Start: 2024-12-23 | End: 2024-12-23

## 2024-12-23 RX ORDER — OXYCODONE HYDROCHLORIDE 5 MG/1
2.5 TABLET ORAL
Status: DISCONTINUED | OUTPATIENT
Start: 2024-12-23 | End: 2024-12-24 | Stop reason: HOSPADM

## 2024-12-23 RX ORDER — FENTANYL CITRATE 50 UG/ML
25 INJECTION, SOLUTION INTRAMUSCULAR; INTRAVENOUS EVERY 5 MIN PRN
Status: DISCONTINUED | OUTPATIENT
Start: 2024-12-23 | End: 2024-12-23 | Stop reason: HOSPADM

## 2024-12-23 RX ORDER — HYDROMORPHONE HYDROCHLORIDE 1 MG/ML
0.5 INJECTION, SOLUTION INTRAMUSCULAR; INTRAVENOUS; SUBCUTANEOUS EVERY 4 HOURS PRN
Status: DISCONTINUED | OUTPATIENT
Start: 2024-12-23 | End: 2024-12-24 | Stop reason: HOSPADM

## 2024-12-23 RX ORDER — BISACODYL 10 MG
10 SUPPOSITORY, RECTAL RECTAL DAILY PRN
Status: DISCONTINUED | OUTPATIENT
Start: 2024-12-23 | End: 2024-12-24 | Stop reason: HOSPADM

## 2024-12-23 RX ORDER — SODIUM CHLORIDE 9 MG/ML
INJECTION, SOLUTION INTRAVENOUS
Status: DISCONTINUED | OUTPATIENT
Start: 2024-12-23 | End: 2024-12-23 | Stop reason: SDUPTHER

## 2024-12-23 RX ORDER — TRANEXAMIC ACID 100 MG/ML
INJECTION, SOLUTION INTRAVENOUS
Status: DISCONTINUED | OUTPATIENT
Start: 2024-12-23 | End: 2024-12-23 | Stop reason: SDUPTHER

## 2024-12-23 RX ORDER — ATORVASTATIN CALCIUM 20 MG/1
20 TABLET, FILM COATED ORAL NIGHTLY
Status: DISCONTINUED | OUTPATIENT
Start: 2024-12-23 | End: 2024-12-24 | Stop reason: HOSPADM

## 2024-12-23 RX ORDER — SODIUM CHLORIDE, SODIUM LACTATE, POTASSIUM CHLORIDE, CALCIUM CHLORIDE 600; 310; 30; 20 MG/100ML; MG/100ML; MG/100ML; MG/100ML
INJECTION, SOLUTION INTRAVENOUS CONTINUOUS
Status: DISCONTINUED | OUTPATIENT
Start: 2024-12-23 | End: 2024-12-23 | Stop reason: HOSPADM

## 2024-12-23 RX ORDER — ASPIRIN 81 MG/1
81 TABLET ORAL 2 TIMES DAILY
Status: DISCONTINUED | OUTPATIENT
Start: 2024-12-23 | End: 2024-12-24 | Stop reason: HOSPADM

## 2024-12-23 RX ORDER — ONDANSETRON 4 MG/1
4 TABLET, ORALLY DISINTEGRATING ORAL EVERY 8 HOURS PRN
Status: DISCONTINUED | OUTPATIENT
Start: 2024-12-23 | End: 2024-12-24 | Stop reason: HOSPADM

## 2024-12-23 RX ORDER — SENNA AND DOCUSATE SODIUM 50; 8.6 MG/1; MG/1
1 TABLET, FILM COATED ORAL 2 TIMES DAILY
Status: DISCONTINUED | OUTPATIENT
Start: 2024-12-23 | End: 2024-12-24 | Stop reason: HOSPADM

## 2024-12-23 RX ORDER — POLYETHYLENE GLYCOL 3350 17 G/17G
17 POWDER, FOR SOLUTION ORAL DAILY PRN
Status: DISCONTINUED | OUTPATIENT
Start: 2024-12-23 | End: 2024-12-24 | Stop reason: HOSPADM

## 2024-12-23 RX ORDER — HYDROXYZINE HYDROCHLORIDE 10 MG/1
10 TABLET, FILM COATED ORAL EVERY 8 HOURS PRN
Status: DISCONTINUED | OUTPATIENT
Start: 2024-12-23 | End: 2024-12-24 | Stop reason: HOSPADM

## 2024-12-23 RX ORDER — FENTANYL CITRATE 50 UG/ML
INJECTION, SOLUTION INTRAMUSCULAR; INTRAVENOUS
Status: DISCONTINUED | OUTPATIENT
Start: 2024-12-23 | End: 2024-12-23 | Stop reason: SDUPTHER

## 2024-12-23 RX ORDER — ONDANSETRON 2 MG/ML
4 INJECTION INTRAMUSCULAR; INTRAVENOUS EVERY 6 HOURS PRN
Status: DISCONTINUED | OUTPATIENT
Start: 2024-12-23 | End: 2024-12-24 | Stop reason: HOSPADM

## 2024-12-23 RX ORDER — SODIUM CHLORIDE 9 MG/ML
INJECTION, SOLUTION INTRAVENOUS PRN
Status: DISCONTINUED | OUTPATIENT
Start: 2024-12-23 | End: 2024-12-24 | Stop reason: HOSPADM

## 2024-12-23 RX ORDER — SODIUM CHLORIDE 9 MG/ML
INJECTION, SOLUTION INTRAVENOUS CONTINUOUS
Status: ACTIVE | OUTPATIENT
Start: 2024-12-23 | End: 2024-12-24

## 2024-12-23 RX ORDER — PANTOPRAZOLE SODIUM 40 MG/1
40 TABLET, DELAYED RELEASE ORAL
Status: DISCONTINUED | OUTPATIENT
Start: 2024-12-24 | End: 2024-12-24 | Stop reason: HOSPADM

## 2024-12-23 RX ORDER — PROPOFOL 10 MG/ML
INJECTION, EMULSION INTRAVENOUS
Status: DISCONTINUED | OUTPATIENT
Start: 2024-12-23 | End: 2024-12-23 | Stop reason: SDUPTHER

## 2024-12-23 RX ORDER — GABAPENTIN 300 MG/1
300 CAPSULE ORAL 3 TIMES DAILY PRN
Status: DISCONTINUED | OUTPATIENT
Start: 2024-12-23 | End: 2024-12-24 | Stop reason: HOSPADM

## 2024-12-23 RX ORDER — LIDOCAINE HYDROCHLORIDE 20 MG/ML
INJECTION, SOLUTION EPIDURAL; INFILTRATION; INTRACAUDAL; PERINEURAL
Status: DISCONTINUED | OUTPATIENT
Start: 2024-12-23 | End: 2024-12-23 | Stop reason: SDUPTHER

## 2024-12-23 RX ORDER — SODIUM CHLORIDE 0.9 % (FLUSH) 0.9 %
5-40 SYRINGE (ML) INJECTION PRN
Status: DISCONTINUED | OUTPATIENT
Start: 2024-12-23 | End: 2024-12-24 | Stop reason: HOSPADM

## 2024-12-23 RX ORDER — ACETAMINOPHEN 500 MG
1000 TABLET ORAL ONCE
Status: DISCONTINUED | OUTPATIENT
Start: 2024-12-23 | End: 2024-12-23 | Stop reason: HOSPADM

## 2024-12-23 RX ORDER — ACETAMINOPHEN 500 MG
500 TABLET ORAL
Status: DISCONTINUED | OUTPATIENT
Start: 2024-12-23 | End: 2024-12-24 | Stop reason: HOSPADM

## 2024-12-23 RX ADMIN — SODIUM CHLORIDE, PRESERVATIVE FREE 10 ML: 5 INJECTION INTRAVENOUS at 11:57

## 2024-12-23 RX ADMIN — ROCURONIUM BROMIDE 20 MG: 10 INJECTION INTRAVENOUS at 08:30

## 2024-12-23 RX ADMIN — SENNOSIDES AND DOCUSATE SODIUM 1 TABLET: 50; 8.6 TABLET ORAL at 21:18

## 2024-12-23 RX ADMIN — WATER 2000 MG: 1 INJECTION INTRAMUSCULAR; INTRAVENOUS; SUBCUTANEOUS at 07:37

## 2024-12-23 RX ADMIN — TRANEXAMIC ACID 1000 MG: 100 INJECTION, SOLUTION INTRAVENOUS at 07:38

## 2024-12-23 RX ADMIN — ACETAMINOPHEN 500 MG: 500 TABLET, FILM COATED ORAL at 18:32

## 2024-12-23 RX ADMIN — PHENYLEPHRINE HYDROCHLORIDE 40 MCG/MIN: 10 INJECTION INTRAVENOUS at 08:24

## 2024-12-23 RX ADMIN — ENALAPRIL MALEATE 20 MG: 10 TABLET ORAL at 21:18

## 2024-12-23 RX ADMIN — Medication 3 MG: at 09:11

## 2024-12-23 RX ADMIN — KETOROLAC TROMETHAMINE 15 MG: 30 INJECTION, SOLUTION INTRAMUSCULAR at 21:18

## 2024-12-23 RX ADMIN — SODIUM CHLORIDE, POTASSIUM CHLORIDE, SODIUM LACTATE AND CALCIUM CHLORIDE: 600; 310; 30; 20 INJECTION, SOLUTION INTRAVENOUS at 06:53

## 2024-12-23 RX ADMIN — ONDANSETRON HYDROCHLORIDE 4 MG: 2 INJECTION, SOLUTION INTRAMUSCULAR; INTRAVENOUS at 07:45

## 2024-12-23 RX ADMIN — ACETAMINOPHEN 500 MG: 500 TABLET, FILM COATED ORAL at 21:17

## 2024-12-23 RX ADMIN — GLYCOPYRROLATE 0.6 MG: 0.2 INJECTION, SOLUTION INTRAMUSCULAR; INTRAVENOUS at 09:11

## 2024-12-23 RX ADMIN — Medication 3 AMPULE: at 06:20

## 2024-12-23 RX ADMIN — HYDROMORPHONE HYDROCHLORIDE 0.3 MG: 1 INJECTION, SOLUTION INTRAMUSCULAR; INTRAVENOUS; SUBCUTANEOUS at 09:17

## 2024-12-23 RX ADMIN — DEXAMETHASONE SODIUM PHOSPHATE 8 MG: 4 INJECTION, SOLUTION INTRAMUSCULAR; INTRAVENOUS at 07:45

## 2024-12-23 RX ADMIN — HYDROMORPHONE HYDROCHLORIDE 0.5 MG: 1 INJECTION, SOLUTION INTRAMUSCULAR; INTRAVENOUS; SUBCUTANEOUS at 08:15

## 2024-12-23 RX ADMIN — PROPOFOL 170 MG: 10 INJECTION, EMULSION INTRAVENOUS at 07:33

## 2024-12-23 RX ADMIN — KETOROLAC TROMETHAMINE 15 MG: 30 INJECTION, SOLUTION INTRAMUSCULAR at 16:20

## 2024-12-23 RX ADMIN — TRANEXAMIC ACID 1000 MG: 100 INJECTION, SOLUTION INTRAVENOUS at 09:08

## 2024-12-23 RX ADMIN — WATER 2000 MG: 1 INJECTION INTRAMUSCULAR; INTRAVENOUS; SUBCUTANEOUS at 16:20

## 2024-12-23 RX ADMIN — ASPIRIN 81 MG: 81 TABLET, COATED ORAL at 11:55

## 2024-12-23 RX ADMIN — ASPIRIN 81 MG: 81 TABLET, COATED ORAL at 21:17

## 2024-12-23 RX ADMIN — ROCURONIUM BROMIDE 30 MG: 10 INJECTION INTRAVENOUS at 07:40

## 2024-12-23 RX ADMIN — Medication 120 MG: at 07:33

## 2024-12-23 RX ADMIN — ATORVASTATIN CALCIUM 20 MG: 20 TABLET, FILM COATED ORAL at 21:17

## 2024-12-23 RX ADMIN — ROCURONIUM BROMIDE 15 MG: 10 INJECTION INTRAVENOUS at 08:05

## 2024-12-23 RX ADMIN — KETOROLAC TROMETHAMINE 15 MG: 30 INJECTION, SOLUTION INTRAMUSCULAR at 11:56

## 2024-12-23 RX ADMIN — AMLODIPINE BESYLATE 2.5 MG: 2.5 TABLET ORAL at 21:17

## 2024-12-23 RX ADMIN — OXYCODONE 5 MG: 5 TABLET ORAL at 09:59

## 2024-12-23 RX ADMIN — FENTANYL CITRATE 50 MCG: 50 INJECTION, SOLUTION INTRAMUSCULAR; INTRAVENOUS at 07:33

## 2024-12-23 RX ADMIN — SODIUM CHLORIDE: 900 INJECTION, SOLUTION INTRAVENOUS at 08:46

## 2024-12-23 RX ADMIN — SODIUM CHLORIDE, POTASSIUM CHLORIDE, SODIUM LACTATE AND CALCIUM CHLORIDE: 600; 310; 30; 20 INJECTION, SOLUTION INTRAVENOUS at 07:26

## 2024-12-23 RX ADMIN — LIDOCAINE HYDROCHLORIDE 100 MG: 20 INJECTION, SOLUTION EPIDURAL; INFILTRATION; INTRACAUDAL; PERINEURAL at 07:33

## 2024-12-23 RX ADMIN — SENNOSIDES AND DOCUSATE SODIUM 1 TABLET: 50; 8.6 TABLET ORAL at 11:56

## 2024-12-23 RX ADMIN — ROCURONIUM BROMIDE 5 MG: 10 INJECTION INTRAVENOUS at 07:33

## 2024-12-23 RX ADMIN — FENTANYL CITRATE 50 MCG: 50 INJECTION, SOLUTION INTRAMUSCULAR; INTRAVENOUS at 07:54

## 2024-12-23 RX ADMIN — SODIUM CHLORIDE, PRESERVATIVE FREE 10 ML: 5 INJECTION INTRAVENOUS at 21:22

## 2024-12-23 ASSESSMENT — PAIN DESCRIPTION - PAIN TYPE: TYPE: ACUTE PAIN;SURGICAL PAIN

## 2024-12-23 ASSESSMENT — PAIN SCALES - GENERAL
PAINLEVEL_OUTOF10: 2
PAINLEVEL_OUTOF10: 4

## 2024-12-23 ASSESSMENT — PAIN DESCRIPTION - DESCRIPTORS
DESCRIPTORS: ACHING;DISCOMFORT
DESCRIPTORS: ACHING;SORE

## 2024-12-23 ASSESSMENT — PAIN DESCRIPTION - ORIENTATION
ORIENTATION: RIGHT
ORIENTATION: RIGHT

## 2024-12-23 ASSESSMENT — PAIN DESCRIPTION - LOCATION
LOCATION: HIP
LOCATION: HIP

## 2024-12-23 NOTE — OP NOTE
capsulotomy was performed and femoral neck was osteotomized.  Head was removed with a corkscrew.  Acetabular retractors were placed and labrum was excised.  Acetabulum was reamed with hemispherical reamers up to 55 mm before impacting a 56 mm Empowr acetabular shell.  Intrinsic stability was satisfactory, but was augmented with 1 cancellous screw.  Acetabular component position was confirmed with AP fluoroscopic image of the pelvis.  36 neutral liner was impacted.  Femur was prepared with TaperFill broaches initially up to size 10.  Calcar was planed based on native anatomy.  Hip was reduced with a standard offset neck trial.  +0 head trial produced satisfactory leg length on AP fluoroscopic images.  Stem was slightly undersized in a little bit of varus.  The broach was removed and the femur was machined to take a little varus out of the stem and upsized to a size 11.  The real size 11 was implanted.  Hip was reduced with a +0 head trial, which again showed satisfactory leg length and offset on AP fluoroscopic images.  Hip was stable in maximum external rotation.  Real head was implanted.  Wound was irrigated.  Periarticular soft tissues were injected with a solution containing 0.5% ropivacaine with epinephrine.  Anterior capsule was closed with heavy Vicryl sutures and tensor fascia was closed with heavy Vicryl and a running #2 Stratafix suture.  Skin and subcutaneous layers were closed in a layered fashion with Vicryl and a running Monocryl subcuticular stitch.  Wound was dressed with Dermabond and silver-impregnated occlusive dressing.  The patient was awakened, extubated, and transported to the postanesthesia care unit in stable condition.  All counts were correct at the end of the procedure.    The physician assistant was critical throughout the case to assist with positioning, retraction, and closure.  There were no other available residents, fellows, or surgical assistants available to assist during this

## 2024-12-23 NOTE — FLOWSHEET NOTE
12/23/24 0807   Family Communication    Relationship to Patient Spouse    Phone Number Avelina, [phone]   Family/Significant Other Update Updated   Delivery Origin Nurse   Family Communication   Family Update Message Procedure started;Patient stable

## 2024-12-23 NOTE — CARE COORDINATION
3377 - Met with pt at bedside, provided update on home health referrals which are pending, provided and reviewed Medicare Outpatient Observation Notice.    Initial note - CM completed chart review; pt presented for pre-planned surgery. Anticipate pt will require home health PT visits, pt's insurance may be barrier to agency choice/ availability, CM sending multiple referrals to determine insurance coverage and service availability. Per chart review, pt has own RW. CM following for additions or changes to discharge plan.       Transportation for d/c: spouse   Support post d/c: spouse   Does pt own DME needed for d/c: yes, RW   Accepting HH agency: pending      Lis Caro LMSW  Care Management  [phone]

## 2024-12-23 NOTE — FLOWSHEET NOTE
12/23/24 0935   Handoff   Communication Given Transfer Handoff   Handoff Given To Nataliya MORENO   Handoff Received From Bijan MORENO and TOÑO Trent CRNA   Handoff Communication Face to Face;At bedside        12/23/24 1020   Handoff   Communication Given Transfer Handoff   Handoff phase Phase I transferring   Handoff Given To Carlos SCOTT RN   Handoff Received From Nataliya MORENO   Handoff Communication Telephone

## 2024-12-23 NOTE — PLAN OF CARE
Problem: Physical Therapy - Adult  Goal: By Discharge: Performs mobility at highest level of function for planned discharge setting.  See evaluation for individualized goals.  Description: FUNCTIONAL STATUS PRIOR TO ADMISSION: Pt independent with mobility PTA. Has history of B TKAs. 2 falls from bed recently, 1 requiring scalp lacerations and 1 this morning pre-surgery requiring EMS assist off of floor per wife.    HOME SUPPORT PRIOR TO ADMISSION: Pt lives with wife in [address] [place] [address], stays on 1st floor. Level entry. Has tub/shower, shower chair, BSC, RW, SPC, and toilet riser.    Physical Therapy Goals  Initiated 12/23/2024  1.  Patient will move from supine to sit and sit to supine, scoot up and down, and roll side to side in bed with supervision/set-up within 7 day(s).    2.  Patient will perform sit to stand with minimal assistance within 7 day(s).  3.  Patient will transfer from bed to chair and chair to bed with minimal assistance using the least restrictive device within 7 day(s).  4.  Patient will ambulate with minimal assistance for 50 feet with the least restrictive device within 7 day(s).     Outcome: Progressing   PHYSICAL THERAPY EVALUATION    Patient: Fco Mary (79 y.o. male)  Date: 12/23/2024  Primary Diagnosis: Primary osteoarthritis of right hip [M16.11]  S/P total right hip arthroplasty [Z96.641]  Procedure(s) (LRB):  RIGHT ANTERIOR TOTAL HIP ARTHROPLASTY (Right) Day of Surgery   Precautions: Restrictions/Precautions: Bed Alarm, Fall Risk, Surgical Protocols             Hip Precautions: Anterior hip precautions        ASSESSMENT :   DEFICITS/IMPAIRMENTS:   The patient is limited by decreased functional mobility, strength, activity tolerance, endurance, safety awareness, increased pain levels     Based on the impairments listed above pt received for PT eval POD 0 s/p R SHIRA. Discussed anterior hip precautions with pt/wife prior to mobility, good understanding. He required CGA for rolling,

## 2024-12-23 NOTE — BRIEF OP NOTE
Brief Postoperative Note      Patient: Fco Mary  YOB: 1945  MRN: [id number]    Date of Procedure: 12/23/2024    Pre-Op Diagnosis Codes:      * Primary osteoarthritis of right hip [M16.11]    Post-Op Diagnosis: Same       Procedure(s):  RIGHT ANTERIOR TOTAL HIP ARTHROPLASTY    Surgeon(s):  Thom Fairbanks MD    Assistant:  Surgical Assistant: Dante Miller    Anesthesia: General    Estimated Blood Loss (mL): 200     Complications: None    Specimens:   * No specimens in log *    Implants:  Implant Name Type Inv. Item Serial No.  Lot No. LRB No. Used Action   CUP ACET CLUS HOLE G 54 MM W/ DOME HOLE PLUG P2 COAT EMPOWR - SN/A Joint Component CUP ACET CLUS HOLE G 54 MM W/ DOME HOLE PLUG P2 COAT EMPOWR N/A [facility] [employer] [facility] [id number] Right 1 Implanted   LINER ACET NEUT G 36X54 MM HIP HXE+ VIT E POLYETH EMPOWR - SN/A Joint Component LINER ACET NEUT G 36X54 MM HIP HXE+ VIT E POLYETH EMPOWR N/A [facility] [employer] [facility] [id number] Right 1 Implanted   SCREW BNE 30 MM ACET EMPOWR - SN/A Screw/Plate/Nail/Torres SCREW BNE 30 MM ACET EMPOWR N/A [facility] [employer] [facility] [id number] Right 1 Implanted   STEM FEM STD OFFSET 11  MM 37 MM HIP CMNTLS CLLRLSS TI - SN/A Joint Component STEM FEM STD OFFSET 11  MM 37 MM HIP CMNTLS CLLRLSS TI N/A [facility] MEDICAL [facility] [id number] Right 1 Implanted   HEAD FEM NEUT 36 MM HIP OFFSET FOR FMP SYS BIOLOX DELT CERM - SN/A Joint Component HEAD FEM NEUT 36 MM HIP OFFSET FOR FMP SYS BIOLOX DELT CERM N/A [facility] [employer] [facility] [id number] Right 1 Implanted         Drains:   [REMOVED] Closed/Suction Drain Inferior;Right Back (Removed)       Findings:  Infection Present At Time Of Surgery (PATOS) (choose all levels that have infection present):  No infection present  Other Findings: severe erosive OA    Electronically signed by Thom Fairbanks MD on 12/23/2024 at 9:15 AM

## 2024-12-23 NOTE — ANESTHESIA POSTPROCEDURE EVALUATION
Department of Anesthesiology  Postprocedure Note    Patient: Fco Mary  MRN: [id number]  YOB: 1945  Date of evaluation: 12/23/2024    Procedure Summary       Date: 12/23/24 Room / Location: [facility] MAIN OR [facility] / [facility] MAIN OR    Anesthesia Start: 0726 Anesthesia Stop: 0938    Procedure: RIGHT ANTERIOR TOTAL HIP ARTHROPLASTY (Right: Hip) Diagnosis:       Primary osteoarthritis of right hip      (Primary osteoarthritis of right hip [M16.11])    Providers: Thom Fairbanks MD Responsible Provider: Brett Blanco MD    Anesthesia Type: general ASA Status: 2            Anesthesia Type: No value filed.    Analia Phase I: Analia Score: 10    Analia Phase II:      Anesthesia Post Evaluation    Patient location during evaluation: PACU  Patient participation: complete - patient participated  Level of consciousness: awake  Pain score: 0  Airway patency: patent  Nausea & Vomiting: no nausea and no vomiting  Cardiovascular status: hemodynamically stable  Respiratory status: acceptable  Hydration status: stable  Multimodal analgesia pain management approach  Pain management: adequate    No notable events documented.

## 2024-12-24 VITALS
BODY MASS INDEX: 28.31 KG/M2 | DIASTOLIC BLOOD PRESSURE: 56 MMHG | TEMPERATURE: 97.7 F | HEIGHT: 69 IN | WEIGHT: 191.14 LBS | RESPIRATION RATE: 18 BRPM | HEART RATE: 80 BPM | OXYGEN SATURATION: 94 % | SYSTOLIC BLOOD PRESSURE: 106 MMHG

## 2024-12-24 LAB
ANION GAP SERPL CALC-SCNC: 5 MMOL/L (ref 2–12)
BUN SERPL-MCNC: 26 MG/DL (ref 6–20)
BUN/CREAT SERPL: 20 (ref 12–20)
CALCIUM SERPL-MCNC: 9.6 MG/DL (ref 8.5–10.1)
CHLORIDE SERPL-SCNC: 106 MMOL/L (ref 97–108)
CO2 SERPL-SCNC: 27 MMOL/L (ref 21–32)
CREAT SERPL-MCNC: 1.32 MG/DL (ref 0.7–1.3)
GLUCOSE SERPL-MCNC: 121 MG/DL (ref 65–100)
HCT VFR BLD AUTO: 44 % (ref 36.6–50.3)
HGB BLD-MCNC: 14 G/DL (ref 12.1–17)
POTASSIUM SERPL-SCNC: 4.2 MMOL/L (ref 3.5–5.1)
SODIUM SERPL-SCNC: 138 MMOL/L (ref 136–145)

## 2024-12-24 PROCEDURE — 85018 HEMOGLOBIN: CPT

## 2024-12-24 PROCEDURE — 97535 SELF CARE MNGMENT TRAINING: CPT | Performed by: OCCUPATIONAL THERAPIST

## 2024-12-24 PROCEDURE — 97116 GAIT TRAINING THERAPY: CPT

## 2024-12-24 PROCEDURE — 94760 N-INVAS EAR/PLS OXIMETRY 1: CPT

## 2024-12-24 PROCEDURE — 96374 THER/PROPH/DIAG INJ IV PUSH: CPT

## 2024-12-24 PROCEDURE — 6360000002 HC RX W HCPCS: Performed by: PHYSICIAN ASSISTANT

## 2024-12-24 PROCEDURE — 36415 COLL VENOUS BLD VENIPUNCTURE: CPT

## 2024-12-24 PROCEDURE — 2500000003 HC RX 250 WO HCPCS: Performed by: PHYSICIAN ASSISTANT

## 2024-12-24 PROCEDURE — 85014 HEMATOCRIT: CPT

## 2024-12-24 PROCEDURE — 97167 OT EVAL HIGH COMPLEX 60 MIN: CPT | Performed by: OCCUPATIONAL THERAPIST

## 2024-12-24 PROCEDURE — 80048 BASIC METABOLIC PNL TOTAL CA: CPT

## 2024-12-24 PROCEDURE — 6370000000 HC RX 637 (ALT 250 FOR IP): Performed by: PHYSICIAN ASSISTANT

## 2024-12-24 PROCEDURE — G0378 HOSPITAL OBSERVATION PER HR: HCPCS

## 2024-12-24 PROCEDURE — 97530 THERAPEUTIC ACTIVITIES: CPT

## 2024-12-24 RX ORDER — OXYCODONE HYDROCHLORIDE 5 MG/1
5 TABLET ORAL
Qty: 28 TABLET | Refills: 0 | Status: SHIPPED | OUTPATIENT
Start: 2024-12-24 | End: 2024-12-31

## 2024-12-24 RX ORDER — SENNA AND DOCUSATE SODIUM 50; 8.6 MG/1; MG/1
1 TABLET, FILM COATED ORAL 2 TIMES DAILY
Qty: 20 TABLET | Refills: 0 | Status: SHIPPED | OUTPATIENT
Start: 2024-12-24

## 2024-12-24 RX ORDER — ASPIRIN 81 MG/1
81 TABLET ORAL 2 TIMES DAILY
Qty: 30 TABLET | Refills: 3 | Status: SHIPPED | OUTPATIENT
Start: 2024-12-24

## 2024-12-24 RX ADMIN — WATER 2000 MG: 1 INJECTION INTRAMUSCULAR; INTRAVENOUS; SUBCUTANEOUS at 00:15

## 2024-12-24 RX ADMIN — KETOROLAC TROMETHAMINE 15 MG: 30 INJECTION, SOLUTION INTRAMUSCULAR at 06:11

## 2024-12-24 RX ADMIN — LEVOTHYROXINE SODIUM 175 MCG: 0.15 TABLET ORAL at 06:11

## 2024-12-24 RX ADMIN — ACETAMINOPHEN 500 MG: 500 TABLET, FILM COATED ORAL at 06:13

## 2024-12-24 RX ADMIN — OXYCODONE 5 MG: 5 TABLET ORAL at 08:55

## 2024-12-24 RX ADMIN — ASPIRIN 81 MG: 81 TABLET, COATED ORAL at 08:55

## 2024-12-24 RX ADMIN — ACETAMINOPHEN 500 MG: 500 TABLET, FILM COATED ORAL at 08:55

## 2024-12-24 RX ADMIN — PANTOPRAZOLE SODIUM 40 MG: 40 TABLET, DELAYED RELEASE ORAL at 06:14

## 2024-12-24 RX ADMIN — SENNOSIDES AND DOCUSATE SODIUM 1 TABLET: 50; 8.6 TABLET ORAL at 08:56

## 2024-12-24 ASSESSMENT — PAIN SCALES - GENERAL
PAINLEVEL_OUTOF10: 1
PAINLEVEL_OUTOF10: 4
PAINLEVEL_OUTOF10: 1

## 2024-12-24 ASSESSMENT — PAIN DESCRIPTION - ORIENTATION: ORIENTATION: RIGHT

## 2024-12-24 ASSESSMENT — PAIN DESCRIPTION - DESCRIPTORS: DESCRIPTORS: ACHING

## 2024-12-24 ASSESSMENT — PAIN DESCRIPTION - LOCATION: LOCATION: HIP

## 2024-12-24 NOTE — PLAN OF CARE
Problem: Physical Therapy - Adult  Goal: By Discharge: Performs mobility at highest level of function for planned discharge setting.  See evaluation for individualized goals.  Description: FUNCTIONAL STATUS PRIOR TO ADMISSION: Pt independent with mobility PTA. Has history of B TKAs. 2 falls from bed recently, 1 requiring scalp lacerations and 1 this morning pre-surgery requiring EMS assist off of floor per wife.    HOME SUPPORT PRIOR TO ADMISSION: Pt lives with wife in [address] [place] [address], stays on 1st floor. Level entry. Has tub/shower, shower chair, BSC, RW, SPC, and toilet riser.    Physical Therapy Goals  Initiated 12/23/2024  1.  Patient will move from supine to sit and sit to supine, scoot up and down, and roll side to side in bed with supervision/set-up within 7 day(s).    2.  Patient will perform sit to stand with minimal assistance within 7 day(s).  3.  Patient will transfer from bed to chair and chair to bed with minimal assistance using the least restrictive device within 7 day(s).  4.  Patient will ambulate with minimal assistance for 50 feet with the least restrictive device within 7 day(s).     Outcome: Adequate for Discharge   PHYSICAL THERAPY TREATMENT    Patient: Fco Mary (79 y.o. male)  Date: 12/24/2024  Diagnosis: Primary osteoarthritis of right hip [M16.11]  S/P total right hip arthroplasty [Z96.641] Primary osteoarthritis of right hip  Procedure(s) (LRB):  RIGHT ANTERIOR TOTAL HIP ARTHROPLASTY (Right) 1 Day Post-Op  Precautions: Bed Alarm, Fall Risk, Surgical Protocols             Hip Precautions: Anterior hip precautions        ASSESSMENT:  Patient continues to benefit from skilled PT services and is progressing towards goals. Pt received semi fowlers in bed, agreeable to participate in therapy. BP stable in all positions. Pt demonstrates significant improvements with mobility this date. Continues to require some assist for bed mobility, patient reports needing occasional help from wife

## 2024-12-24 NOTE — PLAN OF CARE
Problem: Safety - Adult  Goal: Free from fall injury  12/24/2024 0935 by Luiz Issa RN  Outcome: Progressing  12/24/2024 0052 by Amol Covarrubias RN  Outcome: Progressing  Flowsheets (Taken 12/24/2024 0052)  Free From Fall Injury: Instruct family/caregiver on patient safety     Problem: Skin/Tissue Integrity  Goal: Absence of new skin breakdown  Description: 1.  Monitor for areas of redness and/or skin breakdown  2.  Assess vascular access sites hourly  3.  Every 4-6 hours minimum:  Change oxygen saturation probe site  4.  Every 4-6 hours:  If on nasal continuous positive airway pressure, respiratory therapy assess nares and determine need for appliance change or resting period.  12/24/2024 0935 by Luiz Issa RN  Outcome: Progressing  12/24/2024 0052 by Amol Covarrubias RN  Outcome: Progressing     Problem: Pain  Goal: Verbalizes/displays adequate comfort level or baseline comfort level  12/24/2024 0935 by Luiz Issa RN  Outcome: Progressing  12/24/2024 0052 by Amol Covarrubias RN  Outcome: Progressing  Flowsheets (Taken 12/24/2024 0052)  Verbalizes/displays adequate comfort level or baseline comfort level:   Encourage patient to monitor pain and request assistance   Assess pain using appropriate pain scale   Administer analgesics based on type and severity of pain and evaluate response   Implement non-pharmacological measures as appropriate and evaluate response   Consider cultural and social influences on pain and pain management   Notify Licensed Independent Practitioner if interventions unsuccessful or patient reports new pain

## 2024-12-24 NOTE — DISCHARGE SUMMARY
Ortho Discharge Summary    Patient ID:  Fco Mary  [id number]  male  79 y.o.  1945    Admit date: 12/23/2024    Discharge date: 12/24/2024    Admitting Physician: Thom Fairbanks MD     Consulting Physician(s):   Treatment Team:   Thom Fairbanks MD Hull, Jason, MD Blair, Juli Zentner, Jordan, RN Flint, Suzanne S OTR/Margie Romo PTA [place], Madison, RN    Date of Surgery:   12/23/2024     Preoperative Diagnosis:  Primary osteoarthritis of right hip [M16.11]    Postoperative Diagnosis:   * No post-op diagnosis entered *    Procedure(s):   RIGHT ANTERIOR TOTAL HIP ARTHROPLASTY     Anesthesia Type:   General     Surgeon: Thom Fairbanks MD                            HPI:  Pt is a 79 y.o. male who has a history of Primary osteoarthritis of right hip [M16.11]  with pain and limitations of activities of daily living who presents at this time for a right RIGHT ANTERIOR TOTAL HIP ARTHROPLASTY following the failure of conservative management.    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 : A BMI > 30 is classified as obesity and > 40 is classified as morbid obesity.     Medications upon admission :   Prior to Admission Medications   Prescriptions Last Dose Informant Patient Reported? Taking?   Cholecalciferol 75 MCG (3000 UT) TABS   Yes No   Sig: Take 75 mcg by mouth daily   Patient not taking: Reported

## 2024-12-24 NOTE — CARE COORDINATION
[facility] only accepting home health agency; liaison reported that SOC will be 12/30, sooner if staffing allows. Will agree to proceed with this delay as no other agencies had accepted to provide services. AVS updated.    Lis Caro, [facility]  Care Management  [phone]

## 2024-12-24 NOTE — PROGRESS NOTES
DISCHARGE NOTE FROM ORTHO NURSE    Patient determined to be stable for discharge by attending provider. I have reviewed the discharge instructions with the patient and spouse. They verbalized understanding and all questions were answered to their satisfaction. No complaints or further questions were expressed.      Medications sent to pharmacy. Appropriate educational materials and medication side effect teaching were provided.      PIV were removed prior to discharge.     Patient did not discharge with any line, valverde, or drain.    Personal items and valuables accounted for at discharge by patient and/or family: Yes    Post-op patient: Yes-Patient given post-op discharge kit and instructed on use.    Luiz Issa RN   
End of Shift Note    Bedside shift change report given to TIFFANIE Dee (oncoming nurse) by Amol Covarrubias RN (offgoing nurse).  Report included the following information SBAR, Procedure Summary, Intake/Output, MAR, and Recent Results    Shift worked:  night     Shift summary and any significant changes:     POD 1, VSS, pt c/o minimal pain, pt getting up to bathroom and chair with assist and the walker, pt voiding with urinal.      Concerns for physician to address:  See above     Zone phone for oncoming shift:   [phone]       Activity:  Level of Assistance: Minimal assist, patient does 75% or more  Number times ambulated in hallways past shift: 0  Number of times OOB to chair past shift: 1    Cardiac:   Cardiac Monitoring: No      Cardiac Rhythm: Sinus rhythm    Access:  Current line(s): PIV     Genitourinary:   Urinary Status: Voiding    Respiratory:   O2 Device: None (Room air)  Chronic home O2 use?: NO  Incentive spirometer at bedside: YES    GI:  Last BM (including prior to admit): 12/23/24  Current diet:  ADULT DIET; Regular  Passing flatus: YES    Pain Management:   Patient states pain is manageable on current regimen: YES    Skin:  Remi Scale Score: 21  Interventions: Wound Offloading (Prevention Methods): Bed, pressure reduction mattress, Elevate heels, Pillows, Repositioning, Turning    Patient Safety:  Fall Risk: Nursing Judgement-Fall Risk High(Add Comments): Yes  Fall Risk Interventions  Nursing Judgement-Fall Risk High(Add Comments): Yes  Toilet Every 2 Hours-In Advance of Need: Yes  Hourly Visual Checks: Awake, In bed  Fall Visual Posted: Fall sign posted, Socks  Room Door Open: Deferred to promote rest  Alarm On: Bed  Patient Moved Closer to Nursing Station: No    Active Consults:   IP CONSULT TO CASE MANAGEMENT    Length of Stay:  Expected LOS: 1  Actual LOS: 0    Amol Covarrubias RN                            
Occupational Therapy    OT referral received, chart reviewed, and spoke with evaluating PT who reports that the patient will not be discharging home today. At this time we will defer OT evaluation until tomorrow, POD 1 for R anterior SHIRA.     Flako Alberts, OTR/L    
Ortho / Neurosurgery Progress Note    POD# 1  s/p RIGHT ANTERIOR TOTAL HIP ARTHROPLASTY   Pt seen with no visitors. Dong great. Voiding, tolerating diet. Denies fever, chills, nausea, vomiting. Sob, chest or abdominal pain.      Patient in bed    VSS Afebrile.    Visit Vitals  BP (!) 106/56   Pulse 80   Temp 97.7 °F (36.5 °C) (Oral)   Resp 18   Ht 1.753 m (5' 9\")   Wt 86.7 kg (191 lb 2.2 oz)   SpO2 94%   BMI 28.23 kg/m²                Labs    Lab Results   Component Value Date/Time    HGB 14.0 12/24/2024 03:31 AM      Lab Results   Component Value Date/Time    INR 1.0 12/16/2024 08:29 AM      Lab Results   Component Value Date/Time     12/24/2024 03:31 AM    K 4.2 12/24/2024 03:31 AM     12/24/2024 03:31 AM    CO2 27 12/24/2024 03:31 AM    BUN 26 12/24/2024 03:31 AM     Recent Glucose Results:   Glucose   Date Value Ref Range Status   12/24/2024 121 (H) 65 - 100 mg/dL Final   12/16/2024 75 65 - 100 mg/dL Final   05/28/2024 156 (H) 65 - 100 mg/dL Final           Body mass index is 28.23 kg/m². : A BMI > 30 is classified as obesity and > 40 is classified as morbid obesity.     Awake and alert. No acute distress.    Dressing: Silver Dressing C.D.I.   No significant erythema or swelling  Cryotherapy in place over incision.   BLE sensation to light touch intact  BLE motor intact. Strength 5/5    SCD for mechanical DVT proph while in bed        PLAN:  1) PT BID - WBAT.   2) DVT Prophylaxis: Aspirin 81 mg BID   3) GI Prophylaxis - PEPCID  4) Pain control - scheduled tylenol  and toradol, and prn  oxycodone    5) Readiness for discharge:     [x] Vital Signs stable    [x] Labs stable    [x] + Voiding    [x] Wound intact, drainage minimal    [x] Tolerating PO intake     [] Cleared by PT (OT if applicable) for discharge   [x] Adequate pain control on oral medication alone        Discharge Plan: Home with Home Health     Discharge Needs: PEREZ Miranda PA-C, DMSC    Orthopedic Physician Assistant     
TRANSFER - IN REPORT:    Verbal report received from Nataliya MORENO on [facility]  being received from PACU for routine post-op      Report consisted of patient's Situation, Background, Assessment and   Recommendations(SBAR).     Information from the following report(s) Nurse Handoff Report and Index was reviewed with the receiving nurse.    Opportunity for questions and clarification was provided.      Assessment completed upon patient's arrival to unit and care assumed.     
date)    Home safety: Patient instructed on home modifications and safety (raise height of ADL objects, appropriate height of chair surfaces, recliner safety, change of floor surfaces, clear pathways) to increase independence and fall prevention.  Patient indicated understanding.     Standing: Patient instructed and demonstrated to walk up to sink/countertop/surfaces, step into walker to increase safety of joint and fall prevention with Supervision. Instructed to apply concept of hip contraindications to ADLs within the home (no extreme reaching across body to right side, square off while using objects, slide objects along surfaces).  Patient instructed to increase amount of time standing, observe standing position during ADLs to increase even weight bearing through bilateral LEs     Transfer Training  Transfer Training: Yes  Overall Level of Assistance: Supervision  Interventions: Safety awareness training;Verbal cues  Sit to Stand: Stand-by assistance  Stand to Sit: Stand-by assistance  Toilet Transfer: Stand-by assistance;Supervision                                                                                                                                                                                                                                   Barthel Index:    Barthel Index Scale  Feeding: Independent, Able to apply any necessary device. Feeds in reasonable time  Bathing: Cannot perform activity  Grooming: Washes face, herrera hair, brushes teeth, shaves (manages plug if electric razor)  Dressing: Needs help, but does at least half of task within reasonable time  Bowel Control: No accidents. Able to use enema or suppository if needed  Bladder Control: Occasional accidents or needs help with device  Toilet Transfers: Needs help for balance, handling clothes or toilet paper  Chair/Bed Trannsfers: Minimum assistance or supervision required  Ambulation: With help for 50 yards  Stairs: Needs help or

## 2024-12-24 NOTE — PLAN OF CARE
Problem: Safety - Adult  Goal: Free from fall injury  Outcome: Progressing  Flowsheets (Taken 12/24/2024 0052)  Free From Fall Injury: Instruct family/caregiver on patient safety     Problem: Skin/Tissue Integrity  Goal: Absence of new skin breakdown  Description: 1.  Monitor for areas of redness and/or skin breakdown  2.  Assess vascular access sites hourly  3.  Every 4-6 hours minimum:  Change oxygen saturation probe site  4.  Every 4-6 hours:  If on nasal continuous positive airway pressure, respiratory therapy assess nares and determine need for appliance change or resting period.  Outcome: Progressing     Problem: Pain  Goal: Verbalizes/displays adequate comfort level or baseline comfort level  Outcome: Progressing  Flowsheets (Taken 12/24/2024 0052)  Verbalizes/displays adequate comfort level or baseline comfort level:   Encourage patient to monitor pain and request assistance   Assess pain using appropriate pain scale   Administer analgesics based on type and severity of pain and evaluate response   Implement non-pharmacological measures as appropriate and evaluate response   Consider cultural and social influences on pain and pain management   Notify Licensed Independent Practitioner if interventions unsuccessful or patient reports new pain     Problem: Physical Therapy - Adult  Goal: By Discharge: Performs mobility at highest level of function for planned discharge setting.  See evaluation for individualized goals.  Description: FUNCTIONAL STATUS PRIOR TO ADMISSION: Pt independent with mobility PTA. Has history of B TKAs. 2 falls from bed recently, 1 requiring scalp lacerations and 1 this morning pre-surgery requiring EMS assist off of floor per wife.    HOME SUPPORT PRIOR TO ADMISSION: Pt lives with wife in [address] [place] [address], stays on 1st floor. Level entry. Has tub/shower, shower chair, BSC, RW, SPC, and toilet riser.    Physical Therapy Goals  Initiated 12/23/2024  1.  Patient will move from supine to

## 2025-02-13 PROBLEM — M16.12 PRIMARY OSTEOARTHRITIS OF LEFT HIP: Status: ACTIVE | Noted: 2025-02-13

## 2025-03-01 ENCOUNTER — APPOINTMENT (OUTPATIENT)
Facility: HOSPITAL | Age: 80
DRG: 554 | End: 2025-03-01
Payer: MEDICARE

## 2025-03-01 ENCOUNTER — HOSPITAL ENCOUNTER (INPATIENT)
Facility: HOSPITAL | Age: 80
LOS: 5 days | Discharge: HOME HEALTH | DRG: 554 | End: 2025-03-06
Attending: EMERGENCY MEDICINE | Admitting: INTERNAL MEDICINE
Payer: MEDICARE

## 2025-03-01 DIAGNOSIS — I63.9 ACUTE CVA (CEREBROVASCULAR ACCIDENT) (HCC): ICD-10-CM

## 2025-03-01 DIAGNOSIS — M79.89 LEG SWELLING: ICD-10-CM

## 2025-03-01 DIAGNOSIS — M16.12 PRIMARY OSTEOARTHRITIS OF LEFT HIP: ICD-10-CM

## 2025-03-01 DIAGNOSIS — R68.89 UNABLE TO STAND UP: Primary | ICD-10-CM

## 2025-03-01 LAB
ALBUMIN SERPL-MCNC: 3.9 G/DL (ref 3.5–5)
ALBUMIN/GLOB SERPL: 1 (ref 1.1–2.2)
ALP SERPL-CCNC: 126 U/L (ref 45–117)
ALT SERPL-CCNC: 10 U/L (ref 12–78)
ANION GAP SERPL CALC-SCNC: 6 MMOL/L (ref 2–12)
AST SERPL-CCNC: 12 U/L (ref 15–37)
BASOPHILS # BLD: 0.02 K/UL (ref 0–0.1)
BASOPHILS NFR BLD: 0.3 % (ref 0–1)
BILIRUB SERPL-MCNC: 1 MG/DL (ref 0.2–1)
BUN SERPL-MCNC: 18 MG/DL (ref 6–20)
BUN/CREAT SERPL: 16 (ref 12–20)
CALCIUM SERPL-MCNC: 10.1 MG/DL (ref 8.5–10.1)
CHLORIDE SERPL-SCNC: 103 MMOL/L (ref 97–108)
CO2 SERPL-SCNC: 27 MMOL/L (ref 21–32)
CREAT SERPL-MCNC: 1.14 MG/DL (ref 0.7–1.3)
DIFFERENTIAL METHOD BLD: ABNORMAL
EOSINOPHIL # BLD: 0.2 K/UL (ref 0–0.4)
EOSINOPHIL NFR BLD: 2.6 % (ref 0–7)
ERYTHROCYTE [DISTWIDTH] IN BLOOD BY AUTOMATED COUNT: 13.2 % (ref 11.5–14.5)
GLOBULIN SER CALC-MCNC: 3.9 G/DL (ref 2–4)
GLUCOSE SERPL-MCNC: 100 MG/DL (ref 65–100)
HCT VFR BLD AUTO: 45.6 % (ref 36.6–50.3)
HGB BLD-MCNC: 14.6 G/DL (ref 12.1–17)
IMM GRANULOCYTES # BLD AUTO: 0.03 K/UL (ref 0–0.04)
IMM GRANULOCYTES NFR BLD AUTO: 0.4 % (ref 0–0.5)
INR PPP: 1 (ref 0.9–1.1)
LYMPHOCYTES # BLD: 0.95 K/UL (ref 0.8–3.5)
LYMPHOCYTES NFR BLD: 12.2 % (ref 12–49)
MAGNESIUM SERPL-MCNC: 2.4 MG/DL (ref 1.6–2.4)
MCH RBC QN AUTO: 30.6 PG (ref 26–34)
MCHC RBC AUTO-ENTMCNC: 32 G/DL (ref 30–36.5)
MCV RBC AUTO: 95.6 FL (ref 80–99)
MONOCYTES # BLD: 0.54 K/UL (ref 0–1)
MONOCYTES NFR BLD: 6.9 % (ref 5–13)
NEUTS SEG # BLD: 6.04 K/UL (ref 1.8–8)
NEUTS SEG NFR BLD: 77.6 % (ref 32–75)
NRBC # BLD: 0 K/UL (ref 0–0.01)
NRBC BLD-RTO: 0 PER 100 WBC
PLATELET # BLD AUTO: 376 K/UL (ref 150–400)
PMV BLD AUTO: 8.8 FL (ref 8.9–12.9)
POTASSIUM SERPL-SCNC: 4 MMOL/L (ref 3.5–5.1)
PROT SERPL-MCNC: 7.8 G/DL (ref 6.4–8.2)
PROTHROMBIN TIME: 10.9 SEC (ref 9.2–11.2)
RBC # BLD AUTO: 4.77 M/UL (ref 4.1–5.7)
SODIUM SERPL-SCNC: 136 MMOL/L (ref 136–145)
TROPONIN I SERPL HS-MCNC: 8 NG/L (ref 0–76)
WBC # BLD AUTO: 7.8 K/UL (ref 4.1–11.1)

## 2025-03-01 PROCEDURE — 2060000000 HC ICU INTERMEDIATE R&B

## 2025-03-01 PROCEDURE — 85610 PROTHROMBIN TIME: CPT

## 2025-03-01 PROCEDURE — 0042T CT BRAIN PERFUSION: CPT

## 2025-03-01 PROCEDURE — 83735 ASSAY OF MAGNESIUM: CPT

## 2025-03-01 PROCEDURE — 70498 CT ANGIOGRAPHY NECK: CPT

## 2025-03-01 PROCEDURE — 36415 COLL VENOUS BLD VENIPUNCTURE: CPT

## 2025-03-01 PROCEDURE — 72100 X-RAY EXAM L-S SPINE 2/3 VWS: CPT

## 2025-03-01 PROCEDURE — 73502 X-RAY EXAM HIP UNI 2-3 VIEWS: CPT

## 2025-03-01 PROCEDURE — 80053 COMPREHEN METABOLIC PANEL: CPT

## 2025-03-01 PROCEDURE — 70450 CT HEAD/BRAIN W/O DYE: CPT

## 2025-03-01 PROCEDURE — 93005 ELECTROCARDIOGRAM TRACING: CPT | Performed by: EMERGENCY MEDICINE

## 2025-03-01 PROCEDURE — 99285 EMERGENCY DEPT VISIT HI MDM: CPT

## 2025-03-01 PROCEDURE — 6360000004 HC RX CONTRAST MEDICATION: Performed by: EMERGENCY MEDICINE

## 2025-03-01 PROCEDURE — APPNB45 APP NON BILLABLE 31-45 MINUTES

## 2025-03-01 PROCEDURE — 85025 COMPLETE CBC W/AUTO DIFF WBC: CPT

## 2025-03-01 PROCEDURE — 84484 ASSAY OF TROPONIN QUANT: CPT

## 2025-03-01 PROCEDURE — 4A03X5D MEASUREMENT OF ARTERIAL FLOW, INTRACRANIAL, EXTERNAL APPROACH: ICD-10-PCS | Performed by: RADIOLOGY

## 2025-03-01 RX ORDER — IOPAMIDOL 755 MG/ML
100 INJECTION, SOLUTION INTRAVASCULAR
Status: COMPLETED | OUTPATIENT
Start: 2025-03-01 | End: 2025-03-01

## 2025-03-01 RX ADMIN — IOPAMIDOL 100 ML: 755 INJECTION, SOLUTION INTRAVENOUS at 16:26

## 2025-03-01 NOTE — ED PROVIDER NOTES
(MAG-OX) 400 MG TABLET    Take 1 tablet by mouth every evening    PANTOPRAZOLE (PROTONIX) 40 MG TABLET    Take 1 tablet by mouth every morning (before breakfast)    RADHA CUADRAO, SERENOA REPENS, (SAW PALMETTO PO)    Take 320 mg by mouth daily    SENNOSIDES-DOCUSATE SODIUM (SENOKOT-S) 8.6-50 MG TABLET    Take 1 tablet by mouth 2 times daily       ALLERGIES     Wasp venom protein, Sulfa antibiotics, and Hydrochlorothiazide    FAMILY HISTORY       Family History   Problem Relation Age of Onset    Hypertension Mother     Heart Disease Father     No Known Problems Sister     No Known Problems Brother     Heart Disease Paternal Uncle     No Known Problems Daughter     No Known Problems Son     Anesth Problems Neg Hx           SOCIAL HISTORY       Social History     Socioeconomic History    Marital status:    Tobacco Use    Smoking status: Never    Smokeless tobacco: Never   Vaping Use    Vaping status: Never Used   Substance and Sexual Activity    Alcohol use: Yes     Alcohol/week: 3.0 standard drinks of alcohol     Types: 3 Drinks containing 0.5 oz of alcohol per week     Comment: socially    Drug use: No    Sexual activity: Not Currently     Partners: Female     Social Determinants of Health     Financial Resource Strain: Low Risk  (12/16/2024)    Overall Financial Resource Strain (CARDIA)     Difficulty of Paying Living Expenses: Not hard at all   Food Insecurity: No Food Insecurity (12/16/2024)    Hunger Vital Sign     Worried About Running Out of Food in the Last Year: Never true     Ran Out of Food in the Last Year: Never true   Transportation Needs: Unknown (12/16/2024)    PRAPARE - Transportation     Lack of Transportation (Non-Medical): No   Physical Activity: Sufficiently Active (5/24/2024)    Exercise Vital Sign     Days of Exercise per Week: 5 days     Minutes of Exercise per Session: 120 min   Social Connections: Feeling Socially Integrated (5/25/2023)    OASIS : Social Isolation     Frequency of

## 2025-03-01 NOTE — ED TRIAGE NOTES
Pt arrives via EMS from home for cc of lower extremity weakness. Pt has double knee replacement and right hip replacement. BG 92.  Pt states felt like \"legs gave out on him.\"     Hx: neuropathy

## 2025-03-02 ENCOUNTER — APPOINTMENT (OUTPATIENT)
Facility: HOSPITAL | Age: 80
DRG: 554 | End: 2025-03-02
Attending: INTERNAL MEDICINE
Payer: MEDICARE

## 2025-03-02 ENCOUNTER — APPOINTMENT (OUTPATIENT)
Facility: HOSPITAL | Age: 80
DRG: 554 | End: 2025-03-02
Payer: MEDICARE

## 2025-03-02 LAB
AMPHET UR QL SCN: NEGATIVE
APPEARANCE UR: CLEAR
BACTERIA URNS QL MICRO: NEGATIVE /HPF
BARBITURATES UR QL SCN: NEGATIVE
BENZODIAZ UR QL: NEGATIVE
BILIRUB UR QL: NEGATIVE
CANNABINOIDS UR QL SCN: NEGATIVE
COCAINE UR QL SCN: NEGATIVE
COLOR UR: ABNORMAL
COMMENT:: NORMAL
EKG ATRIAL RATE: 71 BPM
EKG DIAGNOSIS: NORMAL
EKG P AXIS: 56 DEGREES
EKG P-R INTERVAL: 160 MS
EKG Q-T INTERVAL: 406 MS
EKG QRS DURATION: 78 MS
EKG QTC CALCULATION (BAZETT): 441 MS
EKG R AXIS: 19 DEGREES
EKG T AXIS: 45 DEGREES
EKG VENTRICULAR RATE: 71 BPM
EPITH CASTS URNS QL MICRO: ABNORMAL /LPF
GLUCOSE UR STRIP.AUTO-MCNC: NEGATIVE MG/DL
HGB UR QL STRIP: ABNORMAL
HYALINE CASTS URNS QL MICRO: ABNORMAL /LPF (ref 0–5)
KETONES UR QL STRIP.AUTO: NEGATIVE MG/DL
LEUKOCYTE ESTERASE UR QL STRIP.AUTO: NEGATIVE
Lab: ABNORMAL
METHADONE UR QL: NEGATIVE
NITRITE UR QL STRIP.AUTO: NEGATIVE
OPIATES UR QL: POSITIVE
PCP UR QL: NEGATIVE
PH UR STRIP: 5.5 (ref 5–8)
PROT UR STRIP-MCNC: NEGATIVE MG/DL
RBC #/AREA URNS HPF: >100 /HPF (ref 0–5)
SP GR UR REFRACTOMETRY: 1.02 (ref 1–1.03)
SPECIMEN HOLD: NORMAL
TROPONIN I SERPL HS-MCNC: 10 NG/L (ref 0–76)
TROPONIN I SERPL HS-MCNC: 8 NG/L (ref 0–76)
URINE CULTURE IF INDICATED: ABNORMAL
UROBILINOGEN UR QL STRIP.AUTO: 0.2 EU/DL (ref 0.2–1)
WBC URNS QL MICRO: ABNORMAL /HPF (ref 0–4)

## 2025-03-02 PROCEDURE — 99222 1ST HOSP IP/OBS MODERATE 55: CPT | Performed by: PHYSICIAN ASSISTANT

## 2025-03-02 PROCEDURE — 72146 MRI CHEST SPINE W/O DYE: CPT

## 2025-03-02 PROCEDURE — 2500000003 HC RX 250 WO HCPCS: Performed by: INTERNAL MEDICINE

## 2025-03-02 PROCEDURE — 6370000000 HC RX 637 (ALT 250 FOR IP): Performed by: INTERNAL MEDICINE

## 2025-03-02 PROCEDURE — 80307 DRUG TEST PRSMV CHEM ANLYZR: CPT

## 2025-03-02 PROCEDURE — 6360000002 HC RX W HCPCS: Performed by: INTERNAL MEDICINE

## 2025-03-02 PROCEDURE — 72141 MRI NECK SPINE W/O DYE: CPT

## 2025-03-02 PROCEDURE — 1100000000 HC RM PRIVATE

## 2025-03-02 PROCEDURE — 93970 EXTREMITY STUDY: CPT

## 2025-03-02 PROCEDURE — 81001 URINALYSIS AUTO W/SCOPE: CPT

## 2025-03-02 PROCEDURE — 84484 ASSAY OF TROPONIN QUANT: CPT

## 2025-03-02 PROCEDURE — 72148 MRI LUMBAR SPINE W/O DYE: CPT

## 2025-03-02 PROCEDURE — 6370000000 HC RX 637 (ALT 250 FOR IP): Performed by: HOSPITALIST

## 2025-03-02 PROCEDURE — 70551 MRI BRAIN STEM W/O DYE: CPT

## 2025-03-02 RX ORDER — GABAPENTIN 300 MG/1
300 CAPSULE ORAL 3 TIMES DAILY PRN
Status: DISCONTINUED | OUTPATIENT
Start: 2025-03-02 | End: 2025-03-03

## 2025-03-02 RX ORDER — SODIUM CHLORIDE 0.9 % (FLUSH) 0.9 %
5-40 SYRINGE (ML) INJECTION EVERY 12 HOURS SCHEDULED
Status: DISCONTINUED | OUTPATIENT
Start: 2025-03-02 | End: 2025-03-06 | Stop reason: HOSPADM

## 2025-03-02 RX ORDER — SODIUM CHLORIDE 9 MG/ML
INJECTION, SOLUTION INTRAVENOUS PRN
Status: DISCONTINUED | OUTPATIENT
Start: 2025-03-02 | End: 2025-03-06 | Stop reason: HOSPADM

## 2025-03-02 RX ORDER — PANTOPRAZOLE SODIUM 40 MG/1
40 TABLET, DELAYED RELEASE ORAL
Status: DISCONTINUED | OUTPATIENT
Start: 2025-03-02 | End: 2025-03-06 | Stop reason: HOSPADM

## 2025-03-02 RX ORDER — ONDANSETRON 2 MG/ML
4 INJECTION INTRAMUSCULAR; INTRAVENOUS EVERY 6 HOURS PRN
Status: DISCONTINUED | OUTPATIENT
Start: 2025-03-02 | End: 2025-03-06 | Stop reason: HOSPADM

## 2025-03-02 RX ORDER — ASPIRIN 81 MG/1
81 TABLET, CHEWABLE ORAL DAILY
Status: DISCONTINUED | OUTPATIENT
Start: 2025-03-02 | End: 2025-03-06 | Stop reason: HOSPADM

## 2025-03-02 RX ORDER — SODIUM CHLORIDE 0.9 % (FLUSH) 0.9 %
5-40 SYRINGE (ML) INJECTION PRN
Status: DISCONTINUED | OUTPATIENT
Start: 2025-03-02 | End: 2025-03-06 | Stop reason: HOSPADM

## 2025-03-02 RX ORDER — ONDANSETRON 4 MG/1
4 TABLET, ORALLY DISINTEGRATING ORAL EVERY 8 HOURS PRN
Status: DISCONTINUED | OUTPATIENT
Start: 2025-03-02 | End: 2025-03-06 | Stop reason: HOSPADM

## 2025-03-02 RX ORDER — ENOXAPARIN SODIUM 100 MG/ML
40 INJECTION SUBCUTANEOUS DAILY
Status: DISCONTINUED | OUTPATIENT
Start: 2025-03-02 | End: 2025-03-06 | Stop reason: HOSPADM

## 2025-03-02 RX ORDER — ATORVASTATIN CALCIUM 40 MG/1
40 TABLET, FILM COATED ORAL NIGHTLY
Status: DISCONTINUED | OUTPATIENT
Start: 2025-03-02 | End: 2025-03-06 | Stop reason: HOSPADM

## 2025-03-02 RX ORDER — POLYETHYLENE GLYCOL 3350 17 G/17G
17 POWDER, FOR SOLUTION ORAL DAILY PRN
Status: DISCONTINUED | OUTPATIENT
Start: 2025-03-02 | End: 2025-03-06 | Stop reason: HOSPADM

## 2025-03-02 RX ORDER — ASPIRIN 300 MG/1
300 SUPPOSITORY RECTAL DAILY
Status: DISCONTINUED | OUTPATIENT
Start: 2025-03-02 | End: 2025-03-06 | Stop reason: HOSPADM

## 2025-03-02 RX ORDER — OXYCODONE HYDROCHLORIDE 5 MG/1
5 TABLET ORAL EVERY 4 HOURS PRN
Status: DISCONTINUED | OUTPATIENT
Start: 2025-03-02 | End: 2025-03-03

## 2025-03-02 RX ORDER — MORPHINE SULFATE 2 MG/ML
2 INJECTION, SOLUTION INTRAMUSCULAR; INTRAVENOUS EVERY 4 HOURS PRN
Status: DISCONTINUED | OUTPATIENT
Start: 2025-03-02 | End: 2025-03-06

## 2025-03-02 RX ORDER — ALLOPURINOL 100 MG/1
200 TABLET ORAL DAILY
Status: DISCONTINUED | OUTPATIENT
Start: 2025-03-02 | End: 2025-03-06 | Stop reason: HOSPADM

## 2025-03-02 RX ORDER — ALLOPURINOL 100 MG/1
100 TABLET ORAL DAILY
Status: DISCONTINUED | OUTPATIENT
Start: 2025-03-02 | End: 2025-03-02 | Stop reason: DRUGHIGH

## 2025-03-02 RX ADMIN — GABAPENTIN 300 MG: 300 CAPSULE ORAL at 08:49

## 2025-03-02 RX ADMIN — OXYCODONE HYDROCHLORIDE 5 MG: 5 TABLET ORAL at 23:52

## 2025-03-02 RX ADMIN — MORPHINE SULFATE 2 MG: 2 INJECTION, SOLUTION INTRAMUSCULAR; INTRAVENOUS at 03:38

## 2025-03-02 RX ADMIN — LEVOTHYROXINE SODIUM 175 MCG: 0.03 TABLET ORAL at 08:49

## 2025-03-02 RX ADMIN — SODIUM CHLORIDE, PRESERVATIVE FREE 10 ML: 5 INJECTION INTRAVENOUS at 20:43

## 2025-03-02 RX ADMIN — OXYCODONE HYDROCHLORIDE 5 MG: 5 TABLET ORAL at 08:49

## 2025-03-02 RX ADMIN — SODIUM CHLORIDE, PRESERVATIVE FREE 10 ML: 5 INJECTION INTRAVENOUS at 08:50

## 2025-03-02 RX ADMIN — ASPIRIN 81 MG CHEWABLE TABLET 81 MG: 81 TABLET CHEWABLE at 08:49

## 2025-03-02 RX ADMIN — ATORVASTATIN CALCIUM 40 MG: 40 TABLET, FILM COATED ORAL at 20:41

## 2025-03-02 RX ADMIN — ALLOPURINOL 200 MG: 100 TABLET ORAL at 08:57

## 2025-03-02 RX ADMIN — OXYCODONE HYDROCHLORIDE 5 MG: 5 TABLET ORAL at 19:16

## 2025-03-02 RX ADMIN — ENOXAPARIN SODIUM 40 MG: 100 INJECTION SUBCUTANEOUS at 08:50

## 2025-03-02 ASSESSMENT — PAIN DESCRIPTION - ORIENTATION
ORIENTATION: LEFT
ORIENTATION: LEFT

## 2025-03-02 ASSESSMENT — PAIN SCALES - GENERAL
PAINLEVEL_OUTOF10: 8
PAINLEVEL_OUTOF10: 4
PAINLEVEL_OUTOF10: 6

## 2025-03-02 ASSESSMENT — PAIN DESCRIPTION - LOCATION
LOCATION: GROIN
LOCATION: GROIN;HIP

## 2025-03-02 NOTE — H&P
Please note that this dictation may have been completed with Dragon, the computer voice recognition software.  Quite often unanticipated grammatical, syntax, homophones, and other interpretive errors are inadvertently transcribed by the computer software.  Please disregard these errors.  Please excuse any errors that have escaped final proofreading.

## 2025-03-02 NOTE — ED NOTES
TRANSFER - OUT REPORT:    Verbal report given to TIFFANIE Keating on Fco Mary  being transferred to [facility] for routine progression of patient care       Report consisted of patient's Situation, Background, Assessment and   Recommendations(SBAR).     Information from the following report(s) ED Encounter Summary, ED SBAR, MAR, Recent Results, Neuro Assessment, and Event Log was reviewed with the receiving nurse.    [place] Fall Assessment:    Presents to emergency department  because of falls (Syncope, seizure, or loss of consciousness): No  Age > 70: Yes  Altered Mental Status, Intoxication with alcohol or substance confusion (Disorientation, impaired judgment, poor safety awaremess, or inability to follow instructions): No  Impaired Mobility: Ambulates or transfers with assistive devices or assistance; Unable to ambulate or transer.: Yes  Nursing Judgement: Yes          Lines:   Peripheral IV 03/01/25 Left Antecubital (Active)        Opportunity for questions and clarification was provided.      Patient transported with:  Monitor and Registered Nurse

## 2025-03-02 NOTE — ED NOTES
Bedside and Verbal shift change report given to Katiuska RN (oncoming nurse) by Avril RN (offgoing nurse). Report included the following information ED Encounter Summary, ED SBAR, MAR, and Recent Results.

## 2025-03-02 NOTE — ED NOTES
Brought pt a lean cuisine dinner and pepsi.     Pt was using the urinal and got his brief wet; changed pt brief then set him up to eat.

## 2025-03-02 NOTE — ED NOTES
Called pt wife at his request, to bring him some flannel pants and adult diapers on tomorrow.    Spoke to the provider about why the pt is on NPO diet; she canceled the order and said he could eat.

## 2025-03-03 ENCOUNTER — APPOINTMENT (OUTPATIENT)
Facility: HOSPITAL | Age: 80
DRG: 554 | End: 2025-03-03
Attending: INTERNAL MEDICINE
Payer: MEDICARE

## 2025-03-03 PROCEDURE — 97166 OT EVAL MOD COMPLEX 45 MIN: CPT

## 2025-03-03 PROCEDURE — 1100000000 HC RM PRIVATE

## 2025-03-03 PROCEDURE — 97530 THERAPEUTIC ACTIVITIES: CPT

## 2025-03-03 PROCEDURE — 6360000002 HC RX W HCPCS: Performed by: HOSPITALIST

## 2025-03-03 PROCEDURE — 97161 PT EVAL LOW COMPLEX 20 MIN: CPT

## 2025-03-03 PROCEDURE — 6360000002 HC RX W HCPCS: Performed by: INTERNAL MEDICINE

## 2025-03-03 PROCEDURE — 97116 GAIT TRAINING THERAPY: CPT

## 2025-03-03 PROCEDURE — 2500000003 HC RX 250 WO HCPCS: Performed by: INTERNAL MEDICINE

## 2025-03-03 PROCEDURE — 6370000000 HC RX 637 (ALT 250 FOR IP): Performed by: INTERNAL MEDICINE

## 2025-03-03 PROCEDURE — 6370000000 HC RX 637 (ALT 250 FOR IP): Performed by: HOSPITALIST

## 2025-03-03 PROCEDURE — 97535 SELF CARE MNGMENT TRAINING: CPT

## 2025-03-03 RX ORDER — HYDROMORPHONE HYDROCHLORIDE 4 MG/1
2 TABLET ORAL EVERY 4 HOURS PRN
Status: DISCONTINUED | OUTPATIENT
Start: 2025-03-03 | End: 2025-03-06 | Stop reason: HOSPADM

## 2025-03-03 RX ORDER — KETOROLAC TROMETHAMINE 30 MG/ML
15 INJECTION, SOLUTION INTRAMUSCULAR; INTRAVENOUS EVERY 6 HOURS PRN
Status: DISCONTINUED | OUTPATIENT
Start: 2025-03-03 | End: 2025-03-06

## 2025-03-03 RX ORDER — LIDOCAINE 4 G/G
1 PATCH TOPICAL DAILY
Status: DISCONTINUED | OUTPATIENT
Start: 2025-03-03 | End: 2025-03-06 | Stop reason: HOSPADM

## 2025-03-03 RX ORDER — GABAPENTIN 300 MG/1
300 CAPSULE ORAL 3 TIMES DAILY
Status: DISCONTINUED | OUTPATIENT
Start: 2025-03-03 | End: 2025-03-06 | Stop reason: HOSPADM

## 2025-03-03 RX ADMIN — ASPIRIN 81 MG CHEWABLE TABLET 81 MG: 81 TABLET CHEWABLE at 08:40

## 2025-03-03 RX ADMIN — SODIUM CHLORIDE, PRESERVATIVE FREE 10 ML: 5 INJECTION INTRAVENOUS at 08:43

## 2025-03-03 RX ADMIN — ALLOPURINOL 200 MG: 100 TABLET ORAL at 08:41

## 2025-03-03 RX ADMIN — SODIUM CHLORIDE, PRESERVATIVE FREE 10 ML: 5 INJECTION INTRAVENOUS at 20:33

## 2025-03-03 RX ADMIN — LEVOTHYROXINE SODIUM 175 MCG: 0.03 TABLET ORAL at 06:45

## 2025-03-03 RX ADMIN — GABAPENTIN 300 MG: 300 CAPSULE ORAL at 16:26

## 2025-03-03 RX ADMIN — OXYCODONE HYDROCHLORIDE 5 MG: 5 TABLET ORAL at 05:31

## 2025-03-03 RX ADMIN — PANTOPRAZOLE SODIUM 40 MG: 40 TABLET, DELAYED RELEASE ORAL at 06:44

## 2025-03-03 RX ADMIN — KETOROLAC TROMETHAMINE 15 MG: 30 INJECTION, SOLUTION INTRAMUSCULAR at 16:26

## 2025-03-03 RX ADMIN — ENOXAPARIN SODIUM 40 MG: 100 INJECTION SUBCUTANEOUS at 08:41

## 2025-03-03 RX ADMIN — OXYCODONE HYDROCHLORIDE 5 MG: 5 TABLET ORAL at 09:58

## 2025-03-03 RX ADMIN — GABAPENTIN 300 MG: 300 CAPSULE ORAL at 20:30

## 2025-03-03 RX ADMIN — ATORVASTATIN CALCIUM 40 MG: 40 TABLET, FILM COATED ORAL at 20:30

## 2025-03-03 ASSESSMENT — PAIN DESCRIPTION - LOCATION: LOCATION: GROIN

## 2025-03-03 ASSESSMENT — PAIN DESCRIPTION - DESCRIPTORS
DESCRIPTORS: ACHING;THROBBING
DESCRIPTORS: ACHING;THROBBING

## 2025-03-03 ASSESSMENT — PAIN SCALES - GENERAL
PAINLEVEL_OUTOF10: 8
PAINLEVEL_OUTOF10: 5
PAINLEVEL_OUTOF10: 4
PAINLEVEL_OUTOF10: 3
PAINLEVEL_OUTOF10: 9
PAINLEVEL_OUTOF10: 5

## 2025-03-03 ASSESSMENT — PAIN - FUNCTIONAL ASSESSMENT: PAIN_FUNCTIONAL_ASSESSMENT: PREVENTS OR INTERFERES SOME ACTIVE ACTIVITIES AND ADLS

## 2025-03-03 ASSESSMENT — PAIN DESCRIPTION - ORIENTATION
ORIENTATION: RIGHT
ORIENTATION: LEFT

## 2025-03-03 NOTE — WOUND CARE
Wound care consult by physician request for bilateral venous stasis dermatitis.    Patient with hemosiderin staining to bilateral lower legs, left anterior lower leg with small crusted wound, no drainage, no erythema.  Left open to air.    Patient believes he is discharging home today.    Wound care will sign off.    Megan \"Marla\" MARTIN Bajwa, RN, CWCN  Office x [phone]  Fasted way to contact me is Perfect Serve

## 2025-03-04 ENCOUNTER — APPOINTMENT (OUTPATIENT)
Facility: HOSPITAL | Age: 80
DRG: 554 | End: 2025-03-04
Payer: MEDICARE

## 2025-03-04 ENCOUNTER — APPOINTMENT (OUTPATIENT)
Facility: HOSPITAL | Age: 80
DRG: 554 | End: 2025-03-04
Attending: INTERNAL MEDICINE
Payer: MEDICARE

## 2025-03-04 PROBLEM — R68.89 UNABLE TO STAND UP: Status: ACTIVE | Noted: 2025-03-04

## 2025-03-04 LAB
ECHO BSA: 2.03 M2
ECHO LV EF PHYSICIAN: 60 %
ECHO LV INTERNAL DIMENSION DIASTOLIC MMODE: 5.3 CM (ref 4.2–5.9)
ECHO LV INTERNAL DIMENSION SYSTOLIC MMODE: 3.1 CM
ECHO MV A VELOCITY: 0.25 M/S
ECHO MV AREA PHT: 7.1 CM2
ECHO MV E DECELERATION TIME (DT): 107.6 MS
ECHO MV E VELOCITY: 0.34 M/S
ECHO MV E/A RATIO: 1.36
ECHO MV PRESSURE HALF TIME (PHT): 31.2 MS
ECHO PV MAX VELOCITY: 0.7 M/S
ECHO PV PEAK GRADIENT: 2 MMHG

## 2025-03-04 PROCEDURE — 6370000000 HC RX 637 (ALT 250 FOR IP): Performed by: HOSPITALIST

## 2025-03-04 PROCEDURE — 73660 X-RAY EXAM OF TOE(S): CPT

## 2025-03-04 PROCEDURE — 6360000002 HC RX W HCPCS: Performed by: HOSPITALIST

## 2025-03-04 PROCEDURE — 6370000000 HC RX 637 (ALT 250 FOR IP): Performed by: INTERNAL MEDICINE

## 2025-03-04 PROCEDURE — 72192 CT PELVIS W/O DYE: CPT

## 2025-03-04 PROCEDURE — 2500000003 HC RX 250 WO HCPCS: Performed by: INTERNAL MEDICINE

## 2025-03-04 PROCEDURE — 1100000000 HC RM PRIVATE

## 2025-03-04 PROCEDURE — 6360000002 HC RX W HCPCS: Performed by: INTERNAL MEDICINE

## 2025-03-04 PROCEDURE — 93306 TTE W/DOPPLER COMPLETE: CPT

## 2025-03-04 RX ORDER — SENNA AND DOCUSATE SODIUM 50; 8.6 MG/1; MG/1
2 TABLET, FILM COATED ORAL DAILY PRN
Status: CANCELLED | OUTPATIENT
Start: 2025-03-04

## 2025-03-04 RX ADMIN — SODIUM CHLORIDE, PRESERVATIVE FREE 10 ML: 5 INJECTION INTRAVENOUS at 21:11

## 2025-03-04 RX ADMIN — GABAPENTIN 300 MG: 300 CAPSULE ORAL at 16:36

## 2025-03-04 RX ADMIN — ATORVASTATIN CALCIUM 40 MG: 40 TABLET, FILM COATED ORAL at 21:10

## 2025-03-04 RX ADMIN — ENOXAPARIN SODIUM 40 MG: 100 INJECTION SUBCUTANEOUS at 10:39

## 2025-03-04 RX ADMIN — KETOROLAC TROMETHAMINE 15 MG: 30 INJECTION, SOLUTION INTRAMUSCULAR at 18:49

## 2025-03-04 RX ADMIN — ASPIRIN 81 MG CHEWABLE TABLET 81 MG: 81 TABLET CHEWABLE at 10:41

## 2025-03-04 RX ADMIN — HYDROMORPHONE HYDROCHLORIDE 2 MG: 4 TABLET ORAL at 18:46

## 2025-03-04 RX ADMIN — ALLOPURINOL 200 MG: 100 TABLET ORAL at 10:41

## 2025-03-04 RX ADMIN — PANTOPRAZOLE SODIUM 40 MG: 40 TABLET, DELAYED RELEASE ORAL at 07:11

## 2025-03-04 RX ADMIN — GABAPENTIN 300 MG: 300 CAPSULE ORAL at 10:41

## 2025-03-04 RX ADMIN — KETOROLAC TROMETHAMINE 15 MG: 30 INJECTION, SOLUTION INTRAMUSCULAR at 10:39

## 2025-03-04 RX ADMIN — LEVOTHYROXINE SODIUM 175 MCG: 0.03 TABLET ORAL at 07:11

## 2025-03-04 RX ADMIN — GABAPENTIN 300 MG: 300 CAPSULE ORAL at 21:10

## 2025-03-04 ASSESSMENT — PAIN SCALES - GENERAL
PAINLEVEL_OUTOF10: 8
PAINLEVEL_OUTOF10: 5
PAINLEVEL_OUTOF10: 5
PAINLEVEL_OUTOF10: 7
PAINLEVEL_OUTOF10: 5

## 2025-03-04 ASSESSMENT — PAIN DESCRIPTION - DESCRIPTORS
DESCRIPTORS: SHARP
DESCRIPTORS: PRESSURE

## 2025-03-04 ASSESSMENT — PAIN DESCRIPTION - LOCATION
LOCATION: HIP
LOCATION: HIP

## 2025-03-04 ASSESSMENT — PAIN DESCRIPTION - PAIN TYPE
TYPE: ACUTE PAIN
TYPE: ACUTE PAIN

## 2025-03-04 ASSESSMENT — PAIN DESCRIPTION - ONSET: ONSET: ON-GOING

## 2025-03-04 ASSESSMENT — PAIN DESCRIPTION - FREQUENCY
FREQUENCY: CONTINUOUS
FREQUENCY: CONTINUOUS

## 2025-03-04 ASSESSMENT — PAIN - FUNCTIONAL ASSESSMENT
PAIN_FUNCTIONAL_ASSESSMENT: PREVENTS OR INTERFERES SOME ACTIVE ACTIVITIES AND ADLS
PAIN_FUNCTIONAL_ASSESSMENT: PREVENTS OR INTERFERES SOME ACTIVE ACTIVITIES AND ADLS

## 2025-03-04 ASSESSMENT — PAIN DESCRIPTION - ORIENTATION
ORIENTATION: LEFT
ORIENTATION: RIGHT

## 2025-03-04 NOTE — CONSULTS
ORTHOPAEDIC CONSULT NOTE    Subjective:     Date of Consultation:  March 2, 2025  Referring Physician:  Melita MONSON Usman is a 79 y.o. male with past medical history significant for CAD status post stenting in distant past, hyperlipidemia, hypertension and orthopedic history significant for right total hip replacement approximately 3 months ago and bilateral total knee arthroplasties in distant past as well as extensive lumbar surgeries with neurosurgery is seen for acute on chronic left hip pain.  Patient has a known history of severe degeneration of his left hip joint and is scheduled for a total hip arthroplasty of this left side on 3/25 of this year.  He states that he was in the shower yesterday when he started to feel his legs weakening give way.  He states that his left leg weak and first and then followed by his right.  He was able to slowly lower himself to the shower floor but could not get up or bear weight.  His wife was unable to help him.  EMS was called and was brought to the emergency department.  He was worked up for a possible stroke with some initial concerns related to his right MCA region.  He is being admitted for further workup.  Patient states that his current hip pain is roughly the same that it has been for a number of months.  He has been able to mobilize fairly well with physical therapy following his recent hip replacement.  He notes that his left hip pain and degeneration is limiting his overall ability to mobilize.  He is concerned that his current state may delay any surgical interventions for his left hip.  He denies any accidents or injuries.  He denies any new onset tingling or numbness.     Patient Active Problem List    Diagnosis Date Noted    Acute CVA (cerebrovascular accident) (HCC) 03/01/2025    Primary osteoarthritis of left hip 02/13/2025    S/P total right hip arthroplasty 12/23/2024    Primary osteoarthritis of right hip 12/03/2024    Lumbar stenosis with 
Moved in the Last Year: Not on file     Homeless in the Last Year: No     Family History   Problem Relation Age of Onset    Hypertension Mother     Heart Disease Father     No Known Problems Sister     No Known Problems Brother     Heart Disease Paternal Uncle     No Known Problems Daughter     No Known Problems Son     Anesth Problems Neg Hx        Exam:  /82   Pulse 78   Temp 99 °F (37.2 °C)   Resp 18   Ht 1.753 m (5' 9\")   Wt 84.4 kg (186 lb)   SpO2 95%   BMI 27.47 kg/m²   Appears well  Has severe pain with range of motion left hip  Cannot overcome gravity with active straight leg raise on the left  No gross motor or sensory deficits distally    Left hip x-rays again shows severe erosive osteoarthritis with subchondral cystic changes.  CT scan confirms no evidence of fracture.    Imp: Severe left hip osteoarthritis    Plan:   Had a lengthy discussion with the patient.  Attempt will be made to reschedule the patient's left total hip replacement to next week, pending OR availability.  He notes this is suboptimal, as he is less than 3 months out from his right total hip.  From a metabolic standpoint, his immune system has not yet returned to baseline.    I suspect we will be able to move forward with surgery next Tuesday.  In the meantime, he should work aggressively with physical therapy with goal of going home in the next 1 to 2 days.  If he cannot mobilize safely, or if pain limits him, recommend short-term admission to skilled rehab until his surgery.    ERMIAS Fairbanks MD  
Insecurity: No Food Insecurity (2024)    Hunger Vital Sign     Worried About Running Out of Food in the Last Year: Never true     Ran Out of Food in the Last Year: Never true   Transportation Needs: Unknown (2024)    PRAPARE - Transportation     Lack of Transportation (Medical): Not on file     Lack of Transportation (Non-Medical): No   Physical Activity: Sufficiently Active (2024)    Exercise Vital Sign     Days of Exercise per Week: 5 days     Minutes of Exercise per Session: 120 min   Stress: Not on file   Social Connections: Feeling Socially Integrated (2023)    OASIS : Social Isolation     Frequency of experiencing loneliness or isolation: Never   Intimate Partner Violence: Not on file   Housing Stability: Unknown (2024)    Housing Stability Vital Sign     Unable to Pay for Housing in the Last Year: Not on file     Number of Times Moved in the Last Year: Not on file     Homeless in the Last Year: No           FH:  Family History   Problem Relation Age of Onset    Hypertension Mother     Heart Disease Father     No Known Problems Sister     No Known Problems Brother     Heart Disease Paternal Uncle     No Known Problems Daughter     No Known Problems Son     Anesth Problems Neg Hx            Objective:     /68   Pulse 84   Temp 98.2 °F (36.8 °C) (Oral)   Resp 23   Wt 84.4 kg (186 lb 1.1 oz)   SpO2 93%   BMI 27.48 kg/m²   Temp (24hrs), Av °F (36.7 °C), Min:97.8 °F (36.6 °C), Max:98.2 °F (36.8 °C)      Physical Exam:  General: Well developed well nourished patient in no apparent distress.   Pulmonary: No increased WOB  Cardiac: Regular rate and rhythm  Extremities: No cyanosis or edema    Neurological Exam:  Mental Status: Oriented to time, place and person. Speech and language intact. Attention and fund of knowledge appropriate.  Normal recent and remote memory.   Cranial Nerves:   VFF, PERRL, EOMI, no nystagmus, no diplopia, no ptosis. Facial sensation is normal.

## 2025-03-05 LAB
GLUCOSE BLD STRIP.AUTO-MCNC: 101 MG/DL (ref 65–117)
SERVICE CMNT-IMP: NORMAL

## 2025-03-05 PROCEDURE — 2500000003 HC RX 250 WO HCPCS: Performed by: INTERNAL MEDICINE

## 2025-03-05 PROCEDURE — 97530 THERAPEUTIC ACTIVITIES: CPT

## 2025-03-05 PROCEDURE — 82962 GLUCOSE BLOOD TEST: CPT

## 2025-03-05 PROCEDURE — 97535 SELF CARE MNGMENT TRAINING: CPT

## 2025-03-05 PROCEDURE — 6370000000 HC RX 637 (ALT 250 FOR IP): Performed by: INTERNAL MEDICINE

## 2025-03-05 PROCEDURE — 97116 GAIT TRAINING THERAPY: CPT

## 2025-03-05 PROCEDURE — 6360000002 HC RX W HCPCS: Performed by: HOSPITALIST

## 2025-03-05 PROCEDURE — 6360000002 HC RX W HCPCS: Performed by: INTERNAL MEDICINE

## 2025-03-05 PROCEDURE — 1100000000 HC RM PRIVATE

## 2025-03-05 PROCEDURE — 6370000000 HC RX 637 (ALT 250 FOR IP): Performed by: HOSPITALIST

## 2025-03-05 RX ADMIN — ATORVASTATIN CALCIUM 40 MG: 40 TABLET, FILM COATED ORAL at 22:17

## 2025-03-05 RX ADMIN — KETOROLAC TROMETHAMINE 15 MG: 30 INJECTION, SOLUTION INTRAMUSCULAR at 15:25

## 2025-03-05 RX ADMIN — SODIUM CHLORIDE, PRESERVATIVE FREE 10 ML: 5 INJECTION INTRAVENOUS at 10:37

## 2025-03-05 RX ADMIN — LEVOTHYROXINE SODIUM 175 MCG: 0.03 TABLET ORAL at 06:45

## 2025-03-05 RX ADMIN — GABAPENTIN 300 MG: 300 CAPSULE ORAL at 22:17

## 2025-03-05 RX ADMIN — ALLOPURINOL 200 MG: 100 TABLET ORAL at 10:31

## 2025-03-05 RX ADMIN — ASPIRIN 81 MG CHEWABLE TABLET 81 MG: 81 TABLET CHEWABLE at 10:30

## 2025-03-05 RX ADMIN — ENOXAPARIN SODIUM 40 MG: 100 INJECTION SUBCUTANEOUS at 10:33

## 2025-03-05 RX ADMIN — SODIUM CHLORIDE, PRESERVATIVE FREE 10 ML: 5 INJECTION INTRAVENOUS at 22:18

## 2025-03-05 RX ADMIN — GABAPENTIN 300 MG: 300 CAPSULE ORAL at 10:31

## 2025-03-05 RX ADMIN — PANTOPRAZOLE SODIUM 40 MG: 40 TABLET, DELAYED RELEASE ORAL at 06:45

## 2025-03-05 RX ADMIN — HYDROMORPHONE HYDROCHLORIDE 2 MG: 4 TABLET ORAL at 16:28

## 2025-03-05 RX ADMIN — GABAPENTIN 300 MG: 300 CAPSULE ORAL at 15:25

## 2025-03-05 ASSESSMENT — PAIN DESCRIPTION - LOCATION
LOCATION: GROIN
LOCATION: HIP;GROIN

## 2025-03-05 ASSESSMENT — PAIN SCALES - GENERAL
PAINLEVEL_OUTOF10: 8
PAINLEVEL_OUTOF10: 5

## 2025-03-05 ASSESSMENT — PAIN DESCRIPTION - ORIENTATION
ORIENTATION: LEFT
ORIENTATION: LEFT

## 2025-03-05 ASSESSMENT — PAIN DESCRIPTION - DESCRIPTORS
DESCRIPTORS: ACHING
DESCRIPTORS: ACHING;THROBBING

## 2025-03-05 NOTE — PROGRESS NOTES
[facility]  Hospitalist Group                                                                                          Hospitalist Progress Note  Sarmad Bender MD  Office Phone: [phone]        Date of Service:  3/2/2025  NAME:  Fco Mary  :  1945  MRN:  [id number]       Admission Summary:   79 y.o. male with past medical history significant for CAD s/p stent placement, hypertension, dyslipidemia, hypothyroidism, degenerative joint disease presented at the emergency room with weakness.  The onset was very sudden and involve bilateral lower extremity.  Patient was in the shower when he developed the weakness.  Patient lowered himself to the ground and was unable to get up on his own.  He was brought to the emergency room as code stroke alert..  CT of the head without contrast did not show acute pathology.  CTA of the head and neck did not show large vessel occlusion.  Patient was referred to the hospitalist service for admission.  Patient is awaiting left hip replacement.  He reported left hip pain which is constant, worse with movement, slight relief at rest, no radiation, sharp and about 6/10 in severity  Patient was last admitted to the hospital from 2024 to 2024, he was admitted for right anterior total hip arthroplasty by the orthopedic service.       Interval history / Subjective:   Feels better, pain/weakness improved     Assessment & Plan:     Leg weakness  -likely due to L hip arthritis  -MRI brain negative for CVA, spine without compression  -PT/OT  -plan for outpatient L hip replacement    Hypothyroidism  Dyslipidemia  -continue home meds     Code status: full  Prophylaxis: sc Lovenox  Care Plan discussed with: pt, consultants   Anticipated Disposition: home w/ hh tomorrow  Inpatient  Cardiac monitoring: None  Central Line:            Social Determinants of Health     Tobacco Use: Low Risk  (2/10/2025)    Patient History     Smoking Tobacco Use: 
        [facility] [place] [facility]  Hospitalist Group                                                                                          Hospitalist Progress Note  Sarmad Bender MD  Office Phone: [phone]        Date of Service:  3/4/2025  NAME:  Fco Mary  :  1945  MRN:  [id number]       Admission Summary:   79 y.o. male with past medical history significant for CAD s/p stent placement, hypertension, dyslipidemia, hypothyroidism, degenerative joint disease presented at the emergency room with weakness.  The onset was very sudden and involve bilateral lower extremity.  Patient was in the shower when he developed the weakness.  Patient lowered himself to the ground and was unable to get up on his own.  He was brought to the emergency room as code stroke alert..  CT of the head without contrast did not show acute pathology.  CTA of the head and neck did not show large vessel occlusion.  Patient was referred to the hospitalist service for admission.  Patient is awaiting left hip replacement.  He reported left hip pain which is constant, worse with movement, slight relief at rest, no radiation, sharp and about 6/10 in severity  Patient was last admitted to the hospital from 2024 to 2024, he was admitted for right anterior total hip arthroplasty by the orthopedic service.       Interval history / Subjective:   Hasn't been out of bed before my assessment, pain ok at rest     Assessment & Plan:     Leg weakness  -due to L hip arthritis  -MRIs negative for CVA, spine without compression  -PT/OT  -plan for outpatient L hip replacement however pt's symptoms are severe may need surgery sooner, d/w ortho  -continue current pain regimen: lidocaine patch, scheduled gabapentin. PRN ketorolac, hydromorphone    Hypothyroidism  Dyslipidemia  -continue home meds     Code status: full  Prophylaxis: sc Lovenox  Care Plan discussed with: pt, consultants   Anticipated Disposition: home w/ HH when 
        [facility] [place] Adult  Hospitalist Group                                                                                          Hospitalist Progress Note  Sarmad Bender MD  Office Phone: [phone]        Date of Service:  3/3/2025  NAME:  Fco Mary  :  1945  MRN:  [id number]       Admission Summary:   79 y.o. male with past medical history significant for CAD s/p stent placement, hypertension, dyslipidemia, hypothyroidism, degenerative joint disease presented at the emergency room with weakness.  The onset was very sudden and involve bilateral lower extremity.  Patient was in the shower when he developed the weakness.  Patient lowered himself to the ground and was unable to get up on his own.  He was brought to the emergency room as code stroke alert..  CT of the head without contrast did not show acute pathology.  CTA of the head and neck did not show large vessel occlusion.  Patient was referred to the hospitalist service for admission.  Patient is awaiting left hip replacement.  He reported left hip pain which is constant, worse with movement, slight relief at rest, no radiation, sharp and about 6/10 in severity  Patient was last admitted to the hospital from 2024 to 2024, he was admitted for right anterior total hip arthroplasty by the orthopedic service.       Interval history / Subjective:   His pain is severe this AM     Assessment & Plan:     Leg weakness  -likely due to L hip arthritis  -MRI brain negative for CVA, spine without compression  -PT/OT  -plan for outpatient L hip replacement however pt's symptoms are severe may need surgery sooner  -add lidocaine patch, schedule gabapentin. PRN ketorolac, change oxycodone to hydromorphone    Hypothyroidism  Dyslipidemia  -continue home meds     Code status: full  Prophylaxis: sc Lovenox  Care Plan discussed with: pt, consultants   Anticipated Disposition: tbd  Inpatient  Cardiac monitoring: None  Central Line:  
Chart reviewed and spoke with nursing. Pt currently [facility] for hip and toe imaging. Will continue to follow for therapy.     Dolores Benson/PT  
End of Shift Note    Bedside shift change report given to TIFFANIE Zaldivar (oncoming nurse) by Dinorah Brown LPN (offgoing nurse).  Report included the following information SBAR    Shift worked:  0730-2000     Shift summary and any significant changes:    Room air  Pain management  Guests present during shift  Discharge planning  PT/OT   Concerns for physician to address:  None       Activity:     Number times ambulated in hallways past shift: 0  Number of times OOB to chair past shift: 1    Cardiac:   Cardiac Monitoring: Yes           Access:  Current line(s): PIV     Genitourinary:   Urinary status: voiding    Respiratory:      Chronic home O2 use?: NO  Incentive spirometer at bedside: YES       GI:     Current diet:  ADULT DIET; Regular; Low Fat/Low Chol/High Fiber/VERITO  Passing flatus: YES  Tolerating current diet: YES       Pain Management:   Patient states pain is manageable on current regimen: YES    Skin:     Interventions: float heels, increase time out of bed, PT/OT consult, and nutritional support    Patient Safety:  Fall Score:    Interventions: bed/chair alarm, assistive device (walker, cane. etc), gripper socks, pt to call before getting OOB, and gait belt       Length of Stay:  Expected LOS: 2  Actual LOS: 2      Dinorah Brown LPN                           
Occupational Therapy  3/4/2025    Chart reviewed and spoke with nursing. Pt currently [facility] for hip and toe imaging. Will continue to follow for therapy.     Thank you,  Preeti Garcia, OTR/L    
SLP Contact Note    Update: MRI negative. Will sign off.    Consult received and chart reviewed in preparation for evaluation. Patient being worked-up for stroke. CT negative for acute infarct.  NIH 1. Pt passed swallow screen and has been initiated on diet.  Therefore, will await MRI and follow up for evaluation as indicated.    Katia Nelson M.Ed, PhD(c), CCC-SLP (pronouns: she/her/hers)  Speech-Language Pathologist    
Spiritual Health History and Assessment/Progress Note  [facility]    Rituals, Rites and Sacraments,  ,  ,      Name: Fco Mary MRN: [id number]    Age: 79 y.o.     Sex: male   Language: English   Bahai: Restorationism   <principal problem not specified>     Date: 3/3/2025            Total Time Calculated: 20 min              Spiritual Assessment began in [facility] 5[facility] ORTHO JOINT        Referral/Consult From: Clergy/   Encounter Overview/Reason: Rituals, Rites and Sacraments  Service Provided For: Patient    Jinny, Belief, Meaning:   Patient is connected with a jinny tradition or spiritual practice  Family/Friends are connected with a jinny tradition or spiritual practice      Importance and Influence:  Patient has spiritual/personal beliefs that influence decisions regarding their health  Family/Friends have spiritual/personal beliefs that influence decisions regarding the patient's health    Community:  Patient is connected with a spiritual community  Family/Friends are connected with a spiritual community:    Assessment and Plan of Care:     Patient Interventions include: Provided sacramental/Cheondoism ritual  Family/Friends Interventions include: Provided sacramental/Cheondoism ritual    Patient Plan of Care: Spiritual Care available upon further referral  Family/Friends Plan of Care: Spiritual Care available upon further referral    Electronically signed by Chaplain CHANTE on 3/3/2025 at 1:50 PM     [facility] Mr. Mary is Restorationism. His wife was with him.  He was sitting in his recliner and days his hip is in pain and he needs surgery.  The couple are members of [employer].  Prayer and communion offered.  We had a discussion about how to go about requesting communion brought to the home since both are unable to attend Oriental orthodox right now.       Sr. BERENICE Watt, RN, ACSW, LCSW   Page:  [phone]  
Spiritual Health History and Assessment/Progress Note  [facility]    Rituals, Rites and Sacraments,  ,  ,      Name: Fco Mary MRN: [id number]    Age: 79 y.o.     Sex: male   Language: English   Jewish: Pentecostal   <principal problem not specified>     Date: 3/5/2025            Total Time Calculated: 5 min              Spiritual Assessment continued in [facility] 5S ORTHO JOINT        Referral/Consult From: Clergy/   Encounter Overview/Reason: Rituals, Rites and Sacraments  Service Provided For: Patient    Jinny, Belief, Meaning:   Patient is connected with a jinny tradition or spiritual practice  Family/Friends are connected with a jinny tradition or spiritual practice      Importance and Influence:  Patient has spiritual/personal beliefs that influence decisions regarding their health  Family/Friends have spiritual/personal beliefs that influence decisions regarding the patient's health    Community:  Patient is connected with a spiritual community  Family/Friends are connected with a spiritual community:    Assessment and Plan of Care:     Patient Interventions include: Provided sacramental/Yarsanism ritual  Family/Friends Interventions include: Provided sacramental/Yarsanism ritual    Patient Plan of Care: Spiritual Care available upon further referral and Other: none  Family/Friends Plan of Care: Spiritual Care available upon further referral    Electronically signed by Chaplain CHANTE on 3/5/2025 at 4:12 PM     [facility] Mr. Mary was sitting in his recliner and had good news to share that his surgery has been moved up by two weeks. This makes him very happy.  His wife and daughter were with him.  Prayer and communion along with ashes offered to all three.  Assured of continued prayer.     Sr. BERENICE Watt, RN, ACSW, LCSW   Page:  [phone]  
have spent 35 of critical care time involved in lab review, consultations with specialist, family decision making and documentation. During this entire length of time I was immediately available to the patient.    KANDACE Loyola  Neurovascular Nurse Practitioner  
(ZYLOPRIM) tablet 200 mg  200 mg Oral Daily     ______________________________________________________________________  EXPECTED LENGTH OF STAY: 5  ACTUAL LENGTH OF STAY:          4                 Sarmad Bender MD

## 2025-03-06 VITALS
DIASTOLIC BLOOD PRESSURE: 86 MMHG | WEIGHT: 186 LBS | RESPIRATION RATE: 16 BRPM | OXYGEN SATURATION: 92 % | SYSTOLIC BLOOD PRESSURE: 138 MMHG | BODY MASS INDEX: 27.55 KG/M2 | TEMPERATURE: 97.7 F | HEART RATE: 71 BPM | HEIGHT: 69 IN

## 2025-03-06 PROCEDURE — 2500000003 HC RX 250 WO HCPCS: Performed by: INTERNAL MEDICINE

## 2025-03-06 PROCEDURE — 97116 GAIT TRAINING THERAPY: CPT

## 2025-03-06 PROCEDURE — 6370000000 HC RX 637 (ALT 250 FOR IP): Performed by: INTERNAL MEDICINE

## 2025-03-06 PROCEDURE — 6370000000 HC RX 637 (ALT 250 FOR IP): Performed by: HOSPITALIST

## 2025-03-06 PROCEDURE — 6360000002 HC RX W HCPCS: Performed by: INTERNAL MEDICINE

## 2025-03-06 PROCEDURE — 97530 THERAPEUTIC ACTIVITIES: CPT

## 2025-03-06 RX ORDER — GABAPENTIN 300 MG/1
300 CAPSULE ORAL 3 TIMES DAILY
Qty: 90 CAPSULE | Refills: 0 | Status: SHIPPED
Start: 2025-03-06 | End: 2025-04-05

## 2025-03-06 RX ORDER — IBUPROFEN 400 MG/1
400 TABLET, FILM COATED ORAL EVERY 6 HOURS PRN
Status: DISCONTINUED | OUTPATIENT
Start: 2025-03-06 | End: 2025-03-06 | Stop reason: HOSPADM

## 2025-03-06 RX ORDER — LIDOCAINE 4 G/G
1 PATCH TOPICAL DAILY
Qty: 10 EACH | Refills: 0 | Status: SHIPPED | OUTPATIENT
Start: 2025-03-07

## 2025-03-06 RX ORDER — HYDROMORPHONE HYDROCHLORIDE 2 MG/1
2 TABLET ORAL EVERY 8 HOURS PRN
Qty: 15 TABLET | Refills: 0 | Status: SHIPPED
Start: 2025-03-06 | End: 2025-03-06

## 2025-03-06 RX ORDER — HYDROMORPHONE HYDROCHLORIDE 2 MG/1
2 TABLET ORAL EVERY 8 HOURS PRN
Qty: 15 TABLET | Refills: 0 | Status: SHIPPED | OUTPATIENT
Start: 2025-03-06 | End: 2025-03-11

## 2025-03-06 RX ORDER — ASPIRIN 81 MG/1
81 TABLET ORAL DAILY
Qty: 30 TABLET | Refills: 3 | Status: SHIPPED
Start: 2025-03-06

## 2025-03-06 RX ADMIN — ALLOPURINOL 200 MG: 100 TABLET ORAL at 08:59

## 2025-03-06 RX ADMIN — HYDROMORPHONE HYDROCHLORIDE 2 MG: 4 TABLET ORAL at 04:04

## 2025-03-06 RX ADMIN — GABAPENTIN 300 MG: 300 CAPSULE ORAL at 08:58

## 2025-03-06 RX ADMIN — SODIUM CHLORIDE, PRESERVATIVE FREE 10 ML: 5 INJECTION INTRAVENOUS at 09:00

## 2025-03-06 RX ADMIN — PANTOPRAZOLE SODIUM 40 MG: 40 TABLET, DELAYED RELEASE ORAL at 05:31

## 2025-03-06 RX ADMIN — POLYETHYLENE GLYCOL 3350 17 G: 17 POWDER, FOR SOLUTION ORAL at 04:06

## 2025-03-06 RX ADMIN — ENOXAPARIN SODIUM 40 MG: 100 INJECTION SUBCUTANEOUS at 08:59

## 2025-03-06 RX ADMIN — HYDROMORPHONE HYDROCHLORIDE 2 MG: 4 TABLET ORAL at 08:58

## 2025-03-06 RX ADMIN — LEVOTHYROXINE SODIUM 175 MCG: 0.03 TABLET ORAL at 05:31

## 2025-03-06 RX ADMIN — ASPIRIN 81 MG CHEWABLE TABLET 81 MG: 81 TABLET CHEWABLE at 08:57

## 2025-03-06 ASSESSMENT — PAIN DESCRIPTION - ORIENTATION
ORIENTATION: RIGHT
ORIENTATION: LEFT

## 2025-03-06 ASSESSMENT — PAIN SCALES - GENERAL
PAINLEVEL_OUTOF10: 7
PAINLEVEL_OUTOF10: 6

## 2025-03-06 ASSESSMENT — PAIN DESCRIPTION - DESCRIPTORS: DESCRIPTORS: ACHING

## 2025-03-06 ASSESSMENT — PAIN DESCRIPTION - LOCATION: LOCATION: ELBOW

## 2025-03-06 ASSESSMENT — PAIN - FUNCTIONAL ASSESSMENT: PAIN_FUNCTIONAL_ASSESSMENT: PREVENTS OR INTERFERES SOME ACTIVE ACTIVITIES AND ADLS

## 2025-03-06 NOTE — DISCHARGE INSTRUCTIONS
Discharge Instructions       PATIENT ID: Fco Mary  MRN: [id number]   YOB: 1945    DATE OF ADMISSION: [unfilled]    DATE OF DISCHARGE: 3/6/2025    PRIMARY CARE PROVIDER: @PCP@     ATTENDING PHYSICIAN: [unfilled]  DISCHARGING PROVIDER: Sushma Madala, MD    To contact this individual call [phone] and ask the  to page.   If unavailable ask to be transferred the Adult Hospitalist Department.    DISCHARGE DIAGNOSES Left hip osteoarthritis     CONSULTATIONS: [unfilled]    PROCEDURES/SURGERIES: * No surgery found *    PENDING TEST RESULTS:   At the time of discharge the following test results are still pending: none     FOLLOW UP APPOINTMENTS:   [unfilled]   PCP in 2 weeks   Ortho as needed     ADDITIONAL CARE RECOMMENDATIONS:   Surgery for left hip on next Tuesday 3/11/2025     DIET: regular diet  Oral Nutritional Supplements:     ACTIVITY: activity as tolerated    Radiology      DISCHARGE MEDICATIONS:   See Medication Reconciliation Form    It is important that you take the medication exactly as they are prescribed.   Keep your medication in the bottles provided by the pharmacist and keep a list of the medication names, dosages, and times to be taken in your wallet.   Do not take other medications without consulting your doctor.       NOTIFY YOUR PHYSICIAN FOR ANY OF THE FOLLOWING:   Fever over 101 degrees for 24 hours.   Chest pain, shortness of breath, fever, chills, nausea, vomiting, diarrhea, change in mentation, falling, weakness, bleeding. Severe pain or pain not relieved by medications.  Or, any other signs or symptoms that you may have questions about.      DISPOSITION:   X Home With:   OT  PT X HH  RN       SNF/Inpatient Rehab/LTAC    Independent/assisted living    Hospice    Other:     Signed:   Sushma Madala, MD  3/6/2025  11:40 AM

## 2025-03-06 NOTE — PLAN OF CARE
Problem: Discharge Planning  Goal: Discharge to home or other facility with appropriate resources  Outcome: Progressing  Flowsheets (Taken 3/5/2025 2005)  Discharge to home or other facility with appropriate resources: Identify barriers to discharge with patient and caregiver     Problem: Skin/Tissue Integrity  Goal: Skin integrity remains intact  Description: 1.  Monitor for areas of redness and/or skin breakdown  2.  Assess vascular access sites hourly  3.  Every 4-6 hours minimum:  Change oxygen saturation probe site  4.  Every 4-6 hours:  If on nasal continuous positive airway pressure, respiratory therapy assess nares and determine need for appliance change or resting period  Recent Flowsheet Documentation  Taken 3/5/2025 2005 by Mary Duque RN  Skin Integrity Remains Intact: Monitor for areas of redness and/or skin breakdown     
  Problem: Occupational Therapy - Adult  Goal: By Discharge: Performs self-care activities at highest level of function for planned discharge setting.  See evaluation for individualized goals.  Description: FUNCTIONAL STATUS PRIOR TO ADMISSION:  Patient lives with spouse in 2 level home with 1st floor set up. Had previous R hip replacement in Dec 2024. Has RW, walk in shower,, shower chair, grab bars in shower.   Prior Level of Assist for ADLs: Independent,  ,  ,  ,  ,  ,  ,  , Prior Level of Assist for Transfers: Independent,         Occupational Therapy Goals:  Initiated 3/3/2025  1.  Patient will perform grooming in stand with Contact Guard Assist within 7 day(s).  2.  Patient will perform bathing with Minimal Assist using AE prn within 7 day(s).  3.  Patient will perform lower body dressing with Moderate Assist using AE prn within 7 day(s).  4.  Patient will perform toilet transfers with Minimal Assist  within 7 day(s).  5.  Patient will perform all aspects of toileting with Minimal Assist within 7 day(s).  Outcome: Progressing   OCCUPATIONAL THERAPY EVALUATION    Patient: Fco Mary (79 y.o. male)  Date: 3/3/2025  Primary Diagnosis: Leg swelling [M79.89]  Acute CVA (cerebrovascular accident) (HCC) [I63.9]  Unable to stand up [R68.89]         Precautions: Fall Risk                  ASSESSMENT :  Patient was previously independent with basic ADL and related mobility. Currently, he requires maximum assistance to complete LB ADL, toileting. Mod assist to move sit to stand at RW level. Constant support required to maintain static stand during ADL. The patient is limited by decreased functional mobility, independence in ADLs, high-level IADLs, ROM, strength, sensation, body mechanics, activity tolerance, endurance, balance, increased pain levels. L hip pain is primary limiting factor. Patient did have pain medication prior to OT session. Patient also complaints of R elbow pain (which ie also attributes to his 
  Problem: Occupational Therapy - Adult  Goal: By Discharge: Performs self-care activities at highest level of function for planned discharge setting.  See evaluation for individualized goals.  Description: FUNCTIONAL STATUS PRIOR TO ADMISSION:  Patient lives with spouse in 2 level home with 1st floor set up. Had previous R hip replacement in Dec 2024. Has RW, walk in shower,, shower chair, grab bars in shower. Has lift recliner, toilet riser   Prior Level of Assist for ADLs: Independent,  ,  ,  ,  ,  ,  ,  , Prior Level of Assist for Transfers: Independent,         Occupational Therapy Goals:  Initiated 3/3/2025  1.  Patient will perform grooming in stand with Contact Guard Assist within 7 day(s).  2.  Patient will perform bathing with Minimal Assist using AE prn within 7 day(s).  3.  Patient will perform lower body dressing with Moderate Assist using AE prn within 7 day(s).  4.  Patient will perform toilet transfers with Minimal Assist  within 7 day(s).  5.  Patient will perform all aspects of toileting with Minimal Assist within 7 day(s).  Outcome: Progressing   OCCUPATIONAL THERAPY TREATMENT  Patient: Fco Mary (79 y.o. male)  Date: 3/5/2025  Primary Diagnosis: Leg swelling [M79.89]  Acute CVA (cerebrovascular accident) (HCC) [I63.9]  Unable to stand up [R68.89]       Precautions: Fall Risk                Chart, occupational therapy assessment, plan of care, and goals were reviewed.    ASSESSMENT  Patient continues to benefit from skilled OT services and is slowly progressing towards goals. Patient able to ambulate to bathroom (~10') to complete grooming tasks and LB bathing in stand. However, he continues to require physical assistance for bed mobility (moderate assistance), up to moderate assistance for sit to stand transfers, requires continuous unilateral support in standing at sink to steady self  (impacting ability to manage task items during grooming, and impacting LB bathing of buttocks). He 
  Problem: Physical Therapy - Adult  Goal: By Discharge: Performs mobility at highest level of function for planned discharge setting.  See evaluation for individualized goals.  Description: FUNCTIONAL STATUS PRIOR TO ADMISSION: Patient was modified independent using a rolling walker for functional mobility.  Still ambulating community distances with difficulty.  Fall hx.    HOME SUPPORT PRIOR TO ADMISSION: The patient lived with spouse but did not require assistance.    Physical Therapy Goals  Initiated 3/3/2025  1.  Patient will move from supine to sit and sit to supine in bed with modified independence within 7 day(s).    2.  Patient will perform sit to stand with modified independence within 7 day(s).  3.  Patient will transfer from bed to chair and chair to bed with modified independence using the least restrictive device within 7 day(s).  4.  Patient will ambulate with modified independence for 200 feet with the least restrictive device within 7 day(s).   5.  Patient will ascend/descend 4 stairs with 1 handrail(s) with supervision/set-up within 7 day(s).    3/5/2025 1427 by Dolores Benson, PT  Outcome: Progressing   PHYSICAL THERAPY TREATMENT    Patient: Fco Mary (79 y.o. male)  Date: 3/5/2025  Diagnosis: Leg swelling [M79.89]  Acute CVA (cerebrovascular accident) (HCC) [I63.9]  Unable to stand up [R68.89] <principal problem not specified>      Precautions: Restrictions/Precautions  Restrictions/Precautions: Fall Risk            ASSESSMENT:  Patient continues to benefit from skilled PT services and is progressing towards goals. Patient required less assistance this afternoon and was able to walk a household distance with RW and contact guard assistance. He is still struggling with bed mobility and needs assistance with transfers. Bedside commode will be delivered tomorrow and wife can borrow a wheelchair tomorrow. Patient should be ready for discharge tomorrow after 2 more PT sessions to continue to 
  Problem: Physical Therapy - Adult  Goal: By Discharge: Performs mobility at highest level of function for planned discharge setting.  See evaluation for individualized goals.  Description: FUNCTIONAL STATUS PRIOR TO ADMISSION: Patient was modified independent using a rolling walker for functional mobility.  Still ambulating community distances with difficulty.  Fall hx.    HOME SUPPORT PRIOR TO ADMISSION: The patient lived with spouse but did not require assistance.    Physical Therapy Goals  Initiated 3/3/2025  1.  Patient will move from supine to sit and sit to supine in bed with modified independence within 7 day(s).    2.  Patient will perform sit to stand with modified independence within 7 day(s).  3.  Patient will transfer from bed to chair and chair to bed with modified independence using the least restrictive device within 7 day(s).  4.  Patient will ambulate with modified independence for 200 feet with the least restrictive device within 7 day(s).   5.  Patient will ascend/descend 4 stairs with 1 handrail(s) with supervision/set-up within 7 day(s).    3/6/2025 1301 by Dolores Benson, PT  Outcome: Adequate for Discharge   PHYSICAL THERAPY TREATMENT/DISCHARGE    Patient: Fco Mary (79 y.o. male)  Date: 3/6/2025  Diagnosis: Leg swelling [M79.89]  Acute CVA (cerebrovascular accident) (HCC) [I63.9]  Unable to stand up [R68.89] <principal problem not specified>      Precautions: Restrictions/Precautions  Restrictions/Precautions: Fall Risk            ASSESSMENT:  Patient has been followed by skilled PT services and has progressed towards goals. Performed all mobility with contact guard assistance and additional time. Bed mobility is still a struggle and takes some time, but he is able to do it.  Safe Discharge Plan with appropriate equipment and restrictions in place for home discharge today.  Patient will be back for L SHIRA surgery on Tuesday 3/11/2025.       PLAN:  Maximum therapeutic benefit has 
  Problem: Physical Therapy - Adult  Goal: By Discharge: Performs mobility at highest level of function for planned discharge setting.  See evaluation for individualized goals.  Description: FUNCTIONAL STATUS PRIOR TO ADMISSION: Patient was modified independent using a rolling walker for functional mobility.  Still ambulating community distances with difficulty.  Fall hx.    HOME SUPPORT PRIOR TO ADMISSION: The patient lived with spouse but did not require assistance.    Physical Therapy Goals  Initiated 3/3/2025  1.  Patient will move from supine to sit and sit to supine in bed with modified independence within 7 day(s).    2.  Patient will perform sit to stand with modified independence within 7 day(s).  3.  Patient will transfer from bed to chair and chair to bed with modified independence using the least restrictive device within 7 day(s).  4.  Patient will ambulate with modified independence for 200 feet with the least restrictive device within 7 day(s).   5.  Patient will ascend/descend 4 stairs with 1 handrail(s) with supervision/set-up within 7 day(s).    Outcome: Progressing   PHYSICAL THERAPY TREATMENT    Patient: Fco Mary (79 y.o. male)  Date: 3/5/2025  Diagnosis: Leg swelling [M79.89]  Acute CVA (cerebrovascular accident) (HCC) [I63.9]  Unable to stand up [R68.89] <principal problem not specified>      Precautions: Restrictions/Precautions  Restrictions/Precautions: Fall Risk            ASSESSMENT:  Patient continues to benefit from skilled PT services and is slowly progressing towards goals. Patient eager to participate in therapy and to get out of bed. Patient performed bed mobility, transfers, gait with RW, stood at bathroom sink for teeth brushing and face washing (could only use one hand at at time, had to brace himself on sink with other arm). Ambulated to chair at bed side with RW and gait belt. Struggled with bed mobility and required moderate assistance, mainly to boost trunk into 
  Problem: Safety - Adult  Goal: Free from fall injury  Outcome: Progressing     Problem: Discharge Planning  Goal: Discharge to home or other facility with appropriate resources  Outcome: Progressing     Problem: Pain  Goal: Verbalizes/displays adequate comfort level or baseline comfort level  Outcome: Progressing     Problem: Skin/Tissue Integrity  Goal: Skin integrity remains intact  Description: 1.  Monitor for areas of redness and/or skin breakdown  2.  Assess vascular access sites hourly  3.  Every 4-6 hours minimum:  Change oxygen saturation probe site  4.  Every 4-6 hours:  If on nasal continuous positive airway pressure, respiratory therapy assess nares and determine need for appliance change or resting period  Outcome: Progressing     
  Problem: Safety - Adult  Goal: Free from fall injury  Outcome: Progressing     Problem: Discharge Planning  Goal: Discharge to home or other facility with appropriate resources  Outcome: Progressing     Problem: Pain  Goal: Verbalizes/displays adequate comfort level or baseline comfort level  Outcome: Progressing     Problem: Skin/Tissue Integrity  Goal: Skin integrity remains intact  Description: 1.  Monitor for areas of redness and/or skin breakdown  2.  Assess vascular access sites hourly  3.  Every 4-6 hours minimum:  Change oxygen saturation probe site  4.  Every 4-6 hours:  If on nasal continuous positive airway pressure, respiratory therapy assess nares and determine need for appliance change or resting period  Outcome: Progressing     Problem: Physical Therapy - Adult  Goal: By Discharge: Performs mobility at highest level of function for planned discharge setting.  See evaluation for individualized goals.  Description: FUNCTIONAL STATUS PRIOR TO ADMISSION: Patient was modified independent using a rolling walker for functional mobility.  Still ambulating community distances with difficulty.  Fall hx.    HOME SUPPORT PRIOR TO ADMISSION: The patient lived with spouse but did not require assistance.    Physical Therapy Goals  Initiated 3/3/2025  1.  Patient will move from supine to sit and sit to supine in bed with modified independence within 7 day(s).    2.  Patient will perform sit to stand with modified independence within 7 day(s).  3.  Patient will transfer from bed to chair and chair to bed with modified independence using the least restrictive device within 7 day(s).  4.  Patient will ambulate with modified independence for 200 feet with the least restrictive device within 7 day(s).   5.  Patient will ascend/descend 4 stairs with 1 handrail(s) with supervision/set-up within 7 day(s).    3/3/2025 1250 by Thom August, PT  Outcome: Progressing     Problem: Occupational Therapy - Adult  Goal: By 
  Problem: Safety - Adult  Goal: Free from fall injury  Outcome: Progressing     Problem: Discharge Planning  Goal: Discharge to home or other facility with appropriate resources  Outcome: Progressing  Flowsheets (Taken 3/4/2025 1040 by Valerie Sabillon RN)  Discharge to home or other facility with appropriate resources:   Identify barriers to discharge with patient and caregiver   Arrange for needed discharge resources and transportation as appropriate   Identify discharge learning needs (meds, wound care, etc)     Problem: Pain  Goal: Verbalizes/displays adequate comfort level or baseline comfort level  Outcome: Progressing     Problem: Skin/Tissue Integrity  Goal: Skin integrity remains intact  Description: 1.  Monitor for areas of redness and/or skin breakdown  2.  Assess vascular access sites hourly  3.  Every 4-6 hours minimum:  Change oxygen saturation probe site  4.  Every 4-6 hours:  If on nasal continuous positive airway pressure, respiratory therapy assess nares and determine need for appliance change or resting period  Outcome: Progressing  Flowsheets (Taken 3/4/2025 1040 by Valerie Sabillon RN)  Skin Integrity Remains Intact: Monitor for areas of redness and/or skin breakdown     Problem: Discharge Planning  Goal: Discharge to home or other facility with appropriate resources  Outcome: Progressing  Flowsheets (Taken 3/4/2025 1040 by Valerie Sabillon RN)  Discharge to home or other facility with appropriate resources:   Identify barriers to discharge with patient and caregiver   Arrange for needed discharge resources and transportation as appropriate   Identify discharge learning needs (meds, wound care, etc)     Problem: Pain  Goal: Verbalizes/displays adequate comfort level or baseline comfort level  Outcome: Progressing     Problem: Skin/Tissue Integrity  Goal: Skin integrity remains intact  Description: 1.  Monitor for areas of redness and/or skin breakdown  2.  Assess vascular access sites 
resting period  3/3/2025 0046 by Rosetta Arias, RN  Outcome: Progressing  3/2/2025 1627 by Marivel Machado, RN  Outcome: Progressing     
Tolerance:   Good    After treatment:   Patient left in no apparent distress sitting up in chair, Call bell within reach, and nurse notified.      COMMUNICATION/EDUCATION:   The patient's plan of care was discussed with: occupational therapist, registered nurse, physician, and     Patient Education  Education Given To: Patient;Family  Education Provided Comments: Due to high fall risk, instructed patient to minimze walking, staying (and sleeping) in -Recliner and using bed side commode, going from Point A to Point B until surgery next week!  Education Method: Demonstration;Verbal  Education Outcome: Verbalized understanding      Dolores Benson, PT  Minutes: 45   
family present, and Updated patient's board on functional status and mobility recommendations    COMMUNICATION/EDUCATION:   The patient's plan of care was discussed with: occupational therapist, registered nurse, and     Patient Education  Education Given To: Patient;Family  Education Provided: Mobility Training;Role of Therapy;Energy Conservation;Fall Prevention Strategies;Plan of Care;Equipment;Transfer Training;ADL Adaptive Strategies  Education Method: Verbal;Demonstration  Barriers to Learning: None  Education Outcome: Verbalized understanding    Thank you for this referral.  Thom August, PT  Minutes: 35      Physical Therapy Evaluation Charge Determination   History Examination Presentation Decision-Making   HIGH Complexity :3+ comorbidities / personal factors will impact the outcome/ POC  HIGH Complexity : 4+ Standardized tests and measures addressing body structure, function, activity limitation and / or participation in recreation  LOW Complexity : Stable, uncomplicated  AM-PAC  MEDIUM   Based on the above components, the patient evaluation is determined to be of the following complexity level: Low

## 2025-03-06 NOTE — CARE COORDINATION
JAROCHO:    CM met with Pt, wife, and daughter in room; they are pleased that surgery may happen sooner than March 25th and plan to discharge home with HH. They do not opt for SNF at this time. CM updated [facility] about plans to go home with HH tomorrow.      CM also provided resources and education about personal care to patient and family; they will look into additional support at home.     CM ordered BSC for patient. Patient's wife would like this delivered to the room.     Wife may wish to have w/c transport arranged with [facility]; CM to f/u on Thursday 3/6 with wife on choice in transport.     ---    Pt is a , lives with wife, and owns a walker and cane (has been using these just prior to current admission).     Transition of Care Plan:    RUR: 14%  Prior Level of Functioning: home with family  Disposition: possible rehab  MEL: 3/6  If SNF or IPR: Date FOC offered:   Date FOC received:   Accepting facility:   Date authorization started with reference number:   Date authorization received and expires:   Follow up appointments: surgery  DME needed: BSC ordered 3/5; might eventually need w/c  Transportation at discharge:   /[facility] Medicare/ letter given: y  Is patient a [place] and connected with [facility]?    If yes, was [place] transfer form completed and VA notified?   Caregiver Contact: Avelina Mary, wife [phone]   Discharge Caregiver contacted prior to discharge? y  Care Conference needed?   Barriers to discharge:         
JAROCHO:    CM notified that Pt's surgery will not rescheduled but will remain planned for the end of March. CM also alerted by therapy and family that discharge to home with HH might not be safe at this time and that rehab may be necessary.     CM provided SNF list to wife and discussed referral process. She will look at list with daughter and will discuss with Pt. She is concerned that Pt will continue falling at home if discharged home and that he and she might both end getting hurt.     AUTH will be needed for SNF    CM to follow up with therapy team about today's sessions as well as with wife and patient tomorrow regarding HH.     CM updated [facility] about possible change in dispo.    ---    Pt is a , lives with wife, and owns a walker and cane (has been using these just prior to current admission).     Transition of Care Plan:    RUR: 12%  Prior Level of Functioning: home with family  Disposition: possible rehab  MEL: 3/6  If SNF or IPR: Date FOC offered:   Date FOC received:   Accepting facility:   Date authorization started with reference number:   Date authorization received and expires:   Follow up appointments:   DME needed:   Transportation at discharge:   /[facility] Medicare/ letter given: y  Is patient a [place] and connected with VA?    If yes, was [place] transfer form completed and VA notified?   Caregiver Contact: Avelina Mary, wife [phone]   Discharge Caregiver contacted prior to discharge? y  Care Conference needed?   Barriers to discharge:  clinical, dispo       
JAROCHO:    Patient cleared therapy; home with [facility], family, and personal care (family to arrange) today.     CM updated HH about plans for d/c today.     CM ordered BSC for patient on 3/5; has been delivered.     Family transport; 2nd [facility] letter given.     Patient has next surgery on Tue 3/11  ---    Pt is a , lives with wife, and owns a walker and cane (has been using these just prior to current admission).     Transition of Care Plan:    RUR: 12%  Prior Level of Functioning: home with family  Disposition: possible rehab  MEL: 3/6  If SNF or IPR: Date FOC offered:   Date FOC received:   Accepting facility:   Date authorization started with reference number:   Date authorization received and expires:   Follow up appointments: surgery  DME needed: BSC ordered 3/5; might eventually need w/c  Transportation at discharge:   IM/IM[facility] Medicare/[facility] letter given: y  Is patient a  and connected with VA?    If yes, was [place] transfer form completed and VA notified?   Caregiver Contact: Avelina Mary, wife [phone]   Discharge Caregiver contacted prior to discharge? y  Care Conference needed?   Barriers to discharge:  none       
the quality data associated with the providers?  Yes

## 2025-03-06 NOTE — DISCHARGE SUMMARY
Discharge Summary       PATIENT ID: Fco Mary  MRN: [id number]   YOB: 1945    DATE OF ADMISSION: 3/1/2025  4:04 PM    DATE OF DISCHARGE: 3/6/2025    PRIMARY CARE PROVIDER: Gideon Carroll III, DO     ATTENDING PHYSICIAN: Dr. Del Valle   DISCHARGING PROVIDER: Sushma Madala, MD    To contact this individual call [phone] and ask the  to page.  If unavailable ask to be transferred the Adult Hospitalist Department.    CONSULTATIONS: IP CONSULT TO TELE-NEUROLOGY  IP CONSULT TO HOSPITALIST  IP CONSULT TO NEUROLOGY  IP CONSULT TO CASE MANAGEMENT  IP CONSULT TO ORTHOPEDIC SURGERY  IP WOUND CARE NURSE CONSULT TO EVAL  IP CONSULT TO CASE MANAGEMENT    PROCEDURES/SURGERIES: * No surgery found *    DIAGNOSES & HOSPITAL COURSE:   Per HPI:\"79 y.o. male with past medical history significant for CAD s/p stent placement, hypertension, dyslipidemia, hypothyroidism, degenerative joint disease presented at the emergency room with weakness.  The onset was very sudden and involve bilateral lower extremity.  Patient was in the shower when he developed the weakness.  Patient lowered himself to the ground and was unable to get up on his own.  He was brought to the emergency room as code stroke alert..  CT of the head without contrast did not show acute pathology.  CTA of the head and neck did not show large vessel occlusion.  Patient was referred to the hospitalist service for admission.  Patient is awaiting left hip replacement.  He reported left hip pain which is constant, worse with movement, slight relief at rest, no radiation, sharp and about 6/10 in severity  Patient was last admitted to the hospital from 12/23/2024 to 12/24/2024, he was admitted for right anterior total hip arthroplasty by the orthopedic service.\"    Left Leg weakness  -due to L hip arthritis  -MRIs negative for CVA, spine without compression  -PT/OT  -plan for outpatient L hip replacement - next Tuesday 3/11   -continue

## 2025-03-07 NOTE — DISCHARGE INSTRUCTIONS
Post-op Discharge Instructions Following Total Joint Replacement  Thom Fairbanks MD  [facility]  [phone]  Follow-up Office Visit  See Dr. Fairbanks approximately 3-4 weeks from date of surgery. Call [phone] to make an appointment.  If you have any postop questions for Dr. Fairbanks's clinical team, please call [phone].  Activity  Use your walker for ambulation.  Weight bearing as tolerated unless instructed otherwise by the physical therapist. Get up every hour you are awake and take a brief walk. Lengthen walking distance daily as your strength improves.  Continue using your walker until seen in the office for your first follow up visit.  Practice your exercises 3 times daily as instructed by the physical therapist. Ice for 20 minutes after exercising.  No driving until seen in the office for your first follow up visit.  Incision Care  The surgical dressing is waterproof and is to remain on your incision for 7 days. On the 7th day, carefully lift the edge of the dressing to break the adhesive seal and gently peel it off.  If your surgical dressing comes loose or falls off before the 7th day, replace it with a dry sterile gauze dressing and change this dressing daily. Once there is no drainage on the bandage, you mean leave the incision open to air.  You may take a shower with the surgical dressing in place. After you remove the surgical dressing on day 7, you may continue to shower and get your incision wet in the shower. Do not submerge your incision under water in a bathtub, hot tub, swimming pool, etc. until after you have been evaluated at your first office visit.  Medications  Blood Clot Prevention: Take medication as prescribed by your physician for 4 weeks postop.  Pain Management: Take pain medication as prescribed; wean yourself off of pain medication as your pain lessens. Take with food.  You make also take Tylenol every 4-6 hours as needed for pain.  Do not exceed 3 grams (3000mg) per

## 2025-03-10 RX ORDER — SODIUM CHLORIDE 9 MG/ML
INJECTION, SOLUTION INTRAVENOUS PRN
Status: CANCELLED | OUTPATIENT
Start: 2025-03-10

## 2025-03-10 RX ORDER — PROCHLORPERAZINE EDISYLATE 5 MG/ML
5 INJECTION INTRAMUSCULAR; INTRAVENOUS
Status: CANCELLED | OUTPATIENT
Start: 2025-03-10 | End: 2025-03-11

## 2025-03-10 RX ORDER — SODIUM CHLORIDE 0.9 % (FLUSH) 0.9 %
5-40 SYRINGE (ML) INJECTION PRN
Status: CANCELLED | OUTPATIENT
Start: 2025-03-10

## 2025-03-10 RX ORDER — OXYCODONE HYDROCHLORIDE 5 MG/1
5 TABLET ORAL
Refills: 0 | Status: CANCELLED | OUTPATIENT
Start: 2025-03-10 | End: 2025-03-11

## 2025-03-10 RX ORDER — HYDROMORPHONE HYDROCHLORIDE 1 MG/ML
0.5 INJECTION, SOLUTION INTRAMUSCULAR; INTRAVENOUS; SUBCUTANEOUS EVERY 5 MIN PRN
Refills: 0 | Status: CANCELLED | OUTPATIENT
Start: 2025-03-10

## 2025-03-10 RX ORDER — HYDRALAZINE HYDROCHLORIDE 20 MG/ML
10 INJECTION INTRAMUSCULAR; INTRAVENOUS ONCE
Status: CANCELLED | OUTPATIENT
Start: 2025-03-10 | End: 2025-03-10

## 2025-03-10 RX ORDER — SODIUM CHLORIDE 0.9 % (FLUSH) 0.9 %
5-40 SYRINGE (ML) INJECTION EVERY 12 HOURS SCHEDULED
Status: CANCELLED | OUTPATIENT
Start: 2025-03-10

## 2025-03-10 RX ORDER — ONDANSETRON 2 MG/ML
4 INJECTION INTRAMUSCULAR; INTRAVENOUS
Status: CANCELLED | OUTPATIENT
Start: 2025-03-10 | End: 2025-03-11

## 2025-03-10 RX ORDER — FENTANYL CITRATE 50 UG/ML
25 INJECTION, SOLUTION INTRAMUSCULAR; INTRAVENOUS EVERY 5 MIN PRN
Refills: 0 | Status: CANCELLED | OUTPATIENT
Start: 2025-03-10

## 2025-03-10 RX ORDER — NALOXONE HYDROCHLORIDE 0.4 MG/ML
INJECTION, SOLUTION INTRAMUSCULAR; INTRAVENOUS; SUBCUTANEOUS PRN
Status: CANCELLED | OUTPATIENT
Start: 2025-03-10

## 2025-03-10 NOTE — PERIOP NOTE
[address] [address]   MAIN OR                     [phone]    MAIN PRE OP             [phone]                                                                                AMBULATORY PRE OP          [phone]  PRE-ADMISSION TESTING    [phone]     Surgery Date:  3/11/25       Is surgery arrival time given by surgeon?  YES  NO    If “NO”, [employer] staff will call you between 4 and 7pm the day before your surgery with your arrival time. (If your surgery is on a Monday, we will call you the Friday before.)    Call [phone] after 7pm Monday-Friday if you did not receive this call.    INSTRUCTIONS BEFORE YOUR SURGERY   When You  Arrive Arrive at [facility] Patient Access on 1st floor the day of your surgery.  Have your insurance card, photo ID,living will/advanced directive/POA (if applicable),  and any copayment (if needed)   Food   and   Drink NO solid food after midnight the night before surgery. You can drink clear liquids from midnight until ONE hour prior to your arrival at the hospital on the day of your surgery. Clear liquids include:  Water  Apple juice (no sediment)  Carbonated beverages  Black coffee(no cream/milk)  Tea(no cream/milk)  Gatorade    No alcohol (beer, wine, liquor) or marijuana (smoking) 24 hours, edibles (3 days). Stop smoking cigarettes 14 days before surgery (helps w/healing and breathing).   Medications to   TAKE   Morning of Surgery MEDICATIONS TO TAKE THE MORNING OF SURGERY WITH A SIP OF WATER: GABAPENTIN,AMLODIPINE,ALLOPURINOL PANTOPRAZOLE AND LEVOTHYROXINE     You may take these medications, IF NEEDED, the morning of surgery: HYDROMORPHONE 4 HOURS PRIOR TO SURGERY IF NEEDED     Ask your surgeon/prescribing doctor for instructions on taking or stopping these medications prior to surgery: ASPIRIN   Medications to STOP  before surgery Non-Steroidal anti-inflammatory Drugs (NSAID's): for example,

## 2025-03-11 ENCOUNTER — APPOINTMENT (OUTPATIENT)
Facility: HOSPITAL | Age: 80
End: 2025-03-11
Attending: ORTHOPAEDIC SURGERY
Payer: MEDICARE

## 2025-03-11 ENCOUNTER — HOSPITAL ENCOUNTER (OUTPATIENT)
Facility: HOSPITAL | Age: 80
Setting detail: OBSERVATION
Discharge: HOME | End: 2025-03-13
Attending: ORTHOPAEDIC SURGERY | Admitting: ORTHOPAEDIC SURGERY
Payer: MEDICARE

## 2025-03-11 ENCOUNTER — ANESTHESIA (OUTPATIENT)
Facility: HOSPITAL | Age: 80
End: 2025-03-11
Payer: MEDICARE

## 2025-03-11 ENCOUNTER — ANESTHESIA EVENT (OUTPATIENT)
Facility: HOSPITAL | Age: 80
End: 2025-03-11
Payer: MEDICARE

## 2025-03-11 DIAGNOSIS — Z96.642 STATUS POST LEFT HIP REPLACEMENT: Primary | ICD-10-CM

## 2025-03-11 PROCEDURE — 2580000003 HC RX 258: Performed by: NURSE ANESTHETIST, CERTIFIED REGISTERED

## 2025-03-11 PROCEDURE — 3700000000 HC ANESTHESIA ATTENDED CARE: Performed by: ORTHOPAEDIC SURGERY

## 2025-03-11 PROCEDURE — 6370000000 HC RX 637 (ALT 250 FOR IP): Performed by: STUDENT IN AN ORGANIZED HEALTH CARE EDUCATION/TRAINING PROGRAM

## 2025-03-11 PROCEDURE — 6370000000 HC RX 637 (ALT 250 FOR IP): Performed by: ORTHOPAEDIC SURGERY

## 2025-03-11 PROCEDURE — 2580000003 HC RX 258: Performed by: PHYSICIAN ASSISTANT

## 2025-03-11 PROCEDURE — 6360000002 HC RX W HCPCS: Performed by: PHYSICIAN ASSISTANT

## 2025-03-11 PROCEDURE — 6370000000 HC RX 637 (ALT 250 FOR IP): Performed by: PHYSICIAN ASSISTANT

## 2025-03-11 PROCEDURE — 6360000002 HC RX W HCPCS: Performed by: NURSE ANESTHETIST, CERTIFIED REGISTERED

## 2025-03-11 PROCEDURE — G0378 HOSPITAL OBSERVATION PER HR: HCPCS

## 2025-03-11 PROCEDURE — 2500000003 HC RX 250 WO HCPCS: Performed by: NURSE ANESTHETIST, CERTIFIED REGISTERED

## 2025-03-11 PROCEDURE — 3600000005 HC SURGERY LEVEL 5 BASE: Performed by: ORTHOPAEDIC SURGERY

## 2025-03-11 PROCEDURE — 2500000003 HC RX 250 WO HCPCS: Performed by: PHYSICIAN ASSISTANT

## 2025-03-11 PROCEDURE — 3600000015 HC SURGERY LEVEL 5 ADDTL 15MIN: Performed by: ORTHOPAEDIC SURGERY

## 2025-03-11 PROCEDURE — 7100000000 HC PACU RECOVERY - FIRST 15 MIN: Performed by: ORTHOPAEDIC SURGERY

## 2025-03-11 PROCEDURE — 7100000001 HC PACU RECOVERY - ADDTL 15 MIN: Performed by: ORTHOPAEDIC SURGERY

## 2025-03-11 PROCEDURE — 3700000001 HC ADD 15 MINUTES (ANESTHESIA): Performed by: ORTHOPAEDIC SURGERY

## 2025-03-11 PROCEDURE — C1776 JOINT DEVICE (IMPLANTABLE): HCPCS | Performed by: ORTHOPAEDIC SURGERY

## 2025-03-11 PROCEDURE — P9045 ALBUMIN (HUMAN), 5%, 250 ML: HCPCS | Performed by: NURSE ANESTHETIST, CERTIFIED REGISTERED

## 2025-03-11 PROCEDURE — 2709999900 HC NON-CHARGEABLE SUPPLY: Performed by: ORTHOPAEDIC SURGERY

## 2025-03-11 DEVICE — TAPERFILL HIP STEM, STANDARD, SIZE 11
Type: IMPLANTABLE DEVICE | Site: HIP | Status: FUNCTIONAL
Brand: DJO SURGICAL

## 2025-03-11 DEVICE — EMPOWR ACET SYSTEM, CUP, HEMISPHERICAL, CLUSTER HOLE, 52MM
Type: IMPLANTABLE DEVICE | Site: HIP | Status: FUNCTIONAL
Brand: DJO SURGICAL

## 2025-03-11 DEVICE — IMPL CAPPED HIP H2 TOTAL ADV OTHER HEAD DJO: Type: IMPLANTABLE DEVICE | Site: HIP | Status: FUNCTIONAL

## 2025-03-11 DEVICE — EMPOWR ACETABULAR SYSTEM, LINER, NEUTRAL, HXE+, 36F
Type: IMPLANTABLE DEVICE | Site: HIP | Status: FUNCTIONAL
Brand: DJO SURGICAL

## 2025-03-11 DEVICE — HEAD, FEMORAL, CERAMIC, BILOX DELTA, 36MM -4.0
Type: IMPLANTABLE DEVICE | Site: HIP | Status: FUNCTIONAL
Brand: DJO SURGICAL

## 2025-03-11 DEVICE — EMPOWR ACETABULAR, BONE SCREW, 40MM
Type: IMPLANTABLE DEVICE | Site: HIP | Status: FUNCTIONAL
Brand: DJO SURGICAL

## 2025-03-11 RX ORDER — LISINOPRIL 20 MG/1
20 TABLET ORAL DAILY
Status: DISCONTINUED | OUTPATIENT
Start: 2025-03-11 | End: 2025-03-13 | Stop reason: HOSPADM

## 2025-03-11 RX ORDER — SODIUM CHLORIDE, SODIUM LACTATE, POTASSIUM CHLORIDE, CALCIUM CHLORIDE 600; 310; 30; 20 MG/100ML; MG/100ML; MG/100ML; MG/100ML
INJECTION, SOLUTION INTRAVENOUS CONTINUOUS
Status: DISCONTINUED | OUTPATIENT
Start: 2025-03-11 | End: 2025-03-11 | Stop reason: HOSPADM

## 2025-03-11 RX ORDER — 0.9 % SODIUM CHLORIDE 0.9 %
500 INTRAVENOUS SOLUTION INTRAVENOUS PRN
Status: DISCONTINUED | OUTPATIENT
Start: 2025-03-11 | End: 2025-03-13 | Stop reason: HOSPADM

## 2025-03-11 RX ORDER — LIDOCAINE HYDROCHLORIDE 10 MG/ML
1 INJECTION, SOLUTION EPIDURAL; INFILTRATION; INTRACAUDAL; PERINEURAL
Status: DISCONTINUED | OUTPATIENT
Start: 2025-03-11 | End: 2025-03-11 | Stop reason: HOSPADM

## 2025-03-11 RX ORDER — AMLODIPINE BESYLATE 5 MG/1
5 TABLET ORAL DAILY
Status: DISCONTINUED | OUTPATIENT
Start: 2025-03-11 | End: 2025-03-11 | Stop reason: CLARIF

## 2025-03-11 RX ORDER — AMLODIPINE BESYLATE 5 MG/1
2.5 TABLET ORAL DAILY
Status: DISCONTINUED | OUTPATIENT
Start: 2025-03-12 | End: 2025-03-13 | Stop reason: HOSPADM

## 2025-03-11 RX ORDER — GABAPENTIN 300 MG/1
900 CAPSULE ORAL 3 TIMES DAILY
Status: DISCONTINUED | OUTPATIENT
Start: 2025-03-11 | End: 2025-03-12

## 2025-03-11 RX ORDER — ALBUMIN HUMAN 50 G/1000ML
SOLUTION INTRAVENOUS
Status: DISCONTINUED | OUTPATIENT
Start: 2025-03-11 | End: 2025-03-11 | Stop reason: SDUPTHER

## 2025-03-11 RX ORDER — ASPIRIN 81 MG/1
81 TABLET ORAL 2 TIMES DAILY
Status: DISCONTINUED | OUTPATIENT
Start: 2025-03-11 | End: 2025-03-13 | Stop reason: HOSPADM

## 2025-03-11 RX ORDER — MIDAZOLAM HYDROCHLORIDE 1 MG/ML
INJECTION, SOLUTION INTRAMUSCULAR; INTRAVENOUS
Status: DISCONTINUED | OUTPATIENT
Start: 2025-03-11 | End: 2025-03-11 | Stop reason: SDUPTHER

## 2025-03-11 RX ORDER — TRANEXAMIC ACID 100 MG/ML
INJECTION, SOLUTION INTRAVENOUS
Status: DISCONTINUED | OUTPATIENT
Start: 2025-03-11 | End: 2025-03-11 | Stop reason: SDUPTHER

## 2025-03-11 RX ORDER — GABAPENTIN 300 MG/1
300 CAPSULE ORAL 3 TIMES DAILY
Status: DISCONTINUED | OUTPATIENT
Start: 2025-03-11 | End: 2025-03-11

## 2025-03-11 RX ORDER — ROCURONIUM BROMIDE 10 MG/ML
INJECTION, SOLUTION INTRAVENOUS
Status: DISCONTINUED | OUTPATIENT
Start: 2025-03-11 | End: 2025-03-11 | Stop reason: SDUPTHER

## 2025-03-11 RX ORDER — FENTANYL CITRATE 50 UG/ML
100 INJECTION, SOLUTION INTRAMUSCULAR; INTRAVENOUS
Status: DISCONTINUED | OUTPATIENT
Start: 2025-03-11 | End: 2025-03-11 | Stop reason: HOSPADM

## 2025-03-11 RX ORDER — FENTANYL CITRATE 50 UG/ML
INJECTION, SOLUTION INTRAMUSCULAR; INTRAVENOUS
Status: DISCONTINUED | OUTPATIENT
Start: 2025-03-11 | End: 2025-03-11 | Stop reason: SDUPTHER

## 2025-03-11 RX ORDER — SODIUM CHLORIDE 0.9 % (FLUSH) 0.9 %
5-40 SYRINGE (ML) INJECTION EVERY 12 HOURS SCHEDULED
Status: DISCONTINUED | OUTPATIENT
Start: 2025-03-11 | End: 2025-03-11 | Stop reason: HOSPADM

## 2025-03-11 RX ORDER — MIDAZOLAM HYDROCHLORIDE 2 MG/2ML
2 INJECTION, SOLUTION INTRAMUSCULAR; INTRAVENOUS PRN
Status: DISCONTINUED | OUTPATIENT
Start: 2025-03-11 | End: 2025-03-11 | Stop reason: HOSPADM

## 2025-03-11 RX ORDER — OXYCODONE HYDROCHLORIDE 5 MG/1
2.5 TABLET ORAL
Status: DISCONTINUED | OUTPATIENT
Start: 2025-03-11 | End: 2025-03-13 | Stop reason: HOSPADM

## 2025-03-11 RX ORDER — ONDANSETRON 2 MG/ML
4 INJECTION INTRAMUSCULAR; INTRAVENOUS EVERY 6 HOURS PRN
Status: DISCONTINUED | OUTPATIENT
Start: 2025-03-11 | End: 2025-03-13 | Stop reason: HOSPADM

## 2025-03-11 RX ORDER — PANTOPRAZOLE SODIUM 40 MG/1
40 TABLET, DELAYED RELEASE ORAL
Status: DISCONTINUED | OUTPATIENT
Start: 2025-03-12 | End: 2025-03-13 | Stop reason: HOSPADM

## 2025-03-11 RX ORDER — HYDROXYZINE HYDROCHLORIDE 10 MG/1
10 TABLET, FILM COATED ORAL EVERY 8 HOURS PRN
Status: DISCONTINUED | OUTPATIENT
Start: 2025-03-11 | End: 2025-03-13 | Stop reason: HOSPADM

## 2025-03-11 RX ORDER — OXYCODONE HYDROCHLORIDE 5 MG/1
5 TABLET ORAL
Status: DISCONTINUED | OUTPATIENT
Start: 2025-03-11 | End: 2025-03-13 | Stop reason: HOSPADM

## 2025-03-11 RX ORDER — ENALAPRIL MALEATE 10 MG/1
20 TABLET ORAL DAILY
Status: DISCONTINUED | OUTPATIENT
Start: 2025-03-11 | End: 2025-03-11 | Stop reason: CLARIF

## 2025-03-11 RX ORDER — BISACODYL 10 MG
10 SUPPOSITORY, RECTAL RECTAL DAILY PRN
Status: DISCONTINUED | OUTPATIENT
Start: 2025-03-11 | End: 2025-03-13 | Stop reason: HOSPADM

## 2025-03-11 RX ORDER — ESMOLOL HYDROCHLORIDE 10 MG/ML
INJECTION INTRAVENOUS
Status: DISCONTINUED | OUTPATIENT
Start: 2025-03-11 | End: 2025-03-11 | Stop reason: SDUPTHER

## 2025-03-11 RX ORDER — DEXMEDETOMIDINE HYDROCHLORIDE 100 UG/ML
INJECTION, SOLUTION INTRAVENOUS
Status: DISCONTINUED | OUTPATIENT
Start: 2025-03-11 | End: 2025-03-11 | Stop reason: SDUPTHER

## 2025-03-11 RX ORDER — AMLODIPINE BESYLATE 5 MG/1
5 TABLET ORAL DAILY
Status: DISCONTINUED | OUTPATIENT
Start: 2025-03-11 | End: 2025-03-11

## 2025-03-11 RX ORDER — SODIUM CHLORIDE 9 MG/ML
INJECTION, SOLUTION INTRAVENOUS PRN
Status: DISCONTINUED | OUTPATIENT
Start: 2025-03-11 | End: 2025-03-13 | Stop reason: HOSPADM

## 2025-03-11 RX ORDER — ONDANSETRON 2 MG/ML
INJECTION INTRAMUSCULAR; INTRAVENOUS
Status: DISCONTINUED | OUTPATIENT
Start: 2025-03-11 | End: 2025-03-11 | Stop reason: SDUPTHER

## 2025-03-11 RX ORDER — SUCCINYLCHOLINE/SOD CL,ISO/PF 200MG/10ML
SYRINGE (ML) INTRAVENOUS
Status: DISCONTINUED | OUTPATIENT
Start: 2025-03-11 | End: 2025-03-11 | Stop reason: SDUPTHER

## 2025-03-11 RX ORDER — SODIUM CHLORIDE 9 MG/ML
INJECTION, SOLUTION INTRAVENOUS PRN
Status: DISCONTINUED | OUTPATIENT
Start: 2025-03-11 | End: 2025-03-11 | Stop reason: HOSPADM

## 2025-03-11 RX ORDER — CELECOXIB 200 MG/1
200 CAPSULE ORAL ONCE
Status: COMPLETED | OUTPATIENT
Start: 2025-03-11 | End: 2025-03-11

## 2025-03-11 RX ORDER — SODIUM CHLORIDE 0.9 % (FLUSH) 0.9 %
5-40 SYRINGE (ML) INJECTION PRN
Status: DISCONTINUED | OUTPATIENT
Start: 2025-03-11 | End: 2025-03-13 | Stop reason: HOSPADM

## 2025-03-11 RX ORDER — ONDANSETRON 4 MG/1
4 TABLET, ORALLY DISINTEGRATING ORAL EVERY 8 HOURS PRN
Status: DISCONTINUED | OUTPATIENT
Start: 2025-03-11 | End: 2025-03-13 | Stop reason: HOSPADM

## 2025-03-11 RX ORDER — ACETAMINOPHEN 500 MG
1000 TABLET ORAL ONCE
Status: DISCONTINUED | OUTPATIENT
Start: 2025-03-11 | End: 2025-03-11 | Stop reason: HOSPADM

## 2025-03-11 RX ORDER — ACETAMINOPHEN 500 MG
500 TABLET ORAL
Status: DISCONTINUED | OUTPATIENT
Start: 2025-03-11 | End: 2025-03-13 | Stop reason: HOSPADM

## 2025-03-11 RX ORDER — DEXAMETHASONE SODIUM PHOSPHATE 4 MG/ML
INJECTION, SOLUTION INTRA-ARTICULAR; INTRALESIONAL; INTRAMUSCULAR; INTRAVENOUS; SOFT TISSUE
Status: DISCONTINUED | OUTPATIENT
Start: 2025-03-11 | End: 2025-03-11 | Stop reason: SDUPTHER

## 2025-03-11 RX ORDER — SODIUM CHLORIDE 9 MG/ML
INJECTION, SOLUTION INTRAVENOUS CONTINUOUS
Status: DISPENSED | OUTPATIENT
Start: 2025-03-11 | End: 2025-03-13

## 2025-03-11 RX ORDER — ACETAMINOPHEN 500 MG
1000 TABLET ORAL ONCE
Status: COMPLETED | OUTPATIENT
Start: 2025-03-11 | End: 2025-03-11

## 2025-03-11 RX ORDER — SODIUM CHLORIDE, SODIUM LACTATE, POTASSIUM CHLORIDE, CALCIUM CHLORIDE 600; 310; 30; 20 MG/100ML; MG/100ML; MG/100ML; MG/100ML
INJECTION, SOLUTION INTRAVENOUS
Status: DISCONTINUED | OUTPATIENT
Start: 2025-03-11 | End: 2025-03-11 | Stop reason: SDUPTHER

## 2025-03-11 RX ORDER — SODIUM CHLORIDE 0.9 % (FLUSH) 0.9 %
5-40 SYRINGE (ML) INJECTION PRN
Status: DISCONTINUED | OUTPATIENT
Start: 2025-03-11 | End: 2025-03-11 | Stop reason: HOSPADM

## 2025-03-11 RX ORDER — LIDOCAINE HYDROCHLORIDE 20 MG/ML
INJECTION, SOLUTION EPIDURAL; INFILTRATION; INTRACAUDAL; PERINEURAL
Status: DISCONTINUED | OUTPATIENT
Start: 2025-03-11 | End: 2025-03-11 | Stop reason: SDUPTHER

## 2025-03-11 RX ORDER — SODIUM CHLORIDE 0.9 % (FLUSH) 0.9 %
5-40 SYRINGE (ML) INJECTION EVERY 12 HOURS SCHEDULED
Status: DISCONTINUED | OUTPATIENT
Start: 2025-03-11 | End: 2025-03-13 | Stop reason: HOSPADM

## 2025-03-11 RX ORDER — KETOROLAC TROMETHAMINE 30 MG/ML
15 INJECTION, SOLUTION INTRAMUSCULAR; INTRAVENOUS EVERY 6 HOURS
Status: DISCONTINUED | OUTPATIENT
Start: 2025-03-11 | End: 2025-03-12

## 2025-03-11 RX ORDER — SENNA AND DOCUSATE SODIUM 50; 8.6 MG/1; MG/1
1 TABLET, FILM COATED ORAL 2 TIMES DAILY
Status: DISCONTINUED | OUTPATIENT
Start: 2025-03-11 | End: 2025-03-13 | Stop reason: HOSPADM

## 2025-03-11 RX ORDER — ATORVASTATIN CALCIUM 20 MG/1
20 TABLET, FILM COATED ORAL NIGHTLY
Status: DISCONTINUED | OUTPATIENT
Start: 2025-03-11 | End: 2025-03-13 | Stop reason: HOSPADM

## 2025-03-11 RX ORDER — POLYETHYLENE GLYCOL 3350 17 G/17G
17 POWDER, FOR SOLUTION ORAL DAILY PRN
Status: DISCONTINUED | OUTPATIENT
Start: 2025-03-11 | End: 2025-03-13 | Stop reason: HOSPADM

## 2025-03-11 RX ADMIN — SENNOSIDES AND DOCUSATE SODIUM 1 TABLET: 50; 8.6 TABLET ORAL at 20:38

## 2025-03-11 RX ADMIN — ROCURONIUM BROMIDE 10 MG: 50 INJECTION INTRAVENOUS at 15:30

## 2025-03-11 RX ADMIN — SODIUM CHLORIDE, POTASSIUM CHLORIDE, SODIUM LACTATE AND CALCIUM CHLORIDE: 600; 310; 30; 20 INJECTION, SOLUTION INTRAVENOUS at 14:57

## 2025-03-11 RX ADMIN — MIDAZOLAM 2 MG: 1 INJECTION INTRAMUSCULAR; INTRAVENOUS at 14:57

## 2025-03-11 RX ADMIN — OXYCODONE HYDROCHLORIDE 5 MG: 5 TABLET ORAL at 20:38

## 2025-03-11 RX ADMIN — ONDANSETRON 4 MG: 2 INJECTION, SOLUTION INTRAMUSCULAR; INTRAVENOUS at 15:21

## 2025-03-11 RX ADMIN — DEXMEDETOMIDINE 4 MCG: 100 INJECTION, SOLUTION INTRAVENOUS at 15:43

## 2025-03-11 RX ADMIN — KETOROLAC TROMETHAMINE 15 MG: 30 INJECTION, SOLUTION INTRAMUSCULAR at 19:21

## 2025-03-11 RX ADMIN — ESMOLOL HYDROCHLORIDE 10 MG: 10 INJECTION, SOLUTION INTRAVENOUS at 15:53

## 2025-03-11 RX ADMIN — Medication 160 MG: at 15:04

## 2025-03-11 RX ADMIN — HYDROMORPHONE HYDROCHLORIDE 0.5 MG: 1 INJECTION, SOLUTION INTRAMUSCULAR; INTRAVENOUS; SUBCUTANEOUS at 15:27

## 2025-03-11 RX ADMIN — ALBUMIN (HUMAN) 250 ML: 12.5 INJECTION, SOLUTION INTRAVENOUS at 16:52

## 2025-03-11 RX ADMIN — TRANEXAMIC ACID 1000 MG: 100 INJECTION INTRAVENOUS at 16:55

## 2025-03-11 RX ADMIN — SUGAMMADEX 200 MG: 100 INJECTION, SOLUTION INTRAVENOUS at 17:24

## 2025-03-11 RX ADMIN — ACETAMINOPHEN 500 MG: 500 TABLET ORAL at 19:21

## 2025-03-11 RX ADMIN — GABAPENTIN 900 MG: 300 CAPSULE ORAL at 20:38

## 2025-03-11 RX ADMIN — SODIUM CHLORIDE: 0.9 INJECTION, SOLUTION INTRAVENOUS at 22:50

## 2025-03-11 RX ADMIN — FENTANYL CITRATE 100 MCG: 50 INJECTION INTRAMUSCULAR; INTRAVENOUS at 15:04

## 2025-03-11 RX ADMIN — WATER 2000 MG: 1 INJECTION INTRAMUSCULAR; INTRAVENOUS; SUBCUTANEOUS at 22:36

## 2025-03-11 RX ADMIN — WATER 2000 MG: 1 INJECTION INTRAMUSCULAR; INTRAVENOUS; SUBCUTANEOUS at 15:18

## 2025-03-11 RX ADMIN — DEXMEDETOMIDINE 8 MCG: 100 INJECTION, SOLUTION INTRAVENOUS at 15:49

## 2025-03-11 RX ADMIN — ASPIRIN 81 MG: 81 TABLET, COATED ORAL at 20:38

## 2025-03-11 RX ADMIN — ACETAMINOPHEN 500 MG: 500 TABLET ORAL at 22:39

## 2025-03-11 RX ADMIN — ACETAMINOPHEN 1000 MG: 500 TABLET ORAL at 13:42

## 2025-03-11 RX ADMIN — ROCURONIUM BROMIDE 45 MG: 50 INJECTION INTRAVENOUS at 15:12

## 2025-03-11 RX ADMIN — ROCURONIUM BROMIDE 5 MG: 50 INJECTION INTRAVENOUS at 15:04

## 2025-03-11 RX ADMIN — DEXAMETHASONE SODIUM PHOSPHATE 4 MG: 4 INJECTION INTRA-ARTICULAR; INTRALESIONAL; INTRAMUSCULAR; INTRAVENOUS; SOFT TISSUE at 15:21

## 2025-03-11 RX ADMIN — HYDROMORPHONE HYDROCHLORIDE 0.5 MG: 1 INJECTION, SOLUTION INTRAMUSCULAR; INTRAVENOUS; SUBCUTANEOUS at 15:43

## 2025-03-11 RX ADMIN — PHENYLEPHRINE HYDROCHLORIDE 50 MCG/MIN: 10 INJECTION INTRAVENOUS at 15:04

## 2025-03-11 RX ADMIN — AMLODIPINE BESYLATE 5 MG: 5 TABLET ORAL at 19:20

## 2025-03-11 RX ADMIN — LISINOPRIL 20 MG: 20 TABLET ORAL at 19:21

## 2025-03-11 RX ADMIN — PROPOFOL 150 MG: 10 INJECTION, EMULSION INTRAVENOUS at 15:04

## 2025-03-11 RX ADMIN — TRANEXAMIC ACID 1000 MG: 100 INJECTION INTRAVENOUS at 15:18

## 2025-03-11 RX ADMIN — CELECOXIB 200 MG: 200 CAPSULE ORAL at 13:43

## 2025-03-11 RX ADMIN — LIDOCAINE HYDROCHLORIDE 60 MG: 20 INJECTION, SOLUTION EPIDURAL; INFILTRATION; INTRACAUDAL; PERINEURAL at 15:04

## 2025-03-11 RX ADMIN — SODIUM CHLORIDE, POTASSIUM CHLORIDE, SODIUM LACTATE AND CALCIUM CHLORIDE: 600; 310; 30; 20 INJECTION, SOLUTION INTRAVENOUS at 14:26

## 2025-03-11 RX ADMIN — ATORVASTATIN CALCIUM 20 MG: 20 TABLET, FILM COATED ORAL at 20:38

## 2025-03-11 ASSESSMENT — PAIN - FUNCTIONAL ASSESSMENT: PAIN_FUNCTIONAL_ASSESSMENT: 0-10

## 2025-03-11 NOTE — ANESTHESIA POSTPROCEDURE EVALUATION
Department of Anesthesiology  Postprocedure Note    Patient: Fco Mary  MRN: [id number]  YOB: 1945  Date of evaluation: 3/11/2025    Procedure Summary       Date: 03/11/25 Room / Location: [facility] MAIN OR [address] [place] [address] MAIN OR    Anesthesia Start: 1500 Anesthesia Stop: 1749    Procedure: LEFT ANTERIOR TOTAL HIP ARTHROPLASTY (Left: Hip) Diagnosis:       Primary osteoarthritis of left hip      (Primary osteoarthritis of left hip [M16.12])    Providers: Thom Fairbanks MD Responsible Provider: Bakari Callaway MD    Anesthesia Type: General ASA Status: 2            Anesthesia Type: General    Analia Phase I:      Analia Phase II:      Anesthesia Post Evaluation    Patient location during evaluation: PACU  Patient participation: complete - patient participated  Level of consciousness: awake  Airway patency: patent  Nausea & Vomiting: no nausea  Cardiovascular status: blood pressure returned to baseline and hemodynamically stable  Respiratory status: acceptable  Hydration status: stable  Multimodal analgesia pain management approach    No notable events documented.

## 2025-03-11 NOTE — FLOWSHEET NOTE
03/11/25 1656   Family Communication    Relationship to Patient Spouse    Phone Number Avelina Mary ([phone])   Family/Significant Other Update Called;Updated   Delivery Origin Nurse   Message Disposition Family present - message delivered   Update Given Yes   Family Communication   Family Update Message Closing

## 2025-03-11 NOTE — ANESTHESIA PRE PROCEDURE
Department of Anesthesiology  Preprocedure Note       Name:  Fco Mary   Age:  79 y.o.  :  1945                                          MRN:  [id number]         Date:  3/11/2025      Surgeon: Surgeon(s):  Thom Fairbanks MD    Procedure: Procedure(s):  LEFT ANTERIOR TOTAL HIP ARTHROPLASTY    Medications prior to admission:   Prior to Admission medications    Medication Sig Start Date End Date Taking? Authorizing Provider   gabapentin (NEURONTIN) 300 MG capsule Take 1 capsule by mouth 3 times daily for 30 days. Max Daily Amount: 900 mg 3/6/25 4/5/25 Yes Madala, Sushma, MD   lidocaine 4 % external patch Place 1 patch onto the skin daily 3/7/25  Yes Madala, Sushma, MD   HYDROmorphone (DILAUDID) 2 MG tablet Take 1 tablet by mouth every 8 hours as needed for Pain for up to 5 days. Max Daily Amount: 6 mg 3/6/25 3/11/25 Yes Madala, Sushma, MD   aspirin 81 MG EC tablet Take 1 tablet by mouth daily 3/6/25  Yes Madala, Sushma, MD   Acetaminophen (TYLENOL ARTHRITIS PAIN PO) Take by mouth as needed   Yes Provider, MD Lindsay   sennosides-docusate sodium (SENOKOT-S) 8.6-50 MG tablet Take 1 tablet by mouth 2 times daily 24  Yes Megan Miranda PA-C   atorvastatin (LIPITOR) 20 MG tablet TAKE 1 TABLET BY MOUTH DAILY  Patient taking differently: Take 1 tablet by mouth nightly 24  Yes Gideon Carroll III, DO   levothyroxine (SYNTHROID) 175 MCG tablet Take 1 tablet by mouth every morning (before breakfast) 24  Yes Gideon Carroll III, DO   indomethacin (INDOCIN) 50 MG capsule Take 1 capsule by mouth 2 times daily as needed for Pain 24  Yes Gideon Carroll III, DO   pantoprazole (PROTONIX) 40 MG tablet Take 1 tablet by mouth every morning (before breakfast) 24  Yes Gideon Carroll III, DO   allopurinol (ZYLOPRIM) 100 MG tablet Take 2 tablets by mouth daily   Yes Provider, Historical, MD   Saw Palmetto, Serenoa repens, (SAW PALMETTO PO) Take 320 mg by mouth daily   Yes

## 2025-03-11 NOTE — H&P
Fco Mary (: 1945) is a 79 y.o. male, patient, here for evaluation of the following chief complaint(s):  Left total hip replacement        SUBJECTIVE/OBJECTIVE:  Fco Mary presents today for left total hip replacement for treatment of severe osteoarthritis.  He has had rapid progression of symptoms since right total hip replacement just over 3 months ago.  He has constant aching on the left hip region.  Wakening night pain.  Pain with all movement.  Left hip is limiting him much more at this point than his right.  Difficulty with simple ADLs.  He had a hospital admission last week due to severe hip pain.     Denies subjective leg length discrepancy.      Past Medical History:   Diagnosis Date    Arthritis     lower back    At risk for sleep apnea 2024    stop bang score 5    Baker's cyst 2006    CAD (coronary artery disease)     stent x 1  LDA    Chronic lower back pain     Dr Gamble     Chronic pain     right knee - resolved with knee replacement 2017    Chronic pain     left knee    Dental infection 2024    Fall     MULTIPLE FALLS.    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    Rheumatic fever     as a child    Skin cancer       RLEG   L FLANK AREA     Past Surgical History:   Procedure Laterality Date    BACK SURGERY      LUMBAR FUSION X3    CARDIAC CATHETERIZATION      CATARACT REMOVAL Right 2017    COLONOSCOPY N/A 10/20/2017    COLONOSCOPY performed by Tomy Quevedo MD at [facility] AMBUL[facility]ORY OR    CORONARY ANGIOPLASTY WITH STENT PLACEMENT      Dr Saravia    GI      COLONOSCOPY    HERNIA REPAIR  approx     @ [facility]    JOINT REPLACEMENT Right 2024    HIP    JOINT REPLACEMENT Left     KNEE    JOINT REPLACEMENT Right     KNEE    LUMBAR FUSION  2006    L3-L4 fusion , L2-S1 fusion 2020    DE UNLISTED PROCEDURE

## 2025-03-11 NOTE — BRIEF OP NOTE
Brief Postoperative Note      Patient: Fco Mary  YOB: 1945  MRN: [id number]    Date of Procedure: 3/11/2025    Pre-Op Diagnosis Codes:      * Primary osteoarthritis of left hip [M16.12]    Post-Op Diagnosis: Same       Procedure(s):  LEFT ANTERIOR TOTAL HIP ARTHROPLASTY    Surgeon(s):  Thom Fairbanks MD    Assistant:  Surgical Assistant: Tre Sullivan    Anesthesia: General    Estimated Blood Loss (mL): 400    Complications: None    Specimens:   * No specimens in log *    Implants:  Implant Name Type Inv. Item Serial No.  Lot No. LRB No. Used Action   CUP ACET CLUS HOLE F 52 MM W/ DOME HOLE PLUG P2 COAT EMPOWR - SNA  CUP ACET CLUS HOLE F 52 MM W/ DOME HOLE PLUG P2 COAT EMPOWR NA [facility] [id number] Left 1 Implanted   LINER ACET NEUT F 36X52 MM HIP HXE+ VIT E POLYETH EMPOWR - SNA  LINER ACET NEUT F 36X52 MM HIP HXE+ VIT E POLYETH EMPOWR NA [facility] [id number] Left 1 Implanted   SCREW BNE 40 MM ACET EMPOWR - SNA  SCREW BNE 40 MM ACET EMPOWR NA [facility] [id number] Left 1 Implanted   STEM FEM STD OFFSET 11  MM 37 MM HIP CMNTLS CLLRLSS TI - SNA  STEM FEM STD OFFSET 11  MM 37 MM HIP CMNTLS CLLRLSS TI NA [employer] [facility] [id number] Left 1 Implanted   HEAD FEM 4- MM 36 MM HIP OFFSET FOR FMP SYS BIOLOX DELT CERM - SNA  HEAD FEM 4- MM 36 MM HIP OFFSET FOR FMP SYS BIOLOX DELT CERM NA [employer] [facility] [employer] - [facility] [id number] Left 1 Implanted         Drains:   [REMOVED] Closed/Suction Drain Inferior;Right Back (Removed)       Findings:  Infection Present At Time Of Surgery (PATOS) (choose all levels that have infection present):  No infection present  Other Findings: severe erosive OA    Electronically signed by Thom Fairbanks MD on 3/11/2025 at 5:15 PM

## 2025-03-12 LAB
ANION GAP SERPL CALC-SCNC: 10 MMOL/L (ref 2–12)
BUN SERPL-MCNC: 22 MG/DL (ref 6–20)
BUN/CREAT SERPL: 14 (ref 12–20)
CALCIUM SERPL-MCNC: 9.1 MG/DL (ref 8.5–10.1)
CHLORIDE SERPL-SCNC: 106 MMOL/L (ref 97–108)
CO2 SERPL-SCNC: 23 MMOL/L (ref 21–32)
CREAT SERPL-MCNC: 1.58 MG/DL (ref 0.7–1.3)
GLUCOSE SERPL-MCNC: 171 MG/DL (ref 65–100)
HCT VFR BLD AUTO: 35 % (ref 36.6–50.3)
HGB BLD-MCNC: 11 G/DL (ref 12.1–17)
POTASSIUM SERPL-SCNC: 4.4 MMOL/L (ref 3.5–5.1)
SODIUM SERPL-SCNC: 139 MMOL/L (ref 136–145)

## 2025-03-12 PROCEDURE — 36415 COLL VENOUS BLD VENIPUNCTURE: CPT

## 2025-03-12 PROCEDURE — 2500000003 HC RX 250 WO HCPCS: Performed by: PHYSICIAN ASSISTANT

## 2025-03-12 PROCEDURE — 6370000000 HC RX 637 (ALT 250 FOR IP): Performed by: PHYSICIAN ASSISTANT

## 2025-03-12 PROCEDURE — 2580000003 HC RX 258: Performed by: ORTHOPAEDIC SURGERY

## 2025-03-12 PROCEDURE — 96374 THER/PROPH/DIAG INJ IV PUSH: CPT

## 2025-03-12 PROCEDURE — G0378 HOSPITAL OBSERVATION PER HR: HCPCS

## 2025-03-12 PROCEDURE — 97165 OT EVAL LOW COMPLEX 30 MIN: CPT

## 2025-03-12 PROCEDURE — 97161 PT EVAL LOW COMPLEX 20 MIN: CPT

## 2025-03-12 PROCEDURE — 6360000002 HC RX W HCPCS: Performed by: PHYSICIAN ASSISTANT

## 2025-03-12 PROCEDURE — 97530 THERAPEUTIC ACTIVITIES: CPT

## 2025-03-12 PROCEDURE — 51798 US URINE CAPACITY MEASURE: CPT

## 2025-03-12 PROCEDURE — 80048 BASIC METABOLIC PNL TOTAL CA: CPT

## 2025-03-12 PROCEDURE — 96361 HYDRATE IV INFUSION ADD-ON: CPT

## 2025-03-12 PROCEDURE — 85014 HEMATOCRIT: CPT

## 2025-03-12 PROCEDURE — 97535 SELF CARE MNGMENT TRAINING: CPT

## 2025-03-12 PROCEDURE — 2580000003 HC RX 258: Performed by: PHYSICIAN ASSISTANT

## 2025-03-12 PROCEDURE — 96376 TX/PRO/DX INJ SAME DRUG ADON: CPT

## 2025-03-12 PROCEDURE — 85018 HEMOGLOBIN: CPT

## 2025-03-12 RX ORDER — 0.9 % SODIUM CHLORIDE 0.9 %
500 INTRAVENOUS SOLUTION INTRAVENOUS ONCE
Status: COMPLETED | OUTPATIENT
Start: 2025-03-12 | End: 2025-03-12

## 2025-03-12 RX ORDER — GABAPENTIN 300 MG/1
300 CAPSULE ORAL 3 TIMES DAILY
Status: DISCONTINUED | OUTPATIENT
Start: 2025-03-12 | End: 2025-03-13 | Stop reason: HOSPADM

## 2025-03-12 RX ORDER — ACETAMINOPHEN 500 MG
500 TABLET ORAL EVERY 4 HOURS
Qty: 100 TABLET | Refills: 0 | Status: SHIPPED | OUTPATIENT
Start: 2025-03-12

## 2025-03-12 RX ORDER — AMOXICILLIN 250 MG
1 CAPSULE ORAL 2 TIMES DAILY PRN
Qty: 60 TABLET | Refills: 0 | Status: SHIPPED | OUTPATIENT
Start: 2025-03-12

## 2025-03-12 RX ORDER — OXYCODONE HYDROCHLORIDE 5 MG/1
2.5-5 TABLET ORAL EVERY 4 HOURS PRN
Qty: 42 TABLET | Refills: 0 | Status: SHIPPED | OUTPATIENT
Start: 2025-03-12 | End: 2025-03-19

## 2025-03-12 RX ORDER — ASPIRIN 81 MG/1
81 TABLET ORAL 2 TIMES DAILY
Qty: 60 TABLET | Refills: 0 | Status: SHIPPED | OUTPATIENT
Start: 2025-03-12

## 2025-03-12 RX ADMIN — ATORVASTATIN CALCIUM 20 MG: 20 TABLET, FILM COATED ORAL at 20:50

## 2025-03-12 RX ADMIN — ACETAMINOPHEN 500 MG: 500 TABLET ORAL at 20:48

## 2025-03-12 RX ADMIN — WATER 2000 MG: 1 INJECTION INTRAMUSCULAR; INTRAVENOUS; SUBCUTANEOUS at 08:07

## 2025-03-12 RX ADMIN — ACETAMINOPHEN 500 MG: 500 TABLET ORAL at 15:31

## 2025-03-12 RX ADMIN — KETOROLAC TROMETHAMINE 15 MG: 30 INJECTION, SOLUTION INTRAMUSCULAR at 03:06

## 2025-03-12 RX ADMIN — ASPIRIN 81 MG: 81 TABLET, COATED ORAL at 09:02

## 2025-03-12 RX ADMIN — SENNOSIDES AND DOCUSATE SODIUM 1 TABLET: 50; 8.6 TABLET ORAL at 09:02

## 2025-03-12 RX ADMIN — SENNOSIDES AND DOCUSATE SODIUM 1 TABLET: 50; 8.6 TABLET ORAL at 20:47

## 2025-03-12 RX ADMIN — ASPIRIN 81 MG: 81 TABLET, COATED ORAL at 20:47

## 2025-03-12 RX ADMIN — ACETAMINOPHEN 500 MG: 500 TABLET ORAL at 08:07

## 2025-03-12 RX ADMIN — SODIUM CHLORIDE: 0.9 INJECTION, SOLUTION INTRAVENOUS at 07:07

## 2025-03-12 RX ADMIN — SODIUM CHLORIDE 500 ML: 0.9 INJECTION, SOLUTION INTRAVENOUS at 05:25

## 2025-03-12 RX ADMIN — SODIUM CHLORIDE: 0.9 INJECTION, SOLUTION INTRAVENOUS at 22:50

## 2025-03-12 RX ADMIN — PANTOPRAZOLE SODIUM 40 MG: 40 TABLET, DELAYED RELEASE ORAL at 08:08

## 2025-03-12 RX ADMIN — KETOROLAC TROMETHAMINE 15 MG: 30 INJECTION, SOLUTION INTRAMUSCULAR at 08:08

## 2025-03-12 RX ADMIN — GABAPENTIN 900 MG: 300 CAPSULE ORAL at 09:03

## 2025-03-12 RX ADMIN — SODIUM CHLORIDE 500 ML: 0.9 INJECTION, SOLUTION INTRAVENOUS at 10:41

## 2025-03-12 RX ADMIN — LEVOTHYROXINE SODIUM 175 MCG: 0.03 TABLET ORAL at 08:07

## 2025-03-12 RX ADMIN — GABAPENTIN 300 MG: 300 CAPSULE ORAL at 20:47

## 2025-03-12 ASSESSMENT — PAIN SCALES - GENERAL: PAINLEVEL_OUTOF10: 2

## 2025-03-12 ASSESSMENT — PAIN DESCRIPTION - LOCATION: LOCATION: HIP

## 2025-03-12 NOTE — PLAN OF CARE
Problem: Physical Therapy - Adult  Goal: By Discharge: Performs mobility at highest level of function for planned discharge setting.  See evaluation for individualized goals.  Description: FUNCTIONAL STATUS PRIOR TO ADMISSION: Patient was independent and active without use of DME.    HOME SUPPORT PRIOR TO ADMISSION: The patient lived with wife but did not require assistance.    Physical Therapy Goals  Initiated 3/12/2025  1.  Patient will move from supine to sit and sit to supine, scoot up and down, and roll side to side in bed with supervision/set-up within 4 day(s).    2.  Patient will perform sit to stand with supervision/set-up within 4 day(s).  3.  Patient will transfer from bed to chair and chair to bed with supervision/set-up using the least restrictive device within 4 day(s).  4.  Patient will ambulate with supervision/set-up for 300 feet with the least restrictive device within 4 day(s).   5.  Patient will ascend/descend 4 stairs with one handrail(s) with supervision/set-up within 4 day(s).  6.  Patient will perform natasha home exercise program per protocol with modified independence within 4 days.       Outcome: Progressing       PHYSICAL THERAPY EVALUATION    Patient: Fco Mary (79 y.o. male)  Date: 3/12/2025  Primary Diagnosis: Primary osteoarthritis of left hip [M16.12]  Procedure(s) (LRB):  LEFT ANTERIOR TOTAL HIP ARTHROPLASTY (Left) 1 Day Post-Op   Precautions: Restrictions/Precautions  Restrictions/Precautions: Fall Risk Lower Extremity Weight Bearing Restrictions  Right Lower Extremity Weight Bearing: Weight Bearing As Tolerated   Position Activity Restriction  Hip Precautions: No sudden or extreme motions      ASSESSMENT :   DEFICITS/IMPAIRMENTS:   The patient is limited by decreased functional mobility, strength, activity tolerance, endurance, balance, posture, orthostatic hypotension who would benefit from PT to address these impairments who would benefit from PT to address these

## 2025-03-12 NOTE — PLAN OF CARE
Problem: Pain  Goal: Verbalizes/displays adequate comfort level or baseline comfort level  3/12/2025 1009 by Albina Tam RN  Outcome: Progressing  3/12/2025 0824 by Tim Zamudio RN  Outcome: Progressing  3/12/2025 0823 by Tim Zamudio RN  Outcome: Progressing     Problem: Safety - Adult  Goal: Free from fall injury  3/12/2025 1009 by Albina Tam RN  Outcome: Progressing  3/12/2025 0824 by Tim Zamudio RN  Outcome: Progressing  3/12/2025 0823 by Tim Zamudio RN  Outcome: Progressing     Problem: Discharge Planning  Goal: Discharge to home or other facility with appropriate resources  Outcome: Progressing

## 2025-03-12 NOTE — PROGRESS NOTES
Spiritual Health History and Assessment/Progress Note  [facility]    Rituals, Rites and Sacraments,  ,  ,      Name: Fco Mary MRN: [id number]    Age: 79 y.o.     Sex: male   Language: English   Christianity: Adventist   Primary osteoarthritis of left hip     Date: 3/12/2025            Total Time Calculated: 5 min              Spiritual Assessment began in [facility] 5S1 ORTHO JOINT        Referral/Consult From: Clergy/   Encounter Overview/Reason: Rituals, Rites and Sacraments  Service Provided For: Patient    Jinny, Belief, Meaning:   Patient is connected with a jinny tradition or spiritual practice  Family/Friends are connected with a jinny tradition or spiritual practice      Importance and Influence:  Patient has spiritual/personal beliefs that influence decisions regarding their health  Family/Friends have spiritual/personal beliefs that influence decisions regarding the patient's health    Community:  Patient is connected with a spiritual community  Family/Friends are connected with a spiritual community:    Assessment and Plan of Care:     Patient Interventions include: Provided sacramental/Restorationist ritual  Family/Friends Interventions include: No family/friends present    Patient Plan of Care: Spiritual Care available upon further referral  Family/Friends Plan of Care: Spiritual Care available upon further referral    Electronically signed by Chaplain CHANTE on 3/12/2025 at 3:10 PM     [facility] visit attempted.  Mr. Mary is Adventist. He was visited on earlier admission by .  Mr. Mary was on a phone call each time Sr. Haas attempted to visit. Prayer for spiritual communion offered for his healing and well-being.    BERENICE Londono Sr., RN, ACSW, LCSW   [facility]:  [phone]

## 2025-03-12 NOTE — OP NOTE
[address]  [address]                            OPERATIVE REPORT      PATIENT NAME: GLORIA SAWYER            : 1945  MED REC NO: [id number]                       ROOM: 566  ACCOUNT NO: [id number]                       ADMIT DATE: 2025  PROVIDER: Thom Fairbanks MD    DATE OF SERVICE:  2025    PREOPERATIVE DIAGNOSES:  Osteoarthritis, left hip.    POSTOPERATIVE DIAGNOSES:  Osteoarthritis, left hip.    PROCEDURES PERFORMED:  Left total hip replacement.    SURGEON:  Thom Fairbanks MD    ASSISTANT:  First Assistant:  Margi Pardo PA-C.    ANESTHESIA:  General.    ESTIMATED BLOOD LOSS:  400 mL.    SPECIMENS REMOVED:  None.     COMPLICATIONS:  None.    IMPLANTS:  DonJoy 52 mm Empowr acetabular shell with one cancellous screw and 36 mm inside diameter neutral polyethylene liner, size 11 standard offset TaperFill femoral stem.    INDICATIONS:  The patient is a 79-year-old male with progressive left hip and groin pain due to severe osteoarthritis.  Symptoms have progressed despite comprehensive conservative treatment.  He presents for left total hip replacement.  Risks, benefits, and alternatives of the procedure were reviewed with him in detail and he desires to proceed.    DESCRIPTION OF PROCEDURE:  The patient was taken to the operating room, where general anesthesia was induced on the stretcher.  He was transferred to supine position on the [place] fracture table.  Left hip and thigh were prepped and draped in usual sterile fashion.  Through a longitudinal incision over tensor fascia shadi muscle, tensor fascia was incised in line with the muscle fibers.  Muscle belly was retracted laterally.  Ascending branches of the lateral circumflex vessels were identified and coagulated.  Anterior capsulotomy was performed and femoral neck was osteotomized.  Head was removed with a corkscrew.  Acetabular retractors were placed and

## 2025-03-12 NOTE — CARE COORDINATION
JAROCHO:    Patient back for second time in recent weeks; would like [employer]  as before. He will d/c home with family; already owns BSC and RW. Family transport.     No new DME needed  [employer]   Family transport       03/12/25 1201   Service Assessment   Patient Orientation Alert and Oriented   Cognition Alert   History Provided By Patient   Primary Caregiver Self   Accompanied By/Relationship spouse   Support Systems Spouse/Significant Other;Family Members;Children   Patient's Healthcare Decision Maker is: Legal Next of Kin   PCP Verified by CM Yes   Last Visit to PCP Within last 3 months   Prior Functional Level Mobility;Assistance with the following:   Current Functional Level Mobility;Assistance with the following:   Can patient return to prior living arrangement Yes   Ability to make needs known: Good   Family able to assist with home care needs: Yes   Would you like for me to discuss the discharge plan with any other family members/significant others, and if so, who? Yes   Social/Functional History   Type of Home House   Discharge Planning   Patient expects to be discharged to: House   Services At/After Discharge   Confirm Follow Up Transport Family   Condition of Participation: Discharge Planning   The Plan for Transition of Care is related to the following treatment goals: Home Health   The Patient and/or Patient Representative was provided with a Choice of Provider? Patient   The Patient and/Or Patient Representative agree with the Discharge Plan? Yes   Freedom of Choice list was provided with basic dialogue that supports the patient's individualized plan of care/goals, treatment preferences, and shares the quality data associated with the providers?  Yes

## 2025-03-12 NOTE — PLAN OF CARE
Problem: Physical Therapy - Adult  Goal: By Discharge: Performs mobility at highest level of function for planned discharge setting.  See evaluation for individualized goals.  Description: FUNCTIONAL STATUS PRIOR TO ADMISSION: Patient was independent and active without use of DME.    HOME SUPPORT PRIOR TO ADMISSION: The patient lived with wife but did not require assistance.    Physical Therapy Goals  Initiated 3/12/2025  1.  Patient will move from supine to sit and sit to supine, scoot up and down, and roll side to side in bed with supervision/set-up within 4 day(s).    2.  Patient will perform sit to stand with supervision/set-up within 4 day(s).  3.  Patient will transfer from bed to chair and chair to bed with supervision/set-up using the least restrictive device within 4 day(s).  4.  Patient will ambulate with supervision/set-up for 300 feet with the least restrictive device within 4 day(s).   5.  Patient will ascend/descend 4 stairs with one handrail(s) with supervision/set-up within 4 day(s).  6.  Patient will perform natasha home exercise program per protocol with modified independence within 4 days.       3/12/2025 1356 by Ean Schmid, PT  Outcome: Progressing  3/12/2025 1348 by Ean Schmid, PT  Outcome: Progressing       PHYSICAL THERAPY TREATMENT-AFTERNOON SESSION    Patient: Fco Mary (79 y.o. male)  Date: 3/12/2025  Diagnosis: Primary osteoarthritis of left hip [M16.12] Primary osteoarthritis of left hip  Procedure(s) (LRB):  LEFT ANTERIOR TOTAL HIP ARTHROPLASTY (Left) 1 Day Post-Op  Precautions: Fall Risk Right Lower Extremity Weight Bearing: Weight Bearing As Tolerated           Hip Precautions: No sudden or extreme motions      ASSESSMENT:  Patient continues to benefit from skilled PT services and is progressing towards goals. Patient is now ambulating 120 ft with contact guard assist using rolling walker- with chair follow for safety.   Patient received with BP of 110/55;

## 2025-03-12 NOTE — PLAN OF CARE
Problem: Pain  Goal: Verbalizes/displays adequate comfort level or baseline comfort level  3/12/2025 0824 by Tim Zamudio RN  Outcome: Progressing  3/12/2025 0823 by Tim Zamudio RN  Outcome: Progressing     Problem: Safety - Adult  Goal: Free from fall injury  3/12/2025 0824 by Tim Zamudio RN  Outcome: Progressing  3/12/2025 0823 by Tim Zamudio RN  Outcome: Progressing

## 2025-03-12 NOTE — PLAN OF CARE
Problem: Occupational Therapy - Adult  Goal: By Discharge: Performs self-care activities at highest level of function for planned discharge setting.  See evaluation for individualized goals.  Description: Occupational Therapy Goals  Initiated: 3/12/2025   1.  Patient will perform grooming with supervision/set-up standing at sink within 3 day(s).  2.  Patient will perform bathing with supervision/set-up from chair within 3 day(s).  3.  Patient will perform upper body dressing and lower body dressing with supervision/set-up within 3 day(s).  4.  Patient will perform toilet transfers with supervision/set-up within 3 day(s).  5.  Patient will perform all aspects of toileting with supervision/set-up within 3 day(s).  6.  Patient will be independent with hip precautions within 3 days.      FUNCTIONAL STATUS PRIOR TO ADMISSION:     , Prior Level of Assist for ADLs: Independent,  ,  ,  ,  ,  , Prior Level of Assist for Homemaking: Independent,  , Prior Level of Assist for Transfers: Independent, Active : Yes     HOME SUPPORT: Pt lives with wife.  He is typically independent with all activities at baseline.     Outcome: Progressing     OCCUPATIONAL THERAPY EVALUATION    Patient: Fco Mary (79 y.o. male)  Date: 3/12/2025  Primary Diagnosis: Primary osteoarthritis of left hip [M16.12]  Procedure(s) (LRB):  LEFT ANTERIOR TOTAL HIP ARTHROPLASTY (Left) 1 Day Post-Op     Precautions: Fall Risk Right Lower Extremity Weight Bearing: Weight Bearing As Tolerated           Hip Precautions: No sudden or extreme motions    ASSESSMENT :  The patient is limited by decreased independence with self care and functional mobility following for L THR.  He has had low BP throughout the night and prior to intervention, has received 1 fluid bolus.  Discussed with nursing and reports BP improved some this morning when progressing OOB to urinate.  Trailed OOB to the chair with feet elevated.  He tolerated well overall.  BP remained

## 2025-03-12 NOTE — PROGRESS NOTES
Pain controlled. No cp/sob. No n/v. Asymptomatic low BP.    BP (!) 88/59   Pulse 55   Temp 97.2 °F (36.2 °C) (Oral)   Resp 16   Ht 1.74 m (5' 8.5\")   Wt 86.2 kg (190 lb)   SpO2 96%   BMI 28.47 kg/m²     Alert  L hip dressing dry  Calves soft NT  Motor 5/5  Pulses symmetrical    Cr 1.58  Hgb 11.0    POD 1 L SHIRA; acute postop blood loss anemia (expected)  -PT; may mobilize with asymptomatic hypotension  -pain control  -ASA bid  -leave IVF running through day today  -home today vs. tomorrow    ERMIAS Fairbanks MD

## 2025-03-12 NOTE — PROGRESS NOTES
Patient assessed for readiness to ambulate.   Patient ambulated with assistance of 1 nurses. Patient ambulated with gait belt and walker. Patient walked to standard walker to the side of the bed. Patient returned safely to bed.     Vitals:    03/11/25 2130   BP: 130/68   Pulse: 74   Resp:    Temp: 97.8 °F (36.6 °C)   SpO2:

## 2025-03-13 VITALS
DIASTOLIC BLOOD PRESSURE: 61 MMHG | OXYGEN SATURATION: 95 % | BODY MASS INDEX: 28.14 KG/M2 | SYSTOLIC BLOOD PRESSURE: 97 MMHG | HEART RATE: 64 BPM | RESPIRATION RATE: 16 BRPM | HEIGHT: 69 IN | TEMPERATURE: 97.9 F | WEIGHT: 190 LBS

## 2025-03-13 LAB
ANION GAP SERPL CALC-SCNC: 4 MMOL/L (ref 2–12)
BUN SERPL-MCNC: 18 MG/DL (ref 6–20)
BUN/CREAT SERPL: 15 (ref 12–20)
CALCIUM SERPL-MCNC: 9 MG/DL (ref 8.5–10.1)
CHLORIDE SERPL-SCNC: 113 MMOL/L (ref 97–108)
CO2 SERPL-SCNC: 25 MMOL/L (ref 21–32)
CREAT SERPL-MCNC: 1.19 MG/DL (ref 0.7–1.3)
GLUCOSE SERPL-MCNC: 101 MG/DL (ref 65–100)
POTASSIUM SERPL-SCNC: 3.8 MMOL/L (ref 3.5–5.1)
SODIUM SERPL-SCNC: 142 MMOL/L (ref 136–145)

## 2025-03-13 PROCEDURE — 96361 HYDRATE IV INFUSION ADD-ON: CPT

## 2025-03-13 PROCEDURE — 97535 SELF CARE MNGMENT TRAINING: CPT

## 2025-03-13 PROCEDURE — 6370000000 HC RX 637 (ALT 250 FOR IP): Performed by: PHYSICIAN ASSISTANT

## 2025-03-13 PROCEDURE — G0378 HOSPITAL OBSERVATION PER HR: HCPCS

## 2025-03-13 PROCEDURE — 80048 BASIC METABOLIC PNL TOTAL CA: CPT

## 2025-03-13 PROCEDURE — 94760 N-INVAS EAR/PLS OXIMETRY 1: CPT

## 2025-03-13 PROCEDURE — 97530 THERAPEUTIC ACTIVITIES: CPT

## 2025-03-13 RX ADMIN — LEVOTHYROXINE SODIUM 175 MCG: 0.03 TABLET ORAL at 06:35

## 2025-03-13 RX ADMIN — ACETAMINOPHEN 500 MG: 500 TABLET ORAL at 06:34

## 2025-03-13 RX ADMIN — SENNOSIDES AND DOCUSATE SODIUM 1 TABLET: 50; 8.6 TABLET ORAL at 09:25

## 2025-03-13 RX ADMIN — ACETAMINOPHEN 500 MG: 500 TABLET ORAL at 10:52

## 2025-03-13 RX ADMIN — ASPIRIN 81 MG: 81 TABLET, COATED ORAL at 09:24

## 2025-03-13 RX ADMIN — PANTOPRAZOLE SODIUM 40 MG: 40 TABLET, DELAYED RELEASE ORAL at 06:35

## 2025-03-13 RX ADMIN — OXYCODONE 2.5 MG: 5 TABLET ORAL at 09:23

## 2025-03-13 RX ADMIN — GABAPENTIN 300 MG: 300 CAPSULE ORAL at 09:25

## 2025-03-13 ASSESSMENT — PAIN SCALES - GENERAL
PAINLEVEL_OUTOF10: 5
PAINLEVEL_OUTOF10: 1
PAINLEVEL_OUTOF10: 5
PAINLEVEL_OUTOF10: 3

## 2025-03-13 ASSESSMENT — PAIN DESCRIPTION - LOCATION
LOCATION: HIP
LOCATION: HIP

## 2025-03-13 ASSESSMENT — PAIN DESCRIPTION - ORIENTATION
ORIENTATION: LEFT
ORIENTATION: LEFT

## 2025-03-13 NOTE — PLAN OF CARE
Problem: Pain  Goal: Verbalizes/displays adequate comfort level or baseline comfort level  3/13/2025 1038 by Albina Tam RN  Outcome: Progressing  3/13/2025 0509 by Tim Zamudio RN  Outcome: Progressing     Problem: Safety - Adult  Goal: Free from fall injury  3/13/2025 1038 by Albina Tam RN  Outcome: Progressing  3/13/2025 0509 by Tim Zamudio RN  Outcome: Progressing     Problem: Discharge Planning  Goal: Discharge to home or other facility with appropriate resources  Outcome: Progressing     Problem: Skin/Tissue Integrity  Goal: Skin integrity remains intact  Description: 1.  Monitor for areas of redness and/or skin breakdown  2.  Assess vascular access sites hourly  3.  Every 4-6 hours minimum:  Change oxygen saturation probe site  4.  Every 4-6 hours:  If on nasal continuous positive airway pressure, respiratory therapy assess nares and determine need for appliance change or resting period  3/13/2025 1038 by Albina Tam RN  Outcome: Progressing  3/13/2025 0509 by Tim Zamudio RN  Outcome: Progressing

## 2025-03-13 NOTE — PROGRESS NOTES
Pain well controlled. Up with PT yest. No cp/sob.    /61   Pulse 67   Temp 97.5 °F (36.4 °C)   Resp 16   Ht 1.74 m (5' 8.5\")   Wt 86.2 kg (190 lb)   SpO2 96%   BMI 28.47 kg/m²     Alert  L hip dressing dry  Calves sfot NT  Motor 5/5    Cr improved    POD 2 L SHIRA  -PT  -ASA  -home today    ERMIAS Fairbanks MD

## 2025-03-13 NOTE — PLAN OF CARE
Problem: Physical Therapy - Adult  Goal: By Discharge: Performs mobility at highest level of function for planned discharge setting.  See evaluation for individualized goals.  Description: FUNCTIONAL STATUS PRIOR TO ADMISSION: Patient was independent and active without use of DME.    HOME SUPPORT PRIOR TO ADMISSION: The patient lived with wife but did not require assistance.    Physical Therapy Goals  Initiated 3/12/2025  1.  Patient will move from supine to sit and sit to supine, scoot up and down, and roll side to side in bed with supervision/set-up within 4 day(s).    2.  Patient will perform sit to stand with supervision/set-up within 4 day(s).  3.  Patient will transfer from bed to chair and chair to bed with supervision/set-up using the least restrictive device within 4 day(s).  4.  Patient will ambulate with supervision/set-up for 300 feet with the least restrictive device within 4 day(s).   5.  Patient will ascend/descend 4 stairs with one handrail(s) with supervision/set-up within 4 day(s).  6.  Patient will perform natasha home exercise program per protocol with modified independence within 4 days.       Outcome: Adequate for Discharge       PHYSICAL THERAPY TREATMENT    Patient: Fco Mary (79 y.o. male)  Date: 3/13/2025  Diagnosis: Primary osteoarthritis of left hip [M16.12] Primary osteoarthritis of left hip  Procedure(s) (LRB):  LEFT ANTERIOR TOTAL HIP ARTHROPLASTY (Left) 2 Days Post-Op  Precautions: Restrictions/Precautions  Restrictions/Precautions: Fall Risk Lower Extremity Weight Bearing Restrictions  Right Lower Extremity Weight Bearing: Weight Bearing As Tolerated   Position Activity Restriction  Hip Precautions: No sudden or extreme motions      ASSESSMENT:  Patient continues to benefit from skilled PT services and is progressing towards goals. Patient demonstrated improved tolerance for treatment with stable vital signs.  Blood pressure supine: 120/74; after ambulation 114/67  Patient

## 2025-03-13 NOTE — PLAN OF CARE
Problem: Pain  Goal: Verbalizes/displays adequate comfort level or baseline comfort level  Outcome: Progressing     Problem: Safety - Adult  Goal: Free from fall injury  Outcome: Progressing     Problem: Skin/Tissue Integrity  Goal: Skin integrity remains intact  Description: 1.  Monitor for areas of redness and/or skin breakdown  2.  Assess vascular access sites hourly  3.  Every 4-6 hours minimum:  Change oxygen saturation probe site  4.  Every 4-6 hours:  If on nasal continuous positive airway pressure, respiratory therapy assess nares and determine need for appliance change or resting period  Outcome: Progressing

## 2025-03-13 NOTE — PLAN OF CARE
Problem: Occupational Therapy - Adult  Goal: By Discharge: Performs self-care activities at highest level of function for planned discharge setting.  See evaluation for individualized goals.  Description: Occupational Therapy Goals  Initiated: 3/12/2025   1.  Patient will perform grooming with supervision/set-up standing at sink within 3 day(s).  2.  Patient will perform bathing with supervision/set-up from chair within 3 day(s).  3.  Patient will perform upper body dressing and lower body dressing with supervision/set-up within 3 day(s).  4.  Patient will perform toilet transfers with supervision/set-up within 3 day(s).  5.  Patient will perform all aspects of toileting with supervision/set-up within 3 day(s).  6.  Patient will be independent with hip precautions within 3 days.      FUNCTIONAL STATUS PRIOR TO ADMISSION:     , Prior Level of Assist for ADLs: Independent,  ,  ,  ,  ,  , Prior Level of Assist for Homemaking: Independent,  , Prior Level of Assist for Transfers: Independent, Active : Yes     HOME SUPPORT: Pt lives with wife.  He is typically independent with all activities at baseline.     Outcome: Progressing    OCCUPATIONAL THERAPY TREATMENT  Patient: Fco Mary (79 y.o. male)  Date: 3/13/2025  Primary Diagnosis: Primary osteoarthritis of left hip [M16.12]  Procedure(s) (LRB):  LEFT ANTERIOR TOTAL HIP ARTHROPLASTY (Left) 2 Days Post-Op   Precautions: Fall Risk Right Lower Extremity Weight Bearing: Weight Bearing As Tolerated           Hip Precautions: No sudden or extreme motions  Chart, occupational therapy assessment, plan of care, and goals were reviewed.    ASSESSMENT  Patient continues to benefit from skilled OT services and is progressing towards goals. This patient is currently min A-I for ADL and is S -SBA for functional mobility during session at  level with increased time due to chronic back pain, sciatica, and c/o neuropathic pain in B hands (decreased his neuropathy meds

## 2025-03-13 NOTE — PLAN OF CARE
Problem: Occupational Therapy - Adult  Goal: By Discharge: Performs self-care activities at highest level of function for planned discharge setting.  See evaluation for individualized goals.  Description: Occupational Therapy Goals  Initiated: 3/12/2025   1.  Patient will perform grooming with supervision/set-up standing at sink within 3 day(s).  2.  Patient will perform bathing with supervision/set-up from chair within 3 day(s).  3.  Patient will perform upper body dressing and lower body dressing with supervision/set-up within 3 day(s).  4.  Patient will perform toilet transfers with supervision/set-up within 3 day(s).  5.  Patient will perform all aspects of toileting with supervision/set-up within 3 day(s).  6.  Patient will be independent with hip precautions within 3 days.      FUNCTIONAL STATUS PRIOR TO ADMISSION:     , Prior Level of Assist for ADLs: Independent,  ,  ,  ,  ,  , Prior Level of Assist for Homemaking: Independent,  , Prior Level of Assist for Transfers: Independent, Active : Yes     HOME SUPPORT: Pt lives with wife.  He is typically independent with all activities at baseline.     3/13/2025 1155 by Abbi Cunningham OT  Outcome: Progressing

## 2025-05-18 RX ORDER — PANTOPRAZOLE SODIUM 40 MG/1
40 TABLET, DELAYED RELEASE ORAL
Qty: 90 TABLET | Refills: 3 | Status: SHIPPED | OUTPATIENT
Start: 2025-05-18

## 2025-05-18 RX ORDER — LEVOTHYROXINE SODIUM 175 UG/1
175 TABLET ORAL
Qty: 90 TABLET | Refills: 3 | Status: SHIPPED | OUTPATIENT
Start: 2025-05-18

## 2025-05-21 ENCOUNTER — HOSPITAL ENCOUNTER (EMERGENCY)
Facility: HOSPITAL | Age: 80
Discharge: HOME | End: 2025-05-21
Attending: STUDENT IN AN ORGANIZED HEALTH CARE EDUCATION/TRAINING PROGRAM
Payer: MEDICARE

## 2025-05-21 ENCOUNTER — APPOINTMENT (OUTPATIENT)
Facility: HOSPITAL | Age: 80
End: 2025-05-21
Payer: MEDICARE

## 2025-05-21 VITALS
HEART RATE: 65 BPM | TEMPERATURE: 98.1 F | SYSTOLIC BLOOD PRESSURE: 154 MMHG | HEIGHT: 69 IN | RESPIRATION RATE: 18 BRPM | DIASTOLIC BLOOD PRESSURE: 84 MMHG | OXYGEN SATURATION: 97 % | BODY MASS INDEX: 27.32 KG/M2

## 2025-05-21 DIAGNOSIS — G89.29 ACUTE EXACERBATION OF CHRONIC LOW BACK PAIN: Primary | ICD-10-CM

## 2025-05-21 DIAGNOSIS — M54.50 ACUTE EXACERBATION OF CHRONIC LOW BACK PAIN: Primary | ICD-10-CM

## 2025-05-21 PROBLEM — Z91.81 AT HIGH RISK FOR FALLS: Status: ACTIVE | Noted: 2025-05-21

## 2025-05-21 PROBLEM — R53.81 PHYSICAL DEBILITY: Status: ACTIVE | Noted: 2025-05-21

## 2025-05-21 LAB
ALBUMIN SERPL-MCNC: 3.5 G/DL (ref 3.5–5)
ALBUMIN/GLOB SERPL: 1.1 (ref 1.1–2.2)
ALP SERPL-CCNC: 116 U/L (ref 45–117)
ALT SERPL-CCNC: 14 U/L (ref 12–78)
ANION GAP SERPL CALC-SCNC: 5 MMOL/L (ref 2–12)
AST SERPL-CCNC: 13 U/L (ref 15–37)
BASOPHILS # BLD: 0.02 K/UL (ref 0–0.1)
BASOPHILS NFR BLD: 0.4 % (ref 0–1)
BILIRUB SERPL-MCNC: 1.5 MG/DL (ref 0.2–1)
BUN SERPL-MCNC: 15 MG/DL (ref 6–20)
BUN/CREAT SERPL: 15 (ref 12–20)
CALCIUM SERPL-MCNC: 9.2 MG/DL (ref 8.5–10.1)
CHLORIDE SERPL-SCNC: 109 MMOL/L (ref 97–108)
CO2 SERPL-SCNC: 25 MMOL/L (ref 21–32)
CREAT SERPL-MCNC: 0.97 MG/DL (ref 0.7–1.3)
DIFFERENTIAL METHOD BLD: ABNORMAL
EOSINOPHIL # BLD: 0.08 K/UL (ref 0–0.4)
EOSINOPHIL NFR BLD: 1.6 % (ref 0–7)
ERYTHROCYTE [DISTWIDTH] IN BLOOD BY AUTOMATED COUNT: 14 % (ref 11.5–14.5)
GLOBULIN SER CALC-MCNC: 3.2 G/DL (ref 2–4)
GLUCOSE SERPL-MCNC: 118 MG/DL (ref 65–100)
HCT VFR BLD AUTO: 36.1 % (ref 36.6–50.3)
HGB BLD-MCNC: 10.7 G/DL (ref 12.1–17)
IMM GRANULOCYTES # BLD AUTO: 0.01 K/UL (ref 0–0.04)
IMM GRANULOCYTES NFR BLD AUTO: 0.2 % (ref 0–0.5)
LYMPHOCYTES # BLD: 0.92 K/UL (ref 0.8–3.5)
LYMPHOCYTES NFR BLD: 18.8 % (ref 12–49)
MCH RBC QN AUTO: 26.8 PG (ref 26–34)
MCHC RBC AUTO-ENTMCNC: 29.6 G/DL (ref 30–36.5)
MCV RBC AUTO: 90.3 FL (ref 80–99)
MONOCYTES # BLD: 0.46 K/UL (ref 0–1)
MONOCYTES NFR BLD: 9.4 % (ref 5–13)
NEUTS SEG # BLD: 3.4 K/UL (ref 1.8–8)
NEUTS SEG NFR BLD: 69.6 % (ref 32–75)
NRBC # BLD: 0 K/UL (ref 0–0.01)
NRBC BLD-RTO: 0 PER 100 WBC
PLATELET # BLD AUTO: 317 K/UL (ref 150–400)
PMV BLD AUTO: 9.8 FL (ref 8.9–12.9)
POTASSIUM SERPL-SCNC: 3.9 MMOL/L (ref 3.5–5.1)
PROT SERPL-MCNC: 6.7 G/DL (ref 6.4–8.2)
RBC # BLD AUTO: 4 M/UL (ref 4.1–5.7)
SODIUM SERPL-SCNC: 139 MMOL/L (ref 136–145)
WBC # BLD AUTO: 4.9 K/UL (ref 4.1–11.1)

## 2025-05-21 PROCEDURE — 97165 OT EVAL LOW COMPLEX 30 MIN: CPT

## 2025-05-21 PROCEDURE — 6360000002 HC RX W HCPCS

## 2025-05-21 PROCEDURE — 2580000003 HC RX 258

## 2025-05-21 PROCEDURE — 85025 COMPLETE CBC W/AUTO DIFF WBC: CPT

## 2025-05-21 PROCEDURE — 74176 CT ABD & PELVIS W/O CONTRAST: CPT

## 2025-05-21 PROCEDURE — 6370000000 HC RX 637 (ALT 250 FOR IP)

## 2025-05-21 PROCEDURE — 97161 PT EVAL LOW COMPLEX 20 MIN: CPT

## 2025-05-21 PROCEDURE — 96374 THER/PROPH/DIAG INJ IV PUSH: CPT

## 2025-05-21 PROCEDURE — 80053 COMPREHEN METABOLIC PANEL: CPT

## 2025-05-21 PROCEDURE — 97535 SELF CARE MNGMENT TRAINING: CPT

## 2025-05-21 PROCEDURE — 96375 TX/PRO/DX INJ NEW DRUG ADDON: CPT

## 2025-05-21 PROCEDURE — 99284 EMERGENCY DEPT VISIT MOD MDM: CPT

## 2025-05-21 PROCEDURE — 97116 GAIT TRAINING THERAPY: CPT

## 2025-05-21 PROCEDURE — 96376 TX/PRO/DX INJ SAME DRUG ADON: CPT

## 2025-05-21 PROCEDURE — 6370000000 HC RX 637 (ALT 250 FOR IP): Performed by: NURSE PRACTITIONER

## 2025-05-21 PROCEDURE — 6360000002 HC RX W HCPCS: Performed by: EMERGENCY MEDICINE

## 2025-05-21 RX ORDER — 0.9 % SODIUM CHLORIDE 0.9 %
1000 INTRAVENOUS SOLUTION INTRAVENOUS ONCE
Status: COMPLETED | OUTPATIENT
Start: 2025-05-21 | End: 2025-05-21

## 2025-05-21 RX ORDER — ACETAMINOPHEN 500 MG
1000 TABLET ORAL EVERY 6 HOURS PRN
Status: DISCONTINUED | OUTPATIENT
Start: 2025-05-21 | End: 2025-05-21 | Stop reason: HOSPADM

## 2025-05-21 RX ORDER — ASPIRIN 81 MG/1
81 TABLET ORAL DAILY
Status: DISCONTINUED | OUTPATIENT
Start: 2025-05-21 | End: 2025-05-21 | Stop reason: HOSPADM

## 2025-05-21 RX ORDER — METHOCARBAMOL 500 MG/1
1000 TABLET, FILM COATED ORAL ONCE
Status: COMPLETED | OUTPATIENT
Start: 2025-05-21 | End: 2025-05-21

## 2025-05-21 RX ORDER — VITAMIN B COMPLEX
3000 TABLET ORAL DAILY
Status: DISCONTINUED | OUTPATIENT
Start: 2025-05-21 | End: 2025-05-21 | Stop reason: HOSPADM

## 2025-05-21 RX ORDER — INDOMETHACIN 25 MG/1
50 CAPSULE ORAL 2 TIMES DAILY WITH MEALS
Status: DISCONTINUED | OUTPATIENT
Start: 2025-05-21 | End: 2025-05-21 | Stop reason: HOSPADM

## 2025-05-21 RX ORDER — LANOLIN ALCOHOL/MO/W.PET/CERES
400 CREAM (GRAM) TOPICAL DAILY
Status: DISCONTINUED | OUTPATIENT
Start: 2025-05-21 | End: 2025-05-21 | Stop reason: HOSPADM

## 2025-05-21 RX ORDER — ALLOPURINOL 100 MG/1
200 TABLET ORAL DAILY
Status: DISCONTINUED | OUTPATIENT
Start: 2025-05-21 | End: 2025-05-21 | Stop reason: HOSPADM

## 2025-05-21 RX ORDER — INDOMETHACIN 50 MG/1
CAPSULE ORAL
COMMUNITY

## 2025-05-21 RX ORDER — VIT C/B6/B5/MAGNESIUM/HERB 173 50-5-6-5MG
500 CAPSULE ORAL DAILY
COMMUNITY

## 2025-05-21 RX ORDER — HYDROMORPHONE HYDROCHLORIDE 1 MG/ML
0.5 INJECTION, SOLUTION INTRAMUSCULAR; INTRAVENOUS; SUBCUTANEOUS ONCE
Status: COMPLETED | OUTPATIENT
Start: 2025-05-21 | End: 2025-05-21

## 2025-05-21 RX ORDER — FLUOROURACIL 50 MG/G
CREAM TOPICAL
COMMUNITY
Start: 2025-05-08 | End: 2025-05-21

## 2025-05-21 RX ORDER — GABAPENTIN 600 MG/1
300 TABLET ORAL 3 TIMES DAILY PRN
Status: DISCONTINUED | OUTPATIENT
Start: 2025-05-21 | End: 2025-05-21 | Stop reason: HOSPADM

## 2025-05-21 RX ORDER — ATORVASTATIN CALCIUM 40 MG/1
20 TABLET, FILM COATED ORAL NIGHTLY
Status: DISCONTINUED | OUTPATIENT
Start: 2025-05-21 | End: 2025-05-21 | Stop reason: HOSPADM

## 2025-05-21 RX ORDER — KETOROLAC TROMETHAMINE 30 MG/ML
15 INJECTION, SOLUTION INTRAMUSCULAR; INTRAVENOUS ONCE
Status: COMPLETED | OUTPATIENT
Start: 2025-05-21 | End: 2025-05-21

## 2025-05-21 RX ORDER — METHOCARBAMOL 750 MG/1
750 TABLET, FILM COATED ORAL 4 TIMES DAILY
Qty: 40 TABLET | Refills: 0 | Status: SHIPPED | OUTPATIENT
Start: 2025-05-21 | End: 2025-05-31

## 2025-05-21 RX ORDER — TETRACYCLINE HCL 500 MG
500 CAPSULE ORAL DAILY
COMMUNITY

## 2025-05-21 RX ADMIN — Medication 400 MG: at 14:24

## 2025-05-21 RX ADMIN — HYDROMORPHONE HYDROCHLORIDE 0.5 MG: 1 INJECTION, SOLUTION INTRAMUSCULAR; INTRAVENOUS; SUBCUTANEOUS at 11:43

## 2025-05-21 RX ADMIN — METHOCARBAMOL 1000 MG: 500 TABLET ORAL at 13:36

## 2025-05-21 RX ADMIN — KETOROLAC TROMETHAMINE 15 MG: 30 INJECTION, SOLUTION INTRAMUSCULAR; INTRAVENOUS at 09:39

## 2025-05-21 RX ADMIN — HYDROMORPHONE HYDROCHLORIDE 0.5 MG: 1 INJECTION, SOLUTION INTRAMUSCULAR; INTRAVENOUS; SUBCUTANEOUS at 17:07

## 2025-05-21 RX ADMIN — Medication 3000 UNITS: at 14:24

## 2025-05-21 RX ADMIN — SODIUM CHLORIDE 1000 ML: 0.9 INJECTION, SOLUTION INTRAVENOUS at 09:40

## 2025-05-21 ASSESSMENT — PAIN DESCRIPTION - FREQUENCY: FREQUENCY: CONTINUOUS

## 2025-05-21 ASSESSMENT — PAIN DESCRIPTION - LOCATION: LOCATION: BACK

## 2025-05-21 ASSESSMENT — PAIN SCALES - GENERAL: PAINLEVEL_OUTOF10: 10

## 2025-05-21 ASSESSMENT — PAIN DESCRIPTION - ORIENTATION: ORIENTATION: LEFT;LOWER

## 2025-05-21 ASSESSMENT — PAIN - FUNCTIONAL ASSESSMENT
PAIN_FUNCTIONAL_ASSESSMENT: 0-10
PAIN_FUNCTIONAL_ASSESSMENT: PREVENTS OR INTERFERES SOME ACTIVE ACTIVITIES AND ADLS

## 2025-05-21 ASSESSMENT — PAIN DESCRIPTION - DESCRIPTORS: DESCRIPTORS: ACHING;DISCOMFORT;STABBING

## 2025-05-21 ASSESSMENT — PAIN DESCRIPTION - PAIN TYPE: TYPE: ACUTE PAIN

## 2025-05-21 ASSESSMENT — PAIN DESCRIPTION - ONSET: ONSET: SUDDEN

## 2025-05-21 NOTE — ED TRIAGE NOTES
Patient arrives to ED via EMS from home with reports of sudden onset of 10/10 lower back pain that began upon waking up this morning. Pt states he has an extensive orthopedic history with spinal fusions and bilateral hip replacements. Pt most recent hip replacement was in March, he denies any redness, fever or swelling at the site. However, BLE pitting edema was noted. Pt denies any trauma, falls numbness, tingling, or incontinence.

## 2025-05-21 NOTE — ED PROVIDER NOTES
Patient waiting for discharge ride home.  Nurse stated to me that patient requesting an additional dose of pain medication before being discharged.  He got significant relief from 0.5 mg of Dilaudid earlier this morning.  I wrote a new order for additional dose of Dilaudid.  Patient is been resting comfortably with stable vital signs.  No hypoxia or other concerns.     Juan Luis Patterson MD  05/21/25 1664    
COMBINATIONS (B-12) 100-5000 MCG SUBL    Place 1 tablet under the tongue daily    ENALAPRIL (VASOTEC) 10 MG TABLET    Take 2 tablets by mouth daily    FUROSEMIDE (LASIX) 20 MG TABLET    Take 2 tablets by mouth as needed    GABAPENTIN (NEURONTIN) 300 MG CAPSULE    Take 1 capsule by mouth 3 times daily for 30 days. Max Daily Amount: 900 mg    INDOMETHACIN (INDOCIN) 50 MG CAPSULE    1 capsule with food or milk Orally Twice a day for 90 days  As needed    LEVOTHYROXINE (SYNTHROID) 175 MCG TABLET    TAKE ONE TABLET BY MOUTH EVERY MORNING BEFORE BREAKFAST    MAGNESIUM OXIDE (MAG-OX) 400 MG TABLET    Take 1 tablet by mouth every evening    PANTOPRAZOLE (PROTONIX) 40 MG TABLET    TAKE ONE TABLET BY MOUTH EVERY MORNING BEFORE BREAKFAST    SAW PALMETTO, SERENOA REPENS, (SAW PALMETTO PO)    Take 320 mg by mouth daily    TURMERIC 500 MG CAPS    Take 1 capsule by mouth daily       ALLERGIES     Wasp venom protein, Sulfa antibiotics, and Hydrochlorothiazide    FAMILY HISTORY       Family History   Problem Relation Age of Onset    Hypertension Mother     Heart Disease Father     No Known Problems Sister     No Known Problems Brother     Heart Disease Paternal Uncle     No Known Problems Daughter     No Known Problems Son     Anesth Problems Neg Hx           SOCIAL HISTORY       Social History     Socioeconomic History    Marital status:    Tobacco Use    Smoking status: Never     Passive exposure: Never    Smokeless tobacco: Never   Vaping Use    Vaping status: Never Used   Substance and Sexual Activity    Alcohol use: Yes     Alcohol/week: 3.0 standard drinks of alcohol     Types: 3 Drinks containing 0.5 oz of alcohol per week    Drug use: Never    Sexual activity: Not Currently     Partners: Female     Social Drivers of Health     Financial Resource Strain: Low Risk  (12/16/2024)    Overall Financial Resource Strain (CARDIA)     Difficulty of Paying Living Expenses: Not hard at all   Food Insecurity: No Food Insecurity

## 2025-05-21 NOTE — ED NOTES
Attempted to call wife x2 to come  patient, no answer at this time. Will continue to try.   
Discharge instructions reviewed with patient. IV removed without complications. Patient is alert and oriented at discharge and in no acute distress.    
Wife called, ETA 2406  
important to him to be able to manage basic ADLs independently.    Advance Care Planning:  [x] Geriatric Team has updated the ACP Navigator with Health Care Decision Maker and Patient Capacity    The patient has appointed the following active healthcare agents:    Primary Decision Maker: Avelina Mary - Spouse - [phone]    The Patient has the following current code status:    Code Status: Prior FULL       HISTORY:     History obtained from: Patient, spouse at the bedside, chart review and ED physician    CHIEF COMPLAINT: Patient arrives with spouse for evaluation of low back pain, nontraumatic.    HPI/SUBJECTIVE:    The patient is:   [x] Verbal and participatory  [] Non-participatory due to:       Patient greeted in room, spouse at the bedside, patient able to state his name, date of birth, where he is current month year.  I introduced myself and role with Senior care services, patient and spouse receptive to discussion.  Also present for discussion includes Sandy Macedo CM.  Patient states that he has been sleeping in a lift/recliner since his first hip surgery in December of 2024, states that he had his other hip replaced in March.  Spouse at the bedside states that she is concerned because he is not elevating his lower extremities and has had increased swelling.  Patient states he has not been taking his furosemide due to feeling like it does not help and it just makes him urinate too frequently.  He states he woke up this morning in the recliner and had a sudden onset of excruciating localized back pain, patient denies any fall or injury, denies any loss of control bowel or bladder.  Patient was picked up by EMS and was sitting on a barstool upon their arrival.  Patient states he did take a dose of indomethacin around 7 AM without relief of symptoms.  Patient lives with spouse in two-story home, first level living.  Sleeps in lift/recliner with head elevated, spouse concerned because he has not

## 2025-05-21 NOTE — CARE COORDINATION
CM consult received and appreciated. EMR reviewed. History of spinal fusion and bilateral replacement. Patient presents to the ED w/ lower back pain 10/10.     Met w/ patient and spouse Avelina Mary [phone] introduced to role.     Patient denies trauma to area. Acknowledged recent left hip surgery in March and OP PT and right hip surgery in December 2024. Previous HH [facility] (preferred provider if needed). [facility] offered.     Home is 2 story and couple reside on the first floor. DME includes RW, shower chair, and lift chair. No reported falls.     [facility] ([address] is local pharmacy.     Discussed PT/OT evaluation and will await recommendations.   [facility] Medicare insurance - will required authorization if  rehab recommended.     1530 [facility] PT/OT recommendation for HH. Referral sent to [facility] by Angelika preferred provider. Referral sent via [facility] . Call placed to [facility] and agency accepted  will visit in 48 hours. Provider added to AVS.     No Additional needs verbalized.

## 2025-05-21 NOTE — PLAN OF CARE
Problem: Occupational Therapy - Adult  Goal: By Discharge: Performs self-care activities at highest level of function for planned discharge setting.  See evaluation for individualized goals.  Description: FUNCTIONAL STATUS PRIOR TO ADMISSION:     , Prior Level of Assist for ADLs: Independent,  ,  ,  ,  ,  ,  ,  , Prior Level of Assist for Transfers: Independent,       HOME SUPPORT: Patient lived with spouse but didn't require assistance.    Occupational Therapy Goals:  Initiated 5/21/2025  1.  Patient will perform lower body dressing with Stand by Assist and Set-up with AE PRN within 7 day(s).  2.  Patient will perform grooming in standing with Supervision within 7 day(s).  3.  Patient will perform toilet transfers with Stand by Assist  within 7 day(s).  4.  Patient will perform all aspects of toileting with Stand by Assist within 7 day(s).  5.  Patient will participate in upper extremity therapeutic exercise/activities with Modified [place] for 10 minutes within 7 day(s).    6.  Patient will utilize energy conservation techniques during functional activities with verbal and visual cues within 7 day(s).    Outcome: Progressing   OCCUPATIONAL THERAPY EVALUATION    Patient: Fco Mary (79 y.o. male)  Date: 5/21/2025  Primary Diagnosis: No admission diagnoses are documented for this encounter.         Precautions:                    ASSESSMENT :  The patient is limited by decreased functional mobility, independence in ADLs, activity tolerance, balance, increased pain levels. Patient is largely limited by pain reporting 10/10 back pain with positional transitions/bed mobility described as \"stabbing\". He required Mod A for bed mobility and progresses with transfers from [place] to SBA over consecutive trials with RW and cues to use UEs to decrease LE WB. Tolerates toileting and brief mobility in room with CGA. Mostly limited in sitting and with bed mobility d/t pain, anticipate steady gains with improved pain

## 2025-05-21 NOTE — PLAN OF CARE
Problem: Physical Therapy - Adult  Goal: By Discharge: Performs mobility at highest level of function for planned discharge setting.  See evaluation for individualized goals.  Description: FUNCTIONAL STATUS PRIOR TO ADMISSION: Patient was independent and active without use of DME. Pt s/p L SHIRA in March 2025, recently discharged from OP PT and progressed from rolling walker to SPC to no AD. Pt also with recent R SHIRA Dec 2024 and h/o spine surgery.     HOME SUPPORT PRIOR TO ADMISSION: The patient lived with wife. Pt sleeps in recliner.    Physical Therapy Goals  Initiated 5/21/2025  1.  Patient will move from supine to sit and sit to supine in bed with modified independence within 7 day(s).    2.  Patient will perform sit to stand with modified independence within 7 day(s).  3.  Patient will transfer from bed to chair and chair to bed with modified independence using the least restrictive device within 7 day(s).  4.  Patient will ambulate with modified independence for 150 feet with the least restrictive device within 7 day(s).       Outcome: Progressing   PHYSICAL THERAPY EVALUATION    Patient: Fco Mary (79 y.o. male)  Date: 5/21/2025  Primary Diagnosis: No admission diagnoses are documented for this encounter.       Precautions:              ASSESSMENT :   DEFICITS/IMPAIRMENTS:   The patient is limited by decreased functional mobility, ROM, strength, sensation, body mechanics, activity tolerance, endurance, coordination     Based on the impairments listed above patient presents below baseline and limited by back pain. Pt required moderate cues to perform log roll in stretcher reporting increased in pain. Pt able to progress to standing initially requiring min A but able to progress to contact guard and ambulation within room using rolling walker. Pt with antalgic gait pattern yet fairly steady. Anticipate steady gains toward PLOF with continued mobility and improved pain tolerance.     Patient will

## 2025-05-22 ENCOUNTER — TELEPHONE (OUTPATIENT)
Age: 80
End: 2025-05-22

## 2025-05-22 NOTE — TELEPHONE ENCOUNTER
----- Message from Gary VILLANUEVA sent at 5/22/2025  1:42 PM EDT -----  Regarding: ECC Message to Provider  ECC Message to Provider    Relationship to Patient: Covered Entity [facility] -Physical Therapist    Additional Information PT was briefly in the hospital and have a Physical therapist order but did not admit him because he  did not meet the criteria for home health care  --------------------------------------------------------------------------------------------------------------------------    Call Back Information: OK to leave message on voicemail  Preferred Call Back Number: Phone

## 2025-05-23 NOTE — TELEPHONE ENCOUNTER
Attempted to speak with pt     Inquired if black referral needs to be created, or if Entity with [facility] will be the PT provider.

## 2025-05-29 ENCOUNTER — OFFICE VISIT (OUTPATIENT)
Age: 80
End: 2025-05-29
Payer: MEDICARE

## 2025-05-29 VITALS
BODY MASS INDEX: 28.19 KG/M2 | DIASTOLIC BLOOD PRESSURE: 81 MMHG | WEIGHT: 186 LBS | HEART RATE: 68 BPM | SYSTOLIC BLOOD PRESSURE: 134 MMHG | HEIGHT: 68 IN | OXYGEN SATURATION: 95 % | RESPIRATION RATE: 20 BRPM | TEMPERATURE: 98.6 F

## 2025-05-29 DIAGNOSIS — E78.2 MIXED HYPERLIPIDEMIA: ICD-10-CM

## 2025-05-29 DIAGNOSIS — E03.9 ACQUIRED HYPOTHYROIDISM: ICD-10-CM

## 2025-05-29 DIAGNOSIS — I25.10 ATHEROSCLEROSIS OF NATIVE CORONARY ARTERY OF NATIVE HEART WITHOUT ANGINA PECTORIS: ICD-10-CM

## 2025-05-29 DIAGNOSIS — Z12.5 PROSTATE CANCER SCREENING: ICD-10-CM

## 2025-05-29 DIAGNOSIS — I87.2 VENOUS INSUFFICIENCY (CHRONIC) (PERIPHERAL): ICD-10-CM

## 2025-05-29 DIAGNOSIS — R73.03 PREDIABETES: ICD-10-CM

## 2025-05-29 DIAGNOSIS — I10 ESSENTIAL (PRIMARY) HYPERTENSION: ICD-10-CM

## 2025-05-29 DIAGNOSIS — Z00.00 MEDICARE ANNUAL WELLNESS VISIT, SUBSEQUENT: Primary | ICD-10-CM

## 2025-05-29 PROCEDURE — 3079F DIAST BP 80-89 MM HG: CPT | Performed by: INTERNAL MEDICINE

## 2025-05-29 PROCEDURE — 1160F RVW MEDS BY RX/DR IN RCRD: CPT | Performed by: INTERNAL MEDICINE

## 2025-05-29 PROCEDURE — 99214 OFFICE O/P EST MOD 30 MIN: CPT | Performed by: INTERNAL MEDICINE

## 2025-05-29 PROCEDURE — G8417 CALC BMI ABV UP PARAM F/U: HCPCS | Performed by: INTERNAL MEDICINE

## 2025-05-29 PROCEDURE — G0439 PPPS, SUBSEQ VISIT: HCPCS | Performed by: INTERNAL MEDICINE

## 2025-05-29 PROCEDURE — 1123F ACP DISCUSS/DSCN MKR DOCD: CPT | Performed by: INTERNAL MEDICINE

## 2025-05-29 PROCEDURE — 3075F SYST BP GE 130 - 139MM HG: CPT | Performed by: INTERNAL MEDICINE

## 2025-05-29 PROCEDURE — G8427 DOCREV CUR MEDS BY ELIG CLIN: HCPCS | Performed by: INTERNAL MEDICINE

## 2025-05-29 PROCEDURE — 1036F TOBACCO NON-USER: CPT | Performed by: INTERNAL MEDICINE

## 2025-05-29 PROCEDURE — 1159F MED LIST DOCD IN RCRD: CPT | Performed by: INTERNAL MEDICINE

## 2025-05-29 RX ORDER — SILDENAFIL 50 MG/1
50 TABLET, FILM COATED ORAL DAILY PRN
Qty: 10 TABLET | Refills: 3 | Status: SHIPPED | OUTPATIENT
Start: 2025-05-29

## 2025-05-29 RX ORDER — FUROSEMIDE 40 MG/1
TABLET ORAL
COMMUNITY
Start: 2025-03-17

## 2025-05-29 SDOH — ECONOMIC STABILITY: FOOD INSECURITY: WITHIN THE PAST 12 MONTHS, YOU WORRIED THAT YOUR FOOD WOULD RUN OUT BEFORE YOU GOT MONEY TO BUY MORE.: NEVER TRUE

## 2025-05-29 SDOH — ECONOMIC STABILITY: FOOD INSECURITY: WITHIN THE PAST 12 MONTHS, THE FOOD YOU BOUGHT JUST DIDN'T LAST AND YOU DIDN'T HAVE MONEY TO GET MORE.: NEVER TRUE

## 2025-05-29 ASSESSMENT — PATIENT HEALTH QUESTIONNAIRE - PHQ9
SUM OF ALL RESPONSES TO PHQ QUESTIONS 1-9: 0
1. LITTLE INTEREST OR PLEASURE IN DOING THINGS: NOT AT ALL
2. FEELING DOWN, DEPRESSED OR HOPELESS: NOT AT ALL

## 2025-05-29 NOTE — PROGRESS NOTES
Medicare Annual Wellness Visit    Fco Mary is here for Medicare AWV (Pt is having some swelling in lower legs/ankles after knee surgery.)    Assessment & Plan   Medicare annual wellness visit, subsequent     No follow-ups on file.     Subjective       Patient's complete Health Risk Assessment and screening values have been reviewed and are found in Flowsheets. The following problems were reviewed today and where indicated follow up appointments were made and/or referrals ordered.    No Positive Risk Factors identified today.                                    Objective   Vitals:    05/29/25 1046   BP: 134/81   BP Site: Left Upper Arm   Patient Position: Sitting   Pulse: 68   Resp: 20   Temp: 98.6 °F (37 °C)   TempSrc: Temporal   SpO2: 95%   Weight: 84.4 kg (186 lb)   Height: 1.727 m (5' 8\")      Body mass index is 28.28 kg/m².                    Allergies   Allergen Reactions    Wasp Venom Protein Anaphylaxis and Swelling    Sulfa Antibiotics Other (See Comments)     Lower extremity redness    Hydrochlorothiazide Rash     Other reaction(s): Rash     Prior to Visit Medications    Medication Sig Taking? Authorizing Provider   furosemide (LASIX) 40 MG tablet  Yes Lindsay Monroy MD   indomethacin (INDOCIN) 50 MG capsule 1 capsule with food or milk Orally Twice a day for 90 days  As needed Yes ProviderLindsay MD   Apple Cider Vinegar 500 MG TABS Take 500 mg by mouth daily Yes Lindsay Monroy MD   turmeric 500 MG CAPS Take 1 capsule by mouth daily Yes Lindsay Monroy MD   methocarbamol (ROBAXIN-750) 750 MG tablet Take 1 tablet by mouth 4 times daily for 10 days Yes Lucía Salmon PA-C   pantoprazole (PROTONIX) 40 MG tablet TAKE ONE TABLET BY MOUTH EVERY MORNING BEFORE BREAKFAST Yes Gideon Carroll III, DO   levothyroxine (SYNTHROID) 175 MCG tablet TAKE ONE TABLET BY MOUTH EVERY MORNING BEFORE BREAKFAST Yes Gideon Carroll III, DO   aspirin (ASPIRIN 81) 81 MG EC tablet Take 1

## 2025-05-29 NOTE — PATIENT INSTRUCTIONS
Allergy and Immunology  Follow up note    Chief Complaint   Patient presents with   • Office Visit   • Follow-up        HPI:  Brian Lopez is a 62 year old female with past medical history of hypertension, type 2 diabetes, seizures, and TIA, and possible GERD presenting for follow up of her moderate persistent asthma and non-allergic rhinitis. Patient last seen 10/12/21.    Moderate persistent asthma:  Walk in clinic visit 4/2/22, diagnosed with bronchitis and acute pansinusitis, started on doxycycline, methylprednisolone and patient is feeling better. She did not have any trouble with her asthma between her last visit and March 2022. ACT below in reference to how she felt before the antibiotics and oral steroids. She is now feeling much better, has not needed her albuterol in 1.5 days.   Asthma Control Test Results     Score   In the past 4 weeks, how much of the time did your asthma keep you from getting as much done at work, school or at home?: None of the time 5         During the past 4 weeks, how often have you had shortness of breath?: 3 to 6 times a week 3         During the past 4 weeks, how often did your asthma symptoms wake you up at night or earlier than usual in the morning?: Once a week 3         During the past 4 weeks, how often have you used your rescue inhaler or nebulizer medication (such as albuterol)?: 3 or more times/day 1         How would you rate your asthma control during the past 4 weeks?: Somewhat controlled 3          Total Score: 15           A total score of 20 - 25 indicates the asthma seems to be well controlled.  A total score of 5 - 19 indicates the asthma may not be under control.    Current medications:  -- Breo Ellipta 200-25 mcg 1 puff daily  -- Montelukast 10 mg nightly  -- Albuterol PRN      Oral steroids: 4/2/22    Prior history:  Last spirometry 7/31/18  Patient given asthma diagnosis at age 29 years.  GERD symptoms: None.  Typical triggers: cold temperature, hot  Would wear compression stockings during the day.  It will help control the edema/fluid in your lower legs.         A Healthy Heart: Care Instructions  Overview     Coronary artery disease, also called heart disease, occurs when a substance called plaque builds up in the vessels that supply oxygen-rich blood to your heart muscle. This can narrow the blood vessels and reduce blood flow. A heart attack happens when blood flow is completely blocked. A high-fat diet, smoking, and other factors increase the risk of heart disease.  Your doctor has found that you have a chance of having heart disease. A heart-healthy lifestyle can help keep your heart healthy and prevent heart disease. This lifestyle includes eating healthy, being active, staying at a weight that's healthy for you, and not smoking or using tobacco. It also includes taking medicines as directed, managing other health conditions, and trying to get a healthy amount of sleep.  Follow-up care is a key part of your treatment and safety. Be sure to make and go to all appointments, and call your doctor if you are having problems. It's also a good idea to know your test results and keep a list of the medicines you take.  How can you care for yourself at home?  Diet    Use less salt when you cook and eat. This helps lower your blood pressure. Taste food before salting. Add only a little salt when you think you need it. With time, your taste buds will adjust to less salt.     Eat fewer snack items, fast foods, canned soups, and other high-salt, high-fat, processed foods.     Read food labels and try to avoid saturated and trans fats. They increase your risk of heart disease by raising cholesterol levels.     Limit the amount of solid fat--butter, margarine, and shortening--you eat. Use olive, peanut, or canola oil when you cook. Bake, broil, and steam foods instead of frying them.     Eat a variety of fruit and vegetables every day. Dark green, deep orange, red, or  temperature, pollens, dust, mold and strong smells  History of ED visits: June 2018 and a couple years ago   History of hospitalizations: None.  History of intubations for severe asthma exacerbations: None.     Non allergic rhinitis with post nasal drip:  Percutaneous skin testing 07/31/18: negative to all environmental allergens tested  Increased nasal congestion and sinus pressure with sinusitis as stated above, was otherwise fine since October through March. Improving with doxycycline. Azelastine helps but makes her stomach upset she she cannot tolerate using.   Seen by ENT in June 2021, CT sinus without any significant sinus disease.     Current medications:  -- olopatadine 0.2% 1 drop per eye daily PRN itchy watery eyes  -- montelukast 10 mg nightly  -- Flonase 1 spray per nostril BID  -- azelastine not tolerated due to stomach upset/taste     Prior history.  Symptoms present for several years.   Symptoms are not present year round and worse in the summer.     Allergic triggers for symptoms: patient unsure.  Non-allergic triggers for symptoms: fresh cut grass.  Vasomotor triggers for symptoms: patient unsure.  History of GERD: Yes  Prior sinus surgery: None.  History of pneumonia: None.  History of otitis media: yes in the past, none recent.      Medications tried: Zyrtec, Allegra    Environmental exposures:  Type of home: apartment  Pets: None  Smoking exposure: No     ALLERGIES:   Allergen Reactions   • Latex   (Environmental) PRURITUS   • Seasonal HEADACHES   • Sulfa Antibiotics PRURITUS   • Sulfasalazine RASH        Current Outpatient Medications   Medication Sig   • doxycycline monohydrate (ADOXA) 100 MG tablet Take 1 tablet by mouth 2 times daily for 10 days.   • promethazine-dextromethorphan (PROMETHAZINE-DM) 6.25-15 MG/5ML syrup Take 5 mLs by mouth 4 times daily as needed for Cough.   • cloNIDine (CATAPRES) 0.3 MG tablet TAKE 1 TABLET BY MOUTH THREE TIMES DAILY   • docusate sodium (COLACE) 100 MG  capsule Take 1 capsule by mouth nightly.   • semaglutide,0.25 or 0.5 mg/DOSE, (Ozempic, 0.25 or 0.5 MG/DOSE,) (1.34 mg/ml) 0.25 or 0.5 MG/DOSE injection Inject 0.25 mg into the skin every 7 days.   • Dexilant 60 MG capsule TAKE 1 CAPSULE BY MOUTH DAILY   • levetiracetam (KEPPRA) 1000 MG tablet Take 1 tablet by mouth 2 times daily.   • Insulin Pen Needle (BD Pen Needle Mery U/F) 32G X 4 MM Misc Use to inject insulin 4x daily. Remove needle cover(s) to expose needle before injecting.   • escitalopram (LEXAPRO) 20 MG tablet Take 1 tablet by mouth daily.   • insulin glargine (Lantus SoloStar) 100 UNIT/ML pen-injector Inject 35 Units into the skin nightly.   • HumaLOG KwikPen 100 UNIT/ML pen-injector INJECT 8 UNITS UNDER THE SKIN BEFORE EACH MEAL THREE TIMES DAILY. TAKE 10 UNITS IF BLOOD SUGAR BEFORE MEAL IS ABOVE 200. MAX DAILY DOSE 30 UNITS   • clopidogrel (PLAVIX) 75 MG tablet TAKE 1 TABLET BY MOUTH DAILY   • atorvastatin (LIPITOR) 20 MG tablet Take 1 tablet by mouth daily.   • carvedilol (COREG) 25 MG tablet Take 1 tablet by mouth 2 times daily (with meals).   • losartan (COZAAR) 100 MG tablet Take 1 tablet by mouth daily.   • Breo Ellipta 200-25 MCG/INH inhaler INHALE 1 PUFF INTO THE LUNGS DAILY   • albuterol 108 (90 Base) MCG/ACT inhaler Inhale 2 puffs into the lungs every 4 hours as needed for Shortness of Breath or Wheezing.   • meclizine (ANTIVERT) 25 MG tablet Take 1 tablet by mouth 3 times daily as needed for Dizziness.   • montelukast (SINGULAIR) 10 MG tablet Take 1 tablet by mouth nightly.   • amitriptyline (ELAVIL) 10 MG tablet Take 1 tablet by mouth nightly.   • pantoprazole (PROTONIX) 40 MG tablet Take 40 mg by mouth 2 times daily.   • Spacer/Aero-Holding Chambers Device Use with inhalers, hand wash with warm water and mild dish soap   • fluticasone (Flonase) 50 MCG/ACT nasal spray Spray 1 spray in each nostril 2 times daily as needed (allergies).   • Cholecalciferol 125 mcg (5,000 units) capsule Take 1  capsule by mouth daily.   • ferrous sulfate 325 (65 FE) MG tablet Take 1 tablet by mouth daily (with breakfast).   • Spacer/Aero Chamber Mouthpiece Misc Use with inhaler, rinse after use   • Ascorbic Acid (VITAMIN C PO) Take 1 tablet by mouth daily.   • Continuous Blood Gluc Sensor (Dexcom G6 Sensor) Misc Insert new sensor every 10 days.   • benzonatate (TESSALON PERLES) 200 MG capsule Take 1 capsule by mouth 3 times daily as needed for Cough.   • hydroCORTisone (CORTIZONE) 2.5 % cream Apply 1 application topically 3 times daily. UNTIL REDNESS DISAPPEARS   • Lancets (OneTouch Delica Plus [id number]) Misc TEST TWICE DAILY   • blood glucose test strip Patient to test twice daily. Dx: E11.65 Meter: per insurance coverage or patient provided name (Freestyle Lite requested)     No current facility-administered medications for this visit.         ROS:  Constitutional Symptoms: No fevers, chills, or unexpected weight loss  EENT: No eye erythema, itching, or drainage, no drainage from ears, See HPI  Respiratory: See HPI  Integumentary: No rashes  Allergic/Immunologic: see HPI    PHYSICAL EXAM:  Vitals:   Visit Vitals  /68 (BP Location: RUE - Right upper extremity, Patient Position: Sitting, Cuff Size: Regular)   Pulse 61   Resp 20   LMP 01/14/2010 (Exact Date)   SpO2 100%     Constitutional: vitals as above, no obvious deformities  Eyes: no conjunctival injection, no swelling or erythema of eyelids, no allergic shiners  Ears: tympanic membranes reflective to light bilaterally, no erythema, no bulging  Nose: inferior nasal turbinates pink, non-edematous bilaterally, no transverse nasal crease, no polyps   Oropharynx: oral mucous membranes moist, no cobblestoning, no edema  Respiratory: No respiratory distress, good aeration bilaterally, no prolonged expiratory phase, no wheezes  Cardiovascular: Regular rate and rhythm, no edema  Skin: warm, no rashes  Psychiatric: appropriate judgement, mood, and  affect    ASSESSMENT/PLAN:  Brian Lopez is a 62 year old female with past medical history of hypertension, type 2 diabetes, seizures, and TIA, and possible GERD with moderate persistent asthma and non-allergic rhinitis that typically flare in spring and fall with weather change.    Moderate persistent asthma, unspecified whether complicated  (primary encounter diagnosis)  Improving after oral steroid course for asthma exacerbation due to sinusitis versus weather change. First oral steroid burst in over a year.   Plan:  -- albuterol 108 (90 Base) MCG/ACT inhaler  -- continue Breo Ellipta 200/25 mcg 1 puff daily  -- continue Singulair (montelukast) 10 nightly    Non-allergic rhinitis  Not optimally controlled as patient cannot tolerate azelastine due to stomach upset when using it.   Plan:  -- continue Singulair (montelukast) 10 mg by mouth nightly  -- stop azelastine  This nasal spray helps more for stuffy nose:             -- Flonase (fluticasone), 1 spray in each nostril 1-2 times a day scheduled or as needed  This nasal spray helps more for runny nose or post nasal drip:             -- start Atrovent (ipratropium bromide) 1- 2 spray per nostril 1-3 times a day scheduled or as needed  -- if symptoms still not improved, call allergy clinic to discuss   -- nasal saline spray and/or irrigation as needed prior to nasal medications  -- avoidance measures as per AVS  -- nasal spray instructions as per AVS    Return in 6 months or sooner as needed.     Hu Fatima MD  Allergy and Immunology

## 2025-05-30 LAB
CHOLEST SERPL-MCNC: 117 MG/DL
EST. AVERAGE GLUCOSE BLD GHB EST-MCNC: 128 MG/DL
HBA1C MFR BLD: 6.1 % (ref 4–5.6)
HDLC SERPL-MCNC: 55 MG/DL
HDLC SERPL: 2.1 (ref 0–5)
LDLC SERPL CALC-MCNC: 39.6 MG/DL (ref 0–100)
PSA SERPL-MCNC: 1 NG/ML (ref 0.01–4)
T4 FREE SERPL-MCNC: 1.6 NG/DL (ref 0.8–1.5)
TRIGL SERPL-MCNC: 112 MG/DL
TSH SERPL DL<=0.05 MIU/L-ACNC: 0.04 UIU/ML (ref 0.36–3.74)
VLDLC SERPL CALC-MCNC: 22.4 MG/DL

## 2025-06-01 ENCOUNTER — RESULTS FOLLOW-UP (OUTPATIENT)
Age: 80
End: 2025-06-01

## 2025-06-07 ENCOUNTER — TELEPHONE (OUTPATIENT)
Age: 80
End: 2025-06-07

## 2025-06-07 DIAGNOSIS — Z87.892 HX OF ANAPHYLAXIS: Primary | ICD-10-CM

## 2025-06-07 RX ORDER — EPINEPHRINE 0.3 MG/.3ML
0.3 INJECTION SUBCUTANEOUS PRN
Qty: 0.6 ML | Refills: 1 | Status: SHIPPED | OUTPATIENT
Start: 2025-06-07 | End: 2025-06-08

## 2025-06-07 NOTE — TELEPHONE ENCOUNTER
Patient called and epi pen cancelled at pharmacy and unable to  . 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

## 2025-06-08 DIAGNOSIS — Z87.892 HX OF ANAPHYLAXIS: ICD-10-CM

## 2025-06-08 RX ORDER — EPINEPHRINE 0.3 MG/.3ML
0.3 INJECTION SUBCUTANEOUS PRN
Qty: 2 EACH | Refills: 1 | Status: SHIPPED | OUTPATIENT
Start: 2025-06-08

## 2025-06-19 ENCOUNTER — FOLLOWUP TELEPHONE ENCOUNTER (OUTPATIENT)
Facility: HOSPITAL | Age: 80
End: 2025-06-19

## 2025-07-21 DIAGNOSIS — M19.90 OSTEOARTHRITIS, UNSPECIFIED OSTEOARTHRITIS TYPE, UNSPECIFIED SITE: Primary | ICD-10-CM

## 2025-07-21 RX ORDER — INDOMETHACIN 50 MG/1
50 CAPSULE ORAL 2 TIMES DAILY WITH MEALS
Qty: 60 CAPSULE | Refills: 3 | Status: SHIPPED | OUTPATIENT
Start: 2025-07-21

## 2025-07-21 NOTE — TELEPHONE ENCOUNTER
indomethacin (INDOCIN) 50 MG capsule    Pt is requesting refill and was told doctor discontinued this med.    Pt needs a call if he can not get this medication.      Refill to [facility][phone]

## 2025-07-21 NOTE — TELEPHONE ENCOUNTER
PCP: Gideon Carroll III, DO    Last appt: 5/29/2025  Future Appointments   Date Time Provider Department Center   3/11/2026 11:00 AM Thom Fairbanks MD [facility]       Requested Prescriptions     Pending Prescriptions Disp Refills    indomethacin (INDOCIN) 50 MG capsule 60 capsule 3     Sig: Take 1 capsule by mouth 2 times daily (with meals)         Pharmacy Confirmed-

## (undated) DEVICE — KENDALL DL ECG CABLE AND LEAD WIRE SYSTEM, 3-LEAD, SINGLE PATIENT USE: Brand: KENDALL

## (undated) DEVICE — POLYLINED TOWEL: Brand: CONVERTORS

## (undated) DEVICE — SUTURE ABSORBABLE MONOFILAMENT 1 CTX 36 CM 48 MM VIO PDS +

## (undated) DEVICE — APPLICATOR MEDICATED 26 CC SOLUTION HI LT ORNG CHLORAPREP

## (undated) DEVICE — HANDPIECE SET WITH BONE CLEANING TIP AND SUCTION TUBE: Brand: INTERPULSE

## (undated) DEVICE — STERILE POLYISOPRENE POWDER-FREE SURGICAL GLOVES WITH EMOLLIENT COATING: Brand: PROTEXIS

## (undated) DEVICE — TIP SUCT CRV REG REDI

## (undated) DEVICE — LAMINECTOMY RICHMOND-LF: Brand: MEDLINE INDUSTRIES, INC.

## (undated) DEVICE — SCRUBIN SCRUB BRUSH DRY STER: Brand: MEDLINE INDUSTRIES, INC.

## (undated) DEVICE — SUTURE VICRYL 1 L27IN ABSRB CT BRAID COAT UD J281H

## (undated) DEVICE — BONE WAX WHITE: Brand: BONE WAX WHITE

## (undated) DEVICE — ZIMMER® STERILE DISPOSABLE TOURNIQUET CUFF WITH PLC, DUAL PORT, SINGLE BLADDER, 34 IN. (86 CM)

## (undated) DEVICE — SPONGE GZ W4XL4IN COT 12 PLY TYP VII WVN C FLD DSGN

## (undated) DEVICE — SUTURE VCRL SZ 0 L18IN ABSRB VLT L36MM CT-1 1/2 CIR J740D

## (undated) DEVICE — DRAPE C ARM W/ POLY STRP W42XL72IN FOR MOB XR

## (undated) DEVICE — CONTINU-FLO SOLUTION SET, 2 INJECTION SITES, MALE LUER LOCK ADAPTER WITH RETRACTABLE COLLAR, LARGE BORE STOPCOCK WITH ROTATING MALE LUER LOCK EXTENSION SET, 2 INJECTION SITES, MALE LUER LOCK ADAPTER WITH RETRACTABLE COLLAR: Brand: INTERLINK/CONTINU-FLO

## (undated) DEVICE — SUTURE VCRL 1 L27IN ABSRB CT BRAID COAT UD J281H

## (undated) DEVICE — SUTURE VICRYL COATED ABSORBABLE BRAIDED 2-0 TP1 54 IN COAT UD VICRYL + VCP880T

## (undated) DEVICE — SCRUB DRY SURG EZ SCRUB BRUSH PREOPERATIVE GRN

## (undated) DEVICE — FLOSEAL MATRIX IS INDICATED IN SURGICAL PROCEDURES (OTHER THAN IN OPHTHALMIC) AS AN ADJUNCT TO HEMOSTASIS WHEN CONTROL OF BLEEDING BY LIGATURE OR CONVENTIONALPROCEDURES IS INEFFECTIVE OR IMPRACTICAL.: Brand: FLOSEAL HEMOSTATIC MATRIX

## (undated) DEVICE — SUTURE VCRL SZ 2-0 L18IN ABSRB UD L26MM CP-2 1/2 CIR REV J762D

## (undated) DEVICE — C-ARM: Brand: UNBRANDED

## (undated) DEVICE — TOWEL SURG W17XL27IN STD BLU COT NONFENESTRATED PREWASHED

## (undated) DEVICE — SYR 5ML 1/5 GRAD LL NSAF LF --

## (undated) DEVICE — ASTOUND STANDARD SURGICAL GOWN, XXL: Brand: CONVERTORS

## (undated) DEVICE — SYR 20ML LL STRL LF --

## (undated) DEVICE — SOL IRR SOD CHL 0.9% TITAN XL CNTNR 3000ML

## (undated) DEVICE — XPERIENCE

## (undated) DEVICE — LIGHT HANDLE: Brand: DEVON

## (undated) DEVICE — NEEDLE SPNL L4.75IN OD25GA QNCKE TYP SPINOCAN

## (undated) DEVICE — YANKAUER,FLEXIBLE HANDLE,REGLR CAPACITY: Brand: MEDLINE INDUSTRIES, INC.

## (undated) DEVICE — GLOVE SURG SZ 65 L12IN FNGR THK79MIL GRN LTX FREE

## (undated) DEVICE — FLOSEAL HEMOSTATIC MATRIX, 10ML: Brand: FLOSEAL HEMOSTATIC MATRIX

## (undated) DEVICE — SPONGE GZ W4XL4IN COT RADPQ HIGHLY ABSRB

## (undated) DEVICE — SET EXTN L7IN PRIMING VOL 0.4ML PRSS RATE MINIBOR 1 REM

## (undated) DEVICE — SOLUTION IRRIG 1000ML STRL H2O USP PLAS POUR BTL

## (undated) DEVICE — T5 HOOD WITH PEEL AWAY FACE SHIELD

## (undated) DEVICE — STERILE POLYISOPRENE POWDER-FREE SURGICAL GLOVES: Brand: PROTEXIS

## (undated) DEVICE — SYR 10ML LUER LOK 1/5ML GRAD --

## (undated) DEVICE — DRAPE C-ARMOUR C-ARM KIT --

## (undated) DEVICE — SYRINGE 20ML LL S/C 50

## (undated) DEVICE — HOOK LOCK LATEX FREE ELASTIC BANDAGE D/L 6INX10YD

## (undated) DEVICE — ZIPPERED TOGA, 2X LARGE: Brand: FLYTE, SURGICOOL

## (undated) DEVICE — LAMINECTOMY-SMH: Brand: MEDLINE INDUSTRIES, INC.

## (undated) DEVICE — SOLUTION IRRIG 1000ML H2O STRL BLT

## (undated) DEVICE — SPONGE GZ W4XL4IN COT 12 PLY TYP VII WVN C FLD DSGN STERILE

## (undated) DEVICE — SOLUTION IV 50ML 0.9% SOD CHL

## (undated) DEVICE — SKIN MARKER,REGULAR TIP WITH RULER AND LABELS: Brand: DEVON

## (undated) DEVICE — STRYKER PERFORMANCE SERIES SAGITTAL BLADE: Brand: STRYKER PERFORMANCE SERIES

## (undated) DEVICE — INFECTION CONTROL KIT SYS

## (undated) DEVICE — DEVON™ KNEE AND BODY STRAP 60" X 3" (1.5 M X 7.6 CM): Brand: DEVON

## (undated) DEVICE — SUTURE STRATAFIX SPRL SZ 1 L14IN ABSRB VLT L48CM CTX 1/2 SXPD2B405

## (undated) DEVICE — TUBING, SUCTION, 1/4" X 12', STRAIGHT: Brand: MEDLINE

## (undated) DEVICE — GARMENT,MEDLINE,DVT,INT,CALF,MED, GEN2: Brand: MEDLINE

## (undated) DEVICE — SYRINGE 50ML E/T

## (undated) DEVICE — 1200 GUARD II KIT W/5MM TUBE W/O VAC TUBE: Brand: GUARDIAN

## (undated) DEVICE — THE MILL DISPOSABLE - MEDIUM

## (undated) DEVICE — Device

## (undated) DEVICE — SPHERE STEALTH 12PK/TY --

## (undated) DEVICE — STRIP SKIN CLSR W1XL5IN NYLON REINF CURAD STERIL

## (undated) DEVICE — COVER,TABLE,HEAVY DUTY,60"X90",STRL: Brand: MEDLINE

## (undated) DEVICE — GAUZE SPONGES,12 PLY: Brand: CURITY

## (undated) DEVICE — BANDAGE COMPR M W6INXL10YD WHT BGE VELC E MTRX HK AND LOOP

## (undated) DEVICE — DERMABOND SKIN ADH 0.7ML -- DERMABOND ADVANCED 12/BX

## (undated) DEVICE — DRAPE,U/ SHT,SPLIT,PLAS,STERIL: Brand: MEDLINE

## (undated) DEVICE — 4-PORT MANIFOLD: Brand: NEPTUNE 2

## (undated) DEVICE — SOLUTION SURG PREP 26 CC PURPREP

## (undated) DEVICE — DECANTER BAG 9": Brand: MEDLINE INDUSTRIES, INC.

## (undated) DEVICE — ALCOHOL RUBBING ISO 16OZ 70%

## (undated) DEVICE — TOTAL TRAY, 16FR 10ML SIL FOLEY, URN: Brand: MEDLINE

## (undated) DEVICE — CARTRIDGE BNE CEM MIX UNIV TWR VAC ROTOR BRK OFF NOZ W/O

## (undated) DEVICE — NEEDLE HYPO 18GA L1.5IN PNK S STL HUB POLYPR SHLD REG BVL

## (undated) DEVICE — NEEDLE HYPO 25GA L1.5IN BVL ORIENTED ECLIPSE

## (undated) DEVICE — HANDLE LT SNAP ON ULT DURABLE LENS FOR TRUMPF ALC DISPOSABLE

## (undated) DEVICE — LIQUIBAND RAPID ADHESIVE 36/CS 0.8ML: Brand: MEDLINE

## (undated) DEVICE — PAD,ABDOMINAL,5"X9",ST,LF,25/BX: Brand: MEDLINE INDUSTRIES, INC.

## (undated) DEVICE — PREP SKN CHLRAPRP APL 26ML STR --

## (undated) DEVICE — PAD GEN USE BORDERED ADH 14IN 2IN AND 12IN 4IN GZ UNIV ST

## (undated) DEVICE — DRAPE,EXTREMITY,89X128,STERILE: Brand: MEDLINE

## (undated) DEVICE — BLADE,CARBON-STEEL,15,STRL,DISPOSABLE,TB: Brand: MEDLINE

## (undated) DEVICE — GLOVE SURG SZ 65 L12IN FNGR THK94MIL STD WHT LTX FREE

## (undated) DEVICE — POSITIONER HD REST FOAM CMFRT TCH

## (undated) DEVICE — TRAY CATH W/ 16FR CATH URIN M CTRL FIT OUTLT TB F STATLOK

## (undated) DEVICE — REM POLYHESIVE ADULT PATIENT RETURN ELECTRODE: Brand: VALLEYLAB

## (undated) DEVICE — SOLUTION IRRIG 3000ML 0.9% SOD CHL FLX CONT 0797208] ICU MEDICAL INC]

## (undated) DEVICE — SUTURE MONOCRYL SZ 3-0 L27IN ABSRB UD PS-2 3/8 CIR REV CUT NDL MCP427H

## (undated) DEVICE — STRAP,POSITIONING,KNEE/BODY,FOAM,4X60": Brand: MEDLINE

## (undated) DEVICE — ZIPPERED TOGA, X-LARGE: Brand: FLYTE, SURGICOOL

## (undated) DEVICE — MARKER,SKIN,WI/RULER AND LABELS: Brand: MEDLINE

## (undated) DEVICE — BIPOLAR IRRIGATOR INTEGRATED TUBING AND BIPOLAR CORD SET, DISPOSABLE

## (undated) DEVICE — DRESSING FOAM 4X8IN DISP POSTOP MEPILEX BORD AG

## (undated) DEVICE — (D)PREP SKN CHLRAPRP APPL 26ML -- CONVERT TO ITEM 371833

## (undated) DEVICE — GLOVE SURG SZ 85 L12IN FNGR THK94MIL STD WHT LTX FREE

## (undated) DEVICE — PADDING CAST SPEC 6INX4YD COT --

## (undated) DEVICE — ENDO CARRY-ON PROCEDURE KIT INCLUDES ENZYMATIC SPONGE, GAUZE, BIOHAZARD LABEL, TRAY, LUBRICANT, DIRTY SCOPE LABEL, WATER LABEL, TRAY, DRAWSTRING PAD, AND DEFENDO 4-PIECE KIT.: Brand: ENDO CARRY-ON PROCEDURE KIT

## (undated) DEVICE — SOLUTION IV 250ML 0.9% SOD CHL CLR INJ FLX BG CONT PRT CLSR

## (undated) DEVICE — SOLUTION LACTATED RINGERS INJECTION USP

## (undated) DEVICE — SOLUTION IRRIG 3000ML 0.9% SOD CHL USP UROMATIC PLAS CONT

## (undated) DEVICE — CUSTOM CAST PD STR

## (undated) DEVICE — NEEDLE HYPO 21GA L1IN GRN S STL HUB POLYPR SHLD REG BVL

## (undated) DEVICE — BLADE SAW 1.27X90 MM FOR LG BNE [KMS2513PM62STE] [KOMET MEDICAL]

## (undated) DEVICE — TOTAL JOINT - SMH: Brand: MEDLINE INDUSTRIES, INC.

## (undated) DEVICE — INTENDED FOR TISSUE SEPARATION, AND OTHER PROCEDURES THAT REQUIRE A SHARP SURGICAL BLADE TO PUNCTURE OR CUT.: Brand: BARD-PARKER ® CARBON RIB-BACK BLADES

## (undated) DEVICE — ELECTRODE BLDE L4IN NONINSULATED EDGE

## (undated) DEVICE — COVER LT HNDL PLAS RIG 1 PER PK

## (undated) DEVICE — Z DISCONTINUED USE 2744636  DRESSING AQUACEL 14 IN ALG W3.5XL14IN POLYUR FLM CVR W/ HYDRCOLL

## (undated) DEVICE — 6619 2 PTNT ISO SYS INCISE AREA&LT;(&GT;&&LT;)&GT;P: Brand: STERI-DRAPE™ IOBAN™ 2

## (undated) DEVICE — ZIPPERED TOGA, LARGE: Brand: FLYTE

## (undated) DEVICE — PADDING CAST W6INXL4YD NONSTERILE COT RAYON MICROPLEATED

## (undated) DEVICE — 3M™ IOBAN™ 2 ANTIMICROBIAL INCISE DRAPE 6651EZ: Brand: IOBAN™ 2

## (undated) DEVICE — (D)SYR 10ML 1/5ML GRAD NSAF -- PKGING CHANGE USE ITEM 338027

## (undated) DEVICE — X-RAY SPONGES,16 PLY: Brand: DERMACEA

## (undated) DEVICE — SOLIDIFIER MEDC 1200ML -- CONVERT TO 356117

## (undated) DEVICE — YANKAUER,OPEN TIP,W/O VENT,STERILE: Brand: MEDLINE INDUSTRIES, INC.

## (undated) DEVICE — HOOD, PEEL-AWAY: Brand: FLYTE

## (undated) DEVICE — SUTURE VCRL SZ 0 L27IN ABSRB UD L36MM CT-1 1/2 CIR J260H

## (undated) DEVICE — SUTURE MCRYL SZ 3-0 L27IN ABSRB UD L24MM PS-1 3/8 CIR PRIM Y936H

## (undated) DEVICE — DRESSING HYDROFIBER AQUACEL AG ADVANTAGE 3.5X14 IN

## (undated) DEVICE — NDL FLTR TIP 5 MIC 18GX1.5IN --

## (undated) DEVICE — CANNULA INJ L2.5IN BLNT TIP 3MM CLR BODY W/ 1 W VLV DLP

## (undated) DEVICE — HYPODERMIC SAFETY NEEDLE: Brand: MAGELLAN

## (undated) DEVICE — STERILE-Z SURGICAL PATIENT COVERS CLEAR POLYETHYLENE STERILE UNIVERSAL FIT 10 PER CASE: Brand: STERILE-Z

## (undated) DEVICE — SUTURE VCRL SZ 2-0 L36IN ABSRB UD L40MM CT 1/2 CIR J957H

## (undated) DEVICE — PROBE SURG L95IN TIP L7MM S STL CERV BALL TIP ANG SGL END

## (undated) DEVICE — 3M™ TEGADERM™ TRANSPARENT FILM DRESSING FRAME STYLE, 1624W, 2-3/8 IN X 2-3/4 IN (6 CM X 7 CM), 100/CT 4CT/CASE: Brand: 3M™ TEGADERM™

## (undated) DEVICE — SUTURE MCRYL SZ 4-0 L27IN ABSRB UD L19MM PS-2 1/2 CIR PRIM Y426H

## (undated) DEVICE — SUTURE ABSORBABLE BRAIDED 2-0 CT-1 27 IN UD VICRYL J259H

## (undated) DEVICE — CANN VES 3MM 6.4CM --

## (undated) DEVICE — SYSTEM NAVIGATION PALM SZ PRECIS ALIGN TECHNOLOGY DISP FOR

## (undated) DEVICE — ERASECAUTI INTERMIT TRAY: Brand: MEDLINE INDUSTRIES, INC.

## (undated) DEVICE — TOOL 14MH30 LEGEND 14CM 3MM: Brand: MIDAS REX ™

## (undated) DEVICE — GOWN,PREVENTION PLUS,XLN/2XL,ST,22/CS: Brand: MEDLINE

## (undated) DEVICE — ADULT SPO2 SENSOR: Brand: NELLCOR

## (undated) DEVICE — 1LYRTR 16FR10ML100%SIL UMS SNP: Brand: MEDLINE INDUSTRIES, INC.

## (undated) DEVICE — CONTAINER,SPECIMEN,3OZ,OR STRL: Brand: MEDLINE

## (undated) DEVICE — BLADE,CARBON-STEEL,11,STRL,DISPOSABLE,TB: Brand: MEDLINE

## (undated) DEVICE — GAMMEX® NON-LATEX PI ORTHO SIZE 8, STERILE POLYISOPRENE POWDER-FREE SURGICAL GLOVE: Brand: GAMMEX

## (undated) DEVICE — KIT EVAC 0.13IN RECT TB DIA10FR 400CC PVC 3 SPR Y CONN DRN

## (undated) DEVICE — DRESSING ANTIMIC FOAM MEPILEX BORD POSTOP AG PROD SZ 4X12 IN

## (undated) DEVICE — CONTAINER,SPECIMEN,4OZ,OR STRL: Brand: MEDLINE

## (undated) DEVICE — Z CONVERTED USE 2271043 CONTAINER SPEC COLL 4OZ SCR ON LID PEEL PCH

## (undated) DEVICE — ELECTRODE PT RET AD L9FT HI MOIST COND ADH HYDRGEL CORDED

## (undated) DEVICE — SOL INJ SOD CL 0.9% 250ML BG --

## (undated) DEVICE — TUBING, SUCTION, 9/32" X 12', STRAIGHT: Brand: MEDLINE INDUSTRIES, INC.

## (undated) DEVICE — PREP SKN PREVAIL 40ML APPL --

## (undated) DEVICE — GLOVE ORANGE PI 7 1/2   MSG9075

## (undated) DEVICE — GAUZE SPNG XRAY 4X4IN 16PLY -- STRL

## (undated) DEVICE — INSERT TIB SZ 6 THK8MM KNEE POST STBL FIX BEAR ATTUNE: Type: IMPLANTABLE DEVICE | Site: KNEE | Status: NON-FUNCTIONAL

## (undated) DEVICE — SYRINGE MED 20ML STD CLR PLAS LUERLOCK TIP N CTRL DISP